# Patient Record
Sex: FEMALE | Race: WHITE | Employment: PART TIME | URBAN - METROPOLITAN AREA
[De-identification: names, ages, dates, MRNs, and addresses within clinical notes are randomized per-mention and may not be internally consistent; named-entity substitution may affect disease eponyms.]

---

## 2017-01-02 ENCOUNTER — MYC MEDICAL ADVICE (OUTPATIENT)
Dept: FAMILY MEDICINE | Facility: CLINIC | Age: 42
End: 2017-01-02

## 2017-03-03 ENCOUNTER — OFFICE VISIT (OUTPATIENT)
Dept: FAMILY MEDICINE | Facility: CLINIC | Age: 42
End: 2017-03-03
Payer: MEDICARE

## 2017-03-03 VITALS
TEMPERATURE: 98 F | DIASTOLIC BLOOD PRESSURE: 74 MMHG | HEART RATE: 94 BPM | RESPIRATION RATE: 12 BRPM | HEIGHT: 63 IN | SYSTOLIC BLOOD PRESSURE: 116 MMHG | WEIGHT: 209 LBS | BODY MASS INDEX: 37.03 KG/M2

## 2017-03-03 DIAGNOSIS — B07.0 PLANTAR WARTS: Primary | ICD-10-CM

## 2017-03-03 DIAGNOSIS — G43.109 MIGRAINE WITH AURA AND WITHOUT STATUS MIGRAINOSUS, NOT INTRACTABLE: ICD-10-CM

## 2017-03-03 DIAGNOSIS — D22.9 NEVUS: ICD-10-CM

## 2017-03-03 DIAGNOSIS — J30.89 PERENNIAL ALLERGIC RHINITIS, UNSPECIFIED ALLERGIC RHINITIS TRIGGER: ICD-10-CM

## 2017-03-03 PROCEDURE — 99212 OFFICE O/P EST SF 10 MIN: CPT | Mod: 25 | Performed by: NURSE PRACTITIONER

## 2017-03-03 PROCEDURE — 17110 DESTRUCTION B9 LES UP TO 14: CPT | Performed by: NURSE PRACTITIONER

## 2017-03-03 RX ORDER — FLUTICASONE PROPIONATE 50 MCG
2 SPRAY, SUSPENSION (ML) NASAL DAILY
Qty: 1 G | Refills: 11 | Status: SHIPPED | OUTPATIENT
Start: 2017-03-03 | End: 2018-01-28

## 2017-03-03 RX ORDER — RIZATRIPTAN BENZOATE 5 MG/1
TABLET ORAL
Qty: 30 TABLET | Refills: 6 | Status: SHIPPED | OUTPATIENT
Start: 2017-03-03 | End: 2017-09-25

## 2017-03-03 NOTE — PROGRESS NOTES
"  SUBJECTIVE:                                                    Prince Patel is a 41 year old female who presents to clinic today for the following health issues:    WART(S)      Onset: 1 month    Description (location/number): both feet    Accompanying signs and symptoms: Painful: no    History: prior warts: YES    Therapies tried and outcome: soaking feet     Prince reports warts on both feet and mole on top of R shoulder that has been \"breaking open and bleeding\" over the past month.    Problem list and histories reviewed & adjusted, as indicated.  Additional history: as documented    Patient Active Problem List   Diagnosis     Allergic rhinitis     Sprain and strain of unspecified site of knee and leg     Obesity     CARDIOVASCULAR SCREENING; LDL GOAL LESS THAN 160     S/P partial hysterectomy     Intertrigo     Migraine with aura and without status migrainosus, not intractable     Past Surgical History   Procedure Laterality Date     Surgical history of -        ingrown toenails removed     Orthopedic surgery       Toenail removal       Laparoscopic hysterectomy supracervical  11/16/2011     Procedure:LAPAROSCOPIC HYSTERECTOMY SUPRACERVICAL; LAPAROSCOPIC HYSTERECTOMY SUPRACERVICAL ; Surgeon:MASOUD WHITE; Location: OR       Social History   Substance Use Topics     Smoking status: Never Smoker     Smokeless tobacco: Never Used     Alcohol use No     Family History   Problem Relation Age of Onset     DIABETES Maternal Aunt      C.A.D. Father      MI, angioplasty, 50s     DIABETES Father      Diabetes     Neurologic Disorder Paternal Grandmother      GASTROINTESTINAL DISEASE Paternal Grandmother      Gluten Intolerance      DIABETES Maternal Grandmother      Multiple Sclerosis         Current Outpatient Prescriptions   Medication Sig Dispense Refill     hydrocortisone (CORTAID) 1 % cream Apply topically 2 times daily 30 g 0     rizatriptan (MAXALT) 5 MG tablet Take  by mouth. ONE TABLET WITH ONSET OF " "MIGRAINE, MAY REPEAT ONCE AFTER 2 HOURS. DO NOT EXCEED 6 TABLETS IN 24 HOURS. 30 tablet 6     fluticasone (FLONASE) 50 MCG/ACT nasal spray Spray 2 sprays into both nostrils daily 1 g 11     fexofenadine (ALLEGRA) 180 MG tablet Take 1 tablet (180 mg) by mouth daily 31 tablet 8     ibuprofen (ADVIL,MOTRIN) 800 MG tablet Take 1 tablet by mouth every 6 hours as needed for pain (For mild pain and temperature greater than 102F). 30 tablet 0     MULTIVITAMINS OR TABS 1 tab qd as directed  0     dicyclomine (BENTYL) 20 MG tablet Take 1 tablet (20 mg) by mouth 4 times daily as needed (Patient not taking: Reported on 3/3/2017) 40 tablet 1     ciprofloxacin (CIPRO) 500 MG tablet Take 1 tablet (500 mg) by mouth 2 times daily (Patient not taking: Reported on 3/3/2017) 14 tablet 0     Allergies   Allergen Reactions     Keflex [Cephalexin] Rash       Reviewed and updated as needed this visit by clinical staff  Tobacco  Allergies  Med Hx  Surg Hx  Fam Hx  Soc Hx      Reviewed and updated as needed this visit by Provider         ROS:  Constitutional, HEENT, cardiovascular, pulmonary, GI, , musculoskeletal, neuro, skin, endocrine and psych systems are negative, except as otherwise noted.    This document serves as a record of the services and decisions personally performed and made by Susan Haase, CNP. It was created on her behalf by Sheyla Ivory, a trained medical scribe. The creation of this document is based the provider's statements to the medical scribe.  Sheyla Ivory March 3, 2017 3:18 PM     OBJECTIVE:                                                    /74 (BP Location: Left arm, Patient Position: Chair, Cuff Size: Adult Regular)  Pulse 94  Temp 98  F (36.7  C) (Oral)  Resp 12  Ht 1.6 m (5' 3\")  Wt 94.8 kg (209 lb)  LMP 10/31/2011  BMI 37.02 kg/m2  Body mass index is 37.02 kg/(m^2).     GENERAL: healthy, alert and no distress, morbidly obese   SKIN: verruca vulgaris on lateral aspect R foot near 5th toe w/ " callus surrounding, 10 mm flesh colored mole with dark brown spot in the center, slightly raised.    NEURO: Normal strength and tone, mentation intact and speech normal  PSYCH: mentation appears normal, affect normal/bright    Diagnostic Test Results:  none      ASSESSMENT/PLAN:                                                    Prince was seen today for wart.    Diagnoses and all orders for this visit:    Plantar warts  -     DESTRUCT BENIGN LESION, UP TO 14  Procedure explained, verbal consent given, cleansed with alcohol, pared with #11 blade, liquid nitrogen applied x 3 pulses (3 seconds each). bandaide placed.  Explained that may take more than 1 treatment, keep covered for next 24 hours, if wart remains return in 3-4 weeks for an additional treatment.    Nevus:  Will monitor for the next 3-4weeks if continues to have bleeding, changes will remove.     Perennial allergic rhinitis, unspecified allergic rhinitis trigger    fluticasone (FLONASE) 50 MCG/ACT spray; Spray 2 sprays into both nostrils daily    Migraine with aura and without status migrainosus, not intractable  -     rizatriptan (MAXALT) 5 MG tablet; Take  by mouth. ONE TABLET WITH ONSET OF MIGRAINE, MAY REPEAT ONCE AFTER 2 HOURS. DO NOT EXCEED 6 TABLETS IN 24 HOURS.      Patient Instructions     Follow up in 3-4 weeks.      The information in this document, created by the medical scribe for me, accurately reflects the services I personally performed and the decisions made by me. I have reviewed and approved this document for accuracy.   Susan Haase, CNP Susan Haase, APRN CNP  [facility]

## 2017-03-03 NOTE — NURSING NOTE
"Chief Complaint   Patient presents with     Wart       Initial /74 (BP Location: Left arm, Patient Position: Chair, Cuff Size: Adult Regular)  Pulse 94  Temp 98  F (36.7  C) (Oral)  Resp 12  Ht 5' 3\" (1.6 m)  Wt 209 lb (94.8 kg)  LMP 10/31/2011  BMI 37.02 kg/m2 Estimated body mass index is 37.02 kg/(m^2) as calculated from the following:    Height as of this encounter: 5' 3\" (1.6 m).    Weight as of this encounter: 209 lb (94.8 kg).  Medication Reconciliation: complete   Lisseth Vanessa CMA      "

## 2017-03-03 NOTE — MR AVS SNAPSHOT
"              After Visit Summary   3/3/2017    Prince Patel    MRN: [id number]           Patient Information     Date Of Birth          1975        Visit Information        Provider Department      3/3/2017 3:30 PM Haase, Susan Rachele, APRN CNP [facility]        Care Instructions      Follow up in 3-4 weeks.        Follow-ups after your visit        Follow-up notes from your care team     Return in about 4 weeks (around 3/31/2017).      Who to contact     If you have questions or need follow up information about today's clinic visit or your schedule please contact [facility] directly at [phone].  Normal or non-critical lab and imaging results will be communicated to you by MyChart, letter or phone within 4 business days after the clinic has received the results. If you do not hear from us within 7 days, please contact the clinic through Open Milehart or phone. If you have a critical or abnormal lab result, we will notify you by phone as soon as possible.  Submit refill requests through travelmob or call your pharmacy and they will forward the refill request to us. Please allow 3 business days for your refill to be completed.          Additional Information About Your Visit        MyChart Information     travelmob gives you secure access to your electronic health record. If you see a primary care provider, you can also send messages to your care team and make appointments. If you have questions, please call your primary care clinic.  If you do not have a primary care provider, please call [phone] and they will assist you.        Care EveryWhere ID     This is your Care EveryWhere ID. This could be used by other organizations to access your [place] medical records  [id number]        Your Vitals Were     Pulse Temperature Respirations Height Last Period BMI (Body Mass Index)    94 98  F (36.7  C) (Oral) 12 5' 3\" (1.6 m) 10/31/2011 37.02 kg/m2       Blood Pressure from " Last 3 Encounters:   03/03/17 116/74   12/16/16 116/88   12/05/16 124/86    Weight from Last 3 Encounters:   03/03/17 209 lb (94.8 kg)   12/16/16 199 lb (90.3 kg)   12/05/16 203 lb (92.1 kg)              Today, you had the following     No orders found for display       Primary Care Provider Office Phone # Fax #    Susan Rachele Haase ELY [phone]       [facility] [address] [address] [facility] [address]        Thank you!     Thank you for choosing [facility]  for your care. Our goal is always to provide you with excellent care. Hearing back from our patients is one way we can continue to improve our services. Please take a few minutes to complete the written survey that you may receive in the mail after your visit with us. Thank you!             Your Updated Medication List - Protect others around you: Learn how to safely use, store and throw away your medicines at www.disposemymeds.org.          This list is accurate as of: 3/3/17  3:39 PM.  Always use your most recent med list.                   Brand Name Dispense Instructions for use    dicyclomine 20 MG tablet    BENTYL    40 tablet    Take 1 tablet (20 mg) by mouth 4 times daily as needed       fexofenadine 180 MG tablet    ALLEGRA    31 tablet    Take 1 tablet (180 mg) by mouth daily       fluticasone 50 MCG/ACT spray    FLONASE    1 g    Spray 2 sprays into both nostrils daily       hydrocortisone 1 % cream    CORTAID    30 g    Apply topically 2 times daily       ibuprofen 800 MG tablet    ADVIL/MOTRIN    30 tablet    Take 1 tablet by mouth every 6 hours as needed for pain (For mild pain and temperature greater than 102F).       multivitamin per tablet      1 tab qd as directed       rizatriptan 5 MG tablet    MAXALT    30 tablet    Take  by mouth. ONE TABLET WITH ONSET OF MIGRAINE, MAY REPEAT ONCE AFTER 2 HOURS. DO NOT EXCEED 6 TABLETS IN 24 HOURS.

## 2017-03-31 ENCOUNTER — OFFICE VISIT (OUTPATIENT)
Dept: FAMILY MEDICINE | Facility: CLINIC | Age: 42
End: 2017-03-31
Payer: MEDICARE

## 2017-03-31 VITALS
TEMPERATURE: 98.1 F | DIASTOLIC BLOOD PRESSURE: 76 MMHG | BODY MASS INDEX: 36.99 KG/M2 | OXYGEN SATURATION: 98 % | SYSTOLIC BLOOD PRESSURE: 112 MMHG | HEART RATE: 85 BPM | WEIGHT: 208.8 LBS

## 2017-03-31 DIAGNOSIS — R05.9 COUGH: ICD-10-CM

## 2017-03-31 DIAGNOSIS — D22.9 ATYPICAL NEVUS: Primary | ICD-10-CM

## 2017-03-31 DIAGNOSIS — J06.9 VIRAL URI WITH COUGH: ICD-10-CM

## 2017-03-31 PROCEDURE — 99213 OFFICE O/P EST LOW 20 MIN: CPT | Mod: 25 | Performed by: NURSE PRACTITIONER

## 2017-03-31 PROCEDURE — 11300 SHAVE SKIN LESION 0.5 CM/<: CPT | Performed by: NURSE PRACTITIONER

## 2017-03-31 PROCEDURE — 88305 TISSUE EXAM BY PATHOLOGIST: CPT | Performed by: NURSE PRACTITIONER

## 2017-03-31 PROCEDURE — 88305 TISSUE EXAM BY PATHOLOGIST: CPT | Mod: 26 | Performed by: NURSE PRACTITIONER

## 2017-03-31 RX ORDER — BENZONATATE 200 MG/1
200 CAPSULE ORAL 3 TIMES DAILY PRN
Qty: 21 CAPSULE | Refills: 0 | Status: SHIPPED | OUTPATIENT
Start: 2017-03-31 | End: 2017-09-25

## 2017-03-31 NOTE — PROGRESS NOTES
SUBJECTIVE:                                                    Prince Patel is a 41 year old female who presents to clinic today for the following health issues:    Pt is here for a mole removal, has had a mole on the right upper back for several months, slightly larger in size.      Cough:  Has had cold symptoms for the past week, cough remains.  Dry and frequent cough, increased at the night.  Denies shortness of breath, chest pain or fever.       Problem list and histories reviewed & adjusted, as indicated.  Additional history: as documented    Patient Active Problem List   Diagnosis     Allergic rhinitis     Sprain and strain of unspecified site of knee and leg     Obesity     CARDIOVASCULAR SCREENING; LDL GOAL LESS THAN 160     S/P partial hysterectomy     Intertrigo     Migraine with aura and without status migrainosus, not intractable     Past Surgical History:   Procedure Laterality Date     LAPAROSCOPIC HYSTERECTOMY SUPRACERVICAL  11/16/2011    Procedure:LAPAROSCOPIC HYSTERECTOMY SUPRACERVICAL; LAPAROSCOPIC HYSTERECTOMY SUPRACERVICAL ; Surgeon:MASOUD WHITE; Location:[facility] OR     ORTHOPEDIC SURGERY       SURGICAL HISTORY OF -       ingrown toenails removed     toenail removal         Social History   Substance Use Topics     Smoking status: Never Smoker     Smokeless tobacco: Never Used     Alcohol use No     Family History   Problem Relation Age of Onset     DIABETES Maternal Aunt      C.A.D. Father      MI, angioplasty, 50s     DIABETES Father      Diabetes     Neurologic Disorder Paternal Grandmother      GASTROINTESTINAL DISEASE Paternal Grandmother      Gluten Intolerance      DIABETES Maternal Grandmother      Multiple Sclerosis         Current Outpatient Prescriptions   Medication Sig Dispense Refill     benzonatate (TESSALON) 200 MG capsule Take 1 capsule (200 mg) by mouth 3 times daily as needed for cough 21 capsule 0     rizatriptan (MAXALT) 5 MG tablet Take  by mouth. ONE TABLET WITH ONSET  OF MIGRAINE, MAY REPEAT ONCE AFTER 2 HOURS. DO NOT EXCEED 6 TABLETS IN 24 HOURS. 30 tablet 6     fluticasone (FLONASE) 50 MCG/ACT spray Spray 2 sprays into both nostrils daily 1 g 11     dicyclomine (BENTYL) 20 MG tablet Take 1 tablet (20 mg) by mouth 4 times daily as needed 40 tablet 1     hydrocortisone (CORTAID) 1 % cream Apply topically 2 times daily 30 g 0     fexofenadine (ALLEGRA) 180 MG tablet Take 1 tablet (180 mg) by mouth daily 31 tablet 8     ibuprofen (ADVIL,MOTRIN) 800 MG tablet Take 1 tablet by mouth every 6 hours as needed for pain (For mild pain and temperature greater than 102F). 30 tablet 0     MULTIVITAMINS OR TABS 1 tab qd as directed  0     Allergies   Allergen Reactions     Keflex [Cephalexin] Rash       Reviewed and updated as needed this visit by clinical staff       Reviewed and updated as needed this visit by Provider         ROS:  C: NEGATIVE for fever, chills, change in weight  INTEGUMENTARY/SKIN: see HPI  E/M: NEGATIVE for ear, mouth and throat problems  R: NEGATIVE for significant cough or SOB  CV: NEGATIVE for chest pain, palpitations or peripheral edema    OBJECTIVE:                                                    /76 (BP Location: Right arm, Patient Position: Chair, Cuff Size: Adult Regular)  Pulse 85  Temp 98.1  F (36.7  C) (Oral)  Wt 208 lb 12.8 oz (94.7 kg)  LMP 10/31/2011  SpO2 98%  BMI 36.99 kg/m2  Body mass index is 36.99 kg/(m^2).   GENERAL: healthy, alert and no distress  NECK: no adenopathy, no asymmetry, masses, or scars and thyroid normal to palpation  RESP: lungs clear to auscultation - no rales, rhonchi or wheezes  CV: regular rate and rhythm, normal S1 S2, no S3 or S4, no murmur, click or rub, no peripheral edema   SKIN: right upper back with a 5 mm nevus, slightly raised.          ASSESSMENT:                                                    See below     PLAN:                                                    Prince was seen today for  mole.    Diagnoses and all orders for this visit:    Atypical nevus  -     Surgical pathology exam  -     BIOPSY SKIN/SUBQ/MUC MEM, SINGLE LESION    Skin Biopsy Procedure Note: right upper back    Consent: Affirmation of informed consent was signed and scanned into the medical record. Risks, benefits and alternatives were discussed. Patient's questions were elicited and answered.     Technique:   Skin prep Betadine  Anesthesia 0.5 cc 1% lidocaine, with epi   Biopsy size 0.5 cm  Biopsy taken via (shave, punch, incisional) shave  Suture  none  Hemostasis  monopolar cautery      EBL:    0  Complications:  No  Tolerance:   Pt tolerated procedure well and was in stable condition.   Pathology sent Yes    Instructions:    Pt should keep dressing in place for 24 hours, then may change and apply antibiotic ointment and simple bandage. May shower after 24 hours.  Pt was instructed to call if bleeding, severe pain or foul smell.   Follow up only if unimproved.    Viral URI with cough  -     benzonatate (TESSALON) 200 MG capsule; Take 1 capsule (200 mg) by mouth 3 times daily as needed for cough        FUTURE APPOINTMENTS:       - Follow-up visit as needed    Susan Haase, APRN [facility]

## 2017-03-31 NOTE — MR AVS SNAPSHOT
After Visit Summary   3/31/2017    Prince Patel    MRN: [id number]           Patient Information     Date Of Birth          1975        Visit Information        Provider Department      3/31/2017 3:30 PM Haase, Susan Rachele, APRN CNP [facility]        Today's Diagnoses     Cough    -  1       Follow-ups after your visit        Who to contact     If you have questions or need follow up information about today's clinic visit or your schedule please contact [facility] directly at [phone].  Normal or non-critical lab and imaging results will be communicated to you by Bypass Mobilehart, letter or phone within 4 business days after the clinic has received the results. If you do not hear from us within 7 days, please contact the clinic through myTomorrowst or phone. If you have a critical or abnormal lab result, we will notify you by phone as soon as possible.  Submit refill requests through 1SDK or call your pharmacy and they will forward the refill request to us. Please allow 3 business days for your refill to be completed.          Additional Information About Your Visit        MyChart Information     1SDK gives you secure access to your electronic health record. If you see a primary care provider, you can also send messages to your care team and make appointments. If you have questions, please call your primary care clinic.  If you do not have a primary care provider, please call [phone] and they will assist you.        Care EveryWhere ID     This is your Care EveryWhere ID. This could be used by other organizations to access your [place] medical records  [id number]        Your Vitals Were     Pulse Temperature Last Period Pulse Oximetry BMI (Body Mass Index)       85 98.1  F (36.7  C) (Oral) 10/31/2011 98% 36.99 kg/m2        Blood Pressure from Last 3 Encounters:   03/31/17 112/76   03/03/17 116/74   12/16/16 116/88    Weight from Last 3 Encounters:    03/31/17 208 lb 12.8 oz (94.7 kg)   03/03/17 209 lb (94.8 kg)   12/16/16 199 lb (90.3 kg)              Today, you had the following     No orders found for display         Today's Medication Changes          These changes are accurate as of: 3/31/17  3:51 PM.  If you have any questions, ask your nurse or doctor.               Start taking these medicines.        Dose/Directions    benzonatate 200 MG capsule   Commonly known as:  TESSALON   Used for:  Cough   Started by:  Haase, Susan Rachele, APRN CNP        Dose:  200 mg   Take 1 capsule (200 mg) by mouth 3 times daily as needed for cough   Quantity:  21 capsule   Refills:  0            Where to get your medicines      These medications were sent to [facility]/pharmacy #[id number] - [facility] [address]  [address] [facility] [address]     Phone:  [phone]     benzonatate 200 MG capsule                Primary Care Provider Office Phone # Fax #    Susan Rachele Haase, APRN -[phone]       [facility] [id number] [place] [address]        Thank you!     Thank you for choosing [facility]  for your care. Our goal is always to provide you with excellent care. Hearing back from our patients is one way we can continue to improve our services. Please take a few minutes to complete the written survey that you may receive in the mail after your visit with us. Thank you!             Your Updated Medication List - Protect others around you: Learn how to safely use, store and throw away your medicines at www.disposemymeds.org.          This list is accurate as of: 3/31/17  3:51 PM.  Always use your most recent med list.                   Brand Name Dispense Instructions for use    benzonatate 200 MG capsule    TESSALON    21 capsule    Take 1 capsule (200 mg) by mouth 3 times daily as needed for cough       dicyclomine 20 MG tablet    BENTYL    40 tablet    Take 1 tablet (20 mg) by mouth 4 times  daily as needed       fexofenadine 180 MG tablet    ALLEGRA    31 tablet    Take 1 tablet (180 mg) by mouth daily       fluticasone 50 MCG/ACT spray    FLONASE    1 g    Spray 2 sprays into both nostrils daily       hydrocortisone 1 % cream    CORTAID    30 g    Apply topically 2 times daily       ibuprofen 800 MG tablet    ADVIL/MOTRIN    30 tablet    Take 1 tablet by mouth every 6 hours as needed for pain (For mild pain and temperature greater than 102F).       multivitamin per tablet      1 tab qd as directed       rizatriptan 5 MG tablet    MAXALT    30 tablet    Take  by mouth. ONE TABLET WITH ONSET OF MIGRAINE, MAY REPEAT ONCE AFTER 2 HOURS. DO NOT EXCEED 6 TABLETS IN 24 HOURS.

## 2017-03-31 NOTE — NURSING NOTE
"Chief Complaint   Patient presents with     Mole       Initial /76 (BP Location: Right arm, Patient Position: Chair, Cuff Size: Adult Regular)  Pulse 85  Temp 98.1  F (36.7  C) (Oral)  Wt 208 lb 12.8 oz (94.7 kg)  LMP 10/31/2011  SpO2 98%  BMI 36.99 kg/m2 Estimated body mass index is 36.99 kg/(m^2) as calculated from the following:    Height as of 3/3/17: 5' 3\" (1.6 m).    Weight as of this encounter: 208 lb 12.8 oz (94.7 kg).  BP completed using cuff size: regular rick Gallegos MA    "

## 2017-04-04 LAB — COPATH REPORT: NORMAL

## 2017-04-05 NOTE — PROGRESS NOTES
Ryan Morrison,  The skin lesion was a normal mole, no concerns.  Please let me know if you have any questions.  Sincerely,     Susan Haase, CNP

## 2017-08-21 ENCOUNTER — TELEPHONE (OUTPATIENT)
Dept: FAMILY MEDICINE | Facility: CLINIC | Age: 42
End: 2017-08-21

## 2017-08-21 DIAGNOSIS — L30.4 INTERTRIGO: ICD-10-CM

## 2017-08-21 NOTE — TELEPHONE ENCOUNTER
Ph. [phone] after 2:30    Reason for call:  Symptom   Symptom or request: return of yeast infection    Duration (how long have symptoms been present):   Have you been treated for this before? Yes    Additional comments: Patient states Susan Haase had her using foot powder but now the infection seems to have returned.    Please advise      Phone number to reach patient:  Home number on file [phone] (home)    Best Time:  After 2:30    Can we leave a detailed message on this number?  YES     Nalini Prieto

## 2017-08-22 RX ORDER — NYSTATIN 100000 [USP'U]/G
POWDER TOPICAL
Qty: 1 BOTTLE | Refills: 11 | Status: SHIPPED | OUTPATIENT
Start: 2017-08-22 | End: 2018-05-07

## 2017-08-22 NOTE — TELEPHONE ENCOUNTER
Patient returned call to [facility].  I advised patient that a prescription was sent to [facility] pharmacy.    Umu Conley/

## 2017-09-25 ENCOUNTER — RADIANT APPOINTMENT (OUTPATIENT)
Dept: GENERAL RADIOLOGY | Facility: CLINIC | Age: 42
End: 2017-09-25
Attending: NURSE PRACTITIONER
Payer: MEDICARE

## 2017-09-25 ENCOUNTER — OFFICE VISIT (OUTPATIENT)
Dept: FAMILY MEDICINE | Facility: CLINIC | Age: 42
End: 2017-09-25
Payer: MEDICARE

## 2017-09-25 VITALS
DIASTOLIC BLOOD PRESSURE: 74 MMHG | RESPIRATION RATE: 12 BRPM | OXYGEN SATURATION: 100 % | HEART RATE: 84 BPM | TEMPERATURE: 98.3 F | WEIGHT: 210 LBS | SYSTOLIC BLOOD PRESSURE: 118 MMHG | BODY MASS INDEX: 37.2 KG/M2

## 2017-09-25 DIAGNOSIS — Z12.31 ENCOUNTER FOR SCREENING MAMMOGRAM FOR MALIGNANT NEOPLASM OF BREAST: ICD-10-CM

## 2017-09-25 DIAGNOSIS — M25.561 ACUTE PAIN OF RIGHT KNEE: Primary | ICD-10-CM

## 2017-09-25 DIAGNOSIS — G43.109 MIGRAINE WITH AURA AND WITHOUT STATUS MIGRAINOSUS, NOT INTRACTABLE: ICD-10-CM

## 2017-09-25 DIAGNOSIS — Z12.39 BREAST CANCER SCREENING: ICD-10-CM

## 2017-09-25 DIAGNOSIS — M25.561 ACUTE PAIN OF RIGHT KNEE: ICD-10-CM

## 2017-09-25 PROCEDURE — 73562 X-RAY EXAM OF KNEE 3: CPT | Mod: RT

## 2017-09-25 PROCEDURE — 99213 OFFICE O/P EST LOW 20 MIN: CPT | Performed by: NURSE PRACTITIONER

## 2017-09-25 RX ORDER — RIZATRIPTAN BENZOATE 5 MG/1
TABLET ORAL
Qty: 30 TABLET | Refills: 6 | Status: SHIPPED | OUTPATIENT
Start: 2017-09-25 | End: 2018-05-07 | Stop reason: ALTCHOICE

## 2017-09-25 NOTE — MR AVS SNAPSHOT
After Visit Summary   9/25/2017    Prince Patel    MRN: [id number]           Patient Information     Date Of Birth          1975        Visit Information        Provider Department      9/25/2017 1:30 PM Haase, Susan Rachele, APRN [facility]        Today's Diagnoses     Acute pain of right knee    -  1    Migraine with aura and without status migrainosus, not intractable          Care Instructions      Knee Pain  Knee pain is very common. It s especially common in active people who put a lot of pressure on their knees, like runners. It affects women more often than men.  Your kneecap (patella) is a thick, round bone. It covers and protects the front portion of your knee joint. It moves along a groove in your thighbone (femur) as part of the patellofemoral joint. A layer of cartilage surrounds the underside of your kneecap. This layer protects it from grinding against your femur.  When this cartilage softens and breaks down, it can cause knee pain. This is partly because of repetitive stress. The stress irritates the lining of the joint. This causes pain in the underlying bone.  What causes knee pain?  Many things can cause knee pain. You may have more than one cause. Some of these include:    Overuse of the knee joint    The kneecap doesn t line up with the tissue around it    Damage to small nerves in the area    Damage to the ligament-like structure that holds the kneecap in place (retinaculum)    Breakdown of the bone under the cartilage    Swelling in the soft tissues around the kneecap    Injury  You might be more likely to have knee pain if you:    Exercise a lot    Recently increased the intensity of your workouts    Have a body mass index (BMI) greater than 25    Have poor alignment of your kneecap    Walk with your feet turned overly outward or inward    Have weakness in surrounding muscle groups (inner quad or hip adductor muscles)    Have too much tightness in  surrounding muscle groups (hamstrings or iliotibial band)    Have a recent history of injury to the area    Are female  Symptoms of knee pain  This type of knee pain is a dull, aching pain in the front of the knee in the area under and around the kneecap. This pain may start quickly or slowly. Your pain might be worse when you squat, run, or sit for a long time. You might also sometimes feel like your knee is giving out. You may have symptoms in one or both of your knees.  Diagnosing knee pain  Your healthcare provider will ask about your medical history and your symptoms. Be sure to describe any activities that make your knee pain worse. He or she will look at your knee. This will include tests of your range of motion, strength, and areas of pain of your knee. Your knee alignment will be checked.  Your healthcare provider will need to rule out other causes of your knee pain, such as arthritis. You may need an imaging test, such as an X-ray or MRI.  Treatment for knee pain  Treatments that can help ease your symptoms may include:    Avoiding activities for a while that make your pain worse, returning to activity over time    Icing the outside of your knee when it causes you pain    Taking over-the-counter pain medicine    Wearing a knee brace or taping your knee to support it    Wearing special shoe inserts to help keep your feet in the proper alignment    Doing special exercises to stretch and strengthen the muscles around your hip and your knee  These steps help most people manage knee pain. But some cases of knee pain need to be treated with surgery. You may need surgery right away. Or you may need it later if other treatments don t work. Your healthcare provider may refer you to an orthopedic surgeon. He or she will talk with you about your choices.  Preventing knee pain  Losing weight and correcting excess muscle tightness or muscle weakness may help lower your risk.  In some cases, you can prevent knee pain.  To help prevent a flare-up of knee pain, you do these things:    Regularly do all the exercises your doctor or physical therapist advises    Support your knee as advised by your doctor or physical therapist    Increase training gradually, and ease up on training when needed    Have an expert check your gait for running or other sporting activities    Stretch properly before and after exercise    Replace your running shoes regularly    Lose excess weight     When to call your healthcare provider  Call your healthcare provider right away if:    Your symptoms don t get better after a few weeks of treatment    You have any new symptoms   Date Last Reviewed: 4/1/2017 2000-2017 [employer]. [address] [place] [address]. All rights reserved. This information is not intended as a substitute for professional medical care. Always follow your healthcare professional's instructions.                Follow-ups after your visit        Follow-up notes from your care team     Return if symptoms worsen or fail to improve.      Your next 10 appointments already scheduled     Sep 25, 2017  1:30 PM CDT   SHORT with Susan Rachele Haase, APRN CNP   [facility] ([facility])    [address] [place] [id number] [phone]              Who to contact     If you have questions or need follow up information about today's clinic visit or your schedule please contact [facility] directly at [phone].  Normal or non-critical lab and imaging results will be communicated to you by MyChart, letter or phone within 4 business days after the clinic has received the results. If you do not hear from us within 7 days, please contact the clinic through MyChart or phone. If you have a critical or abnormal lab result, we will notify you by phone as soon as possible.  Submit refill requests through [employer] or call your pharmacy and they will forward  the refill request to us. Please allow 3 business days for your refill to be completed.          Additional Information About Your Visit        [address] Information     [employer] gives you secure access to your electronic health record. If you see a primary care provider, you can also send messages to your care team and make appointments. If you have questions, please call your primary care clinic.  If you do not have a primary care provider, please call [phone] and they will assist you.        Care EveryWhere ID     This is your Care EveryWhere ID. This could be used by other organizations to access your [facility] medical records  [id number]        Your Vitals Were     Pulse Temperature Respirations Last Period Pulse Oximetry BMI (Body Mass Index)    84 98.3  F (36.8  C) (Oral) 12 10/31/2011 100% 37.2 kg/m2       Blood Pressure from Last 3 Encounters:   09/25/17 118/74   03/31/17 112/76   03/03/17 116/74    Weight from Last 3 Encounters:   09/25/17 210 lb (95.3 kg)   03/31/17 208 lb 12.8 oz (94.7 kg)   03/03/17 209 lb (94.8 kg)                 Today's Medication Changes          These changes are accurate as of: 9/25/17  1:28 PM.  If you have any questions, ask your nurse or doctor.               Start taking these medicines.        Dose/Directions    order for DME   Used for:  Acute pain of right knee   Started by:  Haase, Susan Rachele, APRN CNP        DME:  Closed knee sleeve   Quantity:  1 Device   Refills:  0            Where to get your medicines      These medications were sent to [facility]/pharm[id number] [address]  [address] [facility] [address]     Phone:  [phone]     rizatriptan 5 MG tablet         Some of these will need a paper prescription and others can be bought over the counter.  Ask your nurse if you have questions.     Bring a paper prescription for each of these medications     order for DME                Primary Care Provider Office Phone # Fax #     Susan Rachele Haase, APRN [phone]       [address] [facility] [address]        Equal Access to Services     ROBERT HACKETT : Hadii aad ku hadviri Sofrankie, walada luqadaha, qaybta kaalmada pedro, hilda bishop laDannyamina elizondo. So [facility] [phone].    ATENCIÓN: Si habla español, tiene a ty disposición servicios gratuitos de asistencia lingüística. Llame al [phone].    We comply with applicable federal civil rights laws and [place] laws. We do not discriminate on the basis of race, color, national origin, age, disability sex, sexual orientation or gender identity.            Thank you!     Thank you for choosing [facility]  for your care. Our goal is always to provide you with excellent care. Hearing back from our patients is one way we can continue to improve our services. Please take a few minutes to complete the written survey that you may receive in the mail after your visit with us. Thank you!             Your Updated Medication List - Protect others around you: Learn how to safely use, store and throw away your medicines at www.disposemymeds.org.          This list is accurate as of: 9/25/17  1:28 PM.  Always use your most recent med list.                   Brand Name Dispense Instructions for use Diagnosis    fexofenadine 180 MG tablet    ALLEGRA    31 tablet    Take 1 tablet (180 mg) by mouth daily    Allergic rhinitis, cause unspecified       fluticasone 50 MCG/ACT spray    FLONASE    1 g    Spray 2 sprays into both nostrils daily    Perennial allergic rhinitis, unspecified allergic rhinitis trigger       hydrocortisone 1 % cream    CORTAID    30 g    Apply topically 2 times daily    External hemorrhoids       ibuprofen 800 MG tablet    ADVIL/MOTRIN    30 tablet    Take 1 tablet by mouth every 6 hours as needed for pain (For mild pain and temperature greater than 102F).    Excessive or frequent menstruation       multivitamin per tablet       1 tab qd as directed        nystatin 955080 UNIT/GM Powd    MYCOSTATIN    1 Bottle    Externally apply 1 dose topically 3 times daily as needed.    Intertrigo       order for DME     1 Device    DME:  Closed knee sleeve    Acute pain of right knee       rizatriptan 5 MG tablet    MAXALT    30 tablet    Take  by mouth. ONE TABLET WITH ONSET OF MIGRAINE, MAY REPEAT ONCE AFTER 2 HOURS. DO NOT EXCEED 6 TABLETS IN 24 HOURS.    Migraine with aura and without status migrainosus, not intractable

## 2017-09-25 NOTE — NURSING NOTE
"Chief Complaint   Patient presents with     Musculoskeletal Problem       Initial /74 (BP Location: Left arm, Patient Position: Chair, Cuff Size: Adult Large)  Pulse 84  Temp 98.3  F (36.8  C) (Oral)  Resp 12  Wt 210 lb (95.3 kg)  LMP 10/31/2011  SpO2 100%  BMI 37.2 kg/m2 Estimated body mass index is 37.2 kg/(m^2) as calculated from the following:    Height as of 3/3/17: 5' 3\" (1.6 m).    Weight as of this encounter: 210 lb (95.3 kg).  Medication Reconciliation: complete   Lisseth Vanessa CMA      "

## 2017-09-25 NOTE — PROGRESS NOTES
SUBJECTIVE:   Prince Patel is a 42 year old female who presents to clinic today for the following health issues:    Musculoskeletal problem/pain      Duration: 1 week    Description  Location: R knee    Intensity:  mild    Accompanying signs and symptoms: popping sound    History  Previous similar problem: no   Previous evaluation:  none    Precipitating or alleviating factors:  Trauma or overuse: no   Aggravating factors include: climbing stairs    Therapies tried and outcome: acetaminophen    Gradual onset of popping noise of R knee. The popping noise is often occurring. She walks often for work, around 10,500 steps per [employer], which does exacerbate discomfort. At rest it hurts intermittently, when she moves her lower leg side to side she feels pain, and doing stairs are difficult. To alleviate pain she's taken Ibuprofen and applied heat. Denies swelling. Of note, patient has had previous injury to both knees when she fell about 5 years ago.     Problem list and histories reviewed & adjusted, as indicated.  Additional history: as documented    Patient Active Problem List   Diagnosis     Allergic rhinitis     Sprain and strain of unspecified site of knee and leg     Obesity     CARDIOVASCULAR SCREENING; LDL GOAL LESS THAN 160     S/P partial hysterectomy     Intertrigo     Migraine with aura and without status migrainosus, not intractable     Past Surgical History:   Procedure Laterality Date     LAPAROSCOPIC HYSTERECTOMY SUPRACERVICAL  11/16/2011    Procedure:LAPAROSCOPIC HYSTERECTOMY SUPRACERVICAL; LAPAROSCOPIC HYSTERECTOMY SUPRACERVICAL ; Surgeon:MASOUD WHITE; Location:[facility] OR     ORTHOPEDIC SURGERY       SURGICAL HISTORY OF -       ingrown toenails removed     toenail removal         Social History   Substance Use Topics     Smoking status: Never Smoker     Smokeless tobacco: Never Used     Alcohol use No     Family History   Problem Relation Age of Onset     DIABETES Maternal Aunt      C.A.D. Father       MI, angioplasty, 50s     DIABETES Father      Diabetes     Neurologic Disorder Paternal Grandmother      GASTROINTESTINAL DISEASE Paternal Grandmother      Gluten Intolerance      DIABETES Maternal Grandmother      Multiple Sclerosis         Current Outpatient Prescriptions   Medication Sig Dispense Refill     rizatriptan (MAXALT) 5 MG tablet Take  by mouth. ONE TABLET WITH ONSET OF MIGRAINE, MAY REPEAT ONCE AFTER 2 HOURS. DO NOT EXCEED 6 TABLETS IN 24 HOURS. 30 tablet 6     order for DME DME:  Closed knee sleeve 1 Device 0     nystatin (MYCOSTATIN) 504011 UNIT/GM POWD Externally apply 1 dose topically 3 times daily as needed. 1 Bottle 11     fluticasone (FLONASE) 50 MCG/ACT spray Spray 2 sprays into both nostrils daily 1 g 11     hydrocortisone (CORTAID) 1 % cream Apply topically 2 times daily 30 g 0     fexofenadine (ALLEGRA) 180 MG tablet Take 1 tablet (180 mg) by mouth daily 31 tablet 8     ibuprofen (ADVIL,MOTRIN) 800 MG tablet Take 1 tablet by mouth every 6 hours as needed for pain (For mild pain and temperature greater than 102F). 30 tablet 0     MULTIVITAMINS OR TABS 1 tab qd as directed  0     [DISCONTINUED] rizatriptan (MAXALT) 5 MG tablet Take  by mouth. ONE TABLET WITH ONSET OF MIGRAINE, MAY REPEAT ONCE AFTER 2 HOURS. DO NOT EXCEED 6 TABLETS IN 24 HOURS. 30 tablet 6     Allergies   Allergen Reactions     Keflex [Cephalexin] Rash         Reviewed and updated as needed this visit by clinical staff     Reviewed and updated as needed this visit by Provider       ROS:  Constitutional, HEENT, cardiovascular, pulmonary, GI, , musculoskeletal, neuro, skin, endocrine and psych systems are negative, except as otherwise noted.    This document serves as a record of the services and decisions personally performed and made by Susan Haase, CNP. It was created on her behalf by Romelia Salazar, a trained medical scribe. The creation of this document is based on the provider's statements to the medical scribe.  Romelia  Martin 1:20 PM September 25, 2017    OBJECTIVE:   /74 (BP Location: Left arm, Patient Position: Chair, Cuff Size: Adult Large)  Pulse 84  Temp 98.3  F (36.8  C) (Oral)  Resp 12  Wt 95.3 kg (210 lb)  LMP 10/31/2011  SpO2 100%  BMI 37.2 kg/m2  Body mass index is 37.2 kg/(m^2).  GENERAL: healthy, alert and no distress  RESP: lungs clear to auscultation - no rales, rhonchi or wheezes  CV: regular rate and rhythm, normal S1 S2, no S3 or S4, no murmur, click or rub, no peripheral edema  MS: R knee: lateral and medial tenderness to palpation, full  Flexion and extension, walking with normal gait.  NEURO: Normal strength and tone, mentation intact and speech normal  PSYCH: mentation appears normal, affect normal/bright    ASSESSMENT/PLAN:   Prince was seen today for musculoskeletal problem.    Diagnoses and all orders for this visit:    Acute pain of right knee;  Xray appears normal, patient informed.  Discussed taking tylenol for acute pain, apply ice for 15-20 min tid, wear knee sleeve when at work.   -     XR Knee Right 3 Views; Future    Migraine with aura and without status migrainosus, not intractable: well controlled, refill below  -     rizatriptan (MAXALT) 5 MG tablet; Take  by mouth. ONE TABLET WITH ONSET OF MIGRAINE, MAY REPEAT ONCE AFTER 2 HOURS. DO NOT EXCEED 6 TABLETS IN 24 HOURS.  Follow p in 2 weeks if symptoms get worse or do not improve    The information in this document, created by the medical scribe for me, accurately reflects the services I personally performed and the decisions made by me. I have reviewed and approved this document for accuracy prior to leaving the patient care area.  September 25, 2017 1:23 PM  Susan Haase, APRN [facility]

## 2017-09-25 NOTE — PATIENT INSTRUCTIONS
Knee Pain  Knee pain is very common. It s especially common in active people who put a lot of pressure on their knees, like runners. It affects women more often than men.  Your kneecap (patella) is a thick, round bone. It covers and protects the front portion of your knee joint. It moves along a groove in your thighbone (femur) as part of the patellofemoral joint. A layer of cartilage surrounds the underside of your kneecap. This layer protects it from grinding against your femur.  When this cartilage softens and breaks down, it can cause knee pain. This is partly because of repetitive stress. The stress irritates the lining of the joint. This causes pain in the underlying bone.  What causes knee pain?  Many things can cause knee pain. You may have more than one cause. Some of these include:    Overuse of the knee joint    The kneecap doesn t line up with the tissue around it    Damage to small nerves in the area    Damage to the ligament-like structure that holds the kneecap in place (retinaculum)    Breakdown of the bone under the cartilage    Swelling in the soft tissues around the kneecap    Injury  You might be more likely to have knee pain if you:    Exercise a lot    Recently increased the intensity of your workouts    Have a body mass index (BMI) greater than 25    Have poor alignment of your kneecap    Walk with your feet turned overly outward or inward    Have weakness in surrounding muscle groups (inner quad or hip adductor muscles)    Have too much tightness in surrounding muscle groups (hamstrings or iliotibial band)    Have a recent history of injury to the area    Are female  Symptoms of knee pain  This type of knee pain is a dull, aching pain in the front of the knee in the area under and around the kneecap. This pain may start quickly or slowly. Your pain might be worse when you squat, run, or sit for a long time. You might also sometimes feel like your knee is giving out. You may have symptoms in  one or both of your knees.  Diagnosing knee pain  Your healthcare provider will ask about your medical history and your symptoms. Be sure to describe any activities that make your knee pain worse. He or she will look at your knee. This will include tests of your range of motion, strength, and areas of pain of your knee. Your knee alignment will be checked.  Your healthcare provider will need to rule out other causes of your knee pain, such as arthritis. You may need an imaging test, such as an X-ray or MRI.  Treatment for knee pain  Treatments that can help ease your symptoms may include:    Avoiding activities for a while that make your pain worse, returning to activity over time    Icing the outside of your knee when it causes you pain    Taking over-the-counter pain medicine    Wearing a knee brace or taping your knee to support it    Wearing special shoe inserts to help keep your feet in the proper alignment    Doing special exercises to stretch and strengthen the muscles around your hip and your knee  These steps help most people manage knee pain. But some cases of knee pain need to be treated with surgery. You may need surgery right away. Or you may need it later if other treatments don t work. Your healthcare provider may refer you to an orthopedic surgeon. He or she will talk with you about your choices.  Preventing knee pain  Losing weight and correcting excess muscle tightness or muscle weakness may help lower your risk.  In some cases, you can prevent knee pain. To help prevent a flare-up of knee pain, you do these things:    Regularly do all the exercises your doctor or physical therapist advises    Support your knee as advised by your doctor or physical therapist    Increase training gradually, and ease up on training when needed    Have an expert check your gait for running or other sporting activities    Stretch properly before and after exercise    Replace your running shoes regularly    Lose excess  weight     When to call your healthcare provider  Call your healthcare provider right away if:    Your symptoms don t get better after a few weeks of treatment    You have any new symptoms   Date Last Reviewed: 4/1/2017 2000-2017 [employer]. [address] [place] [address], [place] [address]. All rights reserved. This information is not intended as a substitute for professional medical care. Always follow your healthcare professional's instructions.

## 2017-09-27 ENCOUNTER — RADIANT APPOINTMENT (OUTPATIENT)
Dept: MAMMOGRAPHY | Facility: CLINIC | Age: 42
End: 2017-09-27
Payer: MEDICARE

## 2017-09-27 DIAGNOSIS — Z12.31 ENCOUNTER FOR SCREENING MAMMOGRAM FOR MALIGNANT NEOPLASM OF BREAST: ICD-10-CM

## 2017-09-27 DIAGNOSIS — Z12.39 BREAST CANCER SCREENING: ICD-10-CM

## 2017-09-27 PROCEDURE — G0202 SCR MAMMO BI INCL CAD: HCPCS | Mod: TC

## 2017-09-27 NOTE — PROGRESS NOTES
Ryan Morrison,  The xray of your knee was normal.  Please let me know if you have any questions.  Sincerely,     Susan Haase, CNP

## 2017-10-26 ENCOUNTER — OFFICE VISIT (OUTPATIENT)
Dept: FAMILY MEDICINE | Facility: CLINIC | Age: 42
End: 2017-10-26
Payer: MEDICARE

## 2017-10-26 VITALS
RESPIRATION RATE: 16 BRPM | HEART RATE: 113 BPM | OXYGEN SATURATION: 97 % | SYSTOLIC BLOOD PRESSURE: 130 MMHG | HEIGHT: 63 IN | WEIGHT: 208 LBS | DIASTOLIC BLOOD PRESSURE: 86 MMHG | TEMPERATURE: 98.6 F | BODY MASS INDEX: 36.86 KG/M2

## 2017-10-26 DIAGNOSIS — J02.8 VIRAL SORE THROAT: Primary | ICD-10-CM

## 2017-10-26 DIAGNOSIS — B97.89 VIRAL SORE THROAT: Primary | ICD-10-CM

## 2017-10-26 DIAGNOSIS — G43.111 INTRACTABLE MIGRAINE WITH AURA WITH STATUS MIGRAINOSUS: ICD-10-CM

## 2017-10-26 LAB
DEPRECATED S PYO AG THROAT QL EIA: NORMAL
SPECIMEN SOURCE: NORMAL

## 2017-10-26 PROCEDURE — 87081 CULTURE SCREEN ONLY: CPT | Performed by: FAMILY MEDICINE

## 2017-10-26 PROCEDURE — 99214 OFFICE O/P EST MOD 30 MIN: CPT | Performed by: FAMILY MEDICINE

## 2017-10-26 PROCEDURE — 87880 STREP A ASSAY W/OPTIC: CPT | Performed by: FAMILY MEDICINE

## 2017-10-26 RX ORDER — ONDANSETRON 4 MG/1
4 TABLET, FILM COATED ORAL EVERY 8 HOURS PRN
Qty: 18 TABLET | Refills: 0 | Status: SHIPPED | OUTPATIENT
Start: 2017-10-26 | End: 2018-05-07

## 2017-10-26 RX ORDER — BUTALBITAL, ACETAMINOPHEN AND CAFFEINE 50; 325; 40 MG/1; MG/1; MG/1
1 TABLET ORAL EVERY 4 HOURS PRN
Qty: 10 TABLET | Refills: 0 | Status: SHIPPED | OUTPATIENT
Start: 2017-10-26 | End: 2018-05-11

## 2017-10-26 RX ORDER — DIPHENHYDRAMINE HCL 25 MG
50 TABLET ORAL EVERY 6 HOURS PRN
Qty: 30 TABLET | Refills: 0 | Status: SHIPPED | OUTPATIENT
Start: 2017-10-26 | End: 2018-12-27

## 2017-10-26 NOTE — NURSING NOTE
"Chief Complaint   Patient presents with     Pharyngitis     Headache       Initial BP (!) 155/98 (BP Location: Right arm, Patient Position: Chair, Cuff Size: Adult Large)  Pulse 113  Temp 98.6  F (37  C) (Oral)  Resp 16  Ht 5' 3\" (1.6 m)  Wt 208 lb (94.3 kg)  LMP 10/31/2011  SpO2 97%  BMI 36.85 kg/m2 Estimated body mass index is 36.85 kg/(m^2) as calculated from the following:    Height as of this encounter: 5' 3\" (1.6 m).    Weight as of this encounter: 208 lb (94.3 kg).  Medication Reconciliation: complete   Daja Flores, LYNNETTE      "

## 2017-10-26 NOTE — MR AVS SNAPSHOT
"              After Visit Summary   10/26/2017    Prince Patel    MRN: [id number]           Patient Information     Date Of Birth          1975        Visit Information        Provider Department      10/26/2017 11:20 AM Trina Brown,  [facility]        Today's Diagnoses     Viral sore throat    -  1    Intractable migraine with aura with status migrainosus           Follow-ups after your visit        Who to contact     If you have questions or need follow up information about today's clinic visit or your schedule please contact [facility] directly at [phone].  Normal or non-critical lab and imaging results will be communicated to you by [employer], letter or phone within 4 business days after the clinic has received the results. If you do not hear from us within 7 days, please contact the clinic through [employer] or phone. If you have a critical or abnormal lab result, we will notify you by phone as soon as possible.  Submit refill requests through [employer] or call your pharmacy and they will forward the refill request to us. Please allow 3 business days for your refill to be completed.          Additional Information About Your Visit        MyChart Information     [employer] gives you secure access to your electronic health record. If you see a primary care provider, you can also send messages to your care team and make appointments. If you have questions, please call your primary care clinic.  If you do not have a primary care provider, please call [phone] and they will assist you.        Care EveryWhere ID     This is your Care EveryWhere ID. This could be used by other organizations to access your [place] medical records  [id number]        Your Vitals Were     Pulse Temperature Respirations Height Last Period Pulse Oximetry    113 98.6  F (37  C) (Oral) 16 5' 3\" (1.6 m) 10/31/2011 97%    BMI (Body Mass Index)                   36.85 kg/m2            " Blood Pressure from Last 3 Encounters:   10/26/17 130/86   09/25/17 118/74   03/31/17 112/76    Weight from Last 3 Encounters:   10/26/17 208 lb (94.3 kg)   09/25/17 210 lb (95.3 kg)   03/31/17 208 lb 12.8 oz (94.7 kg)              We Performed the Following     Beta strep group A culture     Rapid strep screen          Today's Medication Changes          These changes are accurate as of: 10/26/17 11:59 PM.  If you have any questions, ask your nurse or doctor.               Start taking these medicines.        Dose/Directions    butalbital-acetaminophen-caffeine -40 MG per tablet   Commonly known as:  FIORICET/ESGIC   Used for:  Intractable migraine with aura with status migrainosus   Started by:  Trina Brown DO        Dose:  1 tablet   Take 1 tablet by mouth every 4 hours as needed Take together with diphenhydramine and ondansetron   Quantity:  10 tablet   Refills:  0       diphenhydrAMINE 25 MG tablet   Commonly known as:  BENADRYL   Used for:  Intractable migraine with aura with status migrainosus   Started by:  Trina Brown DO        Dose:  50 mg   Take 2 tablets (50 mg) by mouth every 6 hours as needed for other (Headache)   Quantity:  30 tablet   Refills:  0       ondansetron 4 MG tablet   Commonly known as:  ZOFRAN   Used for:  Intractable migraine with aura with status migrainosus   Started by:  Trina Brown DO        Dose:  4 mg   Take 1 tablet (4 mg) by mouth every 8 hours as needed for nausea   Quantity:  18 tablet   Refills:  0            Where to get your medicines      These medications were sent to [facility]/pharmacy #[id number] - [place], [place] [facility] [address]  [address] [facility] [address]     Phone:  [phone]     diphenhydrAMINE 25 MG tablet    ondansetron 4 MG tablet         Some of these will need a paper prescription and others can be bought over the counter.  Ask your nurse if you have questions.     Bring a paper prescription for each of these medications      butalbital-acetaminophen-caffeine -40 MG per tablet                Primary Care Provider Office Phone # Fax #    Susan Rachele Haase, EL[place] [phone] [address] [address]        Equal Access to Services     ROBERT HACKETT : Hadii aad ku hadasho Soomaali, waaxda luqadaha, qaybta kaalmada adeegyada, waxay idiin hayaan adeeg eileenkelly lamatilde elizondo. So [facility] [phone].    ATENCIÓN: Si habla español, tiene a ty disposición servicios gratuitos de asistencia lingüística. Llame al [phone].    We comply with applicable federal civil rights laws and [place] laws. We do not discriminate on the basis of race, color, national origin, age, disability, sex, sexual orientation, or gender identity.            Thank you!     Thank you for choosing [facility]  for your care. Our goal is always to provide you with excellent care. Hearing back from our patients is one way we can continue to improve our services. Please take a few minutes to complete the written survey that you may receive in the mail after your visit with us. Thank you!             Your Updated Medication List - Protect others around you: Learn how to safely use, store and throw away your medicines at www.disposemymeds.org.          This list is accurate as of: 10/26/17 11:59 PM.  Always use your most recent med list.                   Brand Name Dispense Instructions for use Diagnosis    butalbital-acetaminophen-caffeine -40 MG per tablet    FIORICET/ESGIC    10 tablet    Take 1 tablet by mouth every 4 hours as needed Take together with diphenhydramine and ondansetron    Intractable migraine with aura with status migrainosus       diphenhydrAMINE 25 MG tablet    BENADRYL    30 tablet    Take 2 tablets (50 mg) by mouth every 6 hours as needed for other (Headache)    Intractable migraine with aura with status migrainosus       fexofenadine 180 MG tablet    ALLEGRA    31 tablet    Take 1 tablet  (180 mg) by mouth daily    Allergic rhinitis, cause unspecified       fluticasone 50 MCG/ACT spray    FLONASE    1 g    Spray 2 sprays into both nostrils daily    Perennial allergic rhinitis, unspecified allergic rhinitis trigger       hydrocortisone 1 % cream    CORTAID    30 g    Apply topically 2 times daily    External hemorrhoids       ibuprofen 800 MG tablet    ADVIL/MOTRIN    30 tablet    Take 1 tablet by mouth every 6 hours as needed for pain (For mild pain and temperature greater than 102F).    Excessive or frequent menstruation       multivitamin per tablet      1 tab qd as directed        nystatin 441371 UNIT/GM Powd    MYCOSTATIN    1 Bottle    Externally apply 1 dose topically 3 times daily as needed.    Intertrigo       ondansetron 4 MG tablet    ZOFRAN    18 tablet    Take 1 tablet (4 mg) by mouth every 8 hours as needed for nausea    Intractable migraine with aura with status migrainosus       order for DME     1 Device    DME:  Closed knee sleeve    Acute pain of right knee       rizatriptan 5 MG tablet    MAXALT    30 tablet    Take  by mouth. ONE TABLET WITH ONSET OF MIGRAINE, MAY REPEAT ONCE AFTER 2 HOURS. DO NOT EXCEED 6 TABLETS IN 24 HOURS.    Migraine with aura and without status migrainosus, not intractable

## 2017-10-26 NOTE — LETTER
[facility]  [address] [place] [address]  Phone: [phone]    October 26, 2017        Prince Patel  [address] [place] [address]          To whom it may concern:    RE: Prince Patel    Patient was seen and treated today at our clinic and missed work.  Please excuse Prince from work.    Please contact me for questions or concerns.      Sincerely,        Trina Brown, DO

## 2017-10-26 NOTE — PROGRESS NOTES
SUBJECTIVE:   Prince Patel is a 42 year old female who presents to clinic today for the following health issues:      RESPIRATORY SYMPTOMS      Duration: 4 days for migraine, ST started 1 day ago    Description  sore throat, cough, headache, fatigue/malaise and nausea    Severity: mild    Accompanying signs and symptoms: None    History (predisposing factors):  none    Precipitating or alleviating factors: coworker had ST    Therapies tried and outcome:  acetaminophen maxalt for migraines        Problem list and histories reviewed & adjusted, as indicated.  Additional history: as documented    Patient Active Problem List   Diagnosis     Allergic rhinitis     Sprain and strain of unspecified site of knee and leg     Obesity     CARDIOVASCULAR SCREENING; LDL GOAL LESS THAN 160     S/P partial hysterectomy     Intertrigo     Migraine with aura and without status migrainosus, not intractable     Past Surgical History:   Procedure Laterality Date     LAPAROSCOPIC HYSTERECTOMY SUPRACERVICAL  11/16/2011    Procedure:LAPAROSCOPIC HYSTERECTOMY SUPRACERVICAL; LAPAROSCOPIC HYSTERECTOMY SUPRACERVICAL ; Surgeon:MASOUD WHITE; Location:[facility] OR     ORTHOPEDIC SURGERY       SURGICAL HISTORY OF -       ingrown toenails removed     toenail removal         Social History   Substance Use Topics     Smoking status: Never Smoker     Smokeless tobacco: Never Used     Alcohol use No     Family History   Problem Relation Age of Onset     DIABETES Maternal Aunt      C.A.D. Father      MI, angioplasty, 50s     DIABETES Father      Diabetes     Neurologic Disorder Paternal Grandmother      GASTROINTESTINAL DISEASE Paternal Grandmother      Gluten Intolerance      DIABETES Maternal Grandmother      Multiple Sclerosis             Reviewed and updated as needed this visit by clinical staff     Reviewed and updated as needed this visit by Provider         ROS:  Constitutional, HEENT, cardiovascular, pulmonary, gi and gu systems are  "negative, except as otherwise noted.      OBJECTIVE:   /86 (BP Location: Right arm, Patient Position: Chair, Cuff Size: Adult Large)  Pulse 113  Temp 98.6  F (37  C) (Oral)  Resp 16  Ht 5' 3\" (1.6 m)  Wt 208 lb (94.3 kg)  LMP 10/31/2011  SpO2 97%  BMI 36.85 kg/m2  Body mass index is 36.85 kg/(m^2).  GENERAL: healthy, alert and no distress  EYES: Eyes grossly normal to inspection, PERRL and conjunctivae and sclerae normal  HENT: ear canals and TM's normal, nose and mouth without ulcers or lesions  NECK: no adenopathy, no asymmetry, masses, or scars and thyroid normal to palpation  RESP: lungs clear to auscultation - no rales, rhonchi or wheezes  CV: regular rate and rhythm, normal S1 S2, no S3 or S4, no murmur, click or rub, no peripheral edema and peripheral pulses strong  NEURO: Normal strength and tone, mentation intact, speech normal, cranial nerves 2-12 intact and DTR's normal and symmetric bilateral UE and LE    Diagnostic Test Results:  Results for orders placed or performed in visit on 10/26/17   Rapid strep screen   Result Value Ref Range    Specimen Description Throat     Rapid Strep A Screen       NEGATIVE: No Group A streptococcal antigen detected by immunoassay, await culture report.       ASSESSMENT/PLAN:     1. Viral sore throat  - Advised OTC cold medications, Analgesics   - Rapid strep screen  - Beta strep group A culture    2. Intractable migraine with aura with status migrainosus  - Take Fioricet, benadryl and zofran together once you get home and then go to bed  - If pain persists can repeat in 6 hours  - butalbital-acetaminophen-caffeine (FIORICET/ESGIC) -40 MG per tablet; Take 1 tablet by mouth every 4 hours as needed Take together with diphenhydramine and ondansetron  Dispense: 10 tablet; Refill: 0  - ondansetron (ZOFRAN) 4 MG tablet; Take 1 tablet (4 mg) by mouth every 8 hours as needed for nausea  Dispense: 18 tablet; Refill: 0  - diphenhydrAMINE (BENADRYL) 25 MG tablet; " Take 2 tablets (50 mg) by mouth every 6 hours as needed for other (Headache)  Dispense: 30 tablet; Refill: 0    Follow up if symptoms are worsening or not improving    Trina Brown,   [facility]

## 2017-10-27 LAB
BACTERIA SPEC CULT: NORMAL
SPECIMEN SOURCE: NORMAL

## 2018-01-28 ENCOUNTER — MYC REFILL (OUTPATIENT)
Dept: OTHER | Age: 43
End: 2018-01-28

## 2018-01-28 ENCOUNTER — MYC REFILL (OUTPATIENT)
Dept: FAMILY MEDICINE | Facility: CLINIC | Age: 43
End: 2018-01-28

## 2018-01-28 DIAGNOSIS — J30.9 ALLERGIC RHINITIS, CAUSE UNSPECIFIED: ICD-10-CM

## 2018-01-28 DIAGNOSIS — J30.9 ALLERGIC RHINITIS: Primary | ICD-10-CM

## 2018-01-28 RX ORDER — FEXOFENADINE HCL 180 MG/1
180 TABLET ORAL DAILY
Qty: 31 TABLET | Refills: 8 | Status: CANCELLED | OUTPATIENT
Start: 2018-01-28

## 2018-01-29 ENCOUNTER — MYC MEDICAL ADVICE (OUTPATIENT)
Dept: FAMILY MEDICINE | Facility: CLINIC | Age: 43
End: 2018-01-29

## 2018-01-29 RX ORDER — FLUTICASONE PROPIONATE 50 MCG
2 SPRAY, SUSPENSION (ML) NASAL DAILY
Qty: 1 G | Refills: 1 | Status: SHIPPED | OUTPATIENT
Start: 2018-01-29 | End: 2018-09-06

## 2018-01-29 RX ORDER — FEXOFENADINE HCL 180 MG/1
180 TABLET ORAL DAILY
Qty: 31 TABLET | Refills: 1 | Status: SHIPPED | OUTPATIENT
Start: 2018-01-29 | End: 2020-03-05

## 2018-01-29 NOTE — TELEPHONE ENCOUNTER
Message from peerTransfer:  Original authorizing provider: ASHA Montano Jorge would like a refill of the following medications:  fexofenadine (ALLEGRA) 180 MG tablet [Lisseth Garibay PA-C]    Preferred pharmacy: [facility]/PHARMACY #[id number] [facility] [address]    Comment:      Medication renewals requested in this message routed to other providers:  fluticasone (FLONASE) 50 MCG/ACT spray [Susan Haase, APRN CNP]

## 2018-01-29 NOTE — TELEPHONE ENCOUNTER
Message from TxVia:  Original authorizing provider: Susan Haase, APRN CNP [facility] would like a refill of the following medications:  fluticasone (FLONASE) 50 MCG/ACT spray [Susan Haase, APRN CNP]    Preferred pharmacy: [facility]/PHARMACY #[id number] [facility] [address]    Comment:      Medication renewals requested in this message routed to other providers:  fexofenadine (ALLEGRA) 180 MG tablet [Lisseth Garibay PA-C]

## 2018-01-29 NOTE — TELEPHONE ENCOUNTER
See refill request sent    Last OV: 10/26/17  Reason for visit: migraine  Date last filled: should have refills until March, allegra giovanna Sawant RN, BSN  Message handled by Nurse Triage.

## 2018-04-05 ENCOUNTER — VIRTUAL VISIT (OUTPATIENT)
Dept: FAMILY MEDICINE | Facility: CLINIC | Age: 43
End: 2018-04-05
Payer: MEDICARE

## 2018-04-05 DIAGNOSIS — J30.89 CHRONIC NON-SEASONAL ALLERGIC RHINITIS, UNSPECIFIED TRIGGER: Primary | ICD-10-CM

## 2018-04-05 PROCEDURE — 98966 PH1 ASSMT&MGMT NQHP 5-10: CPT | Performed by: NURSE PRACTITIONER

## 2018-04-05 NOTE — PROGRESS NOTES
"Prince Patel is a 42 year old female who is being evaluated via a telephone visit.      The patient has been notified of following:     \"This telephone visit will be conducted via a call between you and your physician/provider. We have found that certain health care needs can be provided without the need for a physical exam.  This service lets us provide the care you need with a short phone conversation.  If a prescription is necessary we can send it directly to your pharmacy.  If lab work is needed we can place an order for that and you can then stop by our lab to have the test done at a later time.    We will bill your insurance company for this service.  Please check with your medical insurance if this type of visit is covered. You may be responsible for the cost of this type of visit if insurance coverage is denied.  The typical cost is $30 (10min), $59 (11-20min) and $85 (21-30min).  Most often these visits are shorter than 10 minutes.    If during the course of the call the physician/provider feels a telephone visit is not appropriate, you will not be charged for this service.\"       Consent has been obtained for this service by care team member: yes.   See the scanned image in the medical record.    Prince Patel complains of  No chief complaint on file.    RESPIRATORY SYMPTOMS      Duration: 1 day    Description  nasal congestion, sore throat, cough and headache    Severity: moderate    Accompanying signs and symptoms: light headed    History (predisposing factors):  none    Precipitating or alleviating factors: None    Therapies tried and outcome:  acetaminophen  Since yesterday has had nasal congestion, when has drainage it is green.  Infrequent cough without sputum production.  Denies fever, shortness of breath, chest pain.  Using nasonex on daily basis.  Is not taking allegra at this time.        I have reviewed and updated the patient's Past Medical History, Social History, Family History and " Medication List.    ALLERGIES  Keflex [cephalexin]    Lisseth Vanessa CMA   (MA signature)  Assessment/Plan:  Prince was seen today for uri.    Diagnoses and all orders for this visit:    Chronic non-seasonal allergic rhinitis, unspecified trigger  Suggested OTC mucinex D on a daily basis, continue nasonex, increase fluid intake.     Follow up in 1 week with a clinic visit if symptoms get worse or do not improve.     I have reviewed the note as documented above.  This accurately captures the substance of my conversation with the patient,  Prince Patel    Total time of call between patient and provider was 10 minutes

## 2018-04-05 NOTE — MR AVS SNAPSHOT
After Visit Summary   4/5/2018    Prince Patel    MRN: [id number]           Patient Information     Date Of Birth          1975        Visit Information        Provider Department      4/5/2018 2:15 PM Haase, Susan Rachele, APRN CNP [facility]        Today's Diagnoses     Chronic non-seasonal allergic rhinitis, unspecified trigger    -  1       Follow-ups after your visit        Follow-up notes from your care team     Return if symptoms worsen or fail to improve.      Who to contact     If you have questions or need follow up information about today's clinic visit or your schedule please contact [facility] directly at [phone].  Normal or non-critical lab and imaging results will be communicated to you by MyChart, letter or phone within 4 business days after the clinic has received the results. If you do not hear from us within 7 days, please contact the clinic through "[employer]hart or phone. If you have a critical or abnormal lab result, we will notify you by phone as soon as possible.  Submit refill requests through RE2 or call your pharmacy and they will forward the refill request to us. Please allow 3 business days for your refill to be completed.          Additional Information About Your Visit        MyChart Information     [employer] gives you secure access to your electronic health record. If you see a primary care provider, you can also send messages to your care team and make appointments. If you have questions, please call your primary care clinic.  If you do not have a primary care provider, please call [phone] and they will assist you.        Care EveryWhere ID     This is your Care EveryWhere ID. This could be used by other organizations to access your [place] medical records  [id number]        Your Vitals Were     Last Period                   10/31/2011            Blood Pressure from Last 3 Encounters:   10/26/17 130/86   09/25/17  118/74   03/31/17 112/76    Weight from Last 3 Encounters:   10/26/17 208 lb (94.3 kg)   09/25/17 210 lb (95.3 kg)   03/31/17 208 lb 12.8 oz (94.7 kg)              Today, you had the following     No orders found for display       Primary Care Provider Office Phone # Fax #    Susan Rachele Haase, APRN -[phone]       [address] [facility] [address]        Equal Access to Services     ROBERT HACKETT : Hadii aad ku hadasho Soomaali, waaxda luqadaha, qaybta kaalmada adeegyada, waxay idiin hayaan adeeg dario fitzgerald . So [facility] [phone].    ATENCIÓN: Si habla español, tiene a ty disposición servicios gratuitos de asistencia lingüística. Briseydaame al [phone].    We comply with applicable federal civil rights laws and [place] laws. We do not discriminate on the basis of race, color, national origin, age, disability, sex, sexual orientation, or gender identity.            Thank you!     Thank you for choosing [facility]  for your care. Our goal is always to provide you with excellent care. Hearing back from our patients is one way we can continue to improve our services. Please take a few minutes to complete the written survey that you may receive in the mail after your visit with us. Thank you!             Your Updated Medication List - Protect others around you: Learn how to safely use, store and throw away your medicines at www.disposemymeds.org.          This list is accurate as of 4/5/18  2:36 PM.  Always use your most recent med list.                   Brand Name Dispense Instructions for use Diagnosis    butalbital-acetaminophen-caffeine -40 MG per tablet    FIORICET/ESGIC    10 tablet    Take 1 tablet by mouth every 4 hours as needed Take together with diphenhydramine and ondansetron    Intractable migraine with aura with status migrainosus       diphenhydrAMINE 25 MG tablet    BENADRYL    30 tablet    Take 2 tablets (50 mg) by mouth every 6 hours as needed  for other (Headache)    Intractable migraine with aura with status migrainosus       fexofenadine 180 MG tablet    ALLEGRA    31 tablet    Take 1 tablet (180 mg) by mouth daily    Allergic rhinitis       fluticasone 50 MCG/ACT spray    FLONASE    1 g    Spray 2 sprays into both nostrils daily    Allergic rhinitis       hydrocortisone 1 % cream    CORTAID    30 g    Apply topically 2 times daily    External hemorrhoids       ibuprofen 800 MG tablet    ADVIL/MOTRIN    30 tablet    Take 1 tablet by mouth every 6 hours as needed for pain (For mild pain and temperature greater than 102F).    Excessive or frequent menstruation       multivitamin per tablet      1 tab qd as directed        nystatin [id number] UNIT/GM Powd    MYCOSTATIN    1 Bottle    Externally apply 1 dose topically 3 times daily as needed.    Intertrigo       ondansetron 4 MG tablet    ZOFRAN    18 tablet    Take 1 tablet (4 mg) by mouth every 8 hours as needed for nausea    Intractable migraine with aura with status migrainosus       order for DME     1 Device    DME:  Closed knee sleeve    Acute pain of right knee       rizatriptan 5 MG tablet    MAXALT    30 tablet    Take  by mouth. ONE TABLET WITH ONSET OF MIGRAINE, MAY REPEAT ONCE AFTER 2 HOURS. DO NOT EXCEED 6 TABLETS IN 24 HOURS.    Migraine with aura and without status migrainosus, not intractable

## 2018-05-07 ENCOUNTER — OFFICE VISIT (OUTPATIENT)
Dept: FAMILY MEDICINE | Facility: CLINIC | Age: 43
End: 2018-05-07
Payer: MEDICARE

## 2018-05-07 VITALS
DIASTOLIC BLOOD PRESSURE: 78 MMHG | RESPIRATION RATE: 12 BRPM | WEIGHT: 219 LBS | OXYGEN SATURATION: 99 % | BODY MASS INDEX: 38.79 KG/M2 | HEART RATE: 98 BPM | TEMPERATURE: 98.5 F | SYSTOLIC BLOOD PRESSURE: 120 MMHG

## 2018-05-07 DIAGNOSIS — J30.89 CHRONIC NON-SEASONAL ALLERGIC RHINITIS, UNSPECIFIED TRIGGER: ICD-10-CM

## 2018-05-07 DIAGNOSIS — G43.109 MIGRAINE WITH AURA AND WITHOUT STATUS MIGRAINOSUS, NOT INTRACTABLE: Primary | ICD-10-CM

## 2018-05-07 PROCEDURE — 99214 OFFICE O/P EST MOD 30 MIN: CPT | Performed by: NURSE PRACTITIONER

## 2018-05-07 RX ORDER — SUMATRIPTAN 50 MG/1
TABLET, FILM COATED ORAL
COMMUNITY
End: 2018-05-11

## 2018-05-07 NOTE — MR AVS SNAPSHOT
After Visit Summary   5/7/2018    Prince Patel    MRN: [id number]           Patient Information     Date Of Birth          1975        Visit Information        Provider Department      5/7/2018 2:30 PM Haase, Susan Rachele, APRN CNP [facility]        Today's Diagnoses     Migraine with aura and without status migrainosus, not intractable    -  1    Chronic non-seasonal allergic rhinitis, unspecified trigger           Follow-ups after your visit        Follow-up notes from your care team     Return in about 4 days (around 5/11/2018) for Routine Visit.      Who to contact     If you have questions or need follow up information about today's clinic visit or your schedule please contact [facility] directly at [phone].  Normal or non-critical lab and imaging results will be communicated to you by MyChart, letter or phone within 4 business days after the clinic has received the results. If you do not hear from us within 7 days, please contact the clinic through MyChart or phone. If you have a critical or abnormal lab result, we will notify you by phone as soon as possible.  Submit refill requests through [place] [employer] or call your pharmacy and they will forward the refill request to us. Please allow 3 business days for your refill to be completed.          Additional Information About Your Visit        MyChart Information     [place] [employer] gives you secure access to your electronic health record. If you see a primary care provider, you can also send messages to your care team and make appointments. If you have questions, please call your primary care clinic.  If you do not have a primary care provider, please call [phone] and they will assist you.        Care EveryWhere ID     This is your Care EveryWhere ID. This could be used by other organizations to access your [place] medical records  [id number]        Your Vitals Were     Pulse Temperature Respirations  Last Period Pulse Oximetry BMI (Body Mass Index)    98 98.5  F (36.9  C) (Oral) 12 10/31/2011 99% 38.79 kg/m2       Blood Pressure from Last 3 Encounters:   05/07/18 120/78   10/26/17 130/86   09/25/17 118/74    Weight from Last 3 Encounters:   05/07/18 219 lb (99.3 kg)   10/26/17 208 lb (94.3 kg)   09/25/17 210 lb (95.3 kg)              Today, you had the following     No orders found for display         Today's Medication Changes          These changes are accurate as of 5/7/18  3:01 PM.  If you have any questions, ask your nurse or doctor.               Start taking these medicines.        Dose/Directions    amitriptyline 25 MG tablet   Commonly known as:  ELAVIL   Used for:  Migraine with aura and without status migrainosus, not intractable   Started by:  Haase, Susan Rachele, APRN CNP        Dose:  25 mg   Take 1 tablet (25 mg) by mouth At Bedtime   Quantity:  30 tablet   Refills:  1         Stop taking these medicines if you haven't already. Please contact your care team if you have questions.     hydrocortisone 1 % cream   Commonly known as:  CORTAID   Stopped by:  Haase, Susan Rachele, APRN CNP           nystatin [id number] UNIT/GM Powd   Commonly known as:  MYCOSTATIN   Stopped by:  Haase, Susan Rachele, APRN CNP           ondansetron 4 MG tablet   Commonly known as:  ZOFRAN   Stopped by:  Haase, Susan Rachele, APRN CNP           rizatriptan 5 MG tablet   Commonly known as:  MAXALT   Stopped by:  Haase, Susan Rachele, APRN CNP                Where to get your medicines      These medications were sent to [facility]/pharmacy #[id number] - [place], [place] - [address]  [address] [facility] [address]     Phone:  [phone]     amitriptyline 25 MG tablet                Primary Care Provider Office Phone # Fax #    Susan Rachele Haase, APRN -[phone] [address]  [facility] [address]        Equal Access to Services     ROBERT HACKETT AH: hima Rose  stanley royalmariella gómezhilda armstrong ah. So [facility] [phone].    ATENCIÓN: Si christ ramos, tiene a ty disposición servicios gratuitos de asistencia lingüística. Doris al [phone].    We comply with applicable federal civil rights laws and [place] laws. We do not discriminate on the basis of race, color, national origin, age, disability, sex, sexual orientation, or gender identity.            Thank you!     Thank you for choosing [facility]  for your care. Our goal is always to provide you with excellent care. Hearing back from our patients is one way we can continue to improve our services. Please take a few minutes to complete the written survey that you may receive in the mail after your visit with us. Thank you!             Your Updated Medication List - Protect others around you: Learn how to safely use, store and throw away your medicines at www.disposemymeds.org.          This list is accurate as of 5/7/18  3:01 PM.  Always use your most recent med list.                   Brand Name Dispense Instructions for use Diagnosis    amitriptyline 25 MG tablet    ELAVIL    30 tablet    Take 1 tablet (25 mg) by mouth At Bedtime    Migraine with aura and without status migrainosus, not intractable       butalbital-acetaminophen-caffeine -40 MG per tablet    FIORICET/ESGIC    10 tablet    Take 1 tablet by mouth every 4 hours as needed Take together with diphenhydramine and ondansetron    Intractable migraine with aura with status migrainosus       diphenhydrAMINE 25 MG tablet    BENADRYL    30 tablet    Take 2 tablets (50 mg) by mouth every 6 hours as needed for other (Headache)    Intractable migraine with aura with status migrainosus       fexofenadine 180 MG tablet    ALLEGRA    31 tablet    Take 1 tablet (180 mg) by mouth daily    Allergic rhinitis       fluticasone 50 MCG/ACT spray    FLONASE    1 g    Spray 2 sprays into both nostrils daily     Allergic rhinitis       ibuprofen 800 MG tablet    ADVIL/MOTRIN    30 tablet    Take 1 tablet by mouth every 6 hours as needed for pain (For mild pain and temperature greater than 102F).    Excessive or frequent menstruation       multivitamin per tablet      1 tab qd as directed        order for DME     1 Device    DME:  Closed knee sleeve    Acute pain of right knee       SUMAtriptan 50 MG tablet    IMITREX     Take by mouth at onset of headache for migraine

## 2018-05-07 NOTE — PROGRESS NOTES
SUBJECTIVE:   Prince Patel is a 42 year old female who presents to clinic with her mother today for the following health issues:    History of Present Illness     Migraines:     Headache Symptoms are:  Stable    Migraine frequency::  5 per week    Migraine Duration::  >3 days    Ability to perform ADL's::  Yes    Migraine Rescue/Relief Medications::  Tylenol, Excedrin and Maxalt    Effectiveness of rescue/relief medications::  No relief    Migraine Preventative Medications::  Other    Neurological symptoms::  None    ER or UC Visits::  1    ED/UC Followup:    Facility:  [facility]  Date of visit: 5/4/2018  Reason for visit: headache  Current Status: no improvement     Patient was seen at [facility] on 05/04 for migraine. She received an injection of Imitrex and Toradol which improved headache for a short period of time.  She has had a constant headache residing in the temples since 4/28,  pain is present when awakens in the morning and gradually increases throughout the day.  Has been able to work with the headache and go about normal tasks.   Associated symptoms include vomiting once daily, ear-ringing, nausea especially at night.   Drinking adequate water, normal eating habits. Denies URI symptoms, visual changes, dizziness, recent trauma, light sensitivity, or numbness/tingling in fingertips. Of note, patient has seasonal allergies. Well controlled with daily medication.   Prior to onset of current headaches, migraines occurred monthly.She knows aspartame is a trigger, denies drinking soda.   Problem list and histories reviewed & adjusted, as indicated.  Additional history: as documented  Patient Active Problem List   Diagnosis     Sprain and strain of unspecified site of knee and leg     Obesity     CARDIOVASCULAR SCREENING; LDL GOAL LESS THAN 160     S/P partial hysterectomy     Intertrigo     Migraine with aura and without status migrainosus, not intractable     Chronic  non-seasonal allergic rhinitis, unspecified trigger     Past Surgical History:   Procedure Laterality Date     LAPAROSCOPIC HYSTERECTOMY SUPRACERVICAL  11/16/2011    Procedure:LAPAROSCOPIC HYSTERECTOMY SUPRACERVICAL; LAPAROSCOPIC HYSTERECTOMY SUPRACERVICAL ; Surgeon:MASOUD WHITE; Location:[facility] OR     ORTHOPEDIC SURGERY       SURGICAL HISTORY OF -       ingrown toenails removed     toenail removal         Social History   Substance Use Topics     Smoking status: Never Smoker     Smokeless tobacco: Never Used     Alcohol use No     Family History   Problem Relation Age of Onset     DIABETES Maternal Aunt      C.A.D. Father      MI, angioplasty, 50s     DIABETES Father      Diabetes     Neurologic Disorder Paternal Grandmother      GASTROINTESTINAL DISEASE Paternal Grandmother      Gluten Intolerance      DIABETES Maternal Grandmother      Multiple Sclerosis         Current Outpatient Prescriptions   Medication Sig Dispense Refill     butalbital-acetaminophen-caffeine (FIORICET/ESGIC) -40 MG per tablet Take 1 tablet by mouth every 4 hours as needed Take together with diphenhydramine and ondansetron 10 tablet 0     diphenhydrAMINE (BENADRYL) 25 MG tablet Take 2 tablets (50 mg) by mouth every 6 hours as needed for other (Headache) 30 tablet 0     fexofenadine (ALLEGRA) 180 MG tablet Take 1 tablet (180 mg) by mouth daily 31 tablet 1     fluticasone (FLONASE) 50 MCG/ACT spray Spray 2 sprays into both nostrils daily 1 g 1     ibuprofen (ADVIL,MOTRIN) 800 MG tablet Take 1 tablet by mouth every 6 hours as needed for pain (For mild pain and temperature greater than 102F). 30 tablet 0     MULTIVITAMINS OR TABS 1 tab qd as directed  0     order for DME DME:  Closed knee sleeve 1 Device 0     rizatriptan (MAXALT) 5 MG tablet Take  by mouth. ONE TABLET WITH ONSET OF MIGRAINE, MAY REPEAT ONCE AFTER 2 HOURS. DO NOT EXCEED 6 TABLETS IN 24 HOURS. 30 tablet 6     SUMAtriptan (IMITREX) 50 MG tablet Take by mouth at onset of  headache for migraine       Allergies   Allergen Reactions     Keflex [Cephalexin] Rash       ROS:  Constitutional, HEENT, cardiovascular, pulmonary, GI, neuro, and psych systems are negative, except as otherwise noted.    This document serves as a record of the services and decisions personally performed and made by Susan Haase, CNP. It was created on her behalf by Romelia Salazar, a trained medical scribe. The creation of this document is based on the provider's statements to the medical scribe.  Romelia Salazar 2:52 PM May 7, 2018  OBJECTIVE:   /78 (BP Location: Right arm, Patient Position: Chair, Cuff Size: Adult Large)  Pulse 98  Temp 98.5  F (36.9  C) (Oral)  Resp 12  Wt 99.3 kg (219 lb)  LMP 10/31/2011  SpO2 99%  BMI 38.79 kg/m2  Body mass index is 38.79 kg/(m^2).  GENERAL: healthy, alert and no distress  HENT: ear canals and TM's normal, nose and mouth without ulcers or lesions  NECK: no adenopathy, no asymmetry, masses, or scars and thyroid normal to palpation  RESP: lungs clear to auscultation - no rales, rhonchi or wheezes  CV: regular rate and rhythm, normal S1 S2, no S3 or S4, no murmur, click or rub  MENTAL STATUS:  Alert, oriented x3.  Speech fluent with normal naming. CRANIAL NERVES:  Pupils are equal, round, reactive to light.  Extraocular movements full.  Visual fields full.  Facial sensation, movement normal.  Palate moves symmetrically.  Tongue midline.  Sternocleidomastoid and trapezius strength intact.  Neck strength was normal.    NEUROLOGIC:  Motor 5/5.  Reflexes 2/4.  Good  romberg negative.  Normal heel to toe gait without ataxia.    PSYCH: mentation appears normal, affect normal/bright    ASSESSMENT/PLAN:   Prince was seen today for recheck medication.    Diagnoses and all orders for this visit:    Migraine with aura and without status migrainosus, not intractable: continue to take sumatriptan on prn basis, will also start on amitriptyline to take on a daily basis at bedtime.  Is aware  of need to follow up if symptoms change (dizziness, vision disturbance, etc).    -     amitriptyline (ELAVIL) 25 MG tablet; Take 1 tablet (25 mg) by mouth At Bedtime    Chronic non-seasonal allergic rhinitis, unspecified trigger: well controlled.      Follow up visit on 5/11/2018 at 1430, sooner as needed.     The information in this document, created by the medical scribe for me, accurately reflects the services I personally performed and the decisions made by me. I have reviewed and approved this document for accuracy prior to leaving the patient care area.  May 7, 2018 2:53 PM  Susan Haase, APRN [facility]

## 2018-05-11 ENCOUNTER — OFFICE VISIT (OUTPATIENT)
Dept: FAMILY MEDICINE | Facility: CLINIC | Age: 43
End: 2018-05-11
Payer: MEDICARE

## 2018-05-11 VITALS
WEIGHT: 219 LBS | HEART RATE: 108 BPM | HEIGHT: 63 IN | RESPIRATION RATE: 16 BRPM | BODY MASS INDEX: 38.8 KG/M2 | SYSTOLIC BLOOD PRESSURE: 120 MMHG | DIASTOLIC BLOOD PRESSURE: 84 MMHG | TEMPERATURE: 98.1 F | OXYGEN SATURATION: 97 %

## 2018-05-11 DIAGNOSIS — G43.109 MIGRAINE WITH AURA AND WITHOUT STATUS MIGRAINOSUS, NOT INTRACTABLE: ICD-10-CM

## 2018-05-11 DIAGNOSIS — R51.9 DAILY HEADACHE: Primary | ICD-10-CM

## 2018-05-11 DIAGNOSIS — L70.0 COMEDONE: ICD-10-CM

## 2018-05-11 PROCEDURE — 99213 OFFICE O/P EST LOW 20 MIN: CPT | Performed by: NURSE PRACTITIONER

## 2018-05-11 RX ORDER — SUMATRIPTAN 50 MG/1
50 TABLET, FILM COATED ORAL
Qty: 30 TABLET | Refills: 3 | Status: SHIPPED | OUTPATIENT
Start: 2018-05-11 | End: 2018-10-12

## 2018-05-11 NOTE — PROGRESS NOTES
"  SUBJECTIVE:   Prince Patel is a 42 year old female who presents to clinic today for the following health issues:      History of Present Illness     Migraines:     Headache Symptoms are:  Stable    Migraine frequency::  5 per week    Migraine Duration::  >3 days    Ability to perform ADL's::  Yes    Migraine Rescue/Relief Medications::  Tylenol, Excedrin and Maxalt    Effectiveness of rescue/relief medications::  No relief    Migraine Preventative Medications::  Other    Neurological symptoms::  None    ER or UC Visits::  1    Headaches: At last visit on 5/7/2018 Prince was started on amitriptyline 25 mg at bedtime daily.  Since starting this medication headaches have not improved,still has headaches almost every day. She has not needed her migraine medications, but takes Amitriptyline and tylenol daily. She currently has a headache around her temples that she rates as 3/10. She has been able to attend work with the headaches.  She denies having dizziness, nausea, vomiting or  vision changes.     Skin: Prince is concerned about a blackhead on her back      Problem list and histories reviewed & adjusted, as indicated.  Additional history: as documented    ROS:  Constitutional, HEENT, cardiovascular, pulmonary, GI,  neuro, skin and psych systems are negative, except as otherwise noted.    This document serves as a record of the services and decisions personally performed and made by Susan Haase, CNP. It was created on her behalf by Olman Sparrow, a trained medical scribe. The creation of this document is based the provider's statements to the medical scribe.  Olman Sparrow May 11, 2018 2:35 PM      OBJECTIVE:     /84 (BP Location: Right arm, Patient Position: Chair, Cuff Size: Adult Regular)  Pulse 108  Temp 98.1  F (36.7  C) (Oral)  Resp 16  Ht 1.6 m (5' 3\")  Wt 99.3 kg (219 lb)  LMP 10/31/2011  SpO2 97%  BMI 38.79 kg/m2  Body mass index is 38.79 kg/(m^2).  GENERAL: healthy, alert and no distress  RESP: " lungs clear to auscultation - no rales, rhonchi or wheezes  CV: regular rate and rhythm, normal S1 S2, no S3 or S4, no murmur, click or rub, no peripheral edema  SKIN: comedone on left scapula  PSYCH: mentation appears normal, affect normal/bright    ASSESSMENT/PLAN:   Prince was seen today for recheck.    Diagnoses and all orders for this visit:    Daily headache:  Has had daily headaches since 4/28/2018, placed on daily amitriptyline 25 mg without relief. Also not relieved by ibuprofen, tylenol or Imitrex.  -     NEUROLOGY ADULT REFERRAL    Migraine with aura and without status migrainosus, not intractable  -     SUMAtriptan (IMITREX) 50 MG tablet; Take 1 tablet (50 mg) by mouth at onset of headache for migraine  -     NEUROLOGY ADULT REFERRAL    Comedone: left scapula, extracted.       Follow up in 4 weeks, sooner as needed.    The information in this document, created by the medical scribe for me, accurately reflects the services I personally performed and the decisions made by me. I have reviewed and approved this document for accuracy.   Susan Haase, CNP Susan Haase, APRN CNP  [facility]

## 2018-05-11 NOTE — MR AVS SNAPSHOT
After Visit Summary   5/11/2018    Prince Patel    MRN: [id number]           Patient Information     Date Of Birth          1975        Visit Information        Provider Department      5/11/2018 2:15 PM Haase, Susan Rachele, APRN CNP [facility]        Today's Diagnoses     Daily headache    -  1    Migraine with aura and without status migrainosus, not intractable           Follow-ups after your visit        Additional Services     NEUROLOGY ADULT REFERRAL       Your provider has referred you for the following:   Consult at [facility]: [facility] [phone]   [email or web address]    Please be aware that coverage of these services is subject to the terms and limitations of your health insurance plan.  Call member services at your health plan with any benefit or coverage questions.      Please bring the following with you to your appointment:    (1) Any X-Rays, CTs or MRIs which have been performed.  Contact the facility where they were done to arrange for  prior to your scheduled appointment.    (2) List of current medications  (3) This referral request   (4) Any documents/labs given to you for this referral                  Follow-up notes from your care team     Return in about 4 weeks (around 6/8/2018).      Who to contact     If you have questions or need follow up information about today's clinic visit or your schedule please contact [facility] directly at [phone].  Normal or non-critical lab and imaging results will be communicated to you by MyChart, letter or phone within 4 business days after the clinic has received the results. If you do not hear from us within 7 days, please contact the clinic through MyChart or phone. If you have a critical or abnormal lab result, we will notify you by phone as soon as possible.  Submit refill requests through [employer] or call your pharmacy and  "they will forward the refill request to us. Please allow 3 business days for your refill to be completed.          Additional Information About Your Visit        [email or web address] Information     [employer] gives you secure access to your electronic health record. If you see a primary care provider, you can also send messages to your care team and make appointments. If you have questions, please call your primary care clinic.  If you do not have a primary care provider, please call [phone] and they will assist you.        Care EveryWhere ID     This is your Care EveryWhere ID. This could be used by other organizations to access your [place] medical records  [id number]        Your Vitals Were     Pulse Temperature Respirations Height Last Period Pulse Oximetry    108 98.1  F (36.7  C) (Oral) 16 5' 3\" (1.6 m) 10/31/2011 97%    BMI (Body Mass Index)                   38.79 kg/m2            Blood Pressure from Last 3 Encounters:   05/11/18 120/84   05/07/18 120/78   10/26/17 130/86    Weight from Last 3 Encounters:   05/11/18 219 lb (99.3 kg)   05/07/18 219 lb (99.3 kg)   10/26/17 208 lb (94.3 kg)              We Performed the Following     NEUROLOGY ADULT REFERRAL          Today's Medication Changes          These changes are accurate as of 5/11/18  2:46 PM.  If you have any questions, ask your nurse or doctor.               These medicines have changed or have updated prescriptions.        Dose/Directions    SUMAtriptan 50 MG tablet   Commonly known as:  IMITREX   This may have changed:  how much to take   Used for:  Migraine with aura and without status migrainosus, not intractable   Changed by:  Haase, Susan Rachele, APRN CNP        Dose:  50 mg   Take 1 tablet (50 mg) by mouth at onset of headache for migraine   Quantity:  30 tablet   Refills:  3         Stop taking these medicines if you haven't already. Please contact your care team if you have questions.     butalbital-acetaminophen-caffeine -40 MG per tablet "   Commonly known as:  FIORICET/ESGIC   Stopped by:  Haase, Susan Rachele, APRN CNP                Where to get your medicines      These medications were sent to [facility]/pharmacy #[id number] - [place], [place] - [address]  [address] [address]     Phone:  [phone]     SUMAtriptan 50 MG tablet                Primary Care Provider Office Phone # Fax #    Susan Rachele Haase, APRN -[phone] [address]  [facility] [address]        Equal Access to Services     [facility]: Hadii aad ku hadasho Soomaali, waaxda luqadaha, qaybta kaalmada adeegyada, waxorion rae hayamina fitzgerald . So [facility] [phone].    ATENCIÓN: Si habla español, tiene a ty disposición servicios gratuitos de asistencia lingüística. [facility] [phone].    We comply with applicable federal civil rights laws and [place] laws. We do not discriminate on the basis of race, color, national origin, age, disability, sex, sexual orientation, or gender identity.            Thank you!     Thank you for choosing [facility]  for your care. Our goal is always to provide you with excellent care. Hearing back from our patients is one way we can continue to improve our services. Please take a few minutes to complete the written survey that you may receive in the mail after your visit with us. Thank you!             Your Updated Medication List - Protect others around you: Learn how to safely use, store and throw away your medicines at www.disposemymeds.org.          This list is accurate as of 5/11/18  2:46 PM.  Always use your most recent med list.                   Brand Name Dispense Instructions for use Diagnosis    amitriptyline 25 MG tablet    ELAVIL    30 tablet    Take 1 tablet (25 mg) by mouth At Bedtime    Migraine with aura and without status migrainosus, not intractable       diphenhydrAMINE 25 MG tablet    BENADRYL    30 tablet    Take 2 tablets (50 mg) by mouth every 6  hours as needed for other (Headache)    Intractable migraine with aura with status migrainosus       fexofenadine 180 MG tablet    ALLEGRA    31 tablet    Take 1 tablet (180 mg) by mouth daily    Allergic rhinitis       fluticasone 50 MCG/ACT spray    FLONASE    1 g    Spray 2 sprays into both nostrils daily    Allergic rhinitis       ibuprofen 800 MG tablet    ADVIL/MOTRIN    30 tablet    Take 1 tablet by mouth every 6 hours as needed for pain (For mild pain and temperature greater than 102F).    Excessive or frequent menstruation       multivitamin per tablet      1 tab qd as directed        order for DME     1 Device    DME:  Closed knee sleeve    Acute pain of right knee       SUMAtriptan 50 MG tablet    IMITREX    30 tablet    Take 1 tablet (50 mg) by mouth at onset of headache for migraine    Migraine with aura and without status migrainosus, not intractable

## 2018-05-11 NOTE — PROGRESS NOTES
Answers for HPI/ROS submitted by the patient on 5/7/2018   Chronic problems general questions HPI Form  Headache Symptoms are: Stable  Migraine frequency:: 5 per week  Migraine Duration:: >3 days  Ability to perform ADL's:: Yes  Migraine Rescue/Relief Medications:: Tylenol, Excedrin, Maxalt  Effectiveness of rescue/relief medications:: No relief  Migraine Preventative Medications:: other  Neurological symptoms:: None  ER or UC Visits:: 1

## 2018-06-01 ENCOUNTER — TELEPHONE (OUTPATIENT)
Dept: FAMILY MEDICINE | Facility: CLINIC | Age: 43
End: 2018-06-01

## 2018-06-01 NOTE — TELEPHONE ENCOUNTER
Danuta-see below.  Prince sent below message in her mother's [employer] message.  Not sure why since Prince has [employer] also.  Please advise.  Sujata Raphael RN    May 31, 2018   Tina Patel   to Susan Rachele Haase, ELY CNP           6:05 PM   Hi Dr Tipton this is Prince Patel. I'm using my mom's my chart because my phone is dead right now!   Just want o let you know that I still haven't gotten in to see anyone for the headaches I have been having and still have them!! Please please call and see if you can help me get an appointment scheduled! Appointments can be scheduled after 2:30 on Mondays,Tuesday's and Friday's.     Thank you!   Prince Patel

## 2018-06-01 NOTE — TELEPHONE ENCOUNTER
Naa,  Can you call Prince and help her get a neurology appointment? A referral was placed on 5/11/2018.  Thanks,    Susan Haase, CNP

## 2018-06-01 NOTE — TELEPHONE ENCOUNTER
Spoke to [facility] and the said they will be calling the pt to make an appointment.  Pt has been informed.    Naa-Referrals

## 2018-06-13 ENCOUNTER — OFFICE VISIT (OUTPATIENT)
Dept: FAMILY MEDICINE | Facility: CLINIC | Age: 43
End: 2018-06-13
Payer: MEDICARE

## 2018-06-13 VITALS
TEMPERATURE: 98.2 F | HEART RATE: 116 BPM | RESPIRATION RATE: 14 BRPM | SYSTOLIC BLOOD PRESSURE: 138 MMHG | DIASTOLIC BLOOD PRESSURE: 88 MMHG | BODY MASS INDEX: 38.79 KG/M2 | WEIGHT: 219 LBS

## 2018-06-13 DIAGNOSIS — T14.8XXA HEMATOMA OF SKIN: Primary | ICD-10-CM

## 2018-06-13 PROCEDURE — 99213 OFFICE O/P EST LOW 20 MIN: CPT | Performed by: PHYSICIAN ASSISTANT

## 2018-06-13 NOTE — MR AVS SNAPSHOT
After Visit Summary   6/13/2018    Prince Patel    MRN: [id number]           Patient Information     Date Of Birth          1975        Visit Information        Provider Department      6/13/2018 9:00 AM Radu Gordon PA-C [facility]        Today's Diagnoses     Hematoma of skin    -  1      Care Instructions      Hematoma  A hematoma is a collection of blood trapped outside of a blood vessel. It is what we think of as a bruise or a contusion. It is usually seen under the skin as a black and blue spot on your arm or leg, or a bump on your head after an injury. It can be almost anywhere on or in your body. It can also occur in an internal organ where it can be more serious.  A hematoma is caused by an injury with damage to small blood vessels. This causes blood to leak into the tissues. Blood forms a pocket under the skin that swells and looks like a purplish patch.  Gradually the blood in the hematoma is absorbed back into the body. The swelling and pain of the hematoma will go away. This takes from 1 to 4 weeks, depending on the size of the hematoma. The skin over the hematoma may turn bluish then brown and yellow as the blood is dissolved and absorbed. Usually, this only takes a couple of weeks but can last months.  Home care    Limit motion of the joints near the hematoma. If the hematoma is large and painful, avoid sports and other vigorous physical activity until the swelling and pain goes away.    Apply an ice pack (ice cubes in a plastic bag, wrapped in a thin towel or a frozen bag of peas) over the injured area for 20 minutes every 1 to 2 hours the first day. Continue with ice packs 3 to 4 times a day for the next 2 days. Continue the use of ice packs for relief of pain and swelling as needed.    If you need anything for pain, you can take acetaminophen, unless you were given a different pain medicine to use. Talk with your doctor before using this medicine  if you have chronic liver or kidney disease. Also talk with your doctor if you have had a stomach ulcer or digestive tract bleeding, or are taking blood-thinner medicines.  Follow-up care  Follow up with your healthcare provider, or as advised. If X-rays or a CT scan were done, you will be notified if there is a change in the reading, especially if it affects treatment.  When to seek medical advice  Call your healthcare provider right away f any of the following occur:    Redness around the hematoma    Increase in pain or warmth in the hematoma    Increase in size of the hematoma    Fever of 100.4 F (38 C) or higher, or as directed by your healthcare provider    If the hematoma is on the arm or leg, watch for:  ? Increased swelling or pain in the extremity  ? Numbness or tingling or blue color of the hand or foot  Date Last Reviewed: 11/5/2015 2000-2017 [employer]. [address] [place] [address]. All rights reserved. This information is not intended as a substitute for professional medical care. Always follow your healthcare professional's instructions.                Follow-ups after your visit        Who to contact     If you have questions or need follow up information about today's clinic visit or your schedule please contact [facility] directly at [phone].  Normal or non-critical lab and imaging results will be communicated to you by MyChart, letter or phone within 4 business days after the clinic has received the results. If you do not hear from us within 7 days, please contact the clinic through MyChart or phone. If you have a critical or abnormal lab result, we will notify you by phone as soon as possible.  Submit refill requests through [employer] or call your pharmacy and they will forward the refill request to us. Please allow 3 business days for your refill to be completed.          Additional Information About Your Visit        MyChart Information      [employer] gives you secure access to your electronic health record. If you see a primary care provider, you can also send messages to your care team and make appointments. If you have questions, please call your primary care clinic.  If you do not have a primary care provider, please call [phone] and they will assist you.        Care EveryWhere ID     This is your Care EveryWhere ID. This could be used by other organizations to access your [place] medical records  [id number]        Your Vitals Were     Pulse Temperature Respirations Last Period BMI (Body Mass Index)       116 98.2  F (36.8  C) (Oral) 14 10/31/2011 38.79 kg/m2        Blood Pressure from Last 3 Encounters:   06/13/18 138/88   05/11/18 120/84   05/07/18 120/78    Weight from Last 3 Encounters:   06/13/18 219 lb (99.3 kg)   05/11/18 219 lb (99.3 kg)   05/07/18 219 lb (99.3 kg)              Today, you had the following     No orders found for display         Today's Medication Changes          These changes are accurate as of 6/13/18  9:26 AM.  If you have any questions, ask your nurse or doctor.               Start taking these medicines.        Dose/Directions    order for DME   Used for:  Hematoma of skin   Started by:  Radu Gordon PA-C        Equipment being ordered: crutches   Quantity:  1 Device   Refills:  0            Where to get your medicines      Some of these will need a paper prescription and others can be bought over the counter.  Ask your nurse if you have questions.     Bring a paper prescription for each of these medications     order for DME                Primary Care Provider Office Phone # Fax #    Susan Rachele Haase, APRN -[phone] [address] [facility] [address]        Equal Access to Services     [facility] ROXANN AH: Clarence Lobato, wanoreen angel, qaybta kaalmaarun vasquez, hilda elizondo. So [facility] [phone].    ATENCIÓN: Alie ramos,  tiene a ty disposición servicios gratuitos de asistencia lingüística. Doris joseph [phone].    We comply with applicable federal civil rights laws and [place] laws. We do not discriminate on the basis of race, color, national origin, age, disability, sex, sexual orientation, or gender identity.            Thank you!     Thank you for choosing [facility]  for your care. Our goal is always to provide you with excellent care. Hearing back from our patients is one way we can continue to improve our services. Please take a few minutes to complete the written survey that you may receive in the mail after your visit with us. Thank you!             Your Updated Medication List - Protect others around you: Learn how to safely use, store and throw away your medicines at www.disposemymeds.org.          This list is accurate as of 6/13/18  9:26 AM.  Always use your most recent med list.                   Brand Name Dispense Instructions for use Diagnosis    amitriptyline 25 MG tablet    ELAVIL    30 tablet    Take 1 tablet (25 mg) by mouth At Bedtime    Migraine with aura and without status migrainosus, not intractable       diphenhydrAMINE 25 MG tablet    BENADRYL    30 tablet    Take 2 tablets (50 mg) by mouth every 6 hours as needed for other (Headache)    Intractable migraine with aura with status migrainosus       fexofenadine 180 MG tablet    ALLEGRA    31 tablet    Take 1 tablet (180 mg) by mouth daily    Allergic rhinitis       fluticasone 50 MCG/ACT spray    FLONASE    1 g    Spray 2 sprays into both nostrils daily    Allergic rhinitis       multivitamin per tablet      1 tab qd as directed        order for DME     1 Device    DME:  Closed knee sleeve    Acute pain of right knee       order for DME     1 Device    Equipment being ordered: crutches    Hematoma of skin       SUMAtriptan 50 MG tablet    IMITREX    30 tablet    Take 1 tablet (50 mg) by mouth at onset of headache for migraine     Migraine with aura and without status migrainosus, not intractable

## 2018-06-13 NOTE — LETTER
[facility]  [id number] [place] [id number]  Phone: [phone]    June 13, 2018        Prince Patel  [address] [place] [address]          To whom it may concern:    RE: Prince Patel    Patient was seen and treated today at our clinic and missed work due to non-work related injury. I recommend no work from 6/13/18-6/15/18 for rest and recovery.     Please contact me for questions or concerns.      Sincerely,        Radu Gordon PA-C

## 2018-06-13 NOTE — PATIENT INSTRUCTIONS
Hematoma  A hematoma is a collection of blood trapped outside of a blood vessel. It is what we think of as a bruise or a contusion. It is usually seen under the skin as a black and blue spot on your arm or leg, or a bump on your head after an injury. It can be almost anywhere on or in your body. It can also occur in an internal organ where it can be more serious.  A hematoma is caused by an injury with damage to small blood vessels. This causes blood to leak into the tissues. Blood forms a pocket under the skin that swells and looks like a purplish patch.  Gradually the blood in the hematoma is absorbed back into the body. The swelling and pain of the hematoma will go away. This takes from 1 to 4 weeks, depending on the size of the hematoma. The skin over the hematoma may turn bluish then brown and yellow as the blood is dissolved and absorbed. Usually, this only takes a couple of weeks but can last months.  Home care    Limit motion of the joints near the hematoma. If the hematoma is large and painful, avoid sports and other vigorous physical activity until the swelling and pain goes away.    Apply an ice pack (ice cubes in a plastic bag, wrapped in a thin towel or a frozen bag of peas) over the injured area for 20 minutes every 1 to 2 hours the first day. Continue with ice packs 3 to 4 times a day for the next 2 days. Continue the use of ice packs for relief of pain and swelling as needed.    If you need anything for pain, you can take acetaminophen, unless you were given a different pain medicine to use. Talk with your doctor before using this medicine if you have chronic liver or kidney disease. Also talk with your doctor if you have had a stomach ulcer or digestive tract bleeding, or are taking blood-thinner medicines.  Follow-up care  Follow up with your healthcare provider, or as advised. If X-rays or a CT scan were done, you will be notified if there is a change in the reading, especially if it affects  treatment.  When to seek medical advice  Call your healthcare provider right away f any of the following occur:    Redness around the hematoma    Increase in pain or warmth in the hematoma    Increase in size of the hematoma    Fever of 100.4 F (38 C) or higher, or as directed by your healthcare provider    If the hematoma is on the arm or leg, watch for:  ? Increased swelling or pain in the extremity  ? Numbness or tingling or blue color of the hand or foot  Date Last Reviewed: 11/5/2015 2000-2017 [employer]. [address] [place], [place] [address]. All rights reserved. This information is not intended as a substitute for professional medical care. Always follow your healthcare professional's instructions.

## 2018-06-13 NOTE — PROGRESS NOTES
SUBJECTIVE:   Prince Patel is a 43 year old female who presents to clinic today for the following health issues:      Trauma Left Leg    Onset: 1 day    Description:   Location: Left leg  Character: Sharp    Intensity: 9/10    Progression of Symptoms: constant    Accompanying Signs & Symptoms:  Other symptoms: warmth, swelling, redness and discoloration of thigh    History:   Previous similar pain: no       Precipitating factors:   Trauma or overuse: YES- softball injury    Alleviating factors:  Improved by: ice    Therapies Tried and outcome: ice - helped a little bit      Problem list and histories reviewed & adjusted, as indicated.  Additional history: as documented    Patient Active Problem List   Diagnosis     Sprain and strain of unspecified site of knee and leg     Obesity     CARDIOVASCULAR SCREENING; LDL GOAL LESS THAN 160     S/P partial hysterectomy     Intertrigo     Migraine with aura and without status migrainosus, not intractable     Chronic non-seasonal allergic rhinitis, unspecified trigger     Past Surgical History:   Procedure Laterality Date     LAPAROSCOPIC HYSTERECTOMY SUPRACERVICAL  11/16/2011    Procedure:LAPAROSCOPIC HYSTERECTOMY SUPRACERVICAL; LAPAROSCOPIC HYSTERECTOMY SUPRACERVICAL ; Surgeon:MASOUD WHITE; Location:[facility] OR     ORTHOPEDIC SURGERY       SURGICAL HISTORY OF -       ingrown toenails removed     toenail removal         Social History   Substance Use Topics     Smoking status: Never Smoker     Smokeless tobacco: Never Used     Alcohol use No     Family History   Problem Relation Age of Onset     DIABETES Maternal Aunt      C.A.D. Father      MI, angioplasty, 50s     DIABETES Father      Diabetes     Neurologic Disorder Paternal Grandmother      GASTROINTESTINAL DISEASE Paternal Grandmother      Gluten Intolerance      DIABETES Maternal Grandmother      Multiple Sclerosis         Current Outpatient Prescriptions   Medication Sig Dispense Refill     amitriptyline (ELAVIL)  25 MG tablet Take 1 tablet (25 mg) by mouth At Bedtime 30 tablet 1     diphenhydrAMINE (BENADRYL) 25 MG tablet Take 2 tablets (50 mg) by mouth every 6 hours as needed for other (Headache) 30 tablet 0     fexofenadine (ALLEGRA) 180 MG tablet Take 1 tablet (180 mg) by mouth daily 31 tablet 1     fluticasone (FLONASE) 50 MCG/ACT spray Spray 2 sprays into both nostrils daily 1 g 1     MULTIVITAMINS OR TABS 1 tab qd as directed  0     order for DME Equipment being ordered: crutches 1 Device 0     order for DME DME:  Closed knee sleeve 1 Device 0     SUMAtriptan (IMITREX) 50 MG tablet Take 1 tablet (50 mg) by mouth at onset of headache for migraine 30 tablet 3     Allergies   Allergen Reactions     Keflex [Cephalexin] Rash     BP Readings from Last 3 Encounters:   06/13/18 138/88   05/11/18 120/84   05/07/18 120/78    Wt Readings from Last 3 Encounters:   06/13/18 219 lb (99.3 kg)   05/11/18 219 lb (99.3 kg)   05/07/18 219 lb (99.3 kg)                    Reviewed and updated as needed this visit by clinical staff  Tobacco  Allergies  Meds  Problems  Med Hx  Surg Hx  Fam Hx  Soc Hx        Reviewed and updated as needed this visit by Provider  Allergies  Meds  Problems         ROS:  Constitutional, msk, skin, cardiovascular, pulmonary, gi and gu systems are negative, except as otherwise noted.    OBJECTIVE:     /88 (BP Location: Left arm, Patient Position: Chair, Cuff Size: Adult Large)  Pulse 116  Temp 98.2  F (36.8  C) (Oral)  Resp 14  Wt 219 lb (99.3 kg)  LMP 10/31/2011  BMI 38.79 kg/m2  Body mass index is 38.79 kg/(m^2).  GENERAL APPEARANCE: healthy, alert and no distress  ORTHO: L Hip Exam: Palpation: Tender:   None   Non-tender:  left greater trochanter, left gluteus medius, left ASIS, left iliac crest, left proximal hamstring attachment  Range of Motion:  Left Hip  flexion   decreased, painful, extension  full, external rotation  full, internal rotation  decreased, painful, adduction  full,  abduction  decreased, painful  Strength:  flexion: 4-/5, painful, extension 5/5, abduction: 4+/5, painful, adduction: 5/5    L Knee Exam: Inspection: AP/lateral alignment normal  Tender: none   Non-tender: lateral patellar facet, medial patellar facet, inferior pole patella, patella tendon, quadriceps insertion, MCL, LCL, lateral joint line, medial joint line, medial tibial plateau, lateral tibial plateau, medial femoral condyle, lateral femoral condyle, distal IT band, prepatellar bursa, popliteal region, pes anserine bursa  Active Range of Motion: full flexion, no pain with flexion, full extension, pain with extension  Strength: quad  4-/5, painful and Hamstrings  5/5  Special tests: normal Valgus stress test, positive Valgus stress test    SKIN: ecchymosis with minimal swelling and no erythema noted on lateral mid left thigh. No warmth. Tenderness to palpation present over site.   PSYCH: mentation appears normal and affect normal/bright    Diagnostic Test Results:  none     ASSESSMENT/PLAN:     (T14.8XXA) Hematoma of skin  (primary encounter diagnosis)  Comment: noted on exam. No significant findings suggesting DVT. No findings suggesting hip or knee etiology. No obvious findings suggesting muscular tear. Educated on hematomas and course length. Scheduled nsaids, ice, rest, and compression recommended. Offered crutches. Patient accepts these. If symptoms fail to improve in 3 weeks, patient should RTC. Sooner if worsening.   Plan: order for DME              Follow up: as above. Otherwise, within the next 3 months for fasting annual exam with pcp.     Radu Gordon PA-C  [facility]

## 2018-07-06 DIAGNOSIS — G43.109 MIGRAINE WITH AURA AND WITHOUT STATUS MIGRAINOSUS, NOT INTRACTABLE: ICD-10-CM

## 2018-07-06 NOTE — TELEPHONE ENCOUNTER
"Requested Prescriptions   Pending Prescriptions Disp Refills     amitriptyline (ELAVIL) 25 MG tablet [Pharmacy Med Name: AMITRIPTYLINE HCL 25 MG TAB]  Last Written Prescription Date:  5/7/2018  Last Fill Quantity: 30 tablet,  # refills: 1   Last office visit: 6/13/2018 with prescribing provider:  Evan      30 tablet 1     Sig: TAKE 1 TABLET (25 MG) BY MOUTH AT BEDTIME    Tricyclic Agents ( Annual appt and no PHQ9) Passed    7/6/2018  1:55 AM       Passed - Blood Pressure under 140/90 in past 12 mos    BP Readings from Last 3 Encounters:   06/13/18 138/88   05/11/18 120/84   05/07/18 120/78                Passed - Recent (12 mo) or future (30 days) visit within authorizing provider's specialty    Patient had office visit in the last 12 months or has a visit in the next 30 days with authorizing provider or within the authorizing provider's specialty.  See \"Patient Info\" tab in inbasket, or \"Choose Columns\" in Meds & Orders section of the refill encounter.           Passed - Patient is age 18 or older       Passed - Patient is not pregnant       Passed - No positive pregnancy test on record in past 12 mos          "

## 2018-07-07 ENCOUNTER — MYC MEDICAL ADVICE (OUTPATIENT)
Dept: FAMILY MEDICINE | Facility: CLINIC | Age: 43
End: 2018-07-07

## 2018-07-10 NOTE — TELEPHONE ENCOUNTER
Can you call Prince and have her set up an appt, please double book if needed.  Thanks,  Susan Haase, CNP

## 2018-07-10 NOTE — TELEPHONE ENCOUNTER
Prescription approved per [facility] Refill Protocol.    Judy Cantu, RN  Patient Care Supervisor  [facility]  [phone]

## 2018-07-10 NOTE — TELEPHONE ENCOUNTER
SH, see mychart request and advise if appointment or E visit appropriate, told pt to make appointment, please confirm, route to inform pt of plan    Dayana Sawant RN, BSN  Message handled by Nurse Triage.

## 2018-07-16 ENCOUNTER — OFFICE VISIT (OUTPATIENT)
Dept: FAMILY MEDICINE | Facility: CLINIC | Age: 43
End: 2018-07-16
Payer: MEDICARE

## 2018-07-16 VITALS
WEIGHT: 225.5 LBS | SYSTOLIC BLOOD PRESSURE: 132 MMHG | BODY MASS INDEX: 39.95 KG/M2 | TEMPERATURE: 98 F | OXYGEN SATURATION: 100 % | RESPIRATION RATE: 16 BRPM | HEART RATE: 108 BPM | DIASTOLIC BLOOD PRESSURE: 84 MMHG

## 2018-07-16 DIAGNOSIS — K92.1 HEMATOCHEZIA: Primary | ICD-10-CM

## 2018-07-16 DIAGNOSIS — G43.109 MIGRAINE WITH AURA AND WITHOUT STATUS MIGRAINOSUS, NOT INTRACTABLE: ICD-10-CM

## 2018-07-16 DIAGNOSIS — K64.4 EXTERNAL HEMORRHOIDS: ICD-10-CM

## 2018-07-16 LAB
BASOPHILS # BLD AUTO: 0 10E9/L (ref 0–0.2)
BASOPHILS NFR BLD AUTO: 0.2 %
DIFFERENTIAL METHOD BLD: NORMAL
EOSINOPHIL # BLD AUTO: 0.3 10E9/L (ref 0–0.7)
EOSINOPHIL NFR BLD AUTO: 2.7 %
ERYTHROCYTE [DISTWIDTH] IN BLOOD BY AUTOMATED COUNT: 13.7 % (ref 10–15)
HCT VFR BLD AUTO: 37.3 % (ref 35–47)
HGB BLD-MCNC: 12.4 G/DL (ref 11.7–15.7)
LYMPHOCYTES # BLD AUTO: 2.1 10E9/L (ref 0.8–5.3)
LYMPHOCYTES NFR BLD AUTO: 20.8 %
MCH RBC QN AUTO: 28.7 PG (ref 26.5–33)
MCHC RBC AUTO-ENTMCNC: 33.2 G/DL (ref 31.5–36.5)
MCV RBC AUTO: 86 FL (ref 78–100)
MONOCYTES # BLD AUTO: 0.6 10E9/L (ref 0–1.3)
MONOCYTES NFR BLD AUTO: 6.2 %
NEUTROPHILS # BLD AUTO: 7.1 10E9/L (ref 1.6–8.3)
NEUTROPHILS NFR BLD AUTO: 70.1 %
PLATELET # BLD AUTO: 289 10E9/L (ref 150–450)
RBC # BLD AUTO: 4.32 10E12/L (ref 3.8–5.2)
WBC # BLD AUTO: 10.2 10E9/L (ref 4–11)

## 2018-07-16 PROCEDURE — 85025 COMPLETE CBC W/AUTO DIFF WBC: CPT | Performed by: NURSE PRACTITIONER

## 2018-07-16 PROCEDURE — 99214 OFFICE O/P EST MOD 30 MIN: CPT | Performed by: NURSE PRACTITIONER

## 2018-07-16 PROCEDURE — 36415 COLL VENOUS BLD VENIPUNCTURE: CPT | Performed by: NURSE PRACTITIONER

## 2018-07-16 NOTE — MR AVS SNAPSHOT
After Visit Summary   7/16/2018    Prince Patel    MRN: [id number]           Patient Information     Date Of Birth          1975        Visit Information        Provider Department      7/16/2018 3:00 PM Haase, Susan Rachele, APRN CNP [facility]        Today's Diagnoses     Hematochezia    -  1       Follow-ups after your visit        Additional Services     GASTROENTEROLOGY ADULT REF PROCEDURE ONLY [facility] [phone]       Last Lab Result: Creatinine (mg/dL)       Date                     Value                 11/17/2014               0.88             ----------  Body mass index is 39.95 kg/(m^2).     Needed:  No  Language:  English    Patient will be contacted to schedule procedure.     Please be aware that coverage of these services is subject to the terms and limitations of your health insurance plan.  Call member services at your health plan with any benefit or coverage questions.  Any procedures must be performed at a [facility] facility OR coordinated by your clinic's referral office.    Please bring the following with you to your appointment:    (1) Any X-Rays, CTs or MRIs which have been performed.  Contact the facility where they were done to arrange for  prior to your scheduled appointment.    (2) List of current medications   (3) This referral request   (4) Any documents/labs given to you for this referral                  Follow-up notes from your care team     Return in about 4 weeks (around 8/13/2018) for Physical Exam, Lab Work.      Who to contact     If you have questions or need follow up information about today's clinic visit or your schedule please contact [facility] directly at [phone].  Normal or non-critical lab and imaging results will be communicated to you by MyChart, letter or phone within 4 business days after the clinic has received the results. If you do not hear from us within 7 days, please  contact the clinic through [employer] or phone. If you have a critical or abnormal lab result, we will notify you by phone as soon as possible.  Submit refill requests through [employer] or call your pharmacy and they will forward the refill request to us. Please allow 3 business days for your refill to be completed.          Additional Information About Your Visit        [employer] Information     [employer] gives you secure access to your electronic health record. If you see a primary care provider, you can also send messages to your care team and make appointments. If you have questions, please call your primary care clinic.  If you do not have a primary care provider, please call [phone] and they will assist you.        Care EveryWhere ID     This is your Care EveryWhere ID. This could be used by other organizations to access your [facility] medical records  [id number]        Your Vitals Were     Pulse Temperature Respirations Last Period Pulse Oximetry BMI (Body Mass Index)    108 98  F (36.7  C) (Oral) 16 10/31/2011 100% 39.95 kg/m2       Blood Pressure from Last 3 Encounters:   07/16/18 140/90   06/13/18 138/88   05/11/18 120/84    Weight from Last 3 Encounters:   07/16/18 225 lb 8 oz (102.3 kg)   06/13/18 219 lb (99.3 kg)   05/11/18 219 lb (99.3 kg)              We Performed the Following     CBC with platelets differential     GASTROENTEROLOGY ADULT REF PROCEDURE ONLY [facility] [phone]        Primary Care Provider Office Phone # Fax #    Danuta Price Haase, APRN [phone]       [address] [facility] [address]        Equal Access to Services     [facility]OXANN : Hadii carlos Lobato, waaxda luqadaha, qaybta kaalmada pedro, hilda elizondo. So [facility] [phone].    ATENCIÓN: Si habla español, tiene a ty disposición servicios gratuitos de asistencia lingüística. Llame al [phone].    We comply with applicable federal civil rights laws  and [place] laws. We do not discriminate on the basis of race, color, national origin, age, disability, sex, sexual orientation, or gender identity.            Thank you!     Thank you for choosing [facility]  for your care. Our goal is always to provide you with excellent care. Hearing back from our patients is one way we can continue to improve our services. Please take a few minutes to complete the written survey that you may receive in the mail after your visit with us. Thank you!             Your Updated Medication List - Protect others around you: Learn how to safely use, store and throw away your medicines at www.disposemymeds.org.          This list is accurate as of 7/16/18  3:58 PM.  Always use your most recent med list.                   Brand Name Dispense Instructions for use Diagnosis    amitriptyline 25 MG tablet    ELAVIL    30 tablet    TAKE 1 TABLET (25 MG) BY MOUTH AT BEDTIME    Migraine with aura and without status migrainosus, not intractable       diphenhydrAMINE 25 MG tablet    BENADRYL    30 tablet    Take 2 tablets (50 mg) by mouth every 6 hours as needed for other (Headache)    Intractable migraine with aura with status migrainosus       fexofenadine 180 MG tablet    ALLEGRA    31 tablet    Take 1 tablet (180 mg) by mouth daily    Allergic rhinitis       fluticasone 50 MCG/ACT spray    FLONASE    1 g    Spray 2 sprays into both nostrils daily    Allergic rhinitis       multivitamin per tablet      1 tab qd as directed        order for DME     1 Device    DME:  Closed knee sleeve    Acute pain of right knee       order for DME     1 Device    Equipment being ordered: crutches    Hematoma of skin       SUMAtriptan 50 MG tablet    IMITREX    30 tablet    Take 1 tablet (50 mg) by mouth at onset of headache for migraine    Migraine with aura and without status migrainosus, not intractable

## 2018-07-16 NOTE — PROGRESS NOTES
SUBJECTIVE:   Prince Patel is a 43 year old female who presents to clinic today for the following health issues:    Hemorrhoids       Duration: 1 week    Description:   Pain: no   Itching: no     Accompanying signs and symptoms:   Blood in stool: YES- with wiping   Changes in stool pattern: no / hard stools    History (similar episodes/previous evaluation): once    Precipitating or alleviating factors: None    Therapies tried and outcome: none    Current symptoms began 1 week ago. After each BM, blood in the toilet and when she wipes. Blood is bright red. Stools have been hard. Doesn't take stool softener. Drinks water throughout the day, fiber filled diet. Denies rectal itching or pain, abdominal pain. Personal history of hemorrhoids and rectal bleeding first episode in 12/2016. Family history of colon cancer - mom's sister     Migraines: Have greatly improved with PT. Getting roughly 4-5 headaches per month.     Problem list and histories reviewed & adjusted, as indicated.  Additional history: as documented    Patient Active Problem List   Diagnosis     Sprain and strain of unspecified site of knee and leg     Obesity     CARDIOVASCULAR SCREENING; LDL GOAL LESS THAN 160     S/P partial hysterectomy     Intertrigo     Migraine with aura and without status migrainosus, not intractable     Chronic non-seasonal allergic rhinitis, unspecified trigger     Past Surgical History:   Procedure Laterality Date     LAPAROSCOPIC HYSTERECTOMY SUPRACERVICAL  11/16/2011    Procedure:LAPAROSCOPIC HYSTERECTOMY SUPRACERVICAL; LAPAROSCOPIC HYSTERECTOMY SUPRACERVICAL ; Surgeon:MASOUD WHITE; Location:[facility] OR     ORTHOPEDIC SURGERY       SURGICAL HISTORY OF -       ingrown toenails removed     toenail removal         Social History   Substance Use Topics     Smoking status: Never Smoker     Smokeless tobacco: Never Used     Alcohol use No     Family History   Problem Relation Age of Onset     Diabetes Maternal Aunt      SILVINA  Father      MI, angioplasty, 50s     Diabetes Father      Diabetes     Neurologic Disorder Paternal Grandmother      GASTROINTESTINAL DISEASE Paternal Grandmother      Gluten Intolerance      Diabetes Maternal Grandmother      Multiple Sclerosis         Current Outpatient Prescriptions   Medication Sig Dispense Refill     amitriptyline (ELAVIL) 25 MG tablet TAKE 1 TABLET (25 MG) BY MOUTH AT BEDTIME 30 tablet 1     diphenhydrAMINE (BENADRYL) 25 MG tablet Take 2 tablets (50 mg) by mouth every 6 hours as needed for other (Headache) 30 tablet 0     fexofenadine (ALLEGRA) 180 MG tablet Take 1 tablet (180 mg) by mouth daily 31 tablet 1     fluticasone (FLONASE) 50 MCG/ACT spray Spray 2 sprays into both nostrils daily 1 g 1     MULTIVITAMINS OR TABS 1 tab qd as directed  0     order for DME Equipment being ordered: crutches 1 Device 0     order for DME DME:  Closed knee sleeve 1 Device 0     SUMAtriptan (IMITREX) 50 MG tablet Take 1 tablet (50 mg) by mouth at onset of headache for migraine 30 tablet 3     Allergies   Allergen Reactions     Keflex [Cephalexin] Rash       Reviewed and updated as needed this visit by clinical staff  Tobacco  Allergies  Med Hx  Surg Hx  Fam Hx  Soc Hx      Reviewed and updated as needed this visit by Provider         ROS:  Constitutional, cardiovascular, pulmonary, GI,  neuro, and psych systems are negative, except as otherwise noted.    OBJECTIVE:   /84  Pulse 108  Temp 98  F (36.7  C) (Oral)  Resp 16  Wt 225 lb 8 oz (102.3 kg)  LMP 10/31/2011  SpO2 100%  BMI 39.95 kg/m2  Body mass index is 39.95 kg/(m^2).  GENERAL: healthy, alert and no distress  RESP: lungs clear to auscultation - no rales, rhonchi or wheezes  CV: regular rate and rhythm, normal S1 S2, no S3 or S4, no murmur, click or rub, no peripheral edema  ABDOMEN: soft, nontender, no hepatosplenomegaly, no masses and bowel sounds normal  RECTAL (female): normal sphincter tone, no rectal masses. External hemorrhoid at  3 o'clock,, non thrombosed, no bleeding.  PSYCH: mentation appears normal, affect normal/bright    ASSESSMENT/PLAN:   Prince was seen today for derm problem.    Diagnoses and all orders for this visit:    Hematochezia: 3rd episode, FIT test positive in the past, recommend colonoscopy.  HGB stable at 12.4  -     GASTROENTEROLOGY ADULT REF PROCEDURE ONLY [facility] [phone]  -     CBC with platelets differential    External hemorrhoids: non thrombosed,asymptomatic, increase fiber and fluids in diet    Migraine with aura and without status migrainosus, not intractable: greatly improved with PT.     Follow up in 2-3 months for physical exam, sooner as needed.     The information in this document, created by the medical scribe for me, accurately reflects the services I personally performed and the decisions made by me. I have reviewed and approved this document for accuracy prior to leaving the patient care area.  July 16, 2018 3:50 PM  Susan Haase, APRN [facility]

## 2018-07-17 NOTE — PROGRESS NOTES
Ryan Morrison,  Your CBC (checks for anemia and infection) is normal.  Sincerely,     Susan Haase, CNP

## 2018-08-03 ENCOUNTER — HOSPITAL ENCOUNTER (OUTPATIENT)
Facility: CLINIC | Age: 43
Discharge: HOME | End: 2018-08-03
Attending: INTERNAL MEDICINE | Admitting: INTERNAL MEDICINE
Payer: MEDICARE

## 2018-08-03 VITALS
DIASTOLIC BLOOD PRESSURE: 97 MMHG | HEIGHT: 64 IN | SYSTOLIC BLOOD PRESSURE: 133 MMHG | OXYGEN SATURATION: 97 % | BODY MASS INDEX: 37.05 KG/M2 | WEIGHT: 217 LBS | RESPIRATION RATE: 15 BRPM

## 2018-08-03 LAB — COLONOSCOPY: NORMAL

## 2018-08-03 PROCEDURE — G0500 MOD SEDAT ENDO SERVICE >5YRS: HCPCS | Performed by: INTERNAL MEDICINE

## 2018-08-03 PROCEDURE — 25000128 H RX IP 250 OP 636: Performed by: INTERNAL MEDICINE

## 2018-08-03 PROCEDURE — 45378 DIAGNOSTIC COLONOSCOPY: CPT | Performed by: INTERNAL MEDICINE

## 2018-08-03 RX ORDER — ONDANSETRON 4 MG/1
4 TABLET, ORALLY DISINTEGRATING ORAL EVERY 6 HOURS PRN
Status: DISCONTINUED | OUTPATIENT
Start: 2018-08-03 | End: 2018-08-03 | Stop reason: HOSPADM

## 2018-08-03 RX ORDER — ONDANSETRON 2 MG/ML
4 INJECTION INTRAMUSCULAR; INTRAVENOUS EVERY 6 HOURS PRN
Status: DISCONTINUED | OUTPATIENT
Start: 2018-08-03 | End: 2018-08-03 | Stop reason: HOSPADM

## 2018-08-03 RX ORDER — FENTANYL CITRATE 50 UG/ML
INJECTION, SOLUTION INTRAMUSCULAR; INTRAVENOUS PRN
Status: DISCONTINUED | OUTPATIENT
Start: 2018-08-03 | End: 2018-08-03 | Stop reason: HOSPADM

## 2018-08-03 RX ORDER — FLUMAZENIL 0.1 MG/ML
0.2 INJECTION, SOLUTION INTRAVENOUS
Status: DISCONTINUED | OUTPATIENT
Start: 2018-08-03 | End: 2018-08-03 | Stop reason: HOSPADM

## 2018-08-03 RX ORDER — NALOXONE HYDROCHLORIDE 0.4 MG/ML
.1-.4 INJECTION, SOLUTION INTRAMUSCULAR; INTRAVENOUS; SUBCUTANEOUS
Status: DISCONTINUED | OUTPATIENT
Start: 2018-08-03 | End: 2018-08-03 | Stop reason: HOSPADM

## 2018-08-03 RX ORDER — ONDANSETRON 2 MG/ML
4 INJECTION INTRAMUSCULAR; INTRAVENOUS
Status: DISCONTINUED | OUTPATIENT
Start: 2018-08-03 | End: 2018-08-03 | Stop reason: HOSPADM

## 2018-08-03 RX ORDER — LIDOCAINE 40 MG/G
CREAM TOPICAL
Status: DISCONTINUED | OUTPATIENT
Start: 2018-08-03 | End: 2018-08-03 | Stop reason: HOSPADM

## 2018-08-03 NOTE — LETTER
July 18, 2018      Prince Patel  [address] [place] [address]        Thank you for choosing [facility]. You are scheduled for the following service.     Date:  8/03/2018 Friday             Procedure:  COLONOSCOPY  Doctor:        Dr. Isaias Medina   Arrival Time:  12:30 PM  *Check in at Emergency/Endoscopy desk*  Procedure Time:  1:00 PM    Location:   [facility]        Endoscopy Department, First Floor (Enter through ER Doors) *        [address] [place], [place] [address]      [phone] or [phone] () to reschedule      MIRALAX -GATORADE  PREP  Colonoscopy is the most accurate test to detect colon polyps and colon cancer; and the only test where polyps can be removed. During this procedure, a doctor examines the lining of your large intestine and rectum through a flexible tube.     Transportation  Arrange for a ride for the day of your procedure with a responsible adult.  A taxi ride is not an option unless you are accompanied by a responsible adult. If you fail to arrange transportation with a responsible adult, your procedure will be cancelled and rescheduled.    Purchase the  following supplies at your local pharmacy:  - 2 (two) bisacodyl tablets: each tablet contains 5 mg.  (Dulcolax  laxative NOT Dulcolax  stool softener)   - 1 (one) 8.3 oz bottle of Polyethylene Glycol (PEG) 3350 Powder   (MiraLAX , Smooth LAX , ClearLAX  or equivalent)  - 64 oz Gatorade    Regular Gatorade, Gatorade G2 , Powerade , Powerade Zero  or Pedialyte  is acceptable. Red colored flavors are not allowed; all other colors (yellow, green, orange, purple and blue) are okay. It is also okay to buy two 2.12 oz packets of powdered Gatorade that can be mixed with water to a total volume of 64 oz of liquid.  - 1 (one) 10 oz bottle of Magnesium Citrate (Red colored flavors are not allowed)  It is also okay for you to use a 0.5 oz package of  powdered magnesium citrate (17 g) mixed with 10 oz of water.    PREPARATION FOR COLONOSCOPY    7 days before:    Discontinue fiber supplements and medications containing iron. This includes Metamucil  and Fibercon ; and multivitamins with iron.  3 days before:    Begin a low-fiber diet. A low-fiber diet helps making the cleanout more effective.     Examples of a low-fiber diet include (but are not limited to): white bread, white rice, pasta, crackers, fish, chicken, eggs, ground beef, creamy peanut butter, cooked/steamed/boiled vegetables, canned fruit, bananas, melons, milk, plain yogurt cheese, salad dressing and other condiments.     The following are not allowed on a low-fiber diet: seeds, nuts, popcorn, bran, whole wheat, corn, quinoa, raw fruits and vegetables, berries and dried fruit, beans and lentils.    For additional details on low-fiber diet, please refer to the table on the last page.  2 days before:    Continue the low-fiber diet.     Drink at least 8 glasses of water throughout the day.     Stop eating solid foods at 11:45 pm.  1 day before:    In the morning: begin a clear liquid diet (liquids you can see through).     Examples of a clear liquid diet include: water, clear broth or bouillon, Gatorade, Pedialyte or Powerade, carbonated and non-carbonated soft drinks (Sprite , 7-Up , ginger ale), strained fruit juices without pulp (apple, white grape, white cranberry), Jell-O  and popsicles.     The following are not allowed on a clear liquid diet: red liquids, alcoholic beverages, dairy products (milk, creamer, and yogurt), protein shakes, creamy broths, juice with pulp and chewing tobacco.    At noon: take 2 (two) bisacodyl tablets     At 4 (and no later than 6pm): start drinking the Miralax-Gatorade preparation (8.3 oz of Miralax mixed with 64 oz of Gatorade in a large pitcher). Drink 1(one) 8 oz glass every 15 minutes thereafter, until the mixture is gone.    COLON CLEANSING TIPS: drink adequate  amounts of fluids before and after your colon cleansing to prevent dehydration. Stay near a toilet because you will have diarrhea. Even if you are sitting on the toilet, continue to drink the cleansing solution every 15 minutes. If you feel nauseous or vomit, rinse your mouth with water, take a 15 to 30-minute-break and then continue drinking the solution. You will be uncomfortable until the stool has flushed from your colon (in about 2 to 4 hours). You may feel chilled.      Day of your procedure  You may take all of your morning medications including blood pressure medications, blood thinners (if you have not been instructed to stop these by our office), methadone, anti-seizure medications with sips of water 3 hours prior to your procedure or earlier. Do not take insulin or vitamins prior to your procedure. Continue the clear liquid diet.   4 hours prior: drink 10 oz of magnesium citrate. It may be easier to drink it with a straw.    STOP consuming all liquids after that.     Do not take anything by mouth during this time.     Allow extra time to travel to your procedure as you may need to stop and use a restroom along the way.  You are ready for the procedure, if you followed all instructions and your stool is no longer formed, but clear or yellow liquid. If you are unsure whether your colon is clean, please call our office at [phone] before you leave for your appointment.  Bring the following to your procedure:  - Insurance Card/Photo ID.   - List of current medications including over-the-counter medications and supplements.   - Your rescue inhaler if you currently use one to control asthma.      Canceling or rescheduling your appointment:   If you must cancel or reschedule your appointment, please call [phone] as soon as possible.      COLONOSCOPY PRE-PROCEDURE CHECKLIST  If you have diabetes, ask your regular doctor for diet and medication restrictions.  If you take an anticoagulant or anti-platelet  medication (such as Coumadin , Lovenox , Pradaxa , Xarelto , Eliquis , etc.), please call your primary doctor for advice on holding this medication.  If you take aspirin you may continue to do so.  If you are or may be pregnant, please discuss the risks and benefits of this procedure with your doctor.          What happens during a colonoscopy?    Plan to spend up to two hours, starting at registration time, at the endoscopy center the day of your procedure. The colonoscopy takes an average of 15 to 30 minutes. Recovery time is about 30 minutes.    Before the exam:    You will change into a gown.    Your medical history and medication list will be reviewed with you, unless that has been done over the phone prior to the procedure.     A nurse will insert an intravenous (IV) line into your hand or arm.    The doctor will meet with you and will give you a consent form to sign.    During the exam:     Medicine will be given through the IV line to help you relax.     Your heart rate and oxygen levels will be monitored. If your blood pressure is low, you may be given fluids through the IV line.     The doctor will insert a flexible hollow tube, called a colonoscope, into your rectum. The scope will be advanced slowly through the large intestine (colon).    You may have a feeling of fullness or pressure.     If an abnormal tissue or a polyp is found, the doctor may remove it through the endoscope for closer examination, or biopsy. Tissue removal is painless    After the exam:           Any tissue samples removed during the exam will be sent to a lab for evaluation. It may take 5-7 working days for you to be notified of the results.     A nurse will provide you with complete discharge instructions before you leave the endoscopy center. Be sure to ask the nurse for specific instructions if you take blood thinners such as Aspirin, Coumadin or Plavix.     The doctor will prepare a full report for you and for the physician who  referred you for the procedure.     Your doctor will talk with you about the initial results of your exam.      Medication given during the exam will prohibit you from driving for the rest of the day.     Following the exam, you may resume your normal diet. Your first meal should be light, no greasy foods. Avoid alcohol until the next day.     You may resume your regular activities the day after the procedure.     LOW-FIBER DIET    Foods RECOMMENDED Foods to AVOID   Breads, Cereal, Rice and Pasta:   White bread, rolls, biscuits, croissant and praveena toast.   Waffles, [place] toast and pancakes.   White rice, noodles, pasta, macaroni and peeled cooked potatoes.   Plain crackers and saltines.   Cooked cereals: farina, cream of rice.   Cold cereals: Puffed Rice , Rice Krispies , Corn Flakes  and Special K    Breads, Cereal, Rice and Pasta:   Breads or rolls with nuts, seeds or fruit.   Whole wheat, pumpernickel, rye breads and cornbread.   Potatoes with skin, brown or wild rice, and kasha (buckwheat).     Vegetables:   Tender cooked and canned vegetables without seeds: carrots, asparagus tips, green or wax beans, pumpkin, spinach, lima beans. Vegetables:   Raw or steamed vegetables.   Vegetables with seeds.   Sauerkraut.   Winter squash, peas, broccoli, Brussel sprouts, cabbage, onions, cauliflower, baked beans, peas and corn.   Fruits:   Strained fruit juice.   Canned fruit, except pineapple.   Ripe bananas and melon. Fruits:   Prunes and prune juice.   Raw fruits.   Dried fruits: figs, dates and raisins.   Milk/Dairy:   Milk: plain or flavored.   Yogurt, custard and ice cream.   Cheese and cottage cheese Milk/Dairy:     Meat and other proteins:   ground, well-cooked tender beef, lamb, ham, veal, pork, fish, poultry and organ meats.   Eggs.   Peanut butter without nuts. Meat and other proteins:   Tough, fibrous meats with gristle.   Dry beans, peas and lentils.   Peanut butter with nuts.   Tofu.   Fats, Snack, Sweets,  Condiments and Beverages:   Margarine, butter, oils, mayonnaise, sour cream and salad dressing, plain gravy.   Sugar, hard candy, clear jelly, honey and syrup.   Spices, cooked herbs, bouillon, broth and soups made with allowed vegetable, ketchup and mustard.   Coffee, tea and carbonated drinks.   Plain cakes, cookies and pretzels.   Gelatin, plain puddings, custard, ice cream, sherbet and popsicles. Fats, Snack, Sweets, Condiments and Beverages:   Nuts, seeds and coconut.   Jam, marmalade and preserves.   Pickles, olives, relish and horseradish.   All desserts containing nuts, seeds, dried fruit and coconut; or made from whole grains or bran.   Candy made with nuts or seeds.   Popcorn.                     DIRECTIONS TO THE ENDOSCOPY DEPARTMENT     From the north ([facility])  Take [address] South, exit on [address] [address]. Get into the left hand [address], turn left (east), go one-half mile to [address] and turn left. Go north to the first stoplight, take a right on [address] and follow it to the Emergency entrance.    From the south ([facility])  Take 35N to the 35E split and exit on [address] [address]. On [address] [address], turn left (west) to [address]. Turn right (north) on [address]. Go north to the first stoplight, take a right on [address] and follow it to the Emergency entrance.    From the east via 35E ([facility])  Take [address] south to [address] [address] exit. Turn right on [address] [address]. Go west to [address]. Turn right (north) on [address]. Go to the first stoplight, take a right and follow on [address] to the Emergency entrance.    From the east via [address] ([facility])  Take [address] to [address]. Turn left (south) on [address] to [address]. Turn left (east) on [address] and follow it to the Emergency entrance.    From the west via [address] ([place])  Take [address] east to [address].  Turn right (south) on [address] to [address]. Turn left (east) on [address] and follow it to the Emergency entrance.

## 2018-08-03 NOTE — H&P
Pre-Endoscopy History and Physical     Prince Patel MRN# [id number]   YOB: 1975 Age: 43 year old     Date of Procedure: 8/3/2018  Primary care provider: Haase, Susan Rachele  Type of Endoscopy: Colonoscopy with possible biopsy, possible polypectomy  Reason for Procedure: bleed  Type of Anesthesia Anticipated: Conscious Sedation    HPI:    Prince is a 43 year old female who will be undergoing the above procedure.      A history and physical has been performed. The patient's medications and allergies have been reviewed. The risks and benefits of the procedure and the sedation options and risks were discussed with the patient.  All questions were answered and informed consent was obtained.      She denies a personal or family history of anesthesia complications or bleeding disorders.     Patient Active Problem List   Diagnosis     Sprain and strain of unspecified site of knee and leg     Obesity     CARDIOVASCULAR SCREENING; LDL GOAL LESS THAN 160     S/P partial hysterectomy     Intertrigo     Migraine with aura and without status migrainosus, not intractable     Chronic non-seasonal allergic rhinitis, unspecified trigger        Past Medical History:   Diagnosis Date     Allergic rhinitis, cause unspecified     Allergic rhinitis and blueberries     Intertrigo 11/17/2014     Migraine with aura and without status migrainosus, not intractable 7/18/2016     Migraine, unspecified, without mention of intractable migraine without mention of status migrainosus     Migraine     NONSPECIFIC MEDICAL HISTORY     learning disability, mild MR, ADD?     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 Father      MI, angioplasty, 50s     Diabetes Father      Diabetes     Neurologic Disorder Paternal Grandmother      GASTROINTESTINAL DISEASE Paternal Grandmother      Gluten Intolerance      Diabetes Maternal Grandmother      Multiple Sclerosis       Prior to Admission medications    Medication Sig Start Date End Date Taking? Authorizing Provider   amitriptyline (ELAVIL) 25 MG tablet TAKE 1 TABLET (25 MG) BY MOUTH AT BEDTIME 7/10/18  Yes Haase, Susan Rachele, APRN CNP   diphenhydrAMINE (BENADRYL) 25 MG tablet Take 2 tablets (50 mg) by mouth every 6 hours as needed for other (Headache) 10/26/17  Yes Trina Brown,    fexofenadine (ALLEGRA) 180 MG tablet Take 1 tablet (180 mg) by mouth daily 1/29/18  Yes Haase, Susan Rachele, APRN CNP   fluticasone (FLONASE) 50 MCG/ACT spray Spray 2 sprays into both nostrils daily 1/29/18  Yes Haase, Susan Rachele, APRN CNP   MULTIVITAMINS OR TABS 1 tab qd as directed 9/7/04  Yes Merly Cardona PA-C   SUMAtriptan (IMITREX) 50 MG tablet Take 1 tablet (50 mg) by mouth at onset of headache for migraine 5/11/18  Yes Haase, Susan Rachele, APRN CNP       Allergies   Allergen Reactions     Keflex [Cephalexin] Rash        REVIEW OF SYSTEMS:   5 point ROS negative except as noted above in HPI, including Gen., Resp., CV, GI &  system review.    PHYSICAL EXAM:   LMP 10/31/2011 Estimated body mass index is 39.95 kg/(m^2) as calculated from the following:    Height as of 5/11/18: 1.6 m (5' 3\").    Weight as of 7/16/18: 102.3 kg (225 lb 8 oz).   GENERAL APPEARANCE: alert, and oriented  MENTAL STATUS: alert  AIRWAY EXAM: Mallampatti Class I (visualization of the soft palate, fauces, uvula, anterior and " posterior pillars)  RESP: lungs clear to auscultation - no rales, rhonchi or wheezes  CV: regular rates and rhythm  DIAGNOSTICS:    Not indicated    IMPRESSION   ASA Class 1 - Healthy patient, no medical problems    PLAN:   Plan for Colonoscopy with possible biopsy, possible polypectomy. We discussed the risks, benefits and alternatives and the patient wished to proceed.    The above has been forwarded to the consulting provider.      Signed Electronically by: Isaias Medina  August 3, 2018

## 2018-09-06 DIAGNOSIS — J30.9 ALLERGIC RHINITIS: ICD-10-CM

## 2018-09-06 NOTE — TELEPHONE ENCOUNTER
"Requested Prescriptions   Pending Prescriptions Disp Refills     fluticasone (FLONASE) 50 MCG/ACT spray [Pharmacy Med Name: FLUTICASONE PROP 50 MCG SPRAY]    Last Written Prescription Date:  1/29/18  Last Fill Quantity: 1g,  # refills: 1   Last office visit: 7/16/2018 with prescribing provider:  Haase   Future Office Visit:     16 mL 1     Sig: SPRAY 2 SPRAYS INTO BOTH NOSTRILS DAILY    Inhaled Steroids Protocol Passed    9/6/2018  1:51 AM       Passed - Patient is age 12 or older       Passed - Recent (12 mo) or future (30 days) visit within the authorizing provider's specialty    Patient had office visit in the last 12 months or has a visit in the next 30 days with authorizing provider or within the authorizing provider's specialty.  See \"Patient Info\" tab in inbasket, or \"Choose Columns\" in Meds & Orders section of the refill encounter.              "

## 2018-09-07 RX ORDER — FLUTICASONE PROPIONATE 50 MCG
SPRAY, SUSPENSION (ML) NASAL
Qty: 16 ML | Refills: 1 | Status: SHIPPED | OUTPATIENT
Start: 2018-09-07 | End: 2018-10-15

## 2018-09-07 NOTE — TELEPHONE ENCOUNTER
Prescription [place] per [facility] [place]  Sherry Ontiveros RN BS

## 2018-09-26 ENCOUNTER — ALLIED HEALTH/NURSE VISIT (OUTPATIENT)
Dept: NURSING | Facility: CLINIC | Age: 43
End: 2018-09-26
Payer: MEDICARE

## 2018-09-26 DIAGNOSIS — Z23 NEED FOR PROPHYLACTIC VACCINATION AND INOCULATION AGAINST INFLUENZA: Primary | ICD-10-CM

## 2018-09-26 PROCEDURE — 90686 IIV4 VACC NO PRSV 0.5 ML IM: CPT

## 2018-09-26 PROCEDURE — G0008 ADMIN INFLUENZA VIRUS VAC: HCPCS

## 2018-09-26 PROCEDURE — 99207 ZZC NO CHARGE NURSE ONLY: CPT

## 2018-09-26 NOTE — PROGRESS NOTES

## 2018-09-26 NOTE — MR AVS SNAPSHOT
After Visit Summary   9/26/2018    Prince Patel    MRN: [id number]           Patient Information     Date Of Birth          1975        Visit Information        Provider Department      9/26/2018 2:30 PM ARTURO THEODORE/LPN [facility]        Today's Diagnoses     Need for prophylactic vaccination and inoculation against influenza    -  1       Follow-ups after your visit        Your next 10 appointments already scheduled     Sep 26, 2018  2:30 PM CDT   Nurse Only with CR MA/LPN   [facility] ([facility])    [address] [facility] [address] [phone]            Sep 26, 2018  3:15 PM CDT   Screening Mammogram with CRMA1   [facility] ([facility])    [address] [address] [phone]           Do NOT use body powder, lotions, perfume or deodorant the day of the exam.  If your last mammogram was not done at [place], please bring your mammogram films. We will need the name of your provider to send a copy of your report.  A mammogram may be covered on an annual or biannual basis, please check with your insurance company.              Who to contact     If you have questions or need follow up information about today's clinic visit or your schedule please contact [facility] directly at [phone].  Normal or non-critical lab and imaging results will be communicated to you by MyChart, letter or phone within 4 business days after the clinic has received the results. If you do not hear from us within 7 days, please contact the clinic through MyChart or phone. If you have a critical or abnormal lab result, we will notify you by phone as soon as possible.  Submit refill requests through [employer] or call your pharmacy and they will forward the refill request to us. Please allow 3 business days for your refill to be completed.          Additional  Information About Your Visit        [employer] Information     [employer] gives you secure access to your electronic health record. If you see a primary care provider, you can also send messages to your care team and make appointments. If you have questions, please call your primary care clinic.  If you do not have a primary care provider, please call [phone] and they will assist you.        Care EveryWhere ID     This is your Care EveryWhere ID. This could be used by other organizations to access your [place] medical records  [id number]        Your Vitals Were     Last Period                   10/31/2011            Blood Pressure from Last 3 Encounters:   08/03/18 (!) 133/97   07/16/18 132/84   06/13/18 138/88    Weight from Last 3 Encounters:   08/03/18 217 lb (98.4 kg)   07/16/18 225 lb 8 oz (102.3 kg)   06/13/18 219 lb (99.3 kg)              We Performed the Following     ADMIN INFLUENZA (For MEDICARE Patients ONLY) []     FLU VACCINE, SPLIT VIRUS, IM (QUADRIVALENT) [22906]- >3 YRS        Primary Care Provider Office Phone # Fax #    Susan Rachele Haase, APRN [phone]       [address] [facility] [address]        Equal Access to Services     ROBERT HACKETT : Hadii aad ku hadasho Soomaali, waaxda luqadaha, qaybta kaalmada adeegyada, waxay lashawnin haydiandran natalie fitzgerald . So [facility] [phone].    ATENCIÓN: Si habla español, tiene a ty disposición servicios gratuitos de asistencia lingüística. Llame al [phone].    We comply with applicable federal civil rights laws and [place] laws. We do not discriminate on the basis of race, color, national origin, age, disability, sex, sexual orientation, or gender identity.            Thank you!     Thank you for choosing [facility]  for your care. Our goal is always to provide you with excellent care. Hearing back from our patients is one way we can continue to improve our services. Please take a few minutes to  complete the written survey that you may receive in the mail after your visit with us. Thank you!             Your Updated Medication List - Protect others around you: Learn how to safely use, store and throw away your medicines at www.disposemymeds.org.          This list is accurate as of 9/26/18  2:11 PM.  Always use your most recent med list.                   Brand Name Dispense Instructions for use Diagnosis    amitriptyline 25 MG tablet    ELAVIL    30 tablet    TAKE 1 TABLET (25 MG) BY MOUTH AT BEDTIME    Migraine with aura and without status migrainosus, not intractable       diphenhydrAMINE 25 MG tablet    BENADRYL    30 tablet    Take 2 tablets (50 mg) by mouth every 6 hours as needed for other (Headache)    Intractable migraine with aura with status migrainosus       fexofenadine 180 MG tablet    ALLEGRA    31 tablet    Take 1 tablet (180 mg) by mouth daily    Allergic rhinitis       fluticasone 50 MCG/ACT spray    FLONASE    16 mL    SPRAY 2 SPRAYS INTO BOTH NOSTRILS DAILY    Allergic rhinitis       multivitamin per tablet      1 tab qd as directed        SUMAtriptan 50 MG tablet    IMITREX    30 tablet    Take 1 tablet (50 mg) by mouth at onset of headache for migraine    Migraine with aura and without status migrainosus, not intractable

## 2018-10-01 PROCEDURE — 77067 SCR MAMMO BI INCL CAD: CPT | Mod: TC

## 2018-10-08 ENCOUNTER — TRANSFERRED RECORDS (OUTPATIENT)
Dept: HEALTH INFORMATION MANAGEMENT | Facility: CLINIC | Age: 43
End: 2018-10-08

## 2018-10-10 ENCOUNTER — TELEPHONE (OUTPATIENT)
Dept: FAMILY MEDICINE | Facility: CLINIC | Age: 43
End: 2018-10-10

## 2018-10-10 DIAGNOSIS — G43.109 MIGRAINE WITH AURA AND WITHOUT STATUS MIGRAINOSUS, NOT INTRACTABLE: ICD-10-CM

## 2018-10-10 RX ORDER — SUMATRIPTAN 50 MG/1
50 TABLET, FILM COATED ORAL
Qty: 30 TABLET | Refills: 0 | Status: CANCELLED | OUTPATIENT
Start: 2018-10-10

## 2018-10-10 NOTE — TELEPHONE ENCOUNTER
I had placed a referral for Prince to see neurology on 5/11/2018, can you see if she was seen by them?  If not please have her set up an appointment with them. She will also need to be seen by a provider here for her current issue. I could squeeze her in at 1045 on Friday if that will work for her, otherwise see if another provider has an opening.  Thanks,  Susan Haase, CNP

## 2018-10-10 NOTE — TELEPHONE ENCOUNTER
Called pt, saw neurologist, saw them in June, ordered exercises and PT, willing to see SH 10/12, works until 11:30 Friday morning, wonders if any chance to work in after noon? Aware SH out now, ok for tomorrow, prefers to see SH, limited appts, SH please advise, route to inform pt   Dayana Sawant RN, BSN  Message handled by Nurse Triage.

## 2018-10-12 ENCOUNTER — OFFICE VISIT (OUTPATIENT)
Dept: FAMILY MEDICINE | Facility: CLINIC | Age: 43
End: 2018-10-12
Payer: MEDICARE

## 2018-10-12 VITALS
SYSTOLIC BLOOD PRESSURE: 132 MMHG | DIASTOLIC BLOOD PRESSURE: 82 MMHG | RESPIRATION RATE: 16 BRPM | BODY MASS INDEX: 37.97 KG/M2 | OXYGEN SATURATION: 100 % | HEART RATE: 86 BPM | TEMPERATURE: 97.8 F | WEIGHT: 219.5 LBS

## 2018-10-12 DIAGNOSIS — L30.4 INTERTRIGO: ICD-10-CM

## 2018-10-12 DIAGNOSIS — G43.109 MIGRAINE WITH AURA AND WITHOUT STATUS MIGRAINOSUS, NOT INTRACTABLE: Primary | ICD-10-CM

## 2018-10-12 PROCEDURE — 99214 OFFICE O/P EST MOD 30 MIN: CPT | Mod: 25 | Performed by: NURSE PRACTITIONER

## 2018-10-12 PROCEDURE — 96372 THER/PROPH/DIAG INJ SC/IM: CPT | Performed by: NURSE PRACTITIONER

## 2018-10-12 RX ORDER — NYSTATIN 100000 [USP'U]/G
POWDER TOPICAL 3 TIMES DAILY PRN
Qty: 60 G | Refills: 1 | Status: SHIPPED | OUTPATIENT
Start: 2018-10-12 | End: 2020-03-05

## 2018-10-12 RX ORDER — KETOROLAC TROMETHAMINE 30 MG/ML
30 INJECTION, SOLUTION INTRAMUSCULAR; INTRAVENOUS ONCE
Qty: 1 ML | Refills: 0 | OUTPATIENT
Start: 2018-10-12 | End: 2019-02-15

## 2018-10-12 RX ORDER — SUMATRIPTAN 50 MG/1
50 TABLET, FILM COATED ORAL
Qty: 18 TABLET | Refills: 3 | Status: SHIPPED | OUTPATIENT
Start: 2018-10-12 | End: 2019-02-19

## 2018-10-12 RX ORDER — TOPIRAMATE 25 MG/1
25 TABLET, FILM COATED ORAL 2 TIMES DAILY
Qty: 60 TABLET | Refills: 1 | Status: SHIPPED | OUTPATIENT
Start: 2018-10-12 | End: 2018-12-10

## 2018-10-12 NOTE — MR AVS SNAPSHOT
After Visit Summary   10/12/2018    Prince Patel    MRN: [id number]           Patient Information     Date Of Birth          1975        Visit Information        Provider Department      10/12/2018 12:45 PM Haase, Susan Rachele, APRN [facility]        Today's Diagnoses     Migraine with aura and without status migrainosus, not intractable    -  1    Intertrigo          Care Instructions    Apply powder to your yeast infection for a couple more days.   Preventing Migraine Headaches: Triggers  The first step in preventing migraines is to learn what triggers them. You may then be able to control your triggers to avoid or reduce the severity of your migraines.  Know your triggers  Be aware that you may have more than one trigger, and that some triggers may work together. Common migraine triggers include:    Food and nutrition. Skipping meals or not drinking enough water can trigger headaches. So can certain foods, such as caffeine, monosodium glutamate (MSG), aged cheese, or sausage.    Alcohol. Red wine and other alcoholic beverages are common migraine triggers.    Chemicals. Scents, cleaning products, gasoline, glue, perfume, and paint can be triggers. So can tobacco smoke, including secondhand smoke.    Emotions. Stress can trigger headaches or make them worse once they begin.    Sleep disruption. Staying up late, sleeping late, and traveling across time zones can disrupt your sleep cycle, triggering headaches.    Hormones. Many women notice that migraines tend to happen at a certain point in their menstrual cycle. Birth control pills or hormone replacement therapy may also trigger migraines.    Environment and weather. Air travel, changes in altitude, air pressure changes, hot sun, or bright or flashing lights can be triggers.  Control your triggers  These are some of the things you can do to try to control triggers:    Avoid triggers if you can. For example, stay clear  of alcohol and foods that trigger your headaches. Use unscented household products. Keep regular sleep habits. Manage stress to help control emotional triggers.    Change your behavior at times when triggers can't be avoided. For example, make sure to get enough rest and drink plenty of water while you're traveling. Make sure to carry a hat, sunglasses, and your medicines. Be alert for migraine symptoms, so you can treat a migraine early if it happens.  Date Last Reviewed: 10/9/2015    2770-6965 [employer]. [address] [place] [address]. All rights reserved. This information is not intended as a substitute for professional medical care. Always follow your healthcare professional's instructions.                Follow-ups after your visit        Follow-up notes from your care team     Return in about 4 weeks (around 11/9/2018).      Who to contact     If you have questions or need follow up information about today's clinic visit or your schedule please contact [facility] directly at [phone].  Normal or non-critical lab and imaging results will be communicated to you by [employer], letter or phone within 4 business days after the clinic has received the results. If you do not hear from us within 7 days, please contact the clinic through [employer] or phone. If you have a critical or abnormal lab result, we will notify you by phone as soon as possible.  Submit refill requests through [employer] or call your pharmacy and they will forward the refill request to us. Please allow 3 business days for your refill to be completed.          Additional Information About Your Visit        [employer] Information     [employer] gives you secure access to your electronic health record. If you see a primary care provider, you can also send messages to your care team and make appointments. If you have questions, please call your primary care clinic.  If you do not have a primary care provider,  please call [phone] and they will assist you.        Care EveryWhere ID     This is your Care EveryWhere ID. This could be used by other organizations to access your [place] medical records  [id number]        Your Vitals Were     Pulse Temperature Respirations Last Period Pulse Oximetry Breastfeeding?    86 97.8  F (36.6  C) (Oral) 16 10/31/2011 100% No    BMI (Body Mass Index)                   37.97 kg/m2            Blood Pressure from Last 3 Encounters:   10/12/18 (!) 136/92   08/03/18 (!) 133/97   07/16/18 132/84    Weight from Last 3 Encounters:   10/12/18 219 lb 8 oz (99.6 kg)   08/03/18 217 lb (98.4 kg)   07/16/18 225 lb 8 oz (102.3 kg)              Today, you had the following     No orders found for display         Today's Medication Changes          These changes are accurate as of 10/12/18  1:21 PM.  If you have any questions, ask your nurse or doctor.               Start taking these medicines.        Dose/Directions    ketorolac 30 MG/ML injection   Commonly known as:  TORADOL   Used for:  Migraine with aura and without status migrainosus, not intractable   Started by:  Haase, Susan Rachele, APRN CNP        Dose:  30 mg   Inject 1 mL (30 mg) into the muscle once for 1 dose   Quantity:  1 mL   Refills:  0       nystatin 395597 UNIT/GM Powd   Commonly known as:  MYCOSTATIN   Used for:  Intertrigo   Started by:  Haase, Susan Rachele, APRN CNP        Apply topically 3 times daily as needed   Quantity:  60 g   Refills:  1       topiramate 25 MG tablet   Commonly known as:  TOPAMAX   Used for:  Migraine with aura and without status migrainosus, not intractable   Started by:  Haase, Susan Rachele, APRN CNP        Dose:  25 mg   Take 1 tablet (25 mg) by mouth 2 times daily   Quantity:  60 tablet   Refills:  1         Stop taking these medicines if you haven't already. Please contact your care team if you have questions.     amitriptyline 25 MG tablet   Commonly known as:  ELAVIL   Stopped by:  Haase,  ELY Loaiza CNP                Where to get your medicines      These medications were sent to [facility] [place] [place] - [address]  [address] [facility] [address]     Phone:  [phone]     nystatin 811313 UNIT/GM Powd    SUMAtriptan 50 MG tablet    topiramate 25 MG tablet         Some of these will need a paper prescription and others can be bought over the counter.  Ask your nurse if you have questions.     You don't need a prescription for these medications     ketorolac 30 MG/ML injection                Primary Care Provider Office Phone # Fax #    Susan Rachele Haase, APRN -[phone] [address] [facility] [address]        Equal Access to Services     ROBERT HACKETT : Clarence vidaleso Soraphaelali, waaxda luqadaha, qaybta kaalmada adeegyada, hilda elizondo. So [facility] [phone].    ATENCIÓN: Si habla español, tiene a ty disposición servicios gratuitos de asistencia lingüística. [facility] [phone].    We comply with applicable federal civil rights laws and [place] laws. We do not discriminate on the basis of race, color, national origin, age, disability, sex, sexual orientation, or gender identity.            Thank you!     Thank you for choosing [facility]  for your care. Our goal is always to provide you with excellent care. Hearing back from our patients is one way we can continue to improve our services. Please take a few minutes to complete the written survey that you may receive in the mail after your visit with us. Thank you!             Your Updated Medication List - Protect others around you: Learn how to safely use, store and throw away your medicines at www.disposemymeds.org.          This list is accurate as of 10/12/18  1:21 PM.  Always use your most recent med list.                   Brand Name Dispense Instructions for use Diagnosis    diphenhydrAMINE 25 MG tablet     BENADRYL    30 tablet    Take 2 tablets (50 mg) by mouth every 6 hours as needed for other (Headache)    Intractable migraine with aura with status migrainosus       fexofenadine 180 MG tablet    ALLEGRA    31 tablet    Take 1 tablet (180 mg) by mouth daily    Allergic rhinitis       fluticasone 50 MCG/ACT spray    FLONASE    16 mL    SPRAY 2 SPRAYS INTO BOTH NOSTRILS DAILY    Allergic rhinitis       ketorolac 30 MG/ML injection    TORADOL    1 mL    Inject 1 mL (30 mg) into the muscle once for 1 dose    Migraine with aura and without status migrainosus, not intractable       multivitamin per tablet      1 tab qd as directed        nystatin 137908 UNIT/GM Powd    MYCOSTATIN    60 g    Apply topically 3 times daily as needed    Intertrigo       SUMAtriptan 50 MG tablet    IMITREX    18 tablet    Take 1 tablet (50 mg) by mouth at onset of headache for migraine    Migraine with aura and without status migrainosus, not intractable       topiramate 25 MG tablet    TOPAMAX    60 tablet    Take 1 tablet (25 mg) by mouth 2 times daily    Migraine with aura and without status migrainosus, not intractable

## 2018-10-12 NOTE — PATIENT INSTRUCTIONS
Apply powder to your yeast infection for a couple more days.   Preventing Migraine Headaches: Triggers  The first step in preventing migraines is to learn what triggers them. You may then be able to control your triggers to avoid or reduce the severity of your migraines.  Know your triggers  Be aware that you may have more than one trigger, and that some triggers may work together. Common migraine triggers include:    Food and nutrition. Skipping meals or not drinking enough water can trigger headaches. So can certain foods, such as caffeine, monosodium glutamate (MSG), aged cheese, or sausage.    Alcohol. Red wine and other alcoholic beverages are common migraine triggers.    Chemicals. Scents, cleaning products, gasoline, glue, perfume, and paint can be triggers. So can tobacco smoke, including secondhand smoke.    Emotions. Stress can trigger headaches or make them worse once they begin.    Sleep disruption. Staying up late, sleeping late, and traveling across time zones can disrupt your sleep cycle, triggering headaches.    Hormones. Many women notice that migraines tend to happen at a certain point in their menstrual cycle. Birth control pills or hormone replacement therapy may also trigger migraines.    Environment and weather. Air travel, changes in altitude, air pressure changes, hot sun, or bright or flashing lights can be triggers.  Control your triggers  These are some of the things you can do to try to control triggers:    Avoid triggers if you can. For example, stay clear of alcohol and foods that trigger your headaches. Use unscented household products. Keep regular sleep habits. Manage stress to help control emotional triggers.    Change your behavior at times when triggers can't be avoided. For example, make sure to get enough rest and drink plenty of water while you're traveling. Make sure to carry a hat, sunglasses, and your medicines. Be alert for migraine symptoms, so you can treat a migraine  early if it happens.  Date Last Reviewed: 10/9/2015    2501-7910 [employer]. [address] [place] [address], [place] [address]. All rights reserved. This information is not intended as a substitute for professional medical care. Always follow your healthcare professional's instructions.

## 2018-10-12 NOTE — PROGRESS NOTES
SUBJECTIVE:   Prince Patel is a 43 year old female who presents to clinic today for the following health issues:      Headache  Onset: 10/4/18    Description:   Location: all over   Character: throbbing pain  Frequency:  Constant   Duration:  9 days    Intensity: moderate    Progression of Symptoms:  same and constant    Accompanying Signs & Symptoms:  Stiff neck: no  Neck or upper back pain: no  Fever: no  Sinus pressure: no  Nausea or vomiting: YES- first night only vomiting  Dizziness: no  Numbness: no  Weakness: no  Visual changes: YES- seeing spots    History:   Head trauma: no  Family history of migraines: YES- grandmother paternal   Previous tests for headaches: YES- with neurologist  Neurologist evaluations: YES  Able to do daily activities: YES  Wake with a headaches: YES  Do headaches wake you up: YES  Daily pain medication use: YES- tylenol ran out of migraine medication    Work/school stressors/changes: no    Precipitating factors:   Does light make it worse: YES- sometimes  Does sound make it worse: no    Alleviating factors:  Does sleep help: YES    Therapies Tried and outcome: Ibuprofen (Advil, Motrin) and Tylenol, not helping symptoms    Patient denies that she has been doing anything different that could have triggered her headache. She rates her current pain as 9/10 and describes feeling like something is pushing on her head. The pain gets worse throughout the day. She has been taking Ibuprofen and Tylenol because she is out of Imitrex but reports no relief. Patient last took ibuprofen before she left for work this morning. She thinks she has been taking one Imitrex about three times monthly. Patient describes seeing stars and complains of tinnitus. Denies numbness or tingling. She denies recent allergies or sinus symptoms. Patient was last seen by neurology in June who recommended exercises. They recommended she take two elavil rather than one. Patient feels she is properly hydrated and has  not been more stressed than usual. She has been sleeping normally.     Additional:  Patient complains of recent yeast infection below panus. She has been applying powder and thinks it is improving.     Answers for HPI/ROS submitted by the patient on 10/12/2018   Chronic problems general questions HPI Form  Headache Symptoms are: Stable  Migraine frequency:: 3 per week  Migraine Duration:: >3 days  Ability to perform ADL's:: Yes  Migraine Rescue/Relief Medications:: Tylenol, Sumatriptan (Imitrex)  Effectiveness of rescue/relief medications:: Minor relief  Migraine Preventative Medications:: Amitriptyline  Neurological symptoms:: None  ER or UC Visits:: None      Problem list and histories reviewed & adjusted, as indicated.  Additional history: as documented    Patient Active Problem List   Diagnosis     Sprain and strain of unspecified site of knee and leg     Obesity     CARDIOVASCULAR SCREENING; LDL GOAL LESS THAN 160     S/P partial hysterectomy     Intertrigo     Migraine with aura and without status migrainosus, not intractable     Chronic non-seasonal allergic rhinitis, unspecified trigger     Past Surgical History:   Procedure Laterality Date     COLONOSCOPY N/A 8/3/2018    Procedure: COLONOSCOPY;  colonoscopy;  Surgeon: Isaias Medina MD;  Location:  GI     LAPAROSCOPIC HYSTERECTOMY SUPRACERVICAL  11/16/2011    Procedure:LAPAROSCOPIC HYSTERECTOMY SUPRACERVICAL; LAPAROSCOPIC HYSTERECTOMY SUPRACERVICAL ; Surgeon:MASOUD WHITE; Location: OR     SURGICAL HISTORY OF -       ingrown toenails removed     toenail removal         Social History   Substance Use Topics     Smoking status: Never Smoker     Smokeless tobacco: Never Used     Alcohol use No     Family History   Problem Relation Age of Onset     Diabetes Maternal Aunt      C.A.D. Father      MI, angioplasty, 50s     Diabetes Father      Diabetes     Neurologic Disorder Paternal Grandmother      GASTROINTESTINAL DISEASE Paternal Grandmother       Gluten Intolerance      Diabetes Maternal Grandmother      Multiple Sclerosis         Current Outpatient Prescriptions   Medication Sig Dispense Refill     amitriptyline (ELAVIL) 25 MG tablet TAKE 1 TABLET (25 MG) BY MOUTH AT BEDTIME 30 tablet 1     diphenhydrAMINE (BENADRYL) 25 MG tablet Take 2 tablets (50 mg) by mouth every 6 hours as needed for other (Headache) 30 tablet 0     fexofenadine (ALLEGRA) 180 MG tablet Take 1 tablet (180 mg) by mouth daily 31 tablet 1     fluticasone (FLONASE) 50 MCG/ACT spray SPRAY 2 SPRAYS INTO BOTH NOSTRILS DAILY 16 mL 1     MULTIVITAMINS OR TABS 1 tab qd as directed  0     SUMAtriptan (IMITREX) 50 MG tablet Take 1 tablet (50 mg) by mouth at onset of headache for migraine 30 tablet 3     Allergies   Allergen Reactions     Keflex [Cephalexin] Rash       Reviewed and updated as needed this visit by clinical staff  Tobacco  Allergies  Meds  Med Hx  Surg Hx  Fam Hx  Soc Hx      Reviewed and updated as needed this visit by Provider         ROS:  Constitutional, HEENT, cardiovascular, pulmonary, GI,  neuro, skin, endocrine and psych systems are negative, except as otherwise noted.    This document serves as a record of the services and decisions personally performed by HAASE, SUSAN RACHELE. It was created on his/her behalf by Raven Edwards, a trained medical scribe. The creation of this document is based on the provider's statements to the medical scribe. Raven Edwards, October 12, 2018 1:01 PM  OBJECTIVE:     BP (!) 136/92 (BP Location: Left arm, Patient Position: Sitting, Cuff Size: Adult Large)  Pulse 86  Temp 97.8  F (36.6  C) (Oral)  Resp 16  Wt 99.6 kg (219 lb 8 oz)  LMP 10/31/2011  SpO2 100%  Breastfeeding? No  BMI 37.97 kg/m2  Body mass index is 37.97 kg/(m^2).  GENERAL: healthy, alert and no distress  EYES: Eyes grossly normal to inspection, PERRL and conjunctivae and sclerae normal.   RESP: lungs clear to auscultation - no rales, rhonchi or wheezes  CV: regular  rate and rhythm, normal S1 S2, no S3 or S4, no murmur, click or rub, no peripheral edema and peripheral pulses strong  SKIN: area of erythema (2 cm) below right lower stomach fold    PSYCH: mentation appears normal, affect normal/bright  NEURO: MENTAL STATUS:  Alert, oriented x3.  Speech fluent with normal naming. CRANIAL NERVES:  Pupils are equal, round, reactive to light.  Extraocular movements full.  Visual fields full.  Facial sensation, movement normal.  Palate moves symmetrically.  Tongue midline.  Sternocleidomastoid and trapezius strength intact.  Neck strength was normal.    NEUROLOGIC:  Motor 5/5.  Normal gait.      ASSESSMENT/PLAN:     Prince was seen today for headache.    Diagnoses and all orders for this visit:    Migraine with aura and without status migrainosus, not intractable:  Normal neuro exam.  Gave toradol injection in clinic, rates pain of 5/10 after injection.  Will discontinue amitriptyline and change to Topamax bid for headache prevention.  Will  prescription of Imitrex today.   -     ketorolac (TORADOL) 30 MG/ML injection; Inject 1 mL (30 mg) into the muscle once for 1 dose  -     topiramate (TOPAMAX) 25 MG tablet; Take 1 tablet (25 mg) by mouth 2 times daily  -     SUMAtriptan (IMITREX) 50 MG tablet; Take 1 tablet (50 mg) by mouth at onset of headache for migraine  -     KETOROLAC TROMETHAMINE 15MG  -     INJECTION INTRAMUSCULAR OR SUB-Q    Intertrigo: below left abdominal fold.   -     nystatin (MYCOSTATIN) 070950 UNIT/GM POWD; Apply topically 3 times daily as needed      Follow up in 4 weeks for migraine check, sooner as needed.     Patient Instructions   Apply powder to your yeast infection for a couple more days.       The information in this document, created by the medical scribe for me, accurately reflects the services I personally performed and the decisions made by me. I have reviewed and approved this document for accuracy.   Susan Haase, APRN [facility]  [place]

## 2018-10-15 ENCOUNTER — TELEPHONE (OUTPATIENT)
Dept: FAMILY MEDICINE | Facility: CLINIC | Age: 43
End: 2018-10-15

## 2018-10-15 DIAGNOSIS — J30.9 ALLERGIC RHINITIS: ICD-10-CM

## 2018-10-15 NOTE — TELEPHONE ENCOUNTER
I don't think the form needs a signature, just have the lab results scanned into her chart.  Thanks,  Susan Haase, CNP

## 2018-10-15 NOTE — TELEPHONE ENCOUNTER
Reason for Call:  Form, our goal is to have forms completed with 72 hours, however, some forms may require a visit or additional information.    Type of letter, form or note:  Results    Who is the form from?: [employer] (if other please explain)    Where did the form come from: form was faxed in    What clinic location was the form placed at?: [facility]     Where the form was placed: 's Box    What number is listed as a contact on the form?: [phone]       Additional comments: please review and sign [phone]    Call taken on 10/15/2018 at 10:56 AM by Mireille Reeder

## 2018-10-16 RX ORDER — FLUTICASONE PROPIONATE 50 MCG
SPRAY, SUSPENSION (ML) NASAL
Qty: 3 BOTTLE | Refills: 1
Start: 2018-10-16 | End: 2018-12-27

## 2018-10-16 NOTE — TELEPHONE ENCOUNTER
Pt not taking amitriptyline, discontinued, other med needs short term f/u, not appropriate for 90 day  Dayana Sawant RN, BSN  Message handled by Nurse Triage.

## 2018-10-16 NOTE — TELEPHONE ENCOUNTER
Another fax from [facility], verbal order provided for 3 month supply of flonase if pt desires, will cancel amitriptyline rx, discontinued  Dayana Sawant RN, BSN  Message handled by Nurse Triage.

## 2018-10-17 NOTE — TELEPHONE ENCOUNTER
[facility] calling again to check on refills.  Advised of below.  Flonase was given x 3 months and Amitriptyline to be cancelled-was CARLOZ'kyrie.  Sujata Raphael RN

## 2018-11-30 ENCOUNTER — OFFICE VISIT (OUTPATIENT)
Dept: FAMILY MEDICINE | Facility: CLINIC | Age: 43
End: 2018-11-30
Payer: MEDICARE

## 2018-11-30 VITALS
DIASTOLIC BLOOD PRESSURE: 82 MMHG | SYSTOLIC BLOOD PRESSURE: 130 MMHG | BODY MASS INDEX: 36.88 KG/M2 | OXYGEN SATURATION: 99 % | WEIGHT: 216 LBS | TEMPERATURE: 97.3 F | HEIGHT: 64 IN | RESPIRATION RATE: 14 BRPM | HEART RATE: 91 BPM

## 2018-11-30 DIAGNOSIS — R07.0 THROAT PAIN: Primary | ICD-10-CM

## 2018-11-30 DIAGNOSIS — J01.01 ACUTE RECURRENT MAXILLARY SINUSITIS: ICD-10-CM

## 2018-11-30 LAB
DEPRECATED S PYO AG THROAT QL EIA: NORMAL
SPECIMEN SOURCE: NORMAL

## 2018-11-30 PROCEDURE — 87880 STREP A ASSAY W/OPTIC: CPT | Performed by: NURSE PRACTITIONER

## 2018-11-30 PROCEDURE — 87081 CULTURE SCREEN ONLY: CPT | Performed by: NURSE PRACTITIONER

## 2018-11-30 PROCEDURE — 99213 OFFICE O/P EST LOW 20 MIN: CPT | Performed by: NURSE PRACTITIONER

## 2018-11-30 RX ORDER — AMOXICILLIN 875 MG
875 TABLET ORAL 2 TIMES DAILY
Qty: 20 TABLET | Refills: 0 | Status: SHIPPED | OUTPATIENT
Start: 2018-11-30 | End: 2018-12-27

## 2018-11-30 NOTE — PROGRESS NOTES
SUBJECTIVE:   Prince Patel is a 43 year old female who presents to clinic today for the following health issues:      RESPIRATORY SYMPTOMS      Duration: 3 weeks    Description  nasal congestion, rhinorrhea, sore throat, cough, fatigue/malaise and lightheaded, ringing in the ears, insomnia, green mucous coming out of nose, vomiting after coughing so hard, noticed some blood when vomiting    Severity: moderate    Accompanying signs and symptoms: None    History (predisposing factors):  none    Precipitating or alleviating factors: None    Therapies tried and outcome:  Dayquil, nyquil, mucinex    Patient reports for respiratory symptoms that started 3 weeks ago. She is experiencing nasal congestion, rhinorrhea, a sore throat with loss of voice, a cough, fatigue/malaise, lightheadedness that waxes and weans, constant tinnitus, insomnia, green mucous discharge, vomiting from coughing so hard, and blood in vomit. She feels hot during the day. The severity of her symptoms is moderate. Her friend has bronchitis. Patient has tried Dayquil, Nyquil, and Mucinex. She has not taken medication Today. She denies shortness of breath or chest pain.     Headaches  Patient's headaches have been better since medication change.     Problem list and histories reviewed & adjusted, as indicated.  Additional history: as documented    Patient Active Problem List   Diagnosis     Sprain and strain of unspecified site of knee and leg     Obesity     CARDIOVASCULAR SCREENING; LDL GOAL LESS THAN 160     S/P partial hysterectomy     Intertrigo     Migraine with aura and without status migrainosus, not intractable     Chronic non-seasonal allergic rhinitis, unspecified trigger     Past Surgical History:   Procedure Laterality Date     COLONOSCOPY N/A 8/3/2018    Procedure: COLONOSCOPY;  colonoscopy;  Surgeon: Isaias Medina MD;  Location:  GI     LAPAROSCOPIC HYSTERECTOMY SUPRACERVICAL  11/16/2011    Procedure:LAPAROSCOPIC HYSTERECTOMY  SUPRACERVICAL; LAPAROSCOPIC HYSTERECTOMY SUPRACERVICAL ; Surgeon:MASOUD WHITE; Location:[facility] OR     SURGICAL HISTORY OF -       ingrown toenails removed     toenail removal         Social History   Substance Use Topics     Smoking status: Never Smoker     Smokeless tobacco: Never Used     Alcohol use No     Family History   Problem Relation Age of Onset     Diabetes Maternal Aunt      C.A.D. Father      MI, angioplasty, 50s     Diabetes Father      Diabetes     Neurologic Disorder Paternal Grandmother      GASTROINTESTINAL DISEASE Paternal Grandmother      Gluten Intolerance      Diabetes Maternal Grandmother      Multiple Sclerosis         Current Outpatient Prescriptions   Medication Sig Dispense Refill     diphenhydrAMINE (BENADRYL) 25 MG tablet Take 2 tablets (50 mg) by mouth every 6 hours as needed for other (Headache) 30 tablet 0     fexofenadine (ALLEGRA) 180 MG tablet Take 1 tablet (180 mg) by mouth daily 31 tablet 1     fluticasone (FLONASE) 50 MCG/ACT spray SPRAY 2 SPRAYS INTO BOTH NOSTRILS DAILY 3 Bottle 1     MULTIVITAMINS OR TABS 1 tab qd as directed  0     nystatin (MYCOSTATIN) 242822 UNIT/GM POWD Apply topically 3 times daily as needed 60 g 1     SUMAtriptan (IMITREX) 50 MG tablet Take 1 tablet (50 mg) by mouth at onset of headache for migraine 18 tablet 3     topiramate (TOPAMAX) 25 MG tablet Take 1 tablet (25 mg) by mouth 2 times daily 60 tablet 1     Allergies   Allergen Reactions     Keflex [Cephalexin] Rash       Reviewed and updated as needed this visit by clinical staff  Tobacco  Allergies  Meds  Med Hx  Surg Hx  Fam Hx  Soc Hx      Reviewed and updated as needed this visit by Provider         ROS:  Constitutional, HEENT, cardiovascular, pulmonary, GI, , musculoskeletal, neuro, skin, endocrine and psych systems are negative, except as otherwise noted.    This document serves as a record of the services and decisions personally performed and made by Susan Haase, CNP. It was created on  "her behalf by Opal Rollins, a trained medical scribe. The creation of this document is based on the provider's statements to the medical scribe.  Opal Rollins 1:06 PM November 30, 2018    OBJECTIVE:     /82  Pulse 91  Temp 97.3  F (36.3  C) (Oral)  Resp 14  Ht 1.619 m (5' 3.75\")  Wt 98 kg (216 lb)  LMP 10/31/2011  SpO2 99%  BMI 37.37 kg/m2  Body mass index is 37.37 kg/(m^2).  GENERAL APPEARANCE: healthy, alert and no distress  HENT: ear canals with cerumen in left ear and TM's normal and nose with sinus tenderness, and mouth without ulcers or lesions, unable to visualize back of throat  NECK: no adenopathy, no asymmetry, masses, or scars and thyroid normal to palpation  RESP: lungs clear to auscultation - no rales, rhonchi or wheezes  CV: regular rates and rhythm, normal S1 S2, no S3 or S4 and no murmur, click or rub  PSYCH: mentation appears normal and affect normal/bright    Diagnostic Test Results:  Results for orders placed or performed in visit on 11/30/18 (from the past 24 hour(s))   Rapid strep screen   Result Value Ref Range    Specimen Description Throat     Rapid Strep A Screen       NEGATIVE: No Group A streptococcal antigen detected by immunoassay, await culture report.       ASSESSMENT/PLAN:   Diagnoses and all orders for this visit:    Throat pain:rapid strep negative, patient informed.  -     Rapid strep screen  -     Beta strep group A culture    Acute recurrent maxillary sinusitis: continue daily allegra and flonase.  Will start on amoxicillin.    -     amoxicillin (AMOXIL) 875 MG tablet; Take 1 tablet (875 mg) by mouth 2 times daily    Follow up in 2/2019 for physical exam, arrive fasting.      Patient Instructions   Return if symptoms worsen or fail to improve.       The information in this document, created by the medical scribe for me, accurately reflects the services I personally performed and the decisions made by me. I have reviewed and approved this document for accuracy " prior to leaving the patient care area.  November 30, 2018 1:19 PM    Susan Haase, APRN [facility]

## 2018-11-30 NOTE — MR AVS SNAPSHOT
After Visit Summary   11/30/2018    Prince Patel    MRN: [id number]           Patient Information     Date Of Birth          1975        Visit Information        Provider Department      11/30/2018 1:00 PM Haase, Susan Rachele, APRN CNP [facility]        Today's Diagnoses     Throat pain    -  1    Acute recurrent maxillary sinusitis          Care Instructions    Return if symptoms worsen or fail to improve.           Follow-ups after your visit        Follow-up notes from your care team     Return in about 3 months (around 2/28/2019) for Physical Exam, Lab Work.      Who to contact     If you have questions or need follow up information about today's clinic visit or your schedule please contact [facility] directly at [phone].  Normal or non-critical lab and imaging results will be communicated to you by [employer], letter or phone within 4 business days after the clinic has received the results. If you do not hear from us within 7 days, please contact the clinic through [employer] or phone. If you have a critical or abnormal lab result, we will notify you by phone as soon as possible.  Submit refill requests through [employer] or call your pharmacy and they will forward the refill request to us. Please allow 3 business days for your refill to be completed.          Additional Information About Your Visit        MyChart Information     [employer] gives you secure access to your electronic health record. If you see a primary care provider, you can also send messages to your care team and make appointments. If you have questions, please call your primary care clinic.  If you do not have a primary care provider, please call [phone] and they will assist you.        Care EveryWhere ID     This is your Care EveryWhere ID. This could be used by other organizations to access your [place] medical records  [id number]        Your Vitals Were     Pulse Temperature  "Respirations Height Last Period Pulse Oximetry    91 97.3  F (36.3  C) (Oral) 14 5' 3.75\" (1.619 m) 10/31/2011 99%    BMI (Body Mass Index)                   37.37 kg/m2            Blood Pressure from Last 3 Encounters:   11/30/18 130/82   10/12/18 132/82   08/03/18 (!) 133/97    Weight from Last 3 Encounters:   11/30/18 216 lb (98 kg)   10/12/18 219 lb 8 oz (99.6 kg)   08/03/18 217 lb (98.4 kg)              We Performed the Following     Beta strep group A culture     Rapid strep screen          Today's Medication Changes          These changes are accurate as of 11/30/18  1:30 PM.  If you have any questions, ask your nurse or doctor.               Start taking these medicines.        Dose/Directions    amoxicillin 875 MG tablet   Commonly known as:  AMOXIL   Used for:  Acute recurrent maxillary sinusitis   Started by:  Haase, Susan Rachele, APRN CNP        Dose:  875 mg   Take 1 tablet (875 mg) by mouth 2 times daily   Quantity:  20 tablet   Refills:  0            Where to get your medicines      These medications were sent to [facility] [address]  [address] [facility] [address]     Phone:  [phone]     amoxicillin 875 MG tablet                Primary Care Provider Office Phone # Fax #    Susan Rachele Haase, APRN [phone] [address] [facility] [address]        Equal Access to Services     ROBERT HACKETT AH: Hadii carlos salazar hadasho Soomaali, waaxda luqadaha, qaybta kaalmada adeegyada, waxay kyra elizondo. So [facility] [phone].    ATENCIÓN: Si christ ramos, tiene a ty disposición servicios gratuitos de asistencia lingüística. Llame al [phone].    We comply with applicable federal civil rights laws and [place] laws. We do not discriminate on the basis of race, color, national origin, age, disability, sex, sexual orientation, or gender identity.            Thank you!     Thank you for choosing [facility]  for your care. Our goal is always to provide you with excellent care. Hearing back from our patients is one way we can continue to improve our services. Please take a few minutes to complete the written survey that you may receive in the mail after your visit with us. Thank you!             Your Updated Medication List - Protect others around you: Learn how to safely use, store and throw away your medicines at www.disposemymeds.org.          This list is accurate as of 11/30/18  1:30 PM.  Always use your most recent med list.                   Brand Name Dispense Instructions for use Diagnosis    amoxicillin 875 MG tablet    AMOXIL    20 tablet    Take 1 tablet (875 mg) by mouth 2 times daily    Acute recurrent maxillary sinusitis       diphenhydrAMINE 25 MG tablet    BENADRYL    30 tablet    Take 2 tablets (50 mg) by mouth every 6 hours as needed for other (Headache)    Intractable migraine with aura with status migrainosus       fexofenadine 180 MG tablet    ALLEGRA    31 tablet    Take 1 tablet (180 mg) by mouth daily    Allergic rhinitis       fluticasone 50 MCG/ACT nasal spray    FLONASE    3 Bottle    SPRAY 2 SPRAYS INTO BOTH NOSTRILS DAILY    Allergic rhinitis       multivitamin per tablet      1 tab qd as directed        nystatin 593369 UNIT/GM external powder    MYCOSTATIN    60 g    Apply topically 3 times daily as needed    Intertrigo       SUMAtriptan 50 MG tablet    IMITREX    18 tablet    Take 1 tablet (50 mg) by mouth at onset of headache for migraine    Migraine with aura and without status migrainosus, not intractable       topiramate 25 MG tablet    TOPAMAX    60 tablet    Take 1 tablet (25 mg) by mouth 2 times daily    Migraine with aura and without status migrainosus, not intractable

## 2018-12-01 LAB
BACTERIA SPEC CULT: NORMAL
SPECIMEN SOURCE: NORMAL

## 2018-12-10 DIAGNOSIS — G43.109 MIGRAINE WITH AURA AND WITHOUT STATUS MIGRAINOSUS, NOT INTRACTABLE: ICD-10-CM

## 2018-12-11 DIAGNOSIS — J30.9 ALLERGIC RHINITIS: ICD-10-CM

## 2018-12-11 NOTE — TELEPHONE ENCOUNTER
"Requested Prescriptions   Pending Prescriptions Disp Refills     fluticasone (FLONASE) 50 MCG/ACT nasal spray [Pharmacy Med Name: FLUTICASONE PROP 50 MCG SPRAY]  Last Written Prescription Date:  10/16/2018  Last Fill Quantity: 3 bottle,  # refills: 1   Last office visit: 11/30/2018 with prescribing provider:  Haase     Future Office Visit:   Next 5 appointments (look out 90 days)    Feb 15, 2019  9:30 AM CST  PHYSICAL with Susan Rachele Haase, APRN CNP  [address] [address]  [phone]          16 mL 1     Sig: SPRAY 2 SPRAYS INTO BOTH NOSTRILS DAILY    Inhaled Steroids Protocol Passed - 12/11/2018 12:31 PM       Passed - Patient is age 12 or older       Passed - Recent (12 mo) or future (30 days) visit within the authorizing provider's specialty    Patient had office visit in the last 12 months or has a visit in the next 30 days with authorizing provider or within the authorizing provider's specialty.  See \"Patient Info\" tab in inbasket, or \"Choose Columns\" in Meds & Orders section of the refill encounter.                "

## 2018-12-13 ENCOUNTER — MYC REFILL (OUTPATIENT)
Dept: FAMILY MEDICINE | Facility: CLINIC | Age: 43
End: 2018-12-13

## 2018-12-13 DIAGNOSIS — G43.109 MIGRAINE WITH AURA AND WITHOUT STATUS MIGRAINOSUS, NOT INTRACTABLE: ICD-10-CM

## 2018-12-13 RX ORDER — TOPIRAMATE 25 MG/1
TABLET, FILM COATED ORAL
Qty: 60 TABLET | Refills: 0 | Status: SHIPPED | OUTPATIENT
Start: 2018-12-13 | End: 2019-01-02

## 2018-12-13 RX ORDER — TOPIRAMATE 25 MG/1
25 TABLET, FILM COATED ORAL 2 TIMES DAILY
Qty: 60 TABLET | Refills: 1 | Status: CANCELLED | OUTPATIENT
Start: 2018-12-13

## 2018-12-13 RX ORDER — FLUTICASONE PROPIONATE 50 MCG
SPRAY, SUSPENSION (ML) NASAL
Qty: 16 ML | Refills: 1 | Status: SHIPPED | OUTPATIENT
Start: 2018-12-13 | End: 2020-03-05

## 2018-12-13 NOTE — TELEPHONE ENCOUNTER
10/12/18 visit note: Follow up in 4 weeks for migraine check, sooner as needed.  No show 11/9.  Acute appointment with PCP 11/30/18.   Danuta, do you need to see Prince for migraine management sooner than her physical Feb 15?   Bryce Fowler, RN

## 2018-12-17 ENCOUNTER — MYC MEDICAL ADVICE (OUTPATIENT)
Dept: FAMILY MEDICINE | Facility: CLINIC | Age: 43
End: 2018-12-17

## 2018-12-17 NOTE — TELEPHONE ENCOUNTER
Duplicate, SH sent, see [employer] message  Dayana Sawant RN, BSN  Message handled by Nurse Triage.

## 2018-12-17 NOTE — TELEPHONE ENCOUNTER
Sent mychart response, recommend call nurse line to get more information  Dayana Sawant RN, BSN  Message handled by Nurse Triage.
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
## 2018-12-21 ENCOUNTER — TELEPHONE (OUTPATIENT)
Dept: FAMILY MEDICINE | Facility: CLINIC | Age: 43
End: 2018-12-21
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
## 2018-12-27 ENCOUNTER — ANCILLARY PROCEDURE (OUTPATIENT)
Dept: GENERAL RADIOLOGY | Facility: CLINIC | Age: 43
End: 2018-12-27
Attending: NURSE PRACTITIONER
Payer: MEDICARE

## 2018-12-27 ENCOUNTER — OFFICE VISIT (OUTPATIENT)
Dept: FAMILY MEDICINE | Facility: CLINIC | Age: 43
End: 2018-12-27
Payer: MEDICARE

## 2018-12-27 VITALS
OXYGEN SATURATION: 98 % | WEIGHT: 214 LBS | RESPIRATION RATE: 16 BRPM | SYSTOLIC BLOOD PRESSURE: 114 MMHG | DIASTOLIC BLOOD PRESSURE: 82 MMHG | BODY MASS INDEX: 37.02 KG/M2 | TEMPERATURE: 98 F | HEART RATE: 94 BPM

## 2018-12-27 DIAGNOSIS — R05.9 COUGH: ICD-10-CM

## 2018-12-27 DIAGNOSIS — J20.9 ACUTE BRONCHITIS WITH SYMPTOMS > 10 DAYS: ICD-10-CM

## 2018-12-27 DIAGNOSIS — S06.0X0D CONCUSSION WITHOUT LOSS OF CONSCIOUSNESS, SUBSEQUENT ENCOUNTER: ICD-10-CM

## 2018-12-27 DIAGNOSIS — G43.109 MIGRAINE WITH AURA AND WITHOUT STATUS MIGRAINOSUS, NOT INTRACTABLE: Primary | ICD-10-CM

## 2018-12-27 PROCEDURE — 71046 X-RAY EXAM CHEST 2 VIEWS: CPT

## 2018-12-27 PROCEDURE — 99214 OFFICE O/P EST MOD 30 MIN: CPT | Performed by: NURSE PRACTITIONER

## 2018-12-27 RX ORDER — PREDNISONE 20 MG/1
40 TABLET ORAL DAILY
Qty: 10 TABLET | Refills: 0 | Status: SHIPPED | OUTPATIENT
Start: 2018-12-27 | End: 2019-02-15

## 2018-12-27 RX ORDER — AZITHROMYCIN 250 MG/1
TABLET, FILM COATED ORAL
Qty: 6 TABLET | Refills: 0 | Status: SHIPPED | OUTPATIENT
Start: 2018-12-27 | End: 2019-01-24

## 2018-12-27 RX ORDER — BENZONATATE 200 MG/1
200 CAPSULE ORAL 3 TIMES DAILY PRN
Qty: 30 CAPSULE | Refills: 0 | Status: SHIPPED | OUTPATIENT
Start: 2018-12-27 | End: 2019-02-15
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
## 2018-12-29 ENCOUNTER — MYC REFILL (OUTPATIENT)
Dept: FAMILY MEDICINE | Facility: CLINIC | Age: 43
End: 2018-12-29

## 2018-12-29 DIAGNOSIS — J30.9 ALLERGIC RHINITIS: ICD-10-CM

## 2018-12-29 DIAGNOSIS — G43.109 MIGRAINE WITH AURA AND WITHOUT STATUS MIGRAINOSUS, NOT INTRACTABLE: Primary | ICD-10-CM

## 2018-12-31 NOTE — TELEPHONE ENCOUNTER
"Requested Prescriptions   Pending Prescriptions Disp Refills     topiramate (TOPAMAX) 25 MG tablet  Last Written Prescription Date:  12/13/2018  Last Fill Quantity: 60 tablet,  # refills: 0   Last office visit: 12/27/2018 with prescribing provider:  Haase   Future Office Visit:   Next 5 appointments (look out 90 days)    Feb 15, 2019  9:30 AM CST  PHYSICAL with Susan Rachele Haase, ELY CNP  [facility] [address] [place] [id number] [phone]          60 tablet 0    Anti-Seizure Meds Protocol  Failed - 12/31/2018  8:23 AM       Failed - Review Authorizing provider's last note.     Refer to last progress notes: confirm request is for original authorizing provider (cannot be through other providers).         Failed - Normal ALT or AST on file in past 26 months    Recent Labs   Lab Test 06/21/12  1121   ALT 22     Recent Labs   Lab Test 06/21/12  1121   AST 27            Passed - Recent (12 mo) or future (30 days) visit within the authorizing provider's specialty    Patient had office visit in the last 12 months or has a visit in the next 30 days with authorizing provider or within the authorizing provider's specialty.  See \"Patient Info\" tab in inbasket, or \"Choose Columns\" in Meds & Orders section of the refill encounter.             Passed - Normal CBC on file in past 26 months    Recent Labs   Lab Test 07/16/18  1608   WBC 10.2   RBC 4.32   HGB 12.4   HCT 37.3                   Passed - Normal platelet count on file in past 26 months    Recent Labs   Lab Test 07/16/18  1608                 Passed - No active pregnancy on record       Passed - No positive pregnancy test in last 12 months        fluticasone (FLONASE) 50 MCG/ACT nasal spray  Last Written Prescription Date:  12/13/2018  Last Fill Quantity: 16 mL,  # refills: 1   Last office visit: 12/27/2018 with prescribing provider:  Haase   Future Office Visit:   Next 5 " "appointments (look out 90 days)    Feb 15, 2019  9:30 AM CST  PHYSICAL with Susan Rachele Haase, APRN CNP  [facility] [address] [facility] [address] [phone]          16 mL 1    Inhaled Steroids Protocol Passed - 12/31/2018  8:23 AM       Passed - Patient is age 12 or older       Passed - Recent (12 mo) or future (30 days) visit within the authorizing provider's specialty    Patient had office visit in the last 12 months or has a visit in the next 30 days with authorizing provider or within the authorizing provider's specialty.  See \"Patient Info\" tab in inbasket, or \"Choose Columns\" in Meds & Orders section of the refill encounter.                "

## 2019-01-02 RX ORDER — FLUTICASONE PROPIONATE 50 MCG
SPRAY, SUSPENSION (ML) NASAL
Qty: 16 ML | Refills: 1 | OUTPATIENT
Start: 2019-01-02

## 2019-01-02 RX ORDER — TOPIRAMATE 25 MG/1
25 TABLET, FILM COATED ORAL 2 TIMES DAILY
Qty: 60 TABLET | Refills: 0 | Status: SHIPPED | OUTPATIENT
Start: 2019-01-02 | End: 2019-01-24

## 2019-01-02 RX ORDER — TOPIRAMATE 25 MG/1
TABLET, FILM COATED ORAL
Qty: 60 TABLET | Refills: 0 | Status: CANCELLED | OUTPATIENT
Start: 2019-01-02

## 2019-01-02 NOTE — TELEPHONE ENCOUNTER
Routing refill request to provider for review/approval because:  Labs not current:  ALT/AST  Jerald Mccallum RN, BSN

## 2019-01-18 ENCOUNTER — TRANSFERRED RECORDS (OUTPATIENT)
Dept: HEALTH INFORMATION MANAGEMENT | Facility: CLINIC | Age: 44
End: 2019-01-18

## 2019-01-23 ENCOUNTER — MYC REFILL (OUTPATIENT)
Dept: FAMILY MEDICINE | Facility: CLINIC | Age: 44
End: 2019-01-23

## 2019-01-23 ENCOUNTER — MYC MEDICAL ADVICE (OUTPATIENT)
Dept: FAMILY MEDICINE | Facility: CLINIC | Age: 44
End: 2019-01-23

## 2019-01-23 DIAGNOSIS — G43.109 MIGRAINE WITH AURA AND WITHOUT STATUS MIGRAINOSUS, NOT INTRACTABLE: ICD-10-CM

## 2019-01-23 RX ORDER — TOPIRAMATE 25 MG/1
25 TABLET, FILM COATED ORAL 2 TIMES DAILY
Qty: 60 TABLET | Refills: 0 | Status: CANCELLED | OUTPATIENT
Start: 2019-01-23

## 2019-01-23 NOTE — TELEPHONE ENCOUNTER
Called pt, appointment scheduled, will call and reschedule with ZB if work conflict  Dayana Sawant RN, BSN  Message handled by Nurse Triage.

## 2019-01-23 NOTE — TELEPHONE ENCOUNTER
Danuta- see mychart message and picture and also 2nd [employer] message and picture.  Please advise.  Sujata Raphael RN

## 2019-01-23 NOTE — TELEPHONE ENCOUNTER
Please call Petr Tolbert and ask her to set up an appt, I can work her in tomorrow at 1245?  Thanks,  Susan Haase, CNP

## 2019-01-23 NOTE — TELEPHONE ENCOUNTER
See 3 [employer] messages for same issue, see other mychart message  Dayana Sawant RN, BSN  Message handled by Nurse Triage.

## 2019-01-24 ENCOUNTER — ANCILLARY PROCEDURE (OUTPATIENT)
Dept: GENERAL RADIOLOGY | Facility: CLINIC | Age: 44
End: 2019-01-24
Payer: MEDICARE

## 2019-01-24 ENCOUNTER — OFFICE VISIT (OUTPATIENT)
Dept: FAMILY MEDICINE | Facility: CLINIC | Age: 44
End: 2019-01-24
Payer: MEDICARE

## 2019-01-24 VITALS
HEART RATE: 90 BPM | TEMPERATURE: 97.9 F | SYSTOLIC BLOOD PRESSURE: 120 MMHG | HEIGHT: 64 IN | DIASTOLIC BLOOD PRESSURE: 82 MMHG | RESPIRATION RATE: 14 BRPM | WEIGHT: 226 LBS | BODY MASS INDEX: 38.58 KG/M2 | OXYGEN SATURATION: 100 %

## 2019-01-24 DIAGNOSIS — M54.50 ACUTE RIGHT-SIDED LOW BACK PAIN WITHOUT SCIATICA: ICD-10-CM

## 2019-01-24 DIAGNOSIS — M25.521 RIGHT ELBOW PAIN: Primary | ICD-10-CM

## 2019-01-24 DIAGNOSIS — M25.521 RIGHT ELBOW PAIN: ICD-10-CM

## 2019-01-24 DIAGNOSIS — G43.109 MIGRAINE WITH AURA AND WITHOUT STATUS MIGRAINOSUS, NOT INTRACTABLE: ICD-10-CM

## 2019-01-24 PROCEDURE — 99214 OFFICE O/P EST MOD 30 MIN: CPT | Performed by: NURSE PRACTITIONER

## 2019-01-24 PROCEDURE — 73080 X-RAY EXAM OF ELBOW: CPT | Mod: RT

## 2019-01-24 PROCEDURE — 72100 X-RAY EXAM L-S SPINE 2/3 VWS: CPT

## 2019-01-24 RX ORDER — CYCLOBENZAPRINE HCL 10 MG
5-10 TABLET ORAL 2 TIMES DAILY PRN
Qty: 15 TABLET | Refills: 0 | Status: SHIPPED | OUTPATIENT
Start: 2019-01-24 | End: 2019-02-15

## 2019-01-24 RX ORDER — TOPIRAMATE 25 MG/1
25 TABLET, FILM COATED ORAL 2 TIMES DAILY
Qty: 180 TABLET | Refills: 1 | Status: SHIPPED | OUTPATIENT
Start: 2019-01-24 | End: 2019-07-26

## 2019-01-24 ASSESSMENT — MIFFLIN-ST. JEOR: SCORE: 1671.16

## 2019-01-24 NOTE — PROGRESS NOTES
SUBJECTIVE:   Prince Patel is a 43 year old female who presents to clinic today for the following health issues:    ED/UC Followup:    Facility:  [facility]  Date of visit: 1/18/2018  Reason for visit: Fall: R elbow pain  Current Status: still having pain       Fell on ice on 1/18/2018 landing on her buttocks and right elbow.  Was seen at [facility], had an xray of the elbow taken that she reports was normal.  Has been taking ibuprofen 600 mg for pain, taking on a regular basis without relief.     Has full ROM of elbow, continues to have pain below the elbow that is continuous.  Rates pain as 10/10, constant, increases with movement.  Is right hand dominant.  Has been able to complete functions of her job.  Lower back pain;  Increased pain with sitting and bending, has been using a pad to sit on which helps.  Lower back pain does not radiate down the legs.  Denies extremity weakness,numbness or tingling.  Denies bowel or bladder incontinence/changes.  Migraine: well controlled, requests refill of topamax.    Problem list and histories reviewed & adjusted, as indicated.  Additional history: as documented    Patient Active Problem List   Diagnosis     Sprain and strain of unspecified site of knee and leg     Obesity     CARDIOVASCULAR SCREENING; LDL GOAL LESS THAN 160     S/P partial hysterectomy     Intertrigo     Migraine with aura and without status migrainosus, not intractable     Chronic non-seasonal allergic rhinitis, unspecified trigger     Past Surgical History:   Procedure Laterality Date     COLONOSCOPY N/A 8/3/2018    Procedure: COLONOSCOPY;  colonoscopy;  Surgeon: Isaias Medina MD;  Location:  GI     LAPAROSCOPIC HYSTERECTOMY SUPRACERVICAL  11/16/2011    Procedure:LAPAROSCOPIC HYSTERECTOMY SUPRACERVICAL; LAPAROSCOPIC HYSTERECTOMY SUPRACERVICAL ; Surgeon:MASOUD WHITE; Location: OR     SURGICAL HISTORY OF -       ingrown toenails removed     toenail removal         Social  "History     Tobacco Use     Smoking status: Never Smoker     Smokeless tobacco: Never Used   Substance Use Topics     Alcohol use: No     Family History   Problem Relation Age of Onset     Diabetes Maternal Aunt      C.A.D. Father         MI, angioplasty, 50s     Diabetes Father         Diabetes     Neurologic Disorder Paternal Grandmother      Gastrointestinal Disease Paternal Grandmother         Gluten Intolerance      Diabetes Maternal Grandmother         Multiple Sclerosis         Current Outpatient Medications   Medication Sig Dispense Refill     fexofenadine (ALLEGRA) 180 MG tablet Take 1 tablet (180 mg) by mouth daily 31 tablet 1     fluticasone (FLONASE) 50 MCG/ACT nasal spray SPRAY 2 SPRAYS INTO BOTH NOSTRILS DAILY 16 mL 1     MULTIVITAMINS OR TABS 1 tab qd as directed  0     nystatin (MYCOSTATIN) 106611 UNIT/GM POWD Apply topically 3 times daily as needed 60 g 1     SUMAtriptan (IMITREX) 50 MG tablet Take 1 tablet (50 mg) by mouth at onset of headache for migraine 18 tablet 3     topiramate (TOPAMAX) 25 MG tablet Take 1 tablet (25 mg) by mouth 2 times daily 60 tablet 0     Allergies   Allergen Reactions     Keflex [Cephalexin] Rash       Reviewed and updated as needed this visit by clinical staff       Reviewed and updated as needed this visit by Provider         ROS:  CONSTITUTIONAL: NEGATIVE for fever, chills, change in weight  RESP: NEGATIVE for significant cough or SOB  CV: NEGATIVE for chest pain, palpitations or peripheral edema  MUSCULOSKELETAL: see HPI  NEURO:without paresthesia   PSYCHIATRIC: NEGATIVE for changes in mood or affect    OBJECTIVE:     /82 (BP Location: Left arm, Patient Position: Chair, Cuff Size: Adult Large)   Pulse 90   Temp 97.9  F (36.6  C) (Oral)   Resp 14   Ht 1.619 m (5' 3.75\")   Wt 102.5 kg (226 lb)   LMP 10/31/2011   SpO2 100%   BMI 39.10 kg/m    Body mass index is 39.1 kg/m .   GENERAL: healthy, alert and no distress  NECK: no adenopathy, no asymmetry, " masses, or scars and thyroid normal to palpation  RESP: lungs clear to auscultation - no rales, rhonchi or wheezes  CV: regular rate and rhythm, normal S1 S2, no S3 or S4, no murmur, click or rub, no peripheral edema   MS: right olecranon with tenderness, area of ecchymosis and edema below the elbow about 5 cm in size.  Has full flexion and extension of the elbow, full ROM of wrist.    Right buttocks with a 2 cm area of ecchymosis,  Increased pain upon palpation of coccyx.   NEURO: BUE/BLE with normal strength, tone and reflexes. mentation intact and speech normal  PSYCH: mentation appears normal, affect normal/bright    ASSESSMENT:   Se below  PLAN:     Prince was seen today for recheck.    Diagnoses and all orders for this visit:    Right elbow pain:  Xray appears without fracture. Applied ace to wear during the day for comfort. Pain likely due to severe contusion, will take time to heal.  Continue to apply ice for 15-20 min tid, take ibuprofen 400 mg tid prn  -     XR Elbow Right 2 Views; Future    Acute right-sided low back pain without sciatica:  Suggested ice for 15-20 min tid prn, use pillow to sit on, flexeril 5-10 mg bid prn as well as ibuprofen as above.   -     XR Lumbar Spine 2/3 Views; Future  Migraine with aura and without status migrainosus, not intractable: refill sent.  -     topiramate (TOPAMAX) 25 MG tablet; Take 1 tablet (25 mg) by mouth 2 times daily      Follow up on 2/15 for physical exam, sooner as needed.       Susan Haase, APRN [facility]

## 2019-02-15 ENCOUNTER — OFFICE VISIT (OUTPATIENT)
Dept: FAMILY MEDICINE | Facility: CLINIC | Age: 44
End: 2019-02-15
Payer: MEDICARE

## 2019-02-15 VITALS
SYSTOLIC BLOOD PRESSURE: 130 MMHG | OXYGEN SATURATION: 100 % | DIASTOLIC BLOOD PRESSURE: 86 MMHG | BODY MASS INDEX: 38.07 KG/M2 | HEART RATE: 106 BPM | WEIGHT: 223 LBS | RESPIRATION RATE: 14 BRPM | TEMPERATURE: 98.1 F | HEIGHT: 64 IN

## 2019-02-15 DIAGNOSIS — B07.0 PLANTAR WARTS: ICD-10-CM

## 2019-02-15 DIAGNOSIS — Z00.00 ROUTINE GENERAL MEDICAL EXAMINATION AT A HEALTH CARE FACILITY: Primary | ICD-10-CM

## 2019-02-15 LAB
ALBUMIN SERPL-MCNC: 3.9 G/DL (ref 3.4–5)
ALP SERPL-CCNC: 41 U/L (ref 40–150)
ALT SERPL W P-5'-P-CCNC: 32 U/L (ref 0–50)
ANION GAP SERPL CALCULATED.3IONS-SCNC: 8 MMOL/L (ref 3–14)
AST SERPL W P-5'-P-CCNC: 30 U/L (ref 0–45)
BASOPHILS # BLD AUTO: 0 10E9/L (ref 0–0.2)
BASOPHILS NFR BLD AUTO: 0.3 %
BILIRUB SERPL-MCNC: 0.7 MG/DL (ref 0.2–1.3)
BUN SERPL-MCNC: 9 MG/DL (ref 7–30)
CALCIUM SERPL-MCNC: 8.7 MG/DL (ref 8.5–10.1)
CHLORIDE SERPL-SCNC: 108 MMOL/L (ref 94–109)
CHOLEST SERPL-MCNC: 151 MG/DL
CO2 SERPL-SCNC: 20 MMOL/L (ref 20–32)
CREAT SERPL-MCNC: 0.8 MG/DL (ref 0.52–1.04)
DIFFERENTIAL METHOD BLD: NORMAL
EOSINOPHIL # BLD AUTO: 0.2 10E9/L (ref 0–0.7)
EOSINOPHIL NFR BLD AUTO: 2.9 %
ERYTHROCYTE [DISTWIDTH] IN BLOOD BY AUTOMATED COUNT: 13.3 % (ref 10–15)
GFR SERPL CREATININE-BSD FRML MDRD: >90 ML/MIN/{1.73_M2}
GLUCOSE SERPL-MCNC: 96 MG/DL (ref 70–99)
HCT VFR BLD AUTO: 40.2 % (ref 35–47)
HDLC SERPL-MCNC: 35 MG/DL
HGB BLD-MCNC: 13.8 G/DL (ref 11.7–15.7)
LDLC SERPL CALC-MCNC: 92 MG/DL
LYMPHOCYTES # BLD AUTO: 1.5 10E9/L (ref 0.8–5.3)
LYMPHOCYTES NFR BLD AUTO: 20.3 %
MCH RBC QN AUTO: 29.6 PG (ref 26.5–33)
MCHC RBC AUTO-ENTMCNC: 34.3 G/DL (ref 31.5–36.5)
MCV RBC AUTO: 86 FL (ref 78–100)
MONOCYTES # BLD AUTO: 0.4 10E9/L (ref 0–1.3)
MONOCYTES NFR BLD AUTO: 5.6 %
NEUTROPHILS # BLD AUTO: 5.2 10E9/L (ref 1.6–8.3)
NEUTROPHILS NFR BLD AUTO: 70.9 %
NONHDLC SERPL-MCNC: 116 MG/DL
PLATELET # BLD AUTO: 239 10E9/L (ref 150–450)
POTASSIUM SERPL-SCNC: 3.9 MMOL/L (ref 3.4–5.3)
PROT SERPL-MCNC: 7.6 G/DL (ref 6.8–8.8)
RBC # BLD AUTO: 4.67 10E12/L (ref 3.8–5.2)
SODIUM SERPL-SCNC: 136 MMOL/L (ref 133–144)
TRIGL SERPL-MCNC: 122 MG/DL
TSH SERPL DL<=0.005 MIU/L-ACNC: 2.87 MU/L (ref 0.4–4)
WBC # BLD AUTO: 7.3 10E9/L (ref 4–11)

## 2019-02-15 PROCEDURE — 80053 COMPREHEN METABOLIC PANEL: CPT | Performed by: NURSE PRACTITIONER

## 2019-02-15 PROCEDURE — 36415 COLL VENOUS BLD VENIPUNCTURE: CPT | Performed by: NURSE PRACTITIONER

## 2019-02-15 PROCEDURE — G0145 SCR C/V CYTO,THINLAYER,RESCR: HCPCS | Performed by: NURSE PRACTITIONER

## 2019-02-15 PROCEDURE — 17110 DESTRUCTION B9 LES UP TO 14: CPT | Performed by: NURSE PRACTITIONER

## 2019-02-15 PROCEDURE — 85025 COMPLETE CBC W/AUTO DIFF WBC: CPT | Performed by: NURSE PRACTITIONER

## 2019-02-15 PROCEDURE — 87624 HPV HI-RISK TYP POOLED RSLT: CPT | Performed by: NURSE PRACTITIONER

## 2019-02-15 PROCEDURE — 80061 LIPID PANEL: CPT | Performed by: NURSE PRACTITIONER

## 2019-02-15 PROCEDURE — 84443 ASSAY THYROID STIM HORMONE: CPT | Performed by: NURSE PRACTITIONER

## 2019-02-15 PROCEDURE — G0476 HPV COMBO ASSAY CA SCREEN: HCPCS | Performed by: NURSE PRACTITIONER

## 2019-02-15 PROCEDURE — 99396 PREV VISIT EST AGE 40-64: CPT | Mod: 25 | Performed by: NURSE PRACTITIONER

## 2019-02-15 ASSESSMENT — ENCOUNTER SYMPTOMS
JOINT SWELLING: 0
DIARRHEA: 0
CHILLS: 0
PALPITATIONS: 0
FREQUENCY: 0
SORE THROAT: 0
SHORTNESS OF BREATH: 0
BREAST MASS: 0
WEAKNESS: 0
CONSTIPATION: 0
DIZZINESS: 0
EYE PAIN: 0
PARESTHESIAS: 0
HEADACHES: 1
ABDOMINAL PAIN: 0
DYSURIA: 0
COUGH: 0
HEMATOCHEZIA: 0
HEARTBURN: 0
MYALGIAS: 0
NAUSEA: 0
HEMATURIA: 0
FEVER: 0
NERVOUS/ANXIOUS: 0
ARTHRALGIAS: 0

## 2019-02-15 ASSESSMENT — MIFFLIN-ST. JEOR: SCORE: 1657.55

## 2019-02-15 NOTE — PROGRESS NOTES
p   SUBJECTIVE:   CC: Prince Patel is an 43 year old woman who presents for preventive health visit.     Physical   Annual:     Getting at least 3 servings of Calcium per day:  Yes    Bi-annual eye exam:  NO    Dental care twice a year:  NO    Sleep apnea or symptoms of sleep apnea:  Daytime drowsiness    Diet:  Regular (no restrictions)    Frequency of exercise:  2-3 days/week    Duration of exercise:  15-30 minutes    Taking medications regularly:  Yes    Medication side effects:  None    Additional concerns today:  No    PHQ-2 Total Score: 0  1.  Menses:  Absent due to hysterectomy, cervix intact, last Pap in 2016, NIL, is not sexually active.   2.  Breast cancer screening:  Last mammogram 9/2018  3. Plantar wart:  Noticed several weeks ago on the bottom of the right foot.     Today's PHQ-2 Score:   PHQ-2 ( 1999 [employer]) 2/15/2019   Q1: Little interest or pleasure in doing things 0   Q2: Feeling down, depressed or hopeless 0   PHQ-2 Score 0   Q1: Little interest or pleasure in doing things Not at all   Q2: Feeling down, depressed or hopeless Not at all   PHQ-2 Score 0       Abuse: Current or Past(Physical, Sexual or Emotional)- No  Do you feel safe in your environment? Yes    Social History     Tobacco Use     Smoking status: Never Smoker     Smokeless tobacco: Never Used   Substance Use Topics     Alcohol use: No     Alcohol Use 2/15/2019   If you drink alcohol do you typically have greater than 3 drinks per day OR greater than 7 drinks per week? No   No flowsheet data found.    Reviewed orders with patient.  Reviewed health maintenance and updated orders accordingly - Yes    Mammogram Screening: Patient under age 50, mutual decision reflected in health maintenance.      Pertinent mammograms are reviewed under the imaging tab.  History of abnormal Pap smear: NO - age 30- 65 PAP every 3 years recommended  PAP / HPV 2/4/2016 2/7/2013 9/20/2010   PAP NIL NIL NIL     Reviewed and updated as needed this visit by  "clinical staff         Reviewed and updated as needed this visit by Provider        Review of Systems   Constitutional: Negative for chills and fever.   HENT: Negative for congestion, ear pain, hearing loss and sore throat.    Eyes: Negative for pain and visual disturbance.   Respiratory: Negative for cough and shortness of breath.    Cardiovascular: Negative for chest pain, palpitations and peripheral edema.   Gastrointestinal: Negative for abdominal pain, constipation, diarrhea, heartburn, hematochezia and nausea.   Breasts:  Negative for tenderness, breast mass and discharge.   Genitourinary: Negative for dysuria, frequency, genital sores, hematuria, pelvic pain, urgency, vaginal bleeding and vaginal discharge.   Musculoskeletal: Negative for arthralgias, joint swelling and myalgias.   Skin: Negative for rash.   Neurological: Positive for headaches. Negative for dizziness, weakness and paresthesias.   Psychiatric/Behavioral: Negative for mood changes. The patient is not nervous/anxious.       OBJECTIVE:   /86 (BP Location: Right arm, Patient Position: Chair, Cuff Size: Adult Large)   Pulse 106   Temp 98.1  F (36.7  C) (Oral)   Resp 14   Ht 1.619 m (5' 3.75\")   Wt 101.2 kg (223 lb)   LMP 10/31/2011   SpO2 100%   BMI 38.58 kg/m    Physical Exam  GENERAL: healthy, alert and no distress  EYES: Eyes grossly normal to inspection, PERRL and conjunctivae and sclerae normal  HENT: ear canals and TM's normal, nose and mouth without ulcers or lesions  NECK: no adenopathy, no asymmetry, masses, or scars and thyroid normal to palpation  RESP: lungs clear to auscultation - no rales, rhonchi or wheezes  BREAST: normal without masses, tenderness or nipple discharge and no palpable axillary masses or adenopathy  CV: regular rate and rhythm, normal S1 S2, no S3 or S4, no murmur, click or rub, no peripheral edema and peripheral pulses strong  ABDOMEN: soft, nontender, no hepatosplenomegaly, no masses and bowel sounds " "normal  MS: no gross musculoskeletal defects noted, no edema  :  normal female external genitalia, vaginal mucosa pink, moist, well rugated and normal cervix, adnexae, and uterus surgically absent.  Normal vaginal discharge  SKIN: plantar aspect of right foot with 10 mm wart.   NEURO: Normal strength and tone, mentation intact and speech normal  PSYCH: mentation appears normal, affect normal/bright    ASSESSMENT/PLAN:   Prince was seen today for physical.    Diagnoses and all orders for this visit:    Routine general medical examination at a health care facility  -     CBC with platelets differential  -     Comprehensive metabolic panel  -     Lipid panel reflex to direct LDL Fasting  -     TSH with free T4 reflex  -     Pap imaged thin layer screen with HPV - recommended age 30 - 65 years (select HPV order below)  -     HPV High Risk DNA Cervical    Plantar warts  -     DESTRUCT BENIGN LESION, UP TO 14    Procedure explained, verbal consent given, cleansed with alcohol, liquid nitrogen applied with cryogun x 3 pulses of 3 seconds each, x 4 q-tips, bandaide placed.  Explained that may take more than 1 treatment, keep covered for next 24 hours, if wart remains return in 3-4 weeks for an additional treatment.    COUNSELING:  Reviewed preventive health counseling, as reflected in patient instructions       Regular exercise       Healthy diet/nutrition       Vision screening    BP Readings from Last 1 Encounters:   01/24/19 120/82     Estimated body mass index is 39.1 kg/m  as calculated from the following:    Height as of 1/24/19: 1.619 m (5' 3.75\").    Weight as of 1/24/19: 102.5 kg (226 lb).      Weight management plan: Discussed healthy diet and exercise guidelines     reports that  has never smoked. she has never used smokeless tobacco.      Counseling Resources:  ATP IV Guidelines  Pooled Cohorts Equation Calculator  Breast Cancer Risk Calculator  FRAX Risk Assessment  ICSI Preventive Guidelines  Dietary " Guidelines for Americans, 2010  USDA's MyPlate  ASA Prophylaxis  Lung CA Screening  Follow up in 6 months, sooner as needed.   Susan Haase, APRN [facility]

## 2019-02-15 NOTE — RESULT ENCOUNTER NOTE
Ryan Tolbert,  Your CBC (checks for anemia and infection) is normal.  Sincerely,  Susan Haase, CNP

## 2019-02-17 NOTE — RESULT ENCOUNTER NOTE
Ryan Tolbert,  Your lab results are as below:  1)  TSH (thyroid level) 2.87 which is normal (range 0.4-4)  2)  Cholesterol is normal at 151,  your LDL (bad cholesterol) is normal, your  HDL (good cholesterol) is low at 35.  Continue to follow a low cholesterol diet and we will recheck this in 1 year.  3)  Glucose is normal at 96 (normal fasting is <100).      If you have any questions do not hesitate to call the clinic to discuss the results with me further.     Sincerely,    Susan Haase, CNP

## 2019-02-19 ENCOUNTER — MYC REFILL (OUTPATIENT)
Dept: FAMILY MEDICINE | Facility: CLINIC | Age: 44
End: 2019-02-19

## 2019-02-19 DIAGNOSIS — G43.109 MIGRAINE WITH AURA AND WITHOUT STATUS MIGRAINOSUS, NOT INTRACTABLE: ICD-10-CM

## 2019-02-20 LAB
COPATH REPORT: NORMAL
PAP: NORMAL

## 2019-02-20 RX ORDER — SUMATRIPTAN 50 MG/1
50 TABLET, FILM COATED ORAL
Qty: 18 TABLET | Refills: 2 | Status: SHIPPED | OUTPATIENT
Start: 2019-02-20 | End: 2020-06-24

## 2019-02-20 NOTE — TELEPHONE ENCOUNTER
Has refills, resent  Prescription approved per [facility].  Dayana Sawant, RN, BSN

## 2019-02-21 LAB
FINAL DIAGNOSIS: NORMAL
HPV HR 12 DNA CVX QL NAA+PROBE: NEGATIVE
HPV16 DNA SPEC QL NAA+PROBE: NEGATIVE
HPV18 DNA SPEC QL NAA+PROBE: NEGATIVE
SPECIMEN DESCRIPTION: NORMAL
SPECIMEN SOURCE CVX/VAG CYTO: NORMAL

## 2019-04-23 ENCOUNTER — TELEPHONE (OUTPATIENT)
Dept: FAMILY MEDICINE | Facility: CLINIC | Age: 44
End: 2019-04-23

## 2019-04-23 NOTE — TELEPHONE ENCOUNTER
Reason for call:  Form   Our goal is to have forms completed within 72 hours, however some forms may require a visit or additional information.     Who is the form from? [employer]   Where did the form come from? Patient or family brought in     What clinic location was the form placed at? [facility]  Where was the form placed? [facility]  What number is listed as a contact on the form? [phone]    Phone call message - patient request for a letter, form or note:     Date needed: as soon as possible  Please mail to Patient at address on file  Has the patient signed a consent form for release of information? Not Applicable    Additional comments: Please have Susan Haase complete the [employer] application and mail back to the patient.      Type of letter, form or note: [employer] Application    Phone number to reach patient:  Home number on file [phone] (home)    Best Time:  Anytime    Can we leave a detailed message on this number?  YES

## 2019-04-24 NOTE — TELEPHONE ENCOUNTER
Please let Petrsalvatore Perezu know that the physical exam section of the form has been completed and she can  the form.    Thanks,  Susan Haase, CNP

## 2019-04-28 ENCOUNTER — TRANSFERRED RECORDS (OUTPATIENT)
Dept: HEALTH INFORMATION MANAGEMENT | Facility: CLINIC | Age: 44
End: 2019-04-28

## 2019-08-12 ENCOUNTER — MYC MEDICAL ADVICE (OUTPATIENT)
Dept: FAMILY MEDICINE | Facility: CLINIC | Age: 44
End: 2019-08-12

## 2019-08-14 ENCOUNTER — MYC MEDICAL ADVICE (OUTPATIENT)
Dept: FAMILY MEDICINE | Facility: CLINIC | Age: 44
End: 2019-08-14

## 2019-08-14 ENCOUNTER — E-VISIT (OUTPATIENT)
Dept: FAMILY MEDICINE | Facility: CLINIC | Age: 44
End: 2019-08-14
Payer: MEDICARE

## 2019-08-14 ENCOUNTER — TELEPHONE (OUTPATIENT)
Dept: FAMILY MEDICINE | Facility: CLINIC | Age: 44
End: 2019-08-14

## 2019-08-14 DIAGNOSIS — G43.109 MIGRAINE WITH AURA AND WITHOUT STATUS MIGRAINOSUS, NOT INTRACTABLE: Primary | ICD-10-CM

## 2019-08-14 PROCEDURE — 99444 ZZC PHYSICIAN ONLINE EVALUATION & MANAGEMENT SERVICE: CPT | Performed by: NURSE PRACTITIONER

## 2019-08-14 NOTE — TELEPHONE ENCOUNTER
Phone call to patient     She has had a headache for a few days   She has used imitrex and topamax and not helping     Discussed Evisit vs urgent care visit as unable to get into clinic today     Patient does not have way to get to urgent care as she does not drive     Will send Evisit directions via my chart as well as urgent care locations     Vicky Acosta, Registered Nurse   [facility]

## 2019-08-14 NOTE — TELEPHONE ENCOUNTER
Patient sent in a [facility] request for an appointment for migraine medication.    Migraines medication not helping anymore    She wanted an appointment today.    Please contact patient.

## 2019-09-19 ENCOUNTER — ANCILLARY PROCEDURE (OUTPATIENT)
Dept: GENERAL RADIOLOGY | Facility: CLINIC | Age: 44
End: 2019-09-19
Attending: PHYSICIAN ASSISTANT
Payer: MEDICARE

## 2019-09-19 ENCOUNTER — OFFICE VISIT (OUTPATIENT)
Dept: FAMILY MEDICINE | Facility: CLINIC | Age: 44
End: 2019-09-19
Payer: MEDICARE

## 2019-09-19 VITALS
BODY MASS INDEX: 37.54 KG/M2 | OXYGEN SATURATION: 96 % | SYSTOLIC BLOOD PRESSURE: 150 MMHG | DIASTOLIC BLOOD PRESSURE: 91 MMHG | RESPIRATION RATE: 16 BRPM | TEMPERATURE: 99.4 F | WEIGHT: 217 LBS | HEART RATE: 122 BPM

## 2019-09-19 DIAGNOSIS — R05.9 COUGH: Primary | ICD-10-CM

## 2019-09-19 DIAGNOSIS — R11.10 POST-TUSSIVE VOMITING: ICD-10-CM

## 2019-09-19 DIAGNOSIS — R05.9 COUGH: ICD-10-CM

## 2019-09-19 PROCEDURE — 87798 DETECT AGENT NOS DNA AMP: CPT | Performed by: PHYSICIAN ASSISTANT

## 2019-09-19 PROCEDURE — 99214 OFFICE O/P EST MOD 30 MIN: CPT | Performed by: PHYSICIAN ASSISTANT

## 2019-09-19 PROCEDURE — 71046 X-RAY EXAM CHEST 2 VIEWS: CPT

## 2019-09-19 RX ORDER — BENZONATATE 200 MG/1
200 CAPSULE ORAL 3 TIMES DAILY PRN
Qty: 30 CAPSULE | Refills: 0 | Status: SHIPPED | OUTPATIENT
Start: 2019-09-19 | End: 2020-03-05

## 2019-09-19 RX ORDER — CODEINE PHOSPHATE/GUAIFENESIN 10-100MG/5
5 LIQUID (ML) ORAL EVERY 6 HOURS PRN
Qty: 118 ML | Refills: 0 | Status: SHIPPED | OUTPATIENT
Start: 2019-09-19 | End: 2020-03-05

## 2019-09-19 RX ORDER — AZITHROMYCIN 250 MG/1
TABLET, FILM COATED ORAL
Qty: 6 TABLET | Refills: 0 | Status: SHIPPED | OUTPATIENT
Start: 2019-09-19 | End: 2020-03-03

## 2019-09-19 NOTE — PROGRESS NOTES
Subjective     Petr Patel is a 44 year old female who presents to clinic today for the following health issues:    HPI   Acute Illness   Acute illness concerns: Cough  Onset: 2 days    Fever: Feverish    Chills/Sweats: YES- sweats    Headache (location?): YES    Sinus Pressure:no    Conjunctivitis:  no    Ear Pain: ringing in both ears    Rhinorrhea: no    Congestion: no    Sore Throat: no     Cough: YES green mucous and blood, chest discomfort from coughing    Wheeze: YES    Decreased Appetite: YES    Nausea: YES    Vomiting: YES- from coughing so hard    Diarrhea:  no    Dysuria/Freq.: no    Fatigue/Achiness: YES    Sick/Strep Exposure: YES- friend      Therapies Tried and outcome: none    Patient has been immunized against pertussis. Her last immunization was in 2012.  Of note she did have the influenza immunization about a week ago.    Patient is a non-smoker and does not vape.    Patient Active Problem List   Diagnosis     Sprain and strain of unspecified site of knee and leg     Obesity     CARDIOVASCULAR SCREENING; LDL GOAL LESS THAN 160     S/P partial hysterectomy     Intertrigo     Migraine with aura and without status migrainosus, not intractable     Chronic non-seasonal allergic rhinitis, unspecified trigger       Current Outpatient Medications   Medication     fexofenadine (ALLEGRA) 180 MG tablet     fluticasone (FLONASE) 50 MCG/ACT nasal spray     MULTIVITAMINS OR TABS     nystatin (MYCOSTATIN) 759420 UNIT/GM POWD     SUMAtriptan (IMITREX) 50 MG tablet     topiramate (TOPAMAX) 25 MG tablet     No current facility-administered medications for this visit.        Allergies   Allergen Reactions     Keflex [Cephalexin] Rash       Reviewed and updated as needed this visit by Provider  Tobacco  Allergies  Meds  Problems         Review of Systems   GENERAL:  As noted in HPI  RESP:  As noted in HPI      Objective    BP (!) 150/91 (BP Location: Right arm, Patient Position: Chair, Cuff Size: Adult Large)    Pulse 122   Temp 99.4  F (37.4  C) (Oral)   Resp 16   Wt 98.4 kg (217 lb)   LMP 10/31/2011   SpO2 96%   BMI 37.54 kg/m    Body mass index is 37.54 kg/m .  Physical Exam   GENERAL: No acute distress  HEENT: Normocephalic, PERRL, Canals patent, bilateral TM's non-erythematous and non-bulging. Turbinates normal in appearance bilaterally. Posterior oropharynx non-erythematous and without exudate.  CARDIAC: Regular rate and rhythm. No murmurs.  PULMONARY: Lungs are clear to auscultation bilaterally. No wheezes, rhonchi or crackles.  NEURO: Alert and non-focal      Diagnostic Test Results:  XR CHEST 2 VW 9/19/2019 3:16 PM      HISTORY: Cough; Post-tussive vomiting      COMPARISON: 12/27/2018      FINDINGS: The heart size. Clear lungs. No pleural effusion or  pneumothorax appreciated.                                                                       IMPRESSION: No acute cardiopulmonary abnormalities.     Pertussis: Pending        Assessment & Plan     1. Cough  Due to patient's posttussive vomiting and episodic coughs I did feel further evaluation for possible pertussis was indicated.  Patient treated empirically with a azithromycin as noted below.  She was also provided a prescription for Tessalon Perles as well as guaifenesin with codeine to help with cough.  She was given a note for work.  Chest x-ray obtained and is as noted above showing no pneumonia or cardiopulmonary abnormalities.  We will call patient once results of the pertussis testing has returned.  - XR Chest 2 Views; Future  - B. pertussis/parapertussis PCR-NP  - azithromycin (ZITHROMAX) 250 MG tablet; Take 2 tablets (500 mg) by mouth daily for 1 day, THEN 1 tablet (250 mg) daily for 4 days.  Dispense: 6 tablet; Refill: 0  - benzonatate (TESSALON) 200 MG capsule; Take 1 capsule (200 mg) by mouth 3 times daily as needed for cough  Dispense: 30 capsule; Refill: 0  - guaiFENesin-codeine (GUAIFENESIN AC) 100-10 MG/5ML syrup; Take 5 mLs by mouth every  6 hours as needed for cough  Dispense: 118 mL; Refill: 0    2. Post-tussive vomiting  As noted above.  - XR Chest 2 Views; Future  - B. pertussis/parapertussis PCR-NP  - azithromycin (ZITHROMAX) 250 MG tablet; Take 2 tablets (500 mg) by mouth daily for 1 day, THEN 1 tablet (250 mg) daily for 4 days.  Dispense: 6 tablet; Refill: 0     Malika Bowen PA-C  [facility]

## 2019-09-19 NOTE — LETTER
September 19, 2019      Prince Patel  [address] [place] [id number]        To Whom It May Concern:    Prince Patel  was seen on 9/19/2019.  Please excuse her  until 9/23/2019 due to illness.        Sincerely,        Malika Bowen PA-C

## 2019-09-20 LAB
B PARAPERT DNA SPEC QL NAA+PROBE: NOT DETECTED
B PERT DNA SPEC QL NAA+PROBE: NOT DETECTED
BORDETELLA COMMENT: NORMAL

## 2019-10-10 ENCOUNTER — ANCILLARY PROCEDURE (OUTPATIENT)
Dept: MAMMOGRAPHY | Facility: CLINIC | Age: 44
End: 2019-10-10
Payer: MEDICARE

## 2019-10-10 DIAGNOSIS — Z12.31 VISIT FOR SCREENING MAMMOGRAM: ICD-10-CM

## 2019-10-10 PROCEDURE — 77067 SCR MAMMO BI INCL CAD: CPT | Mod: TC

## 2019-11-02 ENCOUNTER — HEALTH MAINTENANCE LETTER (OUTPATIENT)
Age: 44
End: 2019-11-02

## 2019-11-14 ENCOUNTER — MYC MEDICAL ADVICE (OUTPATIENT)
Dept: FAMILY MEDICINE | Facility: CLINIC | Age: 44
End: 2019-11-14

## 2019-11-22 ENCOUNTER — MYC MEDICAL ADVICE (OUTPATIENT)
Dept: FAMILY MEDICINE | Facility: CLINIC | Age: 44
End: 2019-11-22

## 2019-11-27 ENCOUNTER — OFFICE VISIT (OUTPATIENT)
Dept: ORTHOPEDICS | Facility: CLINIC | Age: 44
End: 2019-11-27
Payer: MEDICARE

## 2019-11-27 ENCOUNTER — ANCILLARY PROCEDURE (OUTPATIENT)
Dept: GENERAL RADIOLOGY | Facility: CLINIC | Age: 44
End: 2019-11-27
Attending: FAMILY MEDICINE
Payer: MEDICARE

## 2019-11-27 VITALS
BODY MASS INDEX: 37.05 KG/M2 | WEIGHT: 217 LBS | SYSTOLIC BLOOD PRESSURE: 130 MMHG | HEIGHT: 64 IN | DIASTOLIC BLOOD PRESSURE: 80 MMHG

## 2019-11-27 DIAGNOSIS — M25.519 PAIN IN JOINT, SHOULDER REGION: ICD-10-CM

## 2019-11-27 DIAGNOSIS — M25.511 PAIN IN JOINT OF RIGHT SHOULDER: Primary | ICD-10-CM

## 2019-11-27 DIAGNOSIS — M75.21 BICEPS TENDINITIS OF RIGHT UPPER EXTREMITY: ICD-10-CM

## 2019-11-27 DIAGNOSIS — G56.01 CARPAL TUNNEL SYNDROME OF RIGHT WRIST: ICD-10-CM

## 2019-11-27 DIAGNOSIS — M75.101 ROTATOR CUFF SYNDROME OF RIGHT SHOULDER: ICD-10-CM

## 2019-11-27 PROCEDURE — 73030 X-RAY EXAM OF SHOULDER: CPT | Mod: RT

## 2019-11-27 PROCEDURE — 99204 OFFICE O/P NEW MOD 45 MIN: CPT | Performed by: FAMILY MEDICINE

## 2019-11-27 ASSESSMENT — MIFFLIN-ST. JEOR: SCORE: 1625.18

## 2019-11-27 NOTE — LETTER
"    11/27/2019         RE: Prince Patel  [address]  [place] [address]        Dear Colleague,    Thank you for referring your patient, Prince Patel, to the [facility]. Please see a copy of my visit note below.    ASSESSMENT & PLAN  Patient Instructions     1. Pain in joint of right shoulder    2. Rotator cuff syndrome of right shoulder    3. Biceps tendinitis of right upper extremity    4. Carpal tunnel syndrome of right wrist      -Patient has right shoulder pain due to rotator cuff and biceps tendinitis.  Patient also has numbness and tingling into her right hand due to carpal tunnel syndrome.  -Patient will start formal physical therapy and home exercise program.  -Patient will start nabumetone 750 mg twice a day as needed.  -Patient will start wearing a cock-up wrist splint at night to offload the wrist joint  -Patient will follow-up in 4 weeks for reevaluation and progression of activity.  -Call direct clinic number [[phone]] at any time with questions or concerns.    Albert Yeo MD [facility] Orthopedics and [facility] Medicine  [facility]          -----    SUBJECTIVE  Prince Patel is a/an 44 year old Right handed female who is seen as a self referral for evaluation of right shoulder pain. The patient is seen by themselves.    Onset: 3 week(s) ago. Patient describes injury as was bowling and something \"popped\" in her shoulder.   Location of Pain: Right anterior GH, sharp pain that shoots down arm, that can cause numbness  Rating of Pain at worst: 10/10  Rating of Pain Currently: 5/10  Worsened by: bowling, lifting, reaching behind, opening things.   Better with: nothing  Treatments tried: rest/activity avoidance, ice, heat, Tylenol, ibuprofen and Aleve  Associated symptoms: numbness and tingling, feels a popping when playing video games.  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)     Other acne      Social History     Socioeconomic History     Marital status: Single     Spouse name: Not on file     Number of children: 0     Years of education: Not on file     Highest education level: Not on file   Occupational History     Occupation: cleaning     Comment: archiver's   Social Needs     Financial resource strain: Not on file     Food insecurity:     Worry: Not on file     Inability: Not on file     Transportation needs:     Medical: Not on file     Non-medical: Not on file   Tobacco Use     Smoking status: Never Smoker     Smokeless tobacco: Never Used   Substance and Sexual Activity     Alcohol use: No     Drug use: No     Sexual activity: Never     Partners: Male   Lifestyle     Physical activity:     Days per week: Not on file     Minutes per session: Not on file     Stress: Not on file   Relationships     Social connections:     Talks on phone: Not on file     Gets together: Not on file     Attends Rastafari service: Not on file     Active member of club or organization: Not on file     Attends meetings of clubs or organizations: Not on file     Relationship status: Not on file     Intimate partner violence:     Fear of current or ex partner: Not on file     Emotionally abused: Not on file     Physically abused: Not on file     Forced sexual activity: Not on file   Other Topics Concern  "     Service Not Asked     Blood Transfusions Not Asked     Caffeine Concern Yes     Comment: 1 pop daily     Occupational Exposure Not Asked     Hobby Hazards Not Asked     Sleep Concern Not Asked     Stress Concern Not Asked     Weight Concern Not Asked     Special Diet No     Comment: calcium daily     Back Care Not Asked     Exercise Yes     Comment: rollerblade, bike     Bike Helmet Not Asked     Seat Belt Yes     Self-Exams Yes     Parent/sibling w/ CABG, MI or angioplasty before 65F 55M? 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    REVIEW OF SYSTEMS:  10 point ROS is negative other than symptoms noted above in HPI, Past Medical History or as stated below  Constitutional: NEGATIVE for fever, chills, change in weight  Skin: NEGATIVE for worrisome rashes, moles or lesions  GI/: NEGATIVE for bowel or bladder changes  Neuro: NEGATIVE for weakness, dizziness or paresthesias    OBJECTIVE:  /80   Ht 1.619 m (5' 3.74\")   Wt 98.4 kg (217 lb)   LMP 10/31/2011   BMI 37.55 kg/m      General: healthy, alert and in no distress  HEENT: no scleral icterus or conjunctival erythema  Skin: no suspicious lesions or rash. No jaundice.  CV: regular rhythm by palpation  Resp: normal respiratory effort without conversational dyspnea   Psych: normal mood and affect  Gait: normal steady gait with appropriate coordination and balance  Neuro: normal light touch sensory exam of the bilateral upper extremities.    MSK:  RIGHT SHOULDER  Inspection:    no swelling, bruising, discoloration, or obvious deformity or asymmetry  Palpation:    Tender about the anterior capsule and greater tuberosity. Remainder of bony and tendinous landmarks are nontender.  Active Range of Motion:     Abduction 1350, FF 1650, , IR L4.      Scapular dyskinesis absent  Strength:    " Scapular plane abduction 5-/5,  ER 5-/5, IR 5/5, biceps 5/5, triceps 5/5  Special Tests:    Positive: Neer's, Ferrera', supraspinatus (empty can), crossed arm adduction and [place]'s    Negative: drop arm/painful arc, crossed arm adduction, Speed's and Yergason's    RIGHT WRIST  Inspection:    No swelling, bruising, discoloration, or obvious deformity or asymmetry  Palpation:   bony and ligamentous line marks are nontender.    Crepitus is Absent    Metacarpals: normal    Thumb: normal    Fingers: normal  Range of Motion:    Full (active and passive) flexion, extension, pronation/supination, and ulnar/radial deviation.  Strength:    No deficits in flexion, extension, ulnar/radial deviation, or  strength.  Special Tests:    Positive: Phalen's    Negative: Tinel's (carpal tunnel), Finkelstein's, thenar eminence wasting, hypothenar eminence wasting      Independent visualization of the below image:  Recent Results (from the past 24 hour(s))   XR Shoulder Right G/E 3 Views    Narrative    SHOULDER RIGHT THREE OR MORE VIEWS  11/27/2019 3:34 PM     HISTORY: Pain in joint, shoulder region.      Impression    IMPRESSION: There is a small focus of calcification adjacent to the  lesser tuberosity. This may well represent a subscapularis region  focus of calcific tendinitis/bursitis. Otherwise unremarkable.    ALAN LAORR, MD Albert Yeo MD [facility]        Again, thank you for allowing me to participate in the care of your patient.        Sincerely,        Albert Yeo, MD

## 2019-11-27 NOTE — PATIENT INSTRUCTIONS
1. Pain in joint of right shoulder    2. Rotator cuff syndrome of right shoulder    3. Biceps tendinitis of right upper extremity    4. Carpal tunnel syndrome of right wrist      -Patient has right shoulder pain due to rotator cuff and biceps tendinitis.  Patient also has numbness and tingling into her right hand due to carpal tunnel syndrome.  -Patient will start formal physical therapy and home exercise program.  -Patient will start nabumetone 750 mg twice a day as needed.  -Patient will start wearing a cock-up wrist splint at night to offload the wrist joint  -Patient will follow-up in 4 weeks for reevaluation and progression of activity.  -Call direct clinic number [[phone]] at any time with questions or concerns.    Albert Yeo MD [facility]  [facility]

## 2019-12-06 ENCOUNTER — THERAPY VISIT (OUTPATIENT)
Dept: PHYSICAL THERAPY | Facility: CLINIC | Age: 44
End: 2019-12-06
Attending: FAMILY MEDICINE
Payer: MEDICARE

## 2019-12-06 ENCOUNTER — TELEPHONE (OUTPATIENT)
Dept: ORTHOPEDICS | Facility: CLINIC | Age: 44
End: 2019-12-06

## 2019-12-06 DIAGNOSIS — G56.01 CARPAL TUNNEL SYNDROME OF RIGHT WRIST: ICD-10-CM

## 2019-12-06 DIAGNOSIS — M75.21 BICEPS TENDINITIS OF RIGHT UPPER EXTREMITY: ICD-10-CM

## 2019-12-06 DIAGNOSIS — M75.101 ROTATOR CUFF SYNDROME OF RIGHT SHOULDER: Primary | ICD-10-CM

## 2019-12-06 DIAGNOSIS — M75.101 ROTATOR CUFF SYNDROME OF RIGHT SHOULDER: ICD-10-CM

## 2019-12-06 DIAGNOSIS — M25.511 PAIN IN JOINT OF RIGHT SHOULDER: ICD-10-CM

## 2019-12-06 PROCEDURE — 97161 PT EVAL LOW COMPLEX 20 MIN: CPT | Mod: GP | Performed by: PHYSICAL THERAPIST

## 2019-12-06 PROCEDURE — 97112 NEUROMUSCULAR REEDUCATION: CPT | Mod: GP | Performed by: PHYSICAL THERAPIST

## 2019-12-06 PROCEDURE — 97110 THERAPEUTIC EXERCISES: CPT | Mod: GP | Performed by: PHYSICAL THERAPIST

## 2019-12-06 NOTE — LETTER
"DEPARTMENT OF HEALTH AND HUMAN SERVICES  CENTERS FOR MEDICARE & MEDICAID SERVICES    PLAN/UPDATED PLAN OF PROGRESS FOR OUTPATIENT REHABILITATION    PATIENTS NAME:  Prince Patel   : 1975  PROVIDER NUMBER:    [id number]  HICN: [id number]   PROVIDER NAME: [facility] [place] [facility]  MEDICAL RECORD NUMBER: [id number]   START OF CARE DATE:  19   TYPE:  PT  PRIMARY/TREATMENT DIAGNOSIS: Rotator cuff syndrome of right shoulder,   Biceps tendinitis of right upper extremity, Carpal tunnel syndrome of right wrist  Pain in joint of right shoulder  VISITS FROM START OF CARE:  Rxs Used: 1     [facility] Initial Evaluation  Subjective:    Prince Patel being seen for  R shoulder pain.   Problem began 2019. Where condition occurred: during recreation / sport.Problem occurred: while bowling  and reported as 5/10 on pain scale. General health as reported by patient is good. Pertinent medical history includes:  Migraines/headaches.  Medical allergies: other. Other medical allergies details: see EPIC.  Surgeries include:  Other. Other surgery history details: Partial hysterectomy.  Current medications:  Anti-inflammatory.   Primary job tasks include:  Prolonged standing, repetitive tasks and prolonged sitting.  Pain is described as aching, shooting and stabbing and is constant. Pain is the same all the time. Since onset symptoms are unchanged. Special tests:  X-ray.  Patient is . Restrictions include:  Working in normal job without restrictions. Barriers include:  None as reported by patient.  Red flags:  Pain at rest/night.  Type of problem:  Right shoulder  This is a new condition   Problem details: Pt c/o R shoulder pain since hearing a \"pop\" in shoulder while bowling 19.  R handed.   MD order 19.  Also c/o R>L wrist pain and was started on splint R hand with mod improvement noted..   Patient reports pain:  Anterior.  Associated " symptoms:  Numbness and tingling (related to CTS in R>L hand). Symptoms are exacerbated by using arm behind back, using arm at shoulder level, lifting, carrying, lying on extremity and using arm overhead and relieved by ice, NSAID's and heat (minimal relief).                Objective:  Shoulder Evaluation:  ROM:  AROM:  : pain all testintg.  Flexion:  Right:  150  Abduction:  Right:  120  External Rotation:  Right:  75  Extension/Internal Rotation:  Right:  L3    PROM:  : ERP all testing guarded.  Strength:    Flexion: Left:/5 strong/pain free   Pain:    Right: 4+/5      Pain:  +  Extension:  Left:/5 Strong/pain free    Pain:    Right: /5  Strong/pain free  Pain:  Abduction:  Left:/5 Strong/pain free  Pain:    Right: 4+/5      Pain:+  Adduction:  Left:/5  Strong/pain free   Pain:    Right:/5  Strong/pain free    Pain:  Internal Rotation:  Left:/5  Strong/pain free    Pain:    Right: 5-/5      Pain:+  External Rotation:   Left:/5  Strong/pain free    Pain:   Right:4/5      Pain:+    Elbow Flexion:  Left:/5  Strong/pain free    Pain:    Right:5-/5      Pain:+  Elbow Extension:  Left:/5  Strong/pain free    Pain:    Right:/5  Strong/pain free    Pain:  PATIENTS NAME:  Prince Patel   : 1975            Special Tests:    Right shoulder positive for the following special tests:Impingement  Palpation:    Right shoulder tenderness present at: Biceps  Mobility Tests:    Glenohumeral posterior right:  Hypomobile  Scapulohumeral rhythm right:  Hypermobile       Assessment/Plan:    Patient is a 44 year old female with right side shoulder complaints.    Patient has the following significant findings with corresponding treatment plan.                Diagnosis 1:  R shoulder pain  Pain -  hot/cold therapy, US, manual therapy, directional preference exercise and home program  Decreased ROM/flexibility - manual therapy and therapeutic exercise  Decreased strength - therapeutic exercise and therapeutic activities  Impaired  "muscle performance - neuro re-education  Decreased function - therapeutic activities  Impaired posture - neuro re-education    Therapy Evaluation Codes:   Cumulative Therapy Evaluation is: Low complexity.    Previous and current functional limitations:  (See Goal Flow Sheet for this information)    Short term and Long term goals: (See Goal Flow Sheet for this information)     Communication ability:  Patient appears to be able to clearly communicate and understand verbal and written communication and follow directions correctly.  Treatment Explanation - The following has been discussed with the patient:   RX ordered/plan of care  Anticipated outcomes  Possible risks and side effects  This patient would benefit from PT intervention to resume normal activities.   Rehab potential is good.    Frequency:  1 X week, once daily  Duration:  for 8 weeks  Discharge Plan:  Achieve all LTG.  Independent in home treatment program.  Reach maximal therapeutic benefit.    Caregiver Signature/Credentials _____________________________ Date ________       Treating Provider:  HUMAIRA Guido                      PATIENTS NAME:  Prince Patel   : 1975             I have reviewed and certified the need for these services and plan of treatment while under my care.        PHYSICIAN'S SIGNATURE:   _________________________________________  Date___________   Albert Yeo, MD    Certification period:  Beginning of Cert date period: 19 to  End of Cert period date: 20     Functional Level Progress Report: Please see attached \"Goal Flow sheet for Functional level.\"    ____X____ Continue Services or       ________ DC Services                Service dates: From  SOC Date: 19 date to present                         "

## 2019-12-06 NOTE — PROGRESS NOTES
"[facility] Initial Evaluation  Subjective:    Prince Patel being seen for  R shoulder pain.   Problem began 11/7/2019. Where condition occurred: during recreation / sport.Problem occurred: while bowling  and reported as 5/10 on pain scale. General health as reported by patient is good. Pertinent medical history includes:  Migraines/headaches.  Medical allergies: other. Other medical allergies details: see EPIC.  Surgeries include:  Other. Other surgery history details: Partial hysterectomy.  Current medications:  Anti-inflammatory.   Primary job tasks include:  Prolonged standing, repetitive tasks and prolonged sitting.  Pain is described as aching, shooting and stabbing and is constant. Pain is the same all the time. Since onset symptoms are unchanged. Special tests:  X-ray.     Patient is . Restrictions include:  Working in normal job without restrictions.    Barriers include:  None as reported by patient.  Red flags:  Pain at rest/night.  Type of problem:  Right shoulder    This is a new condition   Problem details: Pt c/o R shoulder pain since hearing a \"pop\" in shoulder while bowling 11/7/19.  R handed.   MD order 11/27/19.  Also c/o R>L wrist pain and was started on splint R hand with mod improvement noted..   Patient reports pain:  Anterior.  Associated symptoms:  Numbness and tingling (related to CTS in R>L hand). Symptoms are exacerbated by using arm behind back, using arm at shoulder level, lifting, carrying, lying on extremity and using arm overhead and relieved by ice, NSAID's and heat (minimal relief).                      Objective:  System                   Shoulder Evaluation:  ROM:  AROM:  : pain all testintg.  Flexion:  Right:  150    Abduction:  Right:  120      External Rotation:  Right:  75            Extension/Internal Rotation:  Right:  L3    PROM:  : ERP all testing guarded.                                Strength:    Flexion: Left:/5 strong/pain free   " Pain:    Right: 4+/5      Pain:  +  Extension:  Left:/5 Strong/pain free    Pain:    Right: /5  Strong/pain free  Pain:  Abduction:  Left:/5 Strong/pain free  Pain:    Right: 4+/5      Pain:+  Adduction:  Left:/5  Strong/pain free   Pain:    Right:/5  Strong/pain free    Pain:  Internal Rotation:  Left:/5  Strong/pain free    Pain:    Right: 5-/5      Pain:+  External Rotation:   Left:/5  Strong/pain free    Pain:   Right:4/5      Pain:+        Elbow Flexion:  Left:/5  Strong/pain free    Pain:    Right:5-/5      Pain:+  Elbow Extension:  Left:/5  Strong/pain free    Pain:    Right:/5  Strong/pain free    Pain:    Special Tests:      Right shoulder positive for the following special tests:Impingement  Palpation:      Right shoulder tenderness present at: Biceps  Mobility Tests:      Glenohumeral posterior right:  Hypomobile          Scapulohumeral rhythm right:  Hypermobile                                   General     ROS    Assessment/Plan:    Patient is a 44 year old female with right side shoulder complaints.    Patient has the following significant findings with corresponding treatment plan.                Diagnosis 1:  R shoulder pain  Pain -  hot/cold therapy, US, manual therapy, directional preference exercise and home program  Decreased ROM/flexibility - manual therapy and therapeutic exercise  Decreased strength - therapeutic exercise and therapeutic activities  Impaired muscle performance - neuro re-education  Decreased function - therapeutic activities  Impaired posture - neuro re-education    Therapy Evaluation Codes:   Cumulative Therapy Evaluation is: Low complexity.    Previous and current functional limitations:  (See Goal Flow Sheet for this information)    Short term and Long term goals: (See Goal Flow Sheet for this information)     Communication ability:  Patient appears to be able to clearly communicate and understand verbal and written communication and follow directions correctly.  Treatment  Explanation - The following has been discussed with the patient:   RX ordered/plan of care  Anticipated outcomes  Possible risks and side effects  This patient would benefit from PT intervention to resume normal activities.   Rehab potential is good.    Frequency:  1 X week, once daily  Duration:  for 8 weeks  Discharge Plan:  Achieve all LTG.  Independent in home treatment program.  Reach maximal therapeutic benefit.    Please refer to the daily flowsheet for treatment today, total treatment time and time spent performing 1:1 timed codes.

## 2019-12-06 NOTE — TELEPHONE ENCOUNTER
Prince Patel is a 44 year old female who left voicemail stating she saw Dr. Yeo last week and he prescribed Nabumetone 750mg twice a day. It is upsetting her stomach and she is asking for an alternative.     She can be reached at: [phone]  Ok to leave message: No consent on file.     Had to leave voicemail for patient. Asked that she call back on Monday to discuss further. Informed to stop current medication.     AMADEO Isidro RN

## 2019-12-09 NOTE — TELEPHONE ENCOUNTER
Phone call to patient . She state she took the Nabumetone twice daily for 4 days with food. She had nausea and vomited. She has not had this issue with any medication before. She had tried Tylenol , ibuprofen and Aleve in the past with Aleve being most helpful. Will discuss with Dr. Yeo of an alternative and get back with her.     Please advise.     AMADEO Isidro RN

## 2019-12-11 ENCOUNTER — THERAPY VISIT (OUTPATIENT)
Dept: PHYSICAL THERAPY | Facility: CLINIC | Age: 44
End: 2019-12-11
Payer: MEDICARE

## 2019-12-11 DIAGNOSIS — M25.511 PAIN IN JOINT OF RIGHT SHOULDER: ICD-10-CM

## 2019-12-11 PROCEDURE — 97112 NEUROMUSCULAR REEDUCATION: CPT | Mod: GP | Performed by: PHYSICAL THERAPIST

## 2019-12-11 PROCEDURE — 97110 THERAPEUTIC EXERCISES: CPT | Mod: GP | Performed by: PHYSICAL THERAPIST

## 2019-12-11 RX ORDER — DICLOFENAC SODIUM 75 MG/1
75 TABLET, DELAYED RELEASE ORAL 2 TIMES DAILY PRN
Qty: 60 TABLET | Refills: 1 | Status: SHIPPED | OUTPATIENT
Start: 2019-12-11 | End: 2020-03-05

## 2019-12-12 NOTE — TELEPHONE ENCOUNTER
Char called and left a message on my behalf that a new anti-inflammatory, diclofenac 75 mg twice a day was sent to her pharmacy for her to try.  Patient may come back with any further questions or concerns.

## 2019-12-18 ENCOUNTER — THERAPY VISIT (OUTPATIENT)
Dept: PHYSICAL THERAPY | Facility: CLINIC | Age: 44
End: 2019-12-18
Attending: FAMILY MEDICINE
Payer: MEDICARE

## 2019-12-18 DIAGNOSIS — M25.511 PAIN IN JOINT OF RIGHT SHOULDER: ICD-10-CM

## 2019-12-18 PROCEDURE — 97110 THERAPEUTIC EXERCISES: CPT | Mod: GP | Performed by: PHYSICAL THERAPIST

## 2019-12-18 PROCEDURE — 97112 NEUROMUSCULAR REEDUCATION: CPT | Mod: GP | Performed by: PHYSICAL THERAPIST

## 2019-12-27 ENCOUNTER — THERAPY VISIT (OUTPATIENT)
Dept: PHYSICAL THERAPY | Facility: CLINIC | Age: 44
End: 2019-12-27
Payer: MEDICARE

## 2019-12-27 DIAGNOSIS — M25.511 PAIN IN JOINT OF RIGHT SHOULDER: ICD-10-CM

## 2019-12-27 PROCEDURE — 97110 THERAPEUTIC EXERCISES: CPT | Mod: GP | Performed by: PHYSICAL THERAPIST

## 2019-12-27 PROCEDURE — 97112 NEUROMUSCULAR REEDUCATION: CPT | Mod: GP | Performed by: PHYSICAL THERAPIST

## 2020-01-08 ENCOUNTER — THERAPY VISIT (OUTPATIENT)
Dept: PHYSICAL THERAPY | Facility: CLINIC | Age: 45
End: 2020-01-08
Payer: MEDICARE

## 2020-01-08 DIAGNOSIS — M25.511 PAIN IN JOINT OF RIGHT SHOULDER: ICD-10-CM

## 2020-01-08 PROCEDURE — 97110 THERAPEUTIC EXERCISES: CPT | Mod: GP | Performed by: PHYSICAL THERAPIST

## 2020-01-08 PROCEDURE — 97112 NEUROMUSCULAR REEDUCATION: CPT | Mod: GP | Performed by: PHYSICAL THERAPIST

## 2020-01-13 ENCOUNTER — E-VISIT (OUTPATIENT)
Dept: FAMILY MEDICINE | Facility: CLINIC | Age: 45
End: 2020-01-13
Payer: MEDICARE

## 2020-01-13 DIAGNOSIS — M25.511 PAIN IN JOINT OF RIGHT SHOULDER: Primary | ICD-10-CM

## 2020-01-13 PROCEDURE — 99421 OL DIG E/M SVC 5-10 MIN: CPT | Performed by: NURSE PRACTITIONER

## 2020-01-13 NOTE — TELEPHONE ENCOUNTER
Patient requests copy of bill for this visit    Provider E-Visit time total (minutes): 6  Marc Gómez MD

## 2020-01-20 ENCOUNTER — ANCILLARY PROCEDURE (OUTPATIENT)
Dept: GENERAL RADIOLOGY | Facility: CLINIC | Age: 45
End: 2020-01-20
Attending: FAMILY MEDICINE
Payer: MEDICARE

## 2020-01-20 ENCOUNTER — OFFICE VISIT (OUTPATIENT)
Dept: ORTHOPEDICS | Facility: CLINIC | Age: 45
End: 2020-01-20
Payer: MEDICARE

## 2020-01-20 VITALS
HEIGHT: 64 IN | WEIGHT: 217 LBS | BODY MASS INDEX: 37.05 KG/M2 | DIASTOLIC BLOOD PRESSURE: 80 MMHG | SYSTOLIC BLOOD PRESSURE: 128 MMHG

## 2020-01-20 DIAGNOSIS — M75.101 ROTATOR CUFF SYNDROME OF RIGHT SHOULDER: ICD-10-CM

## 2020-01-20 DIAGNOSIS — M25.511 PAIN IN JOINT OF RIGHT SHOULDER: Primary | ICD-10-CM

## 2020-01-20 DIAGNOSIS — M75.21 BICEPS TENDINITIS OF RIGHT UPPER EXTREMITY: ICD-10-CM

## 2020-01-20 DIAGNOSIS — M25.511 PAIN IN JOINT OF RIGHT SHOULDER: ICD-10-CM

## 2020-01-20 PROCEDURE — 73030 X-RAY EXAM OF SHOULDER: CPT | Mod: RT

## 2020-01-20 PROCEDURE — 99213 OFFICE O/P EST LOW 20 MIN: CPT | Performed by: FAMILY MEDICINE

## 2020-01-20 ASSESSMENT — MIFFLIN-ST. JEOR: SCORE: 1625.18

## 2020-01-20 NOTE — LETTER
1/20/2020         RE: Prince Patel  [address]  [address]  [facility] [address]        Dear Colleague,    Thank you for referring your patient, Prince Patel, to the [facility]. Please see a copy of my visit note below.    ASSESSMENT & PLAN  Patient Instructions     1. Pain in joint of right shoulder    2. Rotator cuff syndrome of right shoulder    3. Biceps tendinitis of right upper extremity      -Patient is here for worsening right shoulder pain after slipping on ice and landing on her right shoulder causing a strain of her rotator cuff muscles in the right shoulder and aggravation of her biceps tendinitis  -Patient will restart formal physical therapy and home exercise program.  -Patient will continue with diclofenac 75 mg twice a day as needed for pain.  -Patient possibly partially tore her rotator cuff muscles and or ligaments in the right shoulder after falling on ice.  If patient does not improve, she will need an MRI of the right shoulder for further evaluation and treatment.  -Call direct clinic number [[phone]] at any time with questions or concerns.    Albert Yeo MD [facility] Orthopedics and Sports Medicine  [facility]          -----    SUBJECTIVE:  Prince Patel is a 44 year old female who is seen in follow-up for right shoulder pain due to rotator cuff and biceps tendinitis.  Patient also has numbness and tingling into her right hand due to carpal tunnel syndrome.They were last seen 12/6/2019. Feel on the Right shoulder on ice at her apartment, on 12/29/19    Since their last visit reports 50% improvement until she fell on it, now feels like it's the same pain as when she intially came in. They indicate that their current pain level is 5/10. They have tried other medications: Diclofenac, home exercises and physical therapy (4 visits).      The patient is seen by themselves.    Patient's past medical, surgical, social, and family  "histories were reviewed today and no changes are noted.    REVIEW OF SYSTEMS:  Constitutional: NEGATIVE for fever, chills, change in weight  Skin: NEGATIVE for worrisome rashes, moles or lesions  GI/: NEGATIVE for bowel or bladder changes  Neuro: NEGATIVE for weakness, dizziness or paresthesias    OBJECTIVE:  /80   Ht 1.619 m (5' 3.74\")   Wt 98.4 kg (217 lb)   LMP 10/31/2011   BMI 37.55 kg/m      General: healthy, alert and in no distress  HEENT: no scleral icterus or conjunctival erythema  Skin: no suspicious lesions or rash. No jaundice.  CV: regular rhythm by palpation, no pedal edema  Resp: normal respiratory effort without conversational dyspnea   Psych: normal mood and affect  Gait: normal steady gait with appropriate coordination and balance  Neuro: normal light touch sensory exam of the extremities.    MSK:  RIGHT SHOULDER  Inspection:    no swelling, bruising, discoloration, or obvious deformity or asymmetry  Palpation:    Tender about the anterior capsule and greater tuberosity. Remainder of bony and tendinous landmarks are nontender.  Active Range of Motion:     Abduction 1350, FF 1650, , IR L4.      Scapular dyskinesis absent  Strength:    Scapular plane abduction 5-/5,  ER 5-/5, IR 5/5, biceps 5/5, triceps 5/5  Special Tests:    Positive: Neer's, Ferrera', supraspinatus (empty can), crossed arm adduction and Cecil's    Negative: drop arm/painful arc, crossed arm adduction, Speed's and Yergason's    Independent visualization of the below image:    X-rays taken office today of the right shoulder show no acute fractures or dislocations.  There is a small calcification at the insertion of the supraspinatus tendon.        Albert Yeo MD, [facility] Sports and Orthopedic Care          Again, thank you for allowing me to participate in the care of your patient.        Sincerely,        Albert Yeo, MD    "

## 2020-01-20 NOTE — PROGRESS NOTES
ASSESSMENT & PLAN  Patient Instructions     1. Pain in joint of right shoulder    2. Rotator cuff syndrome of right shoulder    3. Biceps tendinitis of right upper extremity      -Patient is here for worsening right shoulder pain after slipping on ice and landing on her right shoulder causing a strain of her rotator cuff muscles in the right shoulder and aggravation of her biceps tendinitis  -Patient will restart formal physical therapy and home exercise program.  -Patient will continue with diclofenac 75 mg twice a day as needed for pain.  -Patient possibly partially tore her rotator cuff muscles and or ligaments in the right shoulder after falling on ice.  If patient does not improve, she will need an MRI of the right shoulder for further evaluation and treatment.  -Call direct clinic number [[phone]] at any time with questions or concerns.    Albert Yeo MD [facility] Orthopedics and Sports Medicine  [facility]          -----    SUBJECTIVE:  Prince Patel is a 44 year old female who is seen in follow-up for right shoulder pain due to rotator cuff and biceps tendinitis.  Patient also has numbness and tingling into her right hand due to carpal tunnel syndrome.They were last seen 12/6/2019. Feel on the Right shoulder on ice at her apartment, on 12/29/19    Since their last visit reports 50% improvement until she fell on it, now feels like it's the same pain as when she intially came in. They indicate that their current pain level is 5/10. They have tried other medications: Diclofenac, home exercises and physical therapy (4 visits).      The patient is seen by themselves.    Patient's past medical, surgical, social, and family histories were reviewed today and no changes are noted.    REVIEW OF SYSTEMS:  Constitutional: NEGATIVE for fever, chills, change in weight  Skin: NEGATIVE for worrisome rashes, moles or lesions  GI/: NEGATIVE for bowel or bladder changes  Neuro: NEGATIVE for  "weakness, dizziness or paresthesias    OBJECTIVE:  /80   Ht 1.619 m (5' 3.74\")   Wt 98.4 kg (217 lb)   LMP 10/31/2011   BMI 37.55 kg/m     General: healthy, alert and in no distress  HEENT: no scleral icterus or conjunctival erythema  Skin: no suspicious lesions or rash. No jaundice.  CV: regular rhythm by palpation, no pedal edema  Resp: normal respiratory effort without conversational dyspnea   Psych: normal mood and affect  Gait: normal steady gait with appropriate coordination and balance  Neuro: normal light touch sensory exam of the extremities.    MSK:  RIGHT SHOULDER  Inspection:    no swelling, bruising, discoloration, or obvious deformity or asymmetry  Palpation:    Tender about the anterior capsule and greater tuberosity. Remainder of bony and tendinous landmarks are nontender.  Active Range of Motion:     Abduction 1350, FF 1650, , IR L4.      Scapular dyskinesis absent  Strength:    Scapular plane abduction 5-/5,  ER 5-/5, IR 5/5, biceps 5/5, triceps 5/5  Special Tests:    Positive: Neer's, Ferrera', supraspinatus (empty can), crossed arm adduction and [place]'s    Negative: drop arm/painful arc, crossed arm adduction, Speed's and Yergason's    Independent visualization of the below image:    X-rays taken office today of the right shoulder show no acute fractures or dislocations.  There is a small calcification at the insertion of the supraspinatus tendon.        Albert Yeo MD, [facility] Sports and Orthopedic Care        "

## 2020-01-20 NOTE — PATIENT INSTRUCTIONS
1. Pain in joint of right shoulder    2. Rotator cuff syndrome of right shoulder    3. Biceps tendinitis of right upper extremity      -Patient is here for worsening right shoulder pain after slipping on ice and landing on her right shoulder causing a strain of her rotator cuff muscles in the right shoulder and aggravation of her biceps tendinitis  -Patient will restart formal physical therapy and home exercise program.  -Patient will continue with diclofenac 75 mg twice a day as needed for pain.  -Patient possibly partially tore her rotator cuff muscles and or ligaments in the right shoulder after falling on ice.  If patient does not improve, she will need an MRI of the right shoulder for further evaluation and treatment.  -Call direct clinic number [[phone]] at any time with questions or concerns.    Albert Yeo MD [facility] Orthopedics and Sports Medicine  [facility] [place]

## 2020-01-22 ENCOUNTER — THERAPY VISIT (OUTPATIENT)
Dept: PHYSICAL THERAPY | Facility: CLINIC | Age: 45
End: 2020-01-22
Payer: MEDICARE

## 2020-01-22 DIAGNOSIS — M25.511 PAIN IN JOINT OF RIGHT SHOULDER: ICD-10-CM

## 2020-01-22 PROCEDURE — 97110 THERAPEUTIC EXERCISES: CPT | Mod: GP | Performed by: PHYSICAL THERAPIST

## 2020-01-22 PROCEDURE — 97112 NEUROMUSCULAR REEDUCATION: CPT | Mod: GP | Performed by: PHYSICAL THERAPIST

## 2020-01-27 ENCOUNTER — TELEPHONE (OUTPATIENT)
Dept: FAMILY MEDICINE | Facility: CLINIC | Age: 45
End: 2020-01-27

## 2020-01-27 NOTE — TELEPHONE ENCOUNTER
Pt calls, fell last night at [employer] party, tripped over bean bad toss game,, bruised both shins, also injured thumb, bruising noted, already taking naproxen twice daily and diclofenac, wonders that else to do, discussed RICE, may add tylenol, discussed urgent care if concerned about thumb injury, pt agrees, will f/u prn  Dayana Sawant RN, BSN  Message handled by Nurse Triage.

## 2020-02-05 ENCOUNTER — THERAPY VISIT (OUTPATIENT)
Dept: PHYSICAL THERAPY | Facility: CLINIC | Age: 45
End: 2020-02-05
Payer: MEDICARE

## 2020-02-05 DIAGNOSIS — M25.511 PAIN IN JOINT OF RIGHT SHOULDER: ICD-10-CM

## 2020-02-05 PROCEDURE — 97112 NEUROMUSCULAR REEDUCATION: CPT | Mod: GP | Performed by: PHYSICAL THERAPIST

## 2020-02-05 PROCEDURE — 97110 THERAPEUTIC EXERCISES: CPT | Mod: GP | Performed by: PHYSICAL THERAPIST

## 2020-02-08 ENCOUNTER — MYC MEDICAL ADVICE (OUTPATIENT)
Dept: ORTHOPEDICS | Facility: CLINIC | Age: 45
End: 2020-02-08

## 2020-02-08 DIAGNOSIS — M75.101 ROTATOR CUFF SYNDROME OF RIGHT SHOULDER: ICD-10-CM

## 2020-02-08 NOTE — TELEPHONE ENCOUNTER
Sent patient a [email or web address] message asking if she needed a medication refill.     Will wait for a response.     Char Mullen MS, ATC

## 2020-02-10 RX ORDER — DICLOFENAC SODIUM 75 MG/1
75 TABLET, DELAYED RELEASE ORAL 2 TIMES DAILY PRN
Qty: 60 TABLET | Refills: 1 | OUTPATIENT
Start: 2020-02-10

## 2020-02-10 NOTE — TELEPHONE ENCOUNTER
Please deny. Patient responded to my chart message stating she did not request the refill.    Char Mullen MS, ATC

## 2020-02-17 ENCOUNTER — OFFICE VISIT (OUTPATIENT)
Dept: ORTHOPEDICS | Facility: CLINIC | Age: 45
End: 2020-02-17
Payer: MEDICARE

## 2020-02-17 VITALS
SYSTOLIC BLOOD PRESSURE: 120 MMHG | WEIGHT: 217 LBS | DIASTOLIC BLOOD PRESSURE: 88 MMHG | HEIGHT: 64 IN | BODY MASS INDEX: 37.05 KG/M2

## 2020-02-17 DIAGNOSIS — M75.41 IMPINGEMENT SYNDROME OF RIGHT SHOULDER: ICD-10-CM

## 2020-02-17 DIAGNOSIS — M75.101 ROTATOR CUFF SYNDROME OF RIGHT SHOULDER: Primary | ICD-10-CM

## 2020-02-17 PROCEDURE — 99214 OFFICE O/P EST MOD 30 MIN: CPT | Performed by: FAMILY MEDICINE

## 2020-02-17 ASSESSMENT — MIFFLIN-ST. JEOR: SCORE: 1625.18

## 2020-02-17 NOTE — PATIENT INSTRUCTIONS
1. Rotator cuff syndrome of right shoulder    2. Impingement syndrome of right shoulder      -Patient is here for follow-up of right shoulder pain due to inflammation of the rotator cuff muscles and bursitis  -Patient has had no improvement in pain with physical therapy and is concerned with possible tearing after fall  -Patient will get an MRI of the right shoulder for further evaluation.  Your MRI has been ordered. Will check for same day otherwise, schedule with [place] ([phone]).  I will send a message over [employer] with her results and next step treatment options  -Call direct clinic number [[phone]] at any time with questions or concerns.    [facility]o MD [facility]  [facility] [place]

## 2020-02-17 NOTE — PROGRESS NOTES
"ASSESSMENT & PLAN  Patient Instructions     1. Rotator cuff syndrome of right shoulder    2. Impingement syndrome of right shoulder      -Patient is here for follow-up of right shoulder pain due to inflammation of the rotator cuff muscles and bursitis  -Patient has had no improvement in pain with physical therapy and is concerned with possible tearing after fall  -Patient will get an MRI of the right shoulder for further evaluation.  Your MRI has been ordered. Will check for same day otherwise, schedule with [place] ([phone]).  I will send a message over [employer] with her results and next step treatment options  -Call direct clinic number [[phone]] at any time with questions or concerns.    Albert Yeo MD [facility] Orthopedics and Sports Medicine  [facility] [place]          -----    SUBJECTIVE:  Prince Patel is a 44 year old female who is seen in follow-up for right shoulder pain after slipping on ice and landing on her right shoulder causing a strain of her rotator cuff muscles in the right shoulder and aggravation of her biceps tendinitis.They were last seen 1/20/2020.     Since their last visit reports 0% - (About the same as last time).  still hurts at night when she tries to lay on that right side. They indicate that their current pain level is 5/10. They have tried other medications: Diclofenac 75mg, and tylenol home exercises and physical therapy (2 visits).   is able to reach higher above her head now due to PT    The patient is seen by themselves.    Patient's past medical, surgical, social, and family histories were reviewed today and no changes are noted.    REVIEW OF SYSTEMS:  Constitutional: NEGATIVE for fever, chills, change in weight  Skin: NEGATIVE for worrisome rashes, moles or lesions  GI/: NEGATIVE for bowel or bladder changes  Neuro: NEGATIVE for weakness, dizziness or paresthesias    OBJECTIVE:  /88   Ht 1.619 m (5' 3.74\")   Wt 98.4 kg (217 lb)  "  LMP 10/31/2011   BMI 37.55 kg/m     General: healthy, alert and in no distress  HEENT: no scleral icterus or conjunctival erythema  Skin: no suspicious lesions or rash. No jaundice.  CV: regular rhythm by palpation, no pedal edema  Resp: normal respiratory effort without conversational dyspnea   Psych: normal mood and affect  Gait: normal steady gait with appropriate coordination and balance  Neuro: normal light touch sensory exam of the extremities.    MSK:  RIGHT SHOULDER  Inspection:    no swelling, bruising, discoloration, or obvious deformity or asymmetry  Palpation:    Tender about the anterior capsule and greater tuberosity. Remainder of bony and tendinous landmarks are nontender.  Active Range of Motion:     Abduction 1350, FF 1650, , IR L4.      Scapular dyskinesis absent  Strength:    Scapular plane abduction 5-/5,  ER 5-/5, IR 5/5, biceps 5/5, triceps 5/5  Special Tests:    Positive: Neer's, Ferrera', supraspinatus (empty can), crossed arm adduction and Blair's    Negative: drop arm/painful arc, crossed arm adduction, Speed's and Yeairam's        Albert Yeo MD, [facility] Sports and Orthopedic Care

## 2020-02-17 NOTE — LETTER
2/17/2020         RE: Prince Patel  [address]  [address]  [place] [address]        Dear Colleague,    Thank you for referring your patient, Prince Patel, to the [facility] MEDIC[facility]. Please see a copy of my visit note below.    ASSESSMENT & PLAN  Patient Instructions     1. Rotator cuff syndrome of right shoulder    2. Impingement syndrome of right shoulder      -Patient is here for follow-up of right shoulder pain due to inflammation of the rotator cuff muscles and bursitis  -Patient has had no improvement in pain with physical therapy and is concerned with possible tearing after fall  -Patient will get an MRI of the right shoulder for further evaluation.  Your MRI has been ordered. Will check for same day otherwise, schedule with [place] ([phone]).  I will send a message over [employer] with her results and next step treatment options  -Call direct clinic number [[phone]] at any time with questions or concerns.    Albert Yeo MD [facility] Orthopedics and Sports Medicine  [facility] [place]          -----    SUBJECTIVE:  Prince Patel is a 44 year old female who is seen in follow-up for right shoulder pain after slipping on ice and landing on her right shoulder causing a strain of her rotator cuff muscles in the right shoulder and aggravation of her biceps tendinitis.They were last seen 1/20/2020.     Since their last visit reports 0% - (About the same as last time).  still hurts at night when she tries to lay on that right side. They indicate that their current pain level is 5/10. They have tried other medications: Diclofenac 75mg, and tylenol home exercises and physical therapy (2 visits).   is able to reach higher above her head now due to PT    The patient is seen by themselves.    Patient's past medical, surgical, social, and family histories were reviewed today and no changes are noted.    REVIEW OF SYSTEMS:  Constitutional: NEGATIVE for  "fever, chills, change in weight  Skin: NEGATIVE for worrisome rashes, moles or lesions  GI/: NEGATIVE for bowel or bladder changes  Neuro: NEGATIVE for weakness, dizziness or paresthesias    OBJECTIVE:  /88   Ht 1.619 m (5' 3.74\")   Wt 98.4 kg (217 lb)   LMP 10/31/2011   BMI 37.55 kg/m      General: healthy, alert and in no distress  HEENT: no scleral icterus or conjunctival erythema  Skin: no suspicious lesions or rash. No jaundice.  CV: regular rhythm by palpation, no pedal edema  Resp: normal respiratory effort without conversational dyspnea   Psych: normal mood and affect  Gait: normal steady gait with appropriate coordination and balance  Neuro: normal light touch sensory exam of the extremities.    MSK:  RIGHT SHOULDER  Inspection:    no swelling, bruising, discoloration, or obvious deformity or asymmetry  Palpation:    Tender about the anterior capsule and greater tuberosity. Remainder of bony and tendinous landmarks are nontender.  Active Range of Motion:     Abduction 1350, FF 1650, , IR L4.      Scapular dyskinesis absent  Strength:    Scapular plane abduction 5-/5,  ER 5-/5, IR 5/5, biceps 5/5, triceps 5/5  Special Tests:    Positive: Neer's, Ferrera', supraspinatus (empty can), crossed arm adduction and [place]'s    Negative: drop arm/painful arc, crossed arm adduction, Speed's and Yergason's        Albert Yeo MD, [facility] Sports and Orthopedic Care          Again, thank you for allowing me to participate in the care of your patient.        Sincerely,        Albert Yeo, MD    "

## 2020-02-19 ENCOUNTER — THERAPY VISIT (OUTPATIENT)
Dept: PHYSICAL THERAPY | Facility: CLINIC | Age: 45
End: 2020-02-19
Payer: MEDICARE

## 2020-02-19 DIAGNOSIS — M25.511 PAIN IN JOINT OF RIGHT SHOULDER: ICD-10-CM

## 2020-02-19 PROCEDURE — 97112 NEUROMUSCULAR REEDUCATION: CPT | Mod: GP | Performed by: PHYSICAL THERAPIST

## 2020-02-19 PROCEDURE — 97110 THERAPEUTIC EXERCISES: CPT | Mod: GP | Performed by: PHYSICAL THERAPIST

## 2020-02-24 ENCOUNTER — HOSPITAL ENCOUNTER (OUTPATIENT)
Dept: MRI IMAGING | Facility: CLINIC | Age: 45
Discharge: HOME | End: 2020-02-24
Attending: FAMILY MEDICINE | Admitting: FAMILY MEDICINE
Payer: MEDICARE

## 2020-02-24 DIAGNOSIS — M75.101 ROTATOR CUFF SYNDROME OF RIGHT SHOULDER: ICD-10-CM

## 2020-02-24 DIAGNOSIS — M75.41 IMPINGEMENT SYNDROME OF RIGHT SHOULDER: ICD-10-CM

## 2020-02-24 PROCEDURE — 73221 MRI JOINT UPR EXTREM W/O DYE: CPT | Mod: RT

## 2020-02-25 ENCOUNTER — TELEPHONE (OUTPATIENT)
Dept: ORTHOPEDICS | Facility: CLINIC | Age: 45
End: 2020-02-25

## 2020-02-25 NOTE — TELEPHONE ENCOUNTER
Covering for Dr. Yeo.    MRI reviewed.  Distal clavicle fracture, non-displaced, bursitis, no rotator cuff tearing, labral tearing.    Plan was to forward results over [employer] - results send with recommendations.    Celio Bran DO, [facility] Orthopedics [facility]

## 2020-02-26 ENCOUNTER — MYC MEDICAL ADVICE (OUTPATIENT)
Dept: ORTHOPEDICS | Facility: CLINIC | Age: 45
End: 2020-02-26

## 2020-02-26 NOTE — LETTER
February 28, 2020      Prince Patel  [address] [address] [place] [facility] [address]        To Whom It May Concern:    Prince Patel was seen in our clinic. She may return to work with the following: limited to light duty - no use of arms over shoulders on until 3/6/2020 .      Sincerely,        Albert Yeo, MD

## 2020-02-28 NOTE — TELEPHONE ENCOUNTER
Ok to write / issue letter as is.    Celio Bran DO, GURJITM  [facility]

## 2020-02-28 NOTE — TELEPHONE ENCOUNTER
Patient indicates that is it hard to reach over shoulder height for her job. Is requesting a work letter to limit over head reaching for things.     Letter attached, giving her work restrictions of no reaching over head until her appointment with Dr. Yeo on Friday, 3/6/2020.    Please advise.     Char Mullen MS, ATC

## 2020-02-28 NOTE — TELEPHONE ENCOUNTER
LVM with patient, told her to call us back or check her mychart messages.     Char Mullen MS, ATC

## 2020-03-03 NOTE — PROGRESS NOTES
Pre-Visit Planning     Future Appointments   Date Time Provider Department Center   3/5/2020  3:20 PM Haase, Susan Rachele, APRN CNP [facility]   3/6/2020  3:00 PM Yeo, Albert, MD [facility]     Arrival Time for this Appointment:  3:00 PM     Appointment Notes for this encounter:   [facility]    Questionnaires Reviewed/Assigned  Additional questionnaires assigned         HM due pended   Health Maintenance Due   Topic Date Due     ANNUAL REVIEW OF HM ORDERS  1975     HIV SCREENING  05/31/1992     PHQ-2  01/01/2020     MEDICARE ANNUAL WELLNESS VISIT  02/15/2020       Patient preferred phone number: [phone]    Unable to reach. Left voicemail. Advised patient to call clinic back at [phone] to reschedule if unable to attend visit     Vicky Acosta Registered Nurse   [facility]

## 2020-03-05 ENCOUNTER — OFFICE VISIT (OUTPATIENT)
Dept: FAMILY MEDICINE | Facility: CLINIC | Age: 45
End: 2020-03-05
Payer: MEDICARE

## 2020-03-05 VITALS
RESPIRATION RATE: 18 BRPM | TEMPERATURE: 98.4 F | DIASTOLIC BLOOD PRESSURE: 82 MMHG | SYSTOLIC BLOOD PRESSURE: 127 MMHG | WEIGHT: 226.2 LBS | BODY MASS INDEX: 38.62 KG/M2 | HEIGHT: 64 IN | OXYGEN SATURATION: 97 % | HEART RATE: 109 BPM

## 2020-03-05 DIAGNOSIS — J30.2 SEASONAL ALLERGIC RHINITIS, UNSPECIFIED TRIGGER: ICD-10-CM

## 2020-03-05 DIAGNOSIS — Z02.9 ADMINISTRATIVE ENCOUNTER: ICD-10-CM

## 2020-03-05 DIAGNOSIS — M75.101 ROTATOR CUFF SYNDROME OF RIGHT SHOULDER: ICD-10-CM

## 2020-03-05 DIAGNOSIS — B07.0 PLANTAR WARTS: Primary | ICD-10-CM

## 2020-03-05 PROCEDURE — 99213 OFFICE O/P EST LOW 20 MIN: CPT | Mod: 25 | Performed by: NURSE PRACTITIONER

## 2020-03-05 PROCEDURE — 17110 DESTRUCTION B9 LES UP TO 14: CPT | Performed by: NURSE PRACTITIONER

## 2020-03-05 RX ORDER — FLUTICASONE PROPIONATE 50 MCG
SPRAY, SUSPENSION (ML) NASAL
Qty: 16 ML | Refills: 1 | Status: SHIPPED | OUTPATIENT
Start: 2020-03-05 | End: 2020-03-30

## 2020-03-05 RX ORDER — FEXOFENADINE HCL 180 MG/1
180 TABLET ORAL DAILY
Qty: 31 TABLET | Refills: 1 | Status: SHIPPED | OUTPATIENT
Start: 2020-03-05 | End: 2020-03-30

## 2020-03-05 RX ORDER — DICLOFENAC SODIUM 75 MG/1
75 TABLET, DELAYED RELEASE ORAL 2 TIMES DAILY PRN
Qty: 60 TABLET | Refills: 1 | Status: SHIPPED | OUTPATIENT
Start: 2020-03-05 | End: 2020-08-05

## 2020-03-05 ASSESSMENT — MIFFLIN-ST. JEOR: SCORE: 1667.07

## 2020-03-05 NOTE — PROGRESS NOTES
Subjective     Prince Patel is a 44 year old female who presents to clinic today for the following health issues:    History of Present Illness        She eats 2-3 servings of fruits and vegetables daily.She consumes 1 sweetened beverage(s) daily.She exercises with enough effort to increase her heart rate 10 to 19 minutes per day.    She is taking medications regularly.     WART(S)  Onset: unsure    Description:   Location: right foot  Number of warts: a few  Painful: no     Accompanying Signs & Symptoms:  Signs of infection: no     History:   History of trauma: no   Prior warts: YES  Right foot, lateral asepct, 3   Therapies Tried and outcome: liquid nitrogen    Right shoulder pain;  Seeing orthopedics, is having increased pain, requesting a sling to wear until she is seen tomorrow.    Form:  Has a form for [employer] transit to be completed.        Patient Active Problem List   Diagnosis     Sprain and strain of unspecified site of knee and leg     Obesity     CARDIOVASCULAR SCREENING; LDL GOAL LESS THAN 160     S/P partial hysterectomy     Intertrigo     Migraine with aura and without status migrainosus, not intractable     Chronic non-seasonal allergic rhinitis, unspecified trigger     Pain in joint of right shoulder     Past Surgical History:   Procedure Laterality Date     APPENDECTOMY       CARDIAC SURGERY       COLONOSCOPY N/A 8/3/2018    Procedure: COLONOSCOPY;  colonoscopy;  Surgeon: Isaias Medina MD;  Location: [facility]     LAPAROSCOPIC HYSTERECTOMY SUPRACERVICAL  11/16/2011    Procedure:LAPAROSCOPIC HYSTERECTOMY SUPRACERVICAL; LAPAROSCOPIC HYSTERECTOMY SUPRACERVICAL ; Surgeon:MASOUD WHITE; Location:[facility]     SURGICAL HISTORY OF -       ingrown toenails removed     toenail removal         Social History     Tobacco Use     Smoking status: Never Smoker     Smokeless tobacco: Never Used   Substance Use Topics     Alcohol use: No     Family History   Problem Relation Age of Onset     Diabetes Maternal  "Aunt      KATINA. Father         MI, angioplasty, 50s     Diabetes Father         Diabetes     Hypertension Father      Neurologic Disorder Paternal Grandmother      Gastrointestinal Disease Paternal Grandmother         Gluten Intolerance      Diabetes Maternal Grandmother         Multiple Sclerosis         Current Outpatient Medications   Medication Sig Dispense Refill     diclofenac (VOLTAREN) 75 MG EC tablet Take 1 tablet (75 mg) by mouth 2 times daily as needed for moderate pain 60 tablet 1     fexofenadine (ALLEGRA) 180 MG tablet Take 1 tablet (180 mg) by mouth daily 31 tablet 1     fluticasone (FLONASE) 50 MCG/ACT nasal spray SPRAY 2 SPRAYS INTO BOTH NOSTRILS DAILY 16 mL 1     order for DME Equipment being ordered: arm sling 1 each 0     MULTIVITAMINS OR TABS 1 tab qd as directed  0     order for DME Right Wrist Quick Fit,  1 Device 0     SUMAtriptan (IMITREX) 50 MG tablet Take 1 tablet (50 mg) by mouth at onset of headache for migraine 18 tablet 2     BP Readings from Last 3 Encounters:   03/05/20 127/82   02/17/20 120/88   01/20/20 128/80    Wt Readings from Last 3 Encounters:   03/05/20 102.6 kg (226 lb 3.2 oz)   02/17/20 98.4 kg (217 lb)   01/20/20 98.4 kg (217 lb)                    Reviewed and updated as needed this visit by Provider         Review of Systems   ROS COMP: CONSTITUTIONAL: NEGATIVE for fever, chills, change in weight  INTEGUMENTARY/SKIN: see HPI  MUSCULOSKELETAL: see HPI  PSYCHIATRIC: NEGATIVE for changes in mood or affect      Objective    LMP 10/31/2011   Body mass index is 39.13 kg/m .   /82 (BP Location: Right arm, Patient Position: Chair, Cuff Size: Adult Large)   Pulse 109   Temp 98.4  F (36.9  C) (Oral)   Resp 18   Ht 1.619 m (5' 3.75\")   Wt 102.6 kg (226 lb 3.2 oz)   LMP 10/31/2011   SpO2 97%   BMI 39.13 kg/m    Physical Exam   GENERAL: healthy, alert and no distress  RESP: lungs clear to auscultation - no rales, rhonchi or wheezes  CV: regular rate and rhythm, " "normal S1 S2,   MS: right shoulder with limited ROM,holding near side of body.   SKIN: lateral aspect of forefoot with 3 flesh colored 1 mm warts  PSYCH: mentation appears normal, affect normal/bright            Assessment & Plan   Assessment  Prince was seen today for wart and forms.    Diagnoses and all orders for this visit:    Plantar warts  -     DESTRUCT BENIGN LESION, UP TO 14  Procedure explained to patient, verbal consent given, cleansed with alcohol, pared with #15 blade, liquid nitrogen applied x 3 pulses (5 sec each), bandaide placed.  Explained that may take more than 1 treatment, keep covered for next 24 hours, if wart remains return in 3-4 weeks for an additional treatment.    Rotator cuff syndrome of right shoulder:refill below, sling given,will follow up with ortho tomorrow.  -     diclofenac (VOLTAREN) 75 MG EC tablet; Take 1 tablet (75 mg) by mouth 2 times daily as needed for moderate pain    Seasonal allergic rhinitis, unspecified trigger  -     fexofenadine (ALLEGRA) 180 MG tablet; Take 1 tablet (180 mg) by mouth daily  -     fluticasone (FLONASE) 50 MCG/ACT nasal spray; SPRAY 2 SPRAYS INTO BOTH NOSTRILS DAILY    Administrative encounter:  [employer] form completed, asked for form to be copied and scanned into chart.     Other orders  -     REVIEW OF HEALTH MAINTENANCE PROTOCOL ORDERS  -     order for DME; Equipment being ordered: arm sling  BMI:   Estimated body mass index is 39.13 kg/m  as calculated from the following:    Height as of this encounter: 1.619 m (5' 3.75\").    Weight as of this encounter: 102.6 kg (226 lb 3.2 oz).     Follow up in 4 weeks for physical, sooner as needed.     Susan Haase, APRN [facility]        "

## 2020-03-06 ENCOUNTER — OFFICE VISIT (OUTPATIENT)
Dept: ORTHOPEDICS | Facility: CLINIC | Age: 45
End: 2020-03-06
Payer: MEDICARE

## 2020-03-06 VITALS
BODY MASS INDEX: 38.58 KG/M2 | HEIGHT: 64 IN | SYSTOLIC BLOOD PRESSURE: 120 MMHG | DIASTOLIC BLOOD PRESSURE: 88 MMHG | WEIGHT: 226 LBS

## 2020-03-06 DIAGNOSIS — S42.034D CLOSED NONDISPLACED FRACTURE OF ACROMIAL END OF RIGHT CLAVICLE WITH ROUTINE HEALING, SUBSEQUENT ENCOUNTER: ICD-10-CM

## 2020-03-06 DIAGNOSIS — S43.431D TEAR OF RIGHT GLENOID LABRUM, SUBSEQUENT ENCOUNTER: ICD-10-CM

## 2020-03-06 DIAGNOSIS — M75.81 TENDINITIS OF RIGHT ROTATOR CUFF: Primary | ICD-10-CM

## 2020-03-06 PROCEDURE — 99213 OFFICE O/P EST LOW 20 MIN: CPT | Performed by: FAMILY MEDICINE

## 2020-03-06 ASSESSMENT — MIFFLIN-ST. JEOR: SCORE: 1666.16

## 2020-03-06 NOTE — LETTER
March 6, 2020      Prince Patel  [address] [address] [address]        To Whom It May Concern:    Prince Patel was seen in our clinic. She may return to work with the following: no lifting over head until 4/6/2020.      Sincerely,        Albert Yeo, MD

## 2020-03-06 NOTE — LETTER
3/6/2020         RE: Prince Patel  [address]  [address]  [place] [address]        Dear Colleague,    Thank you for referring your patient, Prince Patel, to the [facility]. Please see a copy of my visit note below.    ASSESSMENT & PLAN  Patient Instructions     1. Tendinitis of right rotator cuff    2. Tear of right glenoid labrum, subsequent encounter    3. Closed nondisplaced fracture of acromial end of right clavicle with routine healing, subsequent encounter      -Patient is here for follow-up of right shoulder pain due to a nondisplaced distal clavicle fracture, rotator cuff tendinitis, and labral tear  -MRI of the right shoulder was reviewed today.  All questions were answered.  -Patient will continue with her lifting restrictions of no activity at shoulder height or above.  -Patient will start diclofenac 75 mg twice a day as needed for the next 2 weeks and then at nighttime on an as-needed basis.  Patient was recently prescribed this medication but has not yet started it  -Patient will follow-up in 4 weeks for reevaluation and progression of activity.  If patient has continued pain at the next visit, to consider possible intra-articular cortisone injection  -Call direct clinic number [[phone]] at any time with questions or concerns.    Albert Yeo MD [facility]  [facility] [place]          -----    SUBJECTIVE:  Prince Patel is a 44 year old female who is seen in follow-up for right shoulder pain due to inflammation of the rotator cuff muscles and bursitis.They were last seen 2/17/2020.     Since their last visit reports 0% - (About the same as last time). They indicate that their current pain level is 5/10. Pain wakes her up at night due to waking up on the shoulder. Reaching up over her head and reaching behind her back bothers her. They have tried heat, Tylenol and previous imaging (MRI 2/24/2020).      The  "patient is seen with their mother.    Patient's past medical, surgical, social, and family histories were reviewed today and no changes are noted.    REVIEW OF SYSTEMS:  Constitutional: NEGATIVE for fever, chills, change in weight  Skin: NEGATIVE for worrisome rashes, moles or lesions  GI/: NEGATIVE for bowel or bladder changes  Neuro: NEGATIVE for weakness, dizziness or paresthesias    OBJECTIVE:  /88   Ht 1.619 m (5' 3.75\")   Wt 102.5 kg (226 lb)   LMP 10/31/2011   BMI 39.10 kg/m      General: healthy, alert and in no distress  HEENT: no scleral icterus or conjunctival erythema  Skin: no suspicious lesions or rash. No jaundice.  CV: regular rhythm by palpation, no pedal edema  Resp: normal respiratory effort without conversational dyspnea   Psych: normal mood and affect  Gait: normal steady gait with appropriate coordination and balance  Neuro: normal light touch sensory exam of the extremities.    MSK:  RIGHT SHOULDER  Inspection:    no swelling, bruising, discoloration, or obvious deformity or asymmetry  Palpation:    Tender about the anterior capsule and greater tuberosity. Remainder of bony and tendinous landmarks are nontender.  Active Range of Motion:     Abduction 1350, FF 1650, , IR L4.      Scapular dyskinesis absent  Strength:    Scapular plane abduction 5-/5,  ER 5-/5, IR 5/5, biceps 5/5, triceps 5/5  Special Tests:    Positive: Neer's, Ferrera', supraspinatus (empty can), crossed arm adduction and [place]'s    Negative: drop arm/painful arc, crossed arm adduction, Speed's and Yergason's    Independent visualization of the below image:  EXAM: MR Right  Shoulder without  contrast     TECHNIQUE: Multiplanar, multisequence imaging of the right  shoulder  were obtained without administration of intravenous or intra-articular  gadolinium contrast using routine protocol.     History: Shoulder pain, rotator cuff tear/impingement suspected; Right  shoulder pain s/p fall. r/o rotator cuff " tearing vs tendinitis;  Rotator cuff syndrome of right shoulder; Impingement syndrome of right  shoulder      Additional Clinical information from EMR: Fall on ice.     Comparison: Radiograph 1/20/2020     Findings:     ROTATOR CUFF and ASSOCIATED STRUCTURES  Rotator cuff: Tendinosis of the supraspinatus and subscapularis. No  full-thickness rotator cuff tears or tendon retraction.     Bursa: Small amount of fluid in the subacromial/subdeltoid bursa     Musculature: Muscle bulk of rotator cuff is preserved.  Deltoid muscle  bulk is also preserved.  No muscle edema.     Acromioclavicular joint  There are mild degenerative changes of the acromioclavicular joint.   Acromion is type 2 in sagittal morphology.  Coracoacromial ligament is  not thickened.     OSSEOUS STRUCTURES  Bone marrow edema involving the distal clavicle with an irregular line  involving the far distal tip of the clavicle, which appears to  represent a nondisplaced fracture. The coracoclavicular ligaments are  intact.     LONG BICIPITAL TENDON  The long head of the biceps tendon is normally situated within the  bicipital groove. No complete or partial biceps tendon tear is  present.     GLENOHUMERAL JOINT  Joint fluid: Physiologic amount of joint fluid is  present.     Cartilage and subarticular bone:  No focal hyaline cartilage defects  are noted. No Hill-Sachs, reverse Hill-Sachs, or bony Bankart lesions  are seen.     Labrum: Limited assessment on this study with relative lack of joint  distention shows tearing of the posterior superior labrum.     ANCILLARY FINDINGS:  None                                                                      Impression:  1.  Nondisplaced fracture distal tip clavicle with surrounding bone  marrow edema.  2.  Tendinosis of the supraspinatus and subscapularis without full  thickness tear or tendon retraction.  3.  Tearing of the posterior superior labrum.     I have personally reviewed the examination and initial  interpretation  and I agree with the findings.     JUTTA ELLERMANN, MD    Patient's conditions were thoroughly discussed during today's visit with greater than 50% of the visit spent counseling the patient with total time spent face-to-face with the patient being 20 minutes.    Albert Yeo MD, [facility]          Again, thank you for allowing me to participate in the care of your patient.        Sincerely,        Albert Yeo, MD

## 2020-03-06 NOTE — PROGRESS NOTES
ASSESSMENT & PLAN  Patient Instructions     1. Tendinitis of right rotator cuff    2. Tear of right glenoid labrum, subsequent encounter    3. Closed nondisplaced fracture of acromial end of right clavicle with routine healing, subsequent encounter      -Patient is here for follow-up of right shoulder pain due to a nondisplaced distal clavicle fracture, rotator cuff tendinitis, and labral tear  -MRI of the right shoulder was reviewed today.  All questions were answered.  -Patient will continue with her lifting restrictions of no activity at shoulder height or above.  -Patient will start diclofenac 75 mg twice a day as needed for the next 2 weeks and then at nighttime on an as-needed basis.  Patient was recently prescribed this medication but has not yet started it  -Patient will follow-up in 4 weeks for reevaluation and progression of activity.  If patient has continued pain at the next visit, to consider possible intra-articular cortisone injection  -Call direct clinic number [[phone]] at any time with questions or concerns.    Albert Yeo MD [facility] Orthopedics and Sports Medicine  [facility]          -----    SUBJECTIVE:  Prince Patel is a 44 year old female who is seen in follow-up for right shoulder pain due to inflammation of the rotator cuff muscles and bursitis.They were last seen 2/17/2020.     Since their last visit reports 0% - (About the same as last time). They indicate that their current pain level is 5/10. Pain wakes her up at night due to waking up on the shoulder. Reaching up over her head and reaching behind her back bothers her. They have tried heat, Tylenol and previous imaging (MRI 2/24/2020).      The patient is seen with their mother.    Patient's past medical, surgical, social, and family histories were reviewed today and no changes are noted.    REVIEW OF SYSTEMS:  Constitutional: NEGATIVE for fever, chills, change in weight  Skin: NEGATIVE for worrisome  "rashes, moles or lesions  GI/: NEGATIVE for bowel or bladder changes  Neuro: NEGATIVE for weakness, dizziness or paresthesias    OBJECTIVE:  /88   Ht 1.619 m (5' 3.75\")   Wt 102.5 kg (226 lb)   LMP 10/31/2011   BMI 39.10 kg/m     General: healthy, alert and in no distress  HEENT: no scleral icterus or conjunctival erythema  Skin: no suspicious lesions or rash. No jaundice.  CV: regular rhythm by palpation, no pedal edema  Resp: normal respiratory effort without conversational dyspnea   Psych: normal mood and affect  Gait: normal steady gait with appropriate coordination and balance  Neuro: normal light touch sensory exam of the extremities.    MSK:  RIGHT SHOULDER  Inspection:    no swelling, bruising, discoloration, or obvious deformity or asymmetry  Palpation:    Tender about the anterior capsule and greater tuberosity. Remainder of bony and tendinous landmarks are nontender.  Active Range of Motion:     Abduction 1350, FF 1650, , IR L4.      Scapular dyskinesis absent  Strength:    Scapular plane abduction 5-/5,  ER 5-/5, IR 5/5, biceps 5/5, triceps 5/5  Special Tests:    Positive: Neer's, Ferrera', supraspinatus (empty can), crossed arm adduction and [place]'s    Negative: drop arm/painful arc, crossed arm adduction, Speed's and Yergason's    Independent visualization of the below image:  EXAM: MR Right  Shoulder without  contrast     TECHNIQUE: Multiplanar, multisequence imaging of the right  shoulder  were obtained without administration of intravenous or intra-articular  gadolinium contrast using routine protocol.     History: Shoulder pain, rotator cuff tear/impingement suspected; Right  shoulder pain s/p fall. r/o rotator cuff tearing vs tendinitis;  Rotator cuff syndrome of right shoulder; Impingement syndrome of right  shoulder      Additional Clinical information from EMR: Fall on ice.     Comparison: Radiograph 1/20/2020     Findings:     ROTATOR CUFF and ASSOCIATED STRUCTURES  Rotator " cuff: Tendinosis of the supraspinatus and subscapularis. No  full-thickness rotator cuff tears or tendon retraction.     Bursa: Small amount of fluid in the subacromial/subdeltoid bursa     Musculature: Muscle bulk of rotator cuff is preserved.  Deltoid muscle  bulk is also preserved.  No muscle edema.     Acromioclavicular joint  There are mild degenerative changes of the acromioclavicular joint.   Acromion is type 2 in sagittal morphology.  Coracoacromial ligament is  not thickened.     OSSEOUS STRUCTURES  Bone marrow edema involving the distal clavicle with an irregular line  involving the far distal tip of the clavicle, which appears to  represent a nondisplaced fracture. The coracoclavicular ligaments are  intact.     LONG BICIPITAL TENDON  The long head of the biceps tendon is normally situated within the  bicipital groove. No complete or partial biceps tendon tear is  present.     GLENOHUMERAL JOINT  Joint fluid: Physiologic amount of joint fluid is  present.     Cartilage and subarticular bone:  No focal hyaline cartilage defects  are noted. No Hill-Sachs, reverse Hill-Sachs, or bony Bankart lesions  are seen.     Labrum: Limited assessment on this study with relative lack of joint  distention shows tearing of the posterior superior labrum.     ANCILLARY FINDINGS:  None                                                                      Impression:  1.  Nondisplaced fracture distal tip clavicle with surrounding bone  marrow edema.  2.  Tendinosis of the supraspinatus and subscapularis without full  thickness tear or tendon retraction.  3.  Tearing of the posterior superior labrum.     I have personally reviewed the examination and initial interpretation  and I agree with the findings.     JUTTA ELLERMANN, MD    Patient's conditions were thoroughly discussed during today's visit with greater than 50% of the visit spent counseling the patient with total time spent face-to-face with the patient being 20  minutes.    Albert Yeo MD, [facility]

## 2020-03-06 NOTE — PATIENT INSTRUCTIONS
1. Tendinitis of right rotator cuff    2. Tear of right glenoid labrum, subsequent encounter    3. Closed nondisplaced fracture of acromial end of right clavicle with routine healing, subsequent encounter      -Patient is here for follow-up of right shoulder pain due to a nondisplaced distal clavicle fracture, rotator cuff tendinitis, and labral tear  -MRI of the right shoulder was reviewed today.  All questions were answered.  -Patient will continue with her lifting restrictions of no activity at shoulder height or above.  -Patient will start diclofenac 75 mg twice a day as needed for the next 2 weeks and then at nighttime on an as-needed basis.  Patient was recently prescribed this medication but has not yet started it  -Patient will follow-up in 4 weeks for reevaluation and progression of activity.  If patient has continued pain at the next visit, to consider possible intra-articular cortisone injection  -Call direct clinic number [[phone]] at any time with questions or concerns.    Albert Yeo MD [facility]  [facility] [place]

## 2020-03-29 DIAGNOSIS — J30.2 SEASONAL ALLERGIC RHINITIS, UNSPECIFIED TRIGGER: ICD-10-CM

## 2020-03-30 RX ORDER — FLUTICASONE PROPIONATE 50 MCG
SPRAY, SUSPENSION (ML) NASAL
Qty: 16 ML | Refills: 3 | Status: SHIPPED | OUTPATIENT
Start: 2020-03-30 | End: 2020-06-22

## 2020-03-30 RX ORDER — FEXOFENADINE HCL 180 MG/1
TABLET ORAL
Qty: 31 TABLET | Refills: 3 | Status: SHIPPED | OUTPATIENT
Start: 2020-03-30 | End: 2020-08-05 | Stop reason: ALTCHOICE

## 2020-03-30 NOTE — TELEPHONE ENCOUNTER
Prescription approved per [facility] Refill Protocol.    Pending Prescriptions:                       Disp   Refills    fluticasone (FLONASE) 50 MCG/ACT nasal sp*16 mL  3            Sig: INSTILL 2 SPRAYS INTO BOTH NOSTRILS DAILY    fexofenadine (ALLEGRA) 180 MG tablet [Pha*31 tab*3            Sig: TAKE 1 TABLET BY MOUTH EVERY DAY. (NOT COVERED)    Vciky Acosta Registered Nurse   [facility]

## 2020-03-31 ENCOUNTER — MYC MEDICAL ADVICE (OUTPATIENT)
Dept: FAMILY MEDICINE | Facility: CLINIC | Age: 45
End: 2020-03-31

## 2020-03-31 DIAGNOSIS — J30.89 CHRONIC NON-SEASONAL ALLERGIC RHINITIS: Primary | ICD-10-CM

## 2020-03-31 RX ORDER — CETIRIZINE HYDROCHLORIDE 10 MG/1
10 TABLET ORAL DAILY
Qty: 30 TABLET | Refills: 6 | Status: SHIPPED | OUTPATIENT
Start: 2020-03-31 | End: 2022-02-18

## 2020-03-31 NOTE — TELEPHONE ENCOUNTER
"Patient sends [employer] message saying, \"Is there a way I could get something else for my allergies other than the Fexofenadine as I am temporarily out of work due to the virus and the Fexofenadine is not covered by my insurance anymore ?\"  Please advise.   Zulay Donahue, BSN, RN, PHN    "

## 2020-04-01 ENCOUNTER — TELEPHONE (OUTPATIENT)
Dept: PHYSICAL THERAPY | Facility: CLINIC | Age: 45
End: 2020-04-01

## 2020-04-01 DIAGNOSIS — M25.511 PAIN IN JOINT OF RIGHT SHOULDER: ICD-10-CM

## 2020-04-03 ENCOUNTER — TELEPHONE (OUTPATIENT)
Dept: PHYSICAL THERAPY | Facility: CLINIC | Age: 45
End: 2020-04-03

## 2020-04-03 ENCOUNTER — VIRTUAL VISIT (OUTPATIENT)
Dept: ORTHOPEDICS | Facility: CLINIC | Age: 45
End: 2020-04-03

## 2020-04-03 VITALS — HEIGHT: 64 IN | BODY MASS INDEX: 38.58 KG/M2 | WEIGHT: 226 LBS

## 2020-04-03 DIAGNOSIS — S42.034D CLOSED NONDISPLACED FRACTURE OF ACROMIAL END OF RIGHT CLAVICLE WITH ROUTINE HEALING, SUBSEQUENT ENCOUNTER: Primary | ICD-10-CM

## 2020-04-03 DIAGNOSIS — M25.511 PAIN IN JOINT OF RIGHT SHOULDER: ICD-10-CM

## 2020-04-03 DIAGNOSIS — S43.431D TEAR OF RIGHT GLENOID LABRUM, SUBSEQUENT ENCOUNTER: ICD-10-CM

## 2020-04-03 DIAGNOSIS — M75.81 TENDINITIS OF RIGHT ROTATOR CUFF: ICD-10-CM

## 2020-04-03 PROCEDURE — 99213 OFFICE O/P EST LOW 20 MIN: CPT | Mod: TEL | Performed by: FAMILY MEDICINE

## 2020-04-03 ASSESSMENT — MIFFLIN-ST. JEOR: SCORE: 1666.16

## 2020-04-03 NOTE — TELEPHONE ENCOUNTER
Informed that [facility] is currently closed. Given HUB number is wanting a telephone or video appointment or to be called when clinic reopens. Will call again in a couple of days.

## 2020-04-03 NOTE — TELEPHONE ENCOUNTER
2nd call regarding clinic closure. HUB phone number given again if would like to schedule telephone or video visit or be called when clinic reopens. Will call again next week.

## 2020-04-03 NOTE — PROGRESS NOTES
"Prince Patel is a 44 year old female who is being evaluated via a billable telephone visit.      The patient has been notified of following:     \"This telephone visit will be conducted via a call between you and your physician/provider. We have found that certain health care needs can be provided without the need for a physical exam.  This service lets us provide the care you need with a short phone conversation.  If a prescription is necessary we can send it directly to your pharmacy.  If lab work is needed we can place an order for that and you can then stop by our lab to have the test done at a later time.    If during the course of the call the physician/provider feels a telephone visit is not appropriate, you will not be charged for this service.\"     Physician has received verbal consent for a Telephone Visit from the patient? Yes    Prince Patel complains of    Chief Complaint   Patient presents with     Pain     right shoulder         ALLERGIES  Keflex [cephalexin]      ASSESSMENT & PLAN  Patient Instructions     1. Closed nondisplaced fracture of acromial end of right clavicle with routine healing, subsequent encounter    2. Tear of right glenoid labrum, subsequent encounter    3. Tendinitis of right rotator cuff      -Patient is following up for right shoulder pain due to a fracture, tendinitis and tear  -Patient reports improvement in pain over the past 4 weeks  -Patient can now restart physical therapy; they can start virtual visit next week  -Patient will schedule a repeat phone visit in 4 weeks   -Call direct clinic number [[phone]] at any time with questions or concerns.    Albert Yeo MD [facility] Orthopedics and Sports Medicine  [facility] [place]            -----    SUBJECTIVE:  Prince Patel is a 44 year old female who is seen in follow-up for right shoulder pain due to a nondisplaced distal clavicle fracture, rotator cuff tendinitis, and labral tear.They were last seen " 3/6/2020.     Since their last visit reports 60% improvement. States will have pain if wakes up then will be sore off and on for the whole day. If she does not wake up laying on that side she is pain free for the whole day.  They indicate that their current pain level is 3/10. They have tried ice, Tylenol and other medications: diclofenac at night. Not currently working because of the pandemic        Patient's past medical, surgical, social, family histories and medication list were reviewed today and no changes are noted.    REVIEW OF SYSTEMS:  Constitutional: NEGATIVE for fever, chills, change in weight  Skin: NEGATIVE for worrisome rashes, moles or lesions  GI/: NEGATIVE for bowel or bladder changes  Neuro: NEGATIVE for weakness, dizziness or paresthesias      Independent visualization of the below image:      Phone call start: 3:04pm   Phone call end: 3:20    Albert Yeo MD, CAM  [facility]

## 2020-04-03 NOTE — PATIENT INSTRUCTIONS
1. Closed nondisplaced fracture of acromial end of right clavicle with routine healing, subsequent encounter    2. Tear of right glenoid labrum, subsequent encounter    3. Tendinitis of right rotator cuff      -Patient is following up for right shoulder pain due to a fracture, tendinitis and tear  -Patient reports improvement in pain over the past 4 weeks  -Patient can now restart physical therapy; they can start virtual visit next week  -Patient will continue with Tylenol and Diclofenac as needed  -Patient will schedule a repeat phone visit in 4 weeks   -Call direct clinic number [[phone]] at any time with questions or concerns.    Albert Yeo MD CA[facility]SP[facility]  [facility] [place]

## 2020-04-08 ENCOUNTER — TELEPHONE (OUTPATIENT)
Dept: PHYSICAL THERAPY | Facility: CLINIC | Age: 45
End: 2020-04-08

## 2020-04-08 DIAGNOSIS — M25.511 PAIN IN JOINT OF RIGHT SHOULDER: ICD-10-CM

## 2020-04-08 NOTE — TELEPHONE ENCOUNTER
3rd call regarding clinic closure.  Dr. Yeo's visit note from 4/3/20 instructed patient to resume PT with virtual care this week. Hub phone number left once again to schedule virtual care.

## 2020-06-22 DIAGNOSIS — J30.2 SEASONAL ALLERGIC RHINITIS, UNSPECIFIED TRIGGER: ICD-10-CM

## 2020-06-22 RX ORDER — FLUTICASONE PROPIONATE 50 MCG
SPRAY, SUSPENSION (ML) NASAL
Qty: 48 ML | Refills: 1 | Status: SHIPPED | OUTPATIENT
Start: 2020-06-22 | End: 2022-02-18

## 2020-06-22 NOTE — TELEPHONE ENCOUNTER
Acute Inpt Rehab Note     Still waiting on verification of pre-cert and being in network with patient insurance.  Reviewed Dr. Aldana's note which states he will continue to monitor blood pressure over next 24 hours.  CCP, Sarah, notified still have not heard back from insurance.  Will keep CCP updated as we hear.     Thank you,  Huong Welsh RN   Rehab Admission RN    Prescription approved per [facility].  Jerald Mccallum RN, BSN

## 2020-06-26 DIAGNOSIS — J30.2 SEASONAL ALLERGIC RHINITIS, UNSPECIFIED TRIGGER: ICD-10-CM

## 2020-06-26 RX ORDER — FEXOFENADINE HCL 180 MG/1
TABLET ORAL
Qty: 93 TABLET | Refills: 1 | OUTPATIENT
Start: 2020-06-26

## 2020-08-03 NOTE — PROGRESS NOTES
Pre-Visit Planning     Future Appointments   Date Time Provider Department Center   8/5/2020  2:15 PM Haase, Susan Rachele, APRN CNP [facility]     Arrival Time for this Appointment:  1:50 PM   Appointment Notes for this encounter:   sh review; px  Might have another wart.     Questionnaires Reviewed/Assigned  No additional questionnaires are needed  Last OV with provider  03/05/2020 OV SH plantar warts    [facility] ER Visits  None    Specialty Visits  sptmed tendinitis R shoulder pain r/t clavical fx  PT visits    Imaging and Lab Review  none    Recent Procedures  none    Health Maintenance Due   Topic Date Due     HIV SCREENING  05/31/1992     MEDICARE ANNUAL WELLNESS VISIT  02/15/2020     Current Outpatient Medications   Medication     cetirizine (ZYRTEC) 10 MG tablet     diclofenac (VOLTAREN) 75 MG EC tablet     fexofenadine (ALLEGRA) 180 MG tablet     fluticasone (FLONASE) 50 MCG/ACT nasal spray     MULTIVITAMINS OR TABS     order for DME     order for DME     SUMAtriptan (IMITREX) 50 MG tablet     No current facility-administered medications for this visit.    spoke to pt  No changes to medications and she is good on refills.  Cassidy Langley RN      Patient preferred phone number: [phone]

## 2020-08-05 ENCOUNTER — OFFICE VISIT (OUTPATIENT)
Dept: FAMILY MEDICINE | Facility: CLINIC | Age: 45
End: 2020-08-05
Payer: MEDICARE

## 2020-08-05 VITALS
HEART RATE: 92 BPM | OXYGEN SATURATION: 96 % | HEIGHT: 63 IN | BODY MASS INDEX: 40.08 KG/M2 | DIASTOLIC BLOOD PRESSURE: 88 MMHG | TEMPERATURE: 98.7 F | SYSTOLIC BLOOD PRESSURE: 137 MMHG | WEIGHT: 226.2 LBS | RESPIRATION RATE: 16 BRPM

## 2020-08-05 DIAGNOSIS — E66.01 MORBID OBESITY (H): ICD-10-CM

## 2020-08-05 DIAGNOSIS — Z00.00 ENCOUNTER FOR MEDICARE ANNUAL WELLNESS EXAM: Primary | ICD-10-CM

## 2020-08-05 DIAGNOSIS — B07.0 PLANTAR WARTS: ICD-10-CM

## 2020-08-05 DIAGNOSIS — M75.101 ROTATOR CUFF SYNDROME OF RIGHT SHOULDER: ICD-10-CM

## 2020-08-05 DIAGNOSIS — F43.21 GRIEF: ICD-10-CM

## 2020-08-05 DIAGNOSIS — K21.00 GASTROESOPHAGEAL REFLUX DISEASE WITH ESOPHAGITIS: ICD-10-CM

## 2020-08-05 DIAGNOSIS — L30.4 INTERTRIGO: ICD-10-CM

## 2020-08-05 LAB
BASOPHILS # BLD AUTO: 0 10E9/L (ref 0–0.2)
BASOPHILS NFR BLD AUTO: 0.4 %
DIFFERENTIAL METHOD BLD: NORMAL
EOSINOPHIL # BLD AUTO: 0.3 10E9/L (ref 0–0.7)
EOSINOPHIL NFR BLD AUTO: 3.2 %
ERYTHROCYTE [DISTWIDTH] IN BLOOD BY AUTOMATED COUNT: 14 % (ref 10–15)
HCT VFR BLD AUTO: 40.4 % (ref 35–47)
HGB BLD-MCNC: 13.7 G/DL (ref 11.7–15.7)
LYMPHOCYTES # BLD AUTO: 2 10E9/L (ref 0.8–5.3)
LYMPHOCYTES NFR BLD AUTO: 24.6 %
MCH RBC QN AUTO: 29.9 PG (ref 26.5–33)
MCHC RBC AUTO-ENTMCNC: 33.9 G/DL (ref 31.5–36.5)
MCV RBC AUTO: 88 FL (ref 78–100)
MONOCYTES # BLD AUTO: 0.6 10E9/L (ref 0–1.3)
MONOCYTES NFR BLD AUTO: 7.7 %
NEUTROPHILS # BLD AUTO: 5.2 10E9/L (ref 1.6–8.3)
NEUTROPHILS NFR BLD AUTO: 64.1 %
PLATELET # BLD AUTO: 264 10E9/L (ref 150–450)
RBC # BLD AUTO: 4.58 10E12/L (ref 3.8–5.2)
WBC # BLD AUTO: 8.1 10E9/L (ref 4–11)

## 2020-08-05 PROCEDURE — G0439 PPPS, SUBSEQ VISIT: HCPCS | Performed by: NURSE PRACTITIONER

## 2020-08-05 PROCEDURE — 36415 COLL VENOUS BLD VENIPUNCTURE: CPT | Performed by: NURSE PRACTITIONER

## 2020-08-05 PROCEDURE — 85025 COMPLETE CBC W/AUTO DIFF WBC: CPT | Performed by: NURSE PRACTITIONER

## 2020-08-05 PROCEDURE — 17110 DESTRUCTION B9 LES UP TO 14: CPT | Performed by: NURSE PRACTITIONER

## 2020-08-05 PROCEDURE — 80053 COMPREHEN METABOLIC PANEL: CPT | Performed by: NURSE PRACTITIONER

## 2020-08-05 RX ORDER — FAMOTIDINE 20 MG/1
20 TABLET, FILM COATED ORAL 2 TIMES DAILY
Qty: 60 TABLET | Refills: 1 | Status: SHIPPED | OUTPATIENT
Start: 2020-08-05 | End: 2020-08-05

## 2020-08-05 RX ORDER — FAMOTIDINE 20 MG/1
20 TABLET, FILM COATED ORAL 2 TIMES DAILY
Qty: 60 TABLET | Refills: 1 | Status: SHIPPED | OUTPATIENT
Start: 2020-08-05 | End: 2020-10-12

## 2020-08-05 RX ORDER — CLOTRIMAZOLE 1 %
CREAM (GRAM) TOPICAL 2 TIMES DAILY
Qty: 60 G | Refills: 0 | Status: SHIPPED | OUTPATIENT
Start: 2020-08-05 | End: 2020-12-03

## 2020-08-05 RX ORDER — FLUCONAZOLE 150 MG/1
TABLET ORAL
Qty: 3 TABLET | Refills: 0 | Status: SHIPPED | OUTPATIENT
Start: 2020-08-05 | End: 2020-09-29

## 2020-08-05 RX ORDER — DICLOFENAC SODIUM 75 MG/1
75 TABLET, DELAYED RELEASE ORAL 2 TIMES DAILY PRN
Qty: 60 TABLET | Refills: 1 | Status: SHIPPED | OUTPATIENT
Start: 2020-08-05 | End: 2020-12-03

## 2020-08-05 ASSESSMENT — ENCOUNTER SYMPTOMS
DIARRHEA: 0
HEMATURIA: 0
CHILLS: 0
ABDOMINAL PAIN: 0
DIZZINESS: 0
CONSTIPATION: 0
COUGH: 0
HEMATOCHEZIA: 0

## 2020-08-05 ASSESSMENT — MIFFLIN-ST. JEOR: SCORE: 1646.99

## 2020-08-05 ASSESSMENT — ACTIVITIES OF DAILY LIVING (ADL): CURRENT_FUNCTION: NO ASSISTANCE NEEDED

## 2020-08-05 NOTE — PATIENT INSTRUCTIONS
Patient Education   Personalized Prevention Plan  You are due for the preventive services outlined below.  Your care team is available to assist you in scheduling these services.  If you have already completed any of these items, please share that information with your care team to update in your medical record.  Health Maintenance Due   Topic Date Due     HIV Screening  05/31/1992     Annual Wellness Visit  02/15/2020        At your visit today, we discussed your risk for falls and preventive options.    Fall Prevention  Falls often occur due to slipping, tripping or losing your balance. Millions of people fall every year and injure themselves. Here are ways to reduce your risk of falling again.    Think about your fall, was there anything that caused your fall that can be fixed, removed, or replaced?    Make your home safe by keeping walkways clear of objects you may trip over, such as electric cords.    Use non-slip pads under rugs. Don't use area rugs or small throw rugs.    Use non-slip mats in bathtubs and showers.    Install handrails and lights on staircases. The handrails should be on both sides of the stairs.    Don't walk in poorly lit areas.    Don't stand on chairs or wobbly ladders.    Use caution when reaching overhead or looking upward. This position can cause a loss of balance.    Be sure your shoes fit properly, have non-slip bottoms and are in good condition.     Wear shoes both inside and out. Don't go barefoot or wear slippers.    Be cautious when going up and down stairs, curbs, and when walking on uneven sidewalks.    If your balance is poor, consider using a cane or walker.    If your fall was related to alcohol use, stop or limit alcohol intake.     If your fall was related to use of sleeping medicines, talk to your healthcare provider about this. You may need to reduce your dosage at bedtime if you awaken during the night to go to the bathroom.      To reduce the need for nighttime  bathroom trips:  ? Don't drink fluids for several hours before going to bed  ? Empty your bladder before going to bed  ? Men can keep a urinal at the bedside    Stay as active as you can. Balance, flexibility, strength, and endurance all come from exercise. They all play a role in preventing falls. Ask your healthcare provider which types of activity are right for you.    Get your vision checked on a regular basis.    If you have pets, know where they are before you stand up or walk so you don't trip over them.    Use night lights.    Go over all your medicines with a pharmacist or other healthcare provider to see if any of them could make you more likely to fall.  Date Last Reviewed: 4/1/2018 2000-2019 [employer]. [address] [place] [address], [place] [address]. All rights reserved. This information is not intended as a substitute for professional medical care. Always follow your healthcare professional's instructions.

## 2020-08-05 NOTE — PROGRESS NOTES
"SUBJECTIVE:   Prince Patel is a 45 year old female who presents for Preventive Visit.  Are you in the first 12 months of your Medicare coverage?  No    Healthy Habits:     In general, how would you rate your overall health?  Excellent    Frequency of exercise:  2-3 days/week    Duration of exercise:  15-30 minutes    Do you usually eat at least 4 servings of fruit and vegetables a day, include whole grains    & fiber and avoid regularly eating high fat or \"junk\" foods?  Yes    Taking medications regularly:  Yes    Medication side effects:  None    Ability to successfully perform activities of daily living:  No assistance needed    Home Safety:  No safety concerns identified    Hearing Impairment:  No hearing concerns    In the past 6 months, have you been bothered by leaking of urine?  No    In general, how would you rate your overall mental or emotional health?  Excellent      PHQ-2 Total Score: 0    Additional concerns today:  No    Do you feel safe in your environment? Yes    Have you ever done Advance Care Planning? (For example, a Health Directive, POLST, or a discussion with a medical provider or your loved ones about your wishes): No, advance care planning information given to patient to review.  Patient declined advance care planning discussion at this time.      Fall risk  Fallen 2 or more times in the past year?: Yes  Any fall with injury in the past year?: Yes  Do you have sleep apnea, excessive snoring or daytime drowsiness?: no    Breast cancer screening:  Last 10/2019  Cervical cancer screening:  Last 2/2019, NIL  Right shoulder pain: due to fall, was to have continued PT in 4/2020 was not able to set up appt .  Continues to have right shoulder pain, mainly with movement.    Grief:  Loss of best friend, dog and grandfather in a 2 month period of time. Has been feeling sad.  Right foot:  Wart on right heel, previously treated. .      Reviewed and updated as needed this visit by clinical " staff  Tobacco  Allergies  Med Hx  Surg Hx  Fam Hx  Soc Hx        Reviewed and updated as needed this visit by Provider        Social History     Tobacco Use     Smoking status: Never Smoker     Smokeless tobacco: Never Used   Substance Use Topics     Alcohol use: No         Alcohol Use 8/5/2020   Prescreen: >3 drinks/day or >7 drinks/week? No   Prescreen: >3 drinks/day or >7 drinks/week? -     Current providers sharing in care for this patient include:   Patient Care Team:  Haase, Susan Rachele, APRN CNP as PCP - General (Nurse Practitioner)  Haase, Susan Rachele, APRN CNP as Assigned PCP  Vicky Acosta RN as Personal Advocate & Liaison (PAL) (Nurse)    The following health maintenance items are reviewed in Epic and correct as of today:  Health Maintenance   Topic Date Due     HIV SCREENING  05/31/1992     MEDICARE ANNUAL WELLNESS VISIT  02/15/2020     INFLUENZA VACCINE (1) 09/01/2020     ANNUAL REVIEW OF HM ORDERS  03/05/2021     HPV TEST  02/15/2022     PAP  02/15/2022     DTAP/TDAP/TD IMMUNIZATION (4 - Td) 09/27/2022     MIGRAINE ACTION PLAN  Completed     PHQ-2  Completed     IPV IMMUNIZATION  Aged Out     MENINGITIS IMMUNIZATION  Aged Out     Lab work is in process  BP Readings from Last 3 Encounters:   08/05/20 137/88   03/06/20 120/88   03/05/20 127/82    Wt Readings from Last 3 Encounters:   08/05/20 102.6 kg (226 lb 3.2 oz)   04/03/20 102.5 kg (226 lb)   03/06/20 102.5 kg (226 lb)           Patient Active Problem List   Diagnosis     Sprain and strain of unspecified site of knee and leg     Obesity     CARDIOVASCULAR SCREENING; LDL GOAL LESS THAN 160     S/P partial hysterectomy     Intertrigo     Migraine with aura and without status migrainosus, not intractable     Chronic non-seasonal allergic rhinitis, unspecified trigger     Pain in joint of right shoulder     Past Surgical History:   Procedure Laterality Date     APPENDECTOMY       CARDIAC SURGERY       COLONOSCOPY N/A 8/3/2018     Procedure: COLONOSCOPY;  colonoscopy;  Surgeon: Isaias Medina MD;  Location: [facility]     LAPAROSCOPIC HYSTERECTOMY SUPRACERVICAL  11/16/2011    Procedure:LAPAROSCOPIC HYSTERECTOMY SUPRACERVICAL; LAPAROSCOPIC HYSTERECTOMY SUPRACERVICAL ; Surgeon:MASOUD WHITE; Location:[facility] OR     SURGICAL HISTORY OF -       ingrown toenails removed     toenail removal         Social History     Tobacco Use     Smoking status: Never Smoker     Smokeless tobacco: Never Used   Substance Use Topics     Alcohol use: No     Family History   Problem Relation Age of Onset     Diabetes Maternal Aunt      C.A.D. Father         MI, angioplasty, 50s     Diabetes Father         Diabetes     Hypertension Father      Neurologic Disorder Paternal Grandmother      Gastrointestinal Disease Paternal Grandmother         Gluten Intolerance      Diabetes Maternal Grandmother         Multiple Sclerosis         Current Outpatient Medications   Medication Sig Dispense Refill     cetirizine (ZYRTEC) 10 MG tablet Take 1 tablet (10 mg) by mouth daily 30 tablet 6     fexofenadine (ALLEGRA) 180 MG tablet TAKE 1 TABLET BY MOUTH EVERY DAY. (NOT COVERED) 31 tablet 3     fluticasone (FLONASE) 50 MCG/ACT nasal spray INSTILL 2 SPRAYS INTO BOTH NOSTRILS DAILY 48 mL 1     MULTIVITAMINS OR TABS 1 tab qd as directed  0     SUMAtriptan (IMITREX) 50 MG tablet Take 1 tablet (50 mg) by mouth at onset of headache for migraine 18 tablet 2     diclofenac (VOLTAREN) 75 MG EC tablet Take 1 tablet (75 mg) by mouth 2 times daily as needed for moderate pain 60 tablet 1     order for DME Equipment being ordered: arm sling 1 each 0     order for DME Right Wrist Quick Fit,  1 Device 0     Mammogram Screening:   Last 3 Pap and HPV Results:   PAP / HPV Latest Ref Rng & Units 2/15/2019 2/4/2016 2/7/2013   PAP - NIL NIL NIL   HPV 16 DNA NEG:Negative Negative - -   HPV 18 DNA NEG:Negative Negative - -   OTHER HR HPV NEG:Negative Negative - -       Review of Systems  "  Constitutional: Negative for chills.   HENT: Negative for congestion.    Respiratory: Negative for cough.    Cardiovascular: Negative for chest pain.   Gastrointestinal: Negative for abdominal pain, constipation, diarrhea and hematochezia.   Genitourinary: Negative for hematuria.   Neurological: Negative for dizziness.       OBJECTIVE:   /88 (BP Location: Right arm, Patient Position: Sitting, Cuff Size: Adult Large)   Pulse 92   Temp 98.7  F (37.1  C) (Oral)   Resp 16   Ht 1.595 m (5' 2.8\")   Wt 102.6 kg (226 lb 3.2 oz)   LMP 10/31/2011   SpO2 96%   Breastfeeding No   BMI 40.33 kg/m   Estimated body mass index is 40.33 kg/m  as calculated from the following:    Height as of this encounter: 1.595 m (5' 2.8\").    Weight as of this encounter: 102.6 kg (226 lb 3.2 oz).  Physical Exam  GENERAL: healthy, alert and no distress  EYES: Eyes grossly normal to inspection, PERRL and conjunctivae and sclerae normal  HENT: ear canals and TM's normal, nose and mouth without ulcers or lesions  NECK: no adenopathy, no asymmetry, masses, or scars and thyroid normal to palpation  RESP: lungs clear to auscultation - no rales, rhonchi or wheezes  BREAST: normal without masses, tenderness or nipple discharge and no palpable axillary masses or adenopathy  CV: regular rate and rhythm, normal S1 S2, no S3 or S4, no murmur, click or rub, no peripheral edema and peripheral pulses strong  ABDOMEN: soft, nontender, no hepatosplenomegaly, no masses and bowel sounds normal  MS: flesh colored 2 mm on heel of right foot.  Right shoulder with limited extension.   SKIN: no suspicious lesions or rashes  NEURO: Normal strength and tone, mentation intact and speech normal  PSYCH: mentation appears normal, affect normal/bright    ASSESSMENT / PLAN:   Prince was seen today for physical and wart.    Diagnoses and all orders for this visit:    Encounter for Medicare annual wellness exam  -     C ANNUAL WELLNESS VISIT, PPS, SUBSEQUENT  -     " "CBC with platelets differential  -     Comprehensive metabolic panel  -     Cancel: Lipid panel reflex to direct LDL Fasting    Rotator cuff syndrome of right shoulder: MA assisted with making appt for PT, instructed to follow up with sports med 4 weeks after PT if pain has not improved.   -     diclofenac (VOLTAREN) 75 MG EC tablet; Take 1 tablet (75 mg) by mouth 2 times daily as needed for moderate pain    Grief:  Will refer to BHT    Plantar warts:  cryotherapy  Procedure explained to patient, verbal consent given, cleansed with alcohol, pared with #15 blade, liquid nitrogen applied x 3 pulses (5 sec each), bandaide placed.  Explained that may take more than 1 treatment, keep covered for next 24 hours, if wart remains return in 3-4 weeks for an additional treatment.    Gastroesophageal reflux disease with esophagitis  -    -     famotidine (PEPCID) 20 MG tablet; Take 1 tablet (20 mg) by mouth 2 times daily    Intertrigo  -     fluconazole (DIFLUCAN) 150 MG tablet; Take 1 tablet every week for 3 weeks.  -     clotrimazole (LOTRIMIN) 1 % external cream; Apply topically 2 times daily    COUNSELING:  Reviewed preventive health counseling, as reflected in patient instructions       Regular exercise       Healthy diet/nutrition       Dental care    Estimated body mass index is 40.33 kg/m  as calculated from the following:    Height as of this encounter: 1.595 m (5' 2.8\").    Weight as of this encounter: 102.6 kg (226 lb 3.2 oz).    Weight management plan: Discussed healthy diet and exercise guidelines     reports that she has never smoked. She has never used smokeless tobacco.      Appropriate preventive services were discussed with this patient, including applicable screening as appropriate for cardiovascular disease, diabetes, osteopenia/osteoporosis, and glaucoma.  As appropriate for age/gender, discussed screening for colorectal cancer, prostate cancer, breast cancer, and cervical cancer. Checklist reviewing " preventive services available has been given to the patient.    Reviewed patients plan of care and provided an AVS. The Basic Care Plan (routine screening as documented in Health Maintenance) for Prince meets the Care Plan requirement. This Care Plan has been established and reviewed with the Patient.    Counseling Resources:  ATP IV Guidelines  Pooled Cohorts Equation Calculator  Breast Cancer Risk Calculator  FRAX Risk Assessment  ICSI Preventive Guidelines  Dietary Guidelines for Americans, 2010  USDA's MyPlate  ASA Prophylaxis  Lung CA Screening  Follow up in 6 months, sooner as needed.   Susan Haase, APRN [facility]    Identified Health Risks:

## 2020-08-06 LAB
ALBUMIN SERPL-MCNC: 3.5 G/DL (ref 3.4–5)
ALP SERPL-CCNC: 45 U/L (ref 40–150)
ALT SERPL W P-5'-P-CCNC: 27 U/L (ref 0–50)
ANION GAP SERPL CALCULATED.3IONS-SCNC: 2 MMOL/L (ref 3–14)
AST SERPL W P-5'-P-CCNC: 15 U/L (ref 0–45)
BILIRUB SERPL-MCNC: 0.5 MG/DL (ref 0.2–1.3)
BUN SERPL-MCNC: 11 MG/DL (ref 7–30)
CALCIUM SERPL-MCNC: 8.6 MG/DL (ref 8.5–10.1)
CHLORIDE SERPL-SCNC: 111 MMOL/L (ref 94–109)
CO2 SERPL-SCNC: 27 MMOL/L (ref 20–32)
CREAT SERPL-MCNC: 0.92 MG/DL (ref 0.52–1.04)
GFR SERPL CREATININE-BSD FRML MDRD: 76 ML/MIN/{1.73_M2}
GLUCOSE SERPL-MCNC: 102 MG/DL (ref 70–99)
POTASSIUM SERPL-SCNC: 4.4 MMOL/L (ref 3.4–5.3)
PROT SERPL-MCNC: 7.6 G/DL (ref 6.8–8.8)
SODIUM SERPL-SCNC: 140 MMOL/L (ref 133–144)

## 2020-08-06 NOTE — RESULT ENCOUNTER NOTE
Ryan Tolbert,  Your CBC (checks for anemia and infection) is normal.  Sincerely,     Susan Haase, CNP

## 2020-08-07 ENCOUNTER — TELEPHONE (OUTPATIENT)
Dept: FAMILY MEDICINE | Facility: CLINIC | Age: 45
End: 2020-08-07

## 2020-08-07 NOTE — RESULT ENCOUNTER NOTE
Ryan Tolbert,  Your electrolytes (sodium, potassium, glucose) and kidney/liver function are normal.  Sincerely,     Susan Haase, CNP

## 2020-08-07 NOTE — TELEPHONE ENCOUNTER
Can you please reach out to Petr Perezu she is going through some grief, 3 losses in a short period of time, see my note.    Thank you,  Susan Haase, CNP

## 2020-08-11 NOTE — PROGRESS NOTES
Discharge Note    Progress reporting period is from initial eval to Feb 19, 2020.     Prince failed to return for next follow up visit and current status is unknown.  Please see information below for last relevant information on current status.  Patient seen for Rxs Used: 8 visits.  SUBJECTIVE  Subjective changes noted by patient:  Subjective: Rechecked with MD and to have MRI next week.  Overall cont to note slow improvement  .  Current pain level is Current Pain level: (1-2/10).     Previous pain level was  Initial Pain level: 5/10(upto 10/10).   Changes in function:  Yes (See Goal flowsheet attached for changes in current functional level)  Adverse reaction to treatment or activity: None    OBJECTIVE  Changes noted in objective findings: Objective: AROM: R shoulder AROM WNL 1 episode painful arc abd. 5# overhead with 1-2/10 pain.  Fair control GTB D2     ASSESSMENT/PLAN  Diagnosis: R shoulder pain   DIAGP:  The encounter diagnosis was Pain in joint of right shoulder.  Updated problem list and treatment plan:   Pain - HEP  Decreased ROM/flexibility - HEP  Decreased function - HEP  Decreased strength - HEP  Impaired muscle performance - HEP  STG/LTGs have been met or progress has been made towards goals:  Yes, please see goal flowsheet for most current information  Assessment of Progress: current status is unknown.  Last current status: Assessment of progress: Pt is progressing as expected   Self Management Plans:  HEP  I have re-evaluated this patient and find that the nature, scope, duration and intensity of the therapy is appropriate for the medical condition of the patient.  Prince continues to require the following intervention to meet STG and LTG's:  HEP.    Recommendations:  Discharge with current home program.  Patient to follow up with MD as needed.    Please refer to the daily flowsheet for treatment today, total treatment time and time spent performing 1:1 timed codes.

## 2020-08-12 ENCOUNTER — OFFICE VISIT (OUTPATIENT)
Dept: ORTHOPEDICS | Facility: CLINIC | Age: 45
End: 2020-08-12
Payer: MEDICARE

## 2020-08-12 VITALS
HEIGHT: 63 IN | BODY MASS INDEX: 40.04 KG/M2 | SYSTOLIC BLOOD PRESSURE: 120 MMHG | DIASTOLIC BLOOD PRESSURE: 80 MMHG | WEIGHT: 226 LBS

## 2020-08-12 DIAGNOSIS — M25.511 PAIN IN RIGHT ACROMIOCLAVICULAR JOINT: ICD-10-CM

## 2020-08-12 DIAGNOSIS — M75.41 IMPINGEMENT SYNDROME OF RIGHT SHOULDER: ICD-10-CM

## 2020-08-12 DIAGNOSIS — M75.81 TENDINITIS OF RIGHT ROTATOR CUFF: Primary | ICD-10-CM

## 2020-08-12 PROCEDURE — 20605 DRAIN/INJ JOINT/BURSA W/O US: CPT | Mod: 59 | Performed by: FAMILY MEDICINE

## 2020-08-12 PROCEDURE — 99213 OFFICE O/P EST LOW 20 MIN: CPT | Mod: 25 | Performed by: FAMILY MEDICINE

## 2020-08-12 PROCEDURE — 20611 DRAIN/INJ JOINT/BURSA W/US: CPT | Mod: RT | Performed by: FAMILY MEDICINE

## 2020-08-12 RX ORDER — METHYLPREDNISOLONE ACETATE 40 MG/ML
40 INJECTION, SUSPENSION INTRA-ARTICULAR; INTRALESIONAL; INTRAMUSCULAR; SOFT TISSUE
Status: DISCONTINUED | OUTPATIENT
Start: 2020-08-12 | End: 2021-08-12

## 2020-08-12 RX ADMIN — METHYLPREDNISOLONE ACETATE 40 MG: 40 INJECTION, SUSPENSION INTRA-ARTICULAR; INTRALESIONAL; INTRAMUSCULAR; SOFT TISSUE at 18:08

## 2020-08-12 RX ADMIN — METHYLPREDNISOLONE ACETATE 40 MG: 40 INJECTION, SUSPENSION INTRA-ARTICULAR; INTRALESIONAL; INTRAMUSCULAR; SOFT TISSUE at 18:10

## 2020-08-12 ASSESSMENT — MIFFLIN-ST. JEOR: SCORE: 1646.08

## 2020-08-12 NOTE — PATIENT INSTRUCTIONS
1. Tendinitis of right rotator cuff    2. Impingement syndrome of right shoulder    3. Pain in right acromioclavicular joint      -Patient is following up for right shoulder pain due to rotator cuff tendinitis, bursitis and AC joint pain.  Patient had a nondisplaced distal clavicle fracture approximately 4 months ago  -Patient tolerated subacromial and AC joint cortisone injection today without complications.  Patient was given postprocedure instructions  -Patient will start formal physical therapy and home exercise program in 1 to 2 weeks after the cortisone has decreased  -Patient will follow-up if pain does not improve  -Call direct clinic number [[phone]] at any time with questions or concerns.    Albert Yeo MD CAQSM  [facility]  [facility] [place]

## 2020-08-12 NOTE — LETTER
8/12/2020         RE: Prince Patel  [address]  [address]  [facility] [address]        Dear Colleague,    Thank you for referring your patient, Prince Patel, to the [facility]. Please see a copy of my visit note below.    ASSESSMENT & PLAN  Patient Instructions     1. Tendinitis of right rotator cuff    2. Impingement syndrome of right shoulder    3. Pain in right acromioclavicular joint      -Patient is following up for right shoulder pain due to rotator cuff tendinitis, bursitis and AC joint pain.  Patient had a nondisplaced distal clavicle fracture approximately 4 months ago  -Patient tolerated subacromial and AC joint cortisone injection today without complications.  Patient was given postprocedure instructions  -Patient will start formal physical therapy and home exercise program in 1 to 2 weeks after the cortisone has decreased  -Patient will follow-up if pain does not improve  -Call direct clinic number [[phone]] at any time with questions or concerns.    Albert Yeo MD [facility]  [facility] [place]          -----    SUBJECTIVE:  Prince Patel is a 45 year old female who is seen in follow-up for right shoulder pain due to a fracture, tendinitis and tear.They were last seen 4/3/2020.     Since their last visit reports 0% - (About the same as last time). Still has pain with sleeping because she rolls onto that shoulder. They indicate that their current pain level is 4/10. States still has pain in the clavicle area, and it is a dull achy pain.  They have tried ice, Tylenol and other medications: diclofenac.      The patient is seen by themselves.    Patient's past medical, surgical, social, and family histories were reviewed today and no changes are noted.    REVIEW OF SYSTEMS:  Constitutional: NEGATIVE for fever, chills, change in weight  Skin: NEGATIVE for worrisome rashes, moles or lesions  GI/: NEGATIVE for bowel  "or bladder changes  Neuro: NEGATIVE for weakness, dizziness or paresthesias    OBJECTIVE:  /80   Ht 1.595 m (5' 2.8\")   Wt 102.5 kg (226 lb)   LMP 10/31/2011   BMI 40.29 kg/m     General: healthy, alert and in no distress  HEENT: no scleral icterus or conjunctival erythema  Skin: no suspicious lesions or rash. No jaundice.  CV: regular rhythm by palpation, no pedal edema  Resp: normal respiratory effort without conversational dyspnea   Psych: normal mood and affect  Gait: normal steady gait with appropriate coordination and balance  Neuro: normal light touch sensory exam of the extremities.    MSK:  RIGHT SHOULDER  Inspection:    no swelling, bruising, discoloration, or obvious deformity or asymmetry  Palpation:    Tender about the anterior capsule, AC joint and greater tuberosity. Remainder of bony and tendinous landmarks are nontender.  Active Range of Motion:     Abduction 1450, FF 1650, , IR L4.      Scapular dyskinesis absent  Strength:    Scapular plane abduction 5-/5,  ER 5-/5, IR 5/5, biceps 5/5, triceps 5/5  Special Tests:    Positive: Neer's, Ferrera', supraspinatus (empty can), crossed arm adduction and [place]'s    Negative: drop arm/painful arc, Speed's and Yergason's    Independent visualization of the below image:    Large Joint Injection/Arthocentesis: R subacromial bursa    Date/Time: 8/12/2020 6:08 PM  Performed by: Yeo, Albert, MD  Authorized by: Yeo, Albert, MD     Indications:  Pain  Needle Size:  25 G  Guidance: ultrasound    Approach:  Posterior  Location:  Shoulder      Site:  R subacromial bursa  Medications:  40 mg methylPREDNISolone 40 MG/ML  Outcome:  Tolerated well, no immediate complications  Procedure discussed: discussed risks, benefits, and alternatives    Timeout: timeout called immediately prior to procedure    Prep: patient was prepped and draped in usual sterile fashion    Large Joint Injection/Arthocentesis    Date/Time: 8/12/2020 6:10 PM  Performed by: Yeo, " MD Yfn  Authorized by: Yeo, Albert, MD     Indications:  Pain  Needle Size:  25 G  Guidance: landmark guided    Approach:  Superior  Location:  Shoulder   Location comment:  AC joint      Medications:  40 mg methylPREDNISolone 40 MG/ML  Outcome:  Tolerated well, no immediate complications  Procedure discussed: discussed risks, benefits, and alternatives    Consent Given by:  Patient  Timeout: timeout called immediately prior to procedure    Prep: patient was prepped and draped in usual sterile fashion          Albert Yeo MD, [facility]          Again, thank you for allowing me to participate in the care of your patient.        Sincerely,        Albert Yeo, MD

## 2020-08-12 NOTE — PROGRESS NOTES
"ASSESSMENT & PLAN  Patient Instructions     1. Tendinitis of right rotator cuff    2. Impingement syndrome of right shoulder    3. Pain in right acromioclavicular joint      -Patient is following up for right shoulder pain due to rotator cuff tendinitis, bursitis and AC joint pain.  Patient had a nondisplaced distal clavicle fracture approximately 4 months ago  -Patient tolerated subacromial and AC joint cortisone injection today without complications.  Patient was given postprocedure instructions  -Patient will start formal physical therapy and home exercise program in 1 to 2 weeks after the cortisone has decreased  -Patient will follow-up if pain does not improve  -Call direct clinic number [[phone]] at any time with questions or concerns.    Albert Yeo MD [facility]  [facility]          -----    SUBJECTIVE:  Prince Patel is a 45 year old female who is seen in follow-up for right shoulder pain due to a fracture, tendinitis and tear.They were last seen 4/3/2020.     Since their last visit reports 0% - (About the same as last time). Still has pain with sleeping because she rolls onto that shoulder. They indicate that their current pain level is 4/10. States still has pain in the clavicle area, and it is a dull achy pain.  They have tried ice, Tylenol and other medications: diclofenac.      The patient is seen by themselves.    Patient's past medical, surgical, social, and family histories were reviewed today and no changes are noted.    REVIEW OF SYSTEMS:  Constitutional: NEGATIVE for fever, chills, change in weight  Skin: NEGATIVE for worrisome rashes, moles or lesions  GI/: NEGATIVE for bowel or bladder changes  Neuro: NEGATIVE for weakness, dizziness or paresthesias    OBJECTIVE:  /80   Ht 1.595 m (5' 2.8\")   Wt 102.5 kg (226 lb)   LMP 10/31/2011   BMI 40.29 kg/m     General: healthy, alert and in no distress  HEENT: no scleral icterus or " conjunctival erythema  Skin: no suspicious lesions or rash. No jaundice.  CV: regular rhythm by palpation, no pedal edema  Resp: normal respiratory effort without conversational dyspnea   Psych: normal mood and affect  Gait: normal steady gait with appropriate coordination and balance  Neuro: normal light touch sensory exam of the extremities.    MSK:  RIGHT SHOULDER  Inspection:    no swelling, bruising, discoloration, or obvious deformity or asymmetry  Palpation:    Tender about the anterior capsule, AC joint and greater tuberosity. Remainder of bony and tendinous landmarks are nontender.  Active Range of Motion:     Abduction 1450, FF 1650, , IR L4.      Scapular dyskinesis absent  Strength:    Scapular plane abduction 5-/5,  ER 5-/5, IR 5/5, biceps 5/5, triceps 5/5  Special Tests:    Positive: Neer's, Ferrera', supraspinatus (empty can), crossed arm adduction and [place]'s    Negative: drop arm/painful arc, Speed's and Yergason's    Independent visualization of the below image:    Large Joint Injection/Arthocentesis: R subacromial bursa    Date/Time: 8/12/2020 6:08 PM  Performed by: Yeo, Albert, MD  Authorized by: Yeo, Albert, MD     Indications:  Pain  Needle Size:  25 G  Guidance: ultrasound    Approach:  Posterior  Location:  Shoulder      Site:  R subacromial bursa  Medications:  40 mg methylPREDNISolone 40 MG/ML  Outcome:  Tolerated well, no immediate complications  Procedure discussed: discussed risks, benefits, and alternatives    Timeout: timeout called immediately prior to procedure    Prep: patient was prepped and draped in usual sterile fashion    Large Joint Injection/Arthocentesis    Date/Time: 8/12/2020 6:10 PM  Performed by: Yeo, Albert, MD  Authorized by: Yeo, Albert, MD     Indications:  Pain  Needle Size:  25 G  Guidance: landmark guided    Approach:  Superior  Location:  Shoulder   Location comment:  AC joint      Medications:  40 mg methylPREDNISolone 40 MG/ML  Outcome:  Tolerated well,  no immediate complications  Procedure discussed: discussed risks, benefits, and alternatives    Consent Given by:  Patient  Timeout: timeout called immediately prior to procedure    Prep: patient was prepped and draped in usual sterile fashion          Albert Yeo MD, [facility]

## 2020-08-18 ENCOUNTER — VIRTUAL VISIT (OUTPATIENT)
Dept: BEHAVIORAL HEALTH | Facility: CLINIC | Age: 45
End: 2020-08-18
Payer: MEDICARE

## 2020-08-18 DIAGNOSIS — F33.9 DEPRESSION, MAJOR, RECURRENT (H): Primary | ICD-10-CM

## 2020-08-18 PROCEDURE — 90791 PSYCH DIAGNOSTIC EVALUATION: CPT | Mod: 95 | Performed by: SOCIAL WORKER

## 2020-08-18 ASSESSMENT — COLUMBIA-SUICIDE SEVERITY RATING SCALE - C-SSRS
4. HAVE YOU HAD THESE THOUGHTS AND HAD SOME INTENTION OF ACTING ON THEM?: NO
ATTEMPT LIFETIME: NO
6. HAVE YOU EVER DONE ANYTHING, STARTED TO DO ANYTHING, OR PREPARED TO DO ANYTHING TO END YOUR LIFE?: NO
5. HAVE YOU STARTED TO WORK OUT OR WORKED OUT THE DETAILS OF HOW TO KILL YOURSELF? DO YOU INTEND TO CARRY OUT THIS PLAN?: NO
TOTAL  NUMBER OF ABORTED OR SELF INTERRUPTED ATTEMPTS PAST LIFETIME: NO
6. HAVE YOU EVER DONE ANYTHING, STARTED TO DO ANYTHING, OR PREPARED TO DO ANYTHING TO END YOUR LIFE?: NO
3. HAVE YOU BEEN THINKING ABOUT HOW YOU MIGHT KILL YOURSELF?: NO
2. HAVE YOU ACTUALLY HAD ANY THOUGHTS OF KILLING YOURSELF?: NO
2. HAVE YOU ACTUALLY HAD ANY THOUGHTS OF KILLING YOURSELF?: MIDDLE SCHOOL
2. HAVE YOU ACTUALLY HAD ANY THOUGHTS OF KILLING YOURSELF LIFETIME?: YES
4. HAVE YOU HAD THESE THOUGHTS AND HAD SOME INTENTION OF ACTING ON THEM?: NO
TOTAL  NUMBER OF INTERRUPTED ATTEMPTS LIFETIME: NO
ATTEMPT PAST THREE MONTHS: NO
TOTAL  NUMBER OF ABORTED OR SELF INTERRUPTED ATTEMPTS PAST 3 MONTHS: NO
TOTAL  NUMBER OF INTERRUPTED ATTEMPTS PAST 3 MONTHS: NO
1. IN THE PAST MONTH, HAVE YOU WISHED YOU WERE DEAD OR WISHED YOU COULD GO TO SLEEP AND NOT WAKE UP?: NO
1. IN THE PAST MONTH, HAVE YOU WISHED YOU WERE DEAD OR WISHED YOU COULD GO TO SLEEP AND NOT WAKE UP?: NO
5. HAVE YOU STARTED TO WORK OUT OR WORKED OUT THE DETAILS OF HOW TO KILL YOURSELF? DO YOU INTEND TO CARRY OUT THIS PLAN?: NO

## 2020-08-18 ASSESSMENT — PATIENT HEALTH QUESTIONNAIRE - PHQ9: SUM OF ALL RESPONSES TO PHQ QUESTIONS 1-9: 2

## 2020-08-18 NOTE — PROGRESS NOTES
Treatment Plan    Client's Name: Prince Patel  YOB: 1975    Date: 08/18/2020    DSM-V Diagnoses: 296.31 (F33.0) Major Depressive Disorder, Recurrent Episode, Mild _ and With anxious distress  Psychosocial / Contextual Factors: grief issue related to multiple losses in the last few months  WHODAS: NA    Referral / Collaboration:  Referral to another professional/service is not indicated at this time..    Anticipated number of session or this episode of care: 5-10      MeasurableTreatment Goal(s) related to diagnosis / functional impairment(s)  Goal 1: Client will identify grief responses    I will know I've met my goal when decreased depression.      Objective #A Identify skills to manage grief   Client will Decrease frequency and intensity of feeling down, depressed, hopeless  Improve concentration, focus, and mindfulness in daily activities .  Status: Continued - Date(s):11/18/2020     Intervention(s)  Therapist will assign homework to facilitate growth toward goal.        Patient has reviewed and agreed to the above plan.      Phoebe Clement, [facility]  August 18, 2020
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
## 2020-08-28 DIAGNOSIS — K21.00 GASTROESOPHAGEAL REFLUX DISEASE WITH ESOPHAGITIS: ICD-10-CM

## 2020-08-28 RX ORDER — FAMOTIDINE 20 MG/1
TABLET, FILM COATED ORAL
Qty: 60 TABLET | Refills: 1 | OUTPATIENT
Start: 2020-08-28

## 2020-08-31 ENCOUNTER — THERAPY VISIT (OUTPATIENT)
Dept: PHYSICAL THERAPY | Facility: CLINIC | Age: 45
End: 2020-08-31
Attending: FAMILY MEDICINE
Payer: MEDICARE

## 2020-08-31 DIAGNOSIS — M25.511 PAIN IN JOINT OF RIGHT SHOULDER: ICD-10-CM

## 2020-08-31 DIAGNOSIS — M25.511 PAIN IN RIGHT ACROMIOCLAVICULAR JOINT: ICD-10-CM

## 2020-08-31 DIAGNOSIS — M75.41 IMPINGEMENT SYNDROME OF RIGHT SHOULDER: ICD-10-CM

## 2020-08-31 DIAGNOSIS — M75.81 TENDINITIS OF RIGHT ROTATOR CUFF: ICD-10-CM

## 2020-08-31 PROCEDURE — 97162 PT EVAL MOD COMPLEX 30 MIN: CPT | Mod: GP | Performed by: PHYSICAL THERAPIST

## 2020-08-31 PROCEDURE — 97110 THERAPEUTIC EXERCISES: CPT | Mod: GP | Performed by: PHYSICAL THERAPIST

## 2020-08-31 NOTE — PROGRESS NOTES
[facility] Initial Evaluation -- Upper Extremity    Evaluation Date: August 31, 2020  Prince Patel is a 45 year old female with a R shoulder condition.   Referral: Dr. Yeo  Work mechanical stresses:  /prolonged standing  Employment status:  Part time  Leisure mechanical stresses: exercise bike  Functional disability score (SPADI): see flow sheet  VAS score (0-10): 5/10  Handedness (R/L):  R  Patient goals/expectations:  To get rid of the pain in my shoulder    HISTORY    Present symptoms: R shoulder (front to back over top).    Pain quality (sharp/shooting/stabbing/aching/burning/cramping):  aching    Present since (onset date):  December 2019    Symptoms (improving/unchanging/worsening):  Had been improving after injection (today is more painful).    Symptoms commenced as a result of: falling on ice   Condition occurred in the following environment: apartment building    Symptoms at onset: R shoulder, some R neck pain  Paresthesia (yes/no):  occasional into R hand  Spinal history: some mild episodic neck issues   Cough/Sneeze (pos/neg):  neg    Constant symptoms: R shoulder  Intermittent symptoms: R hand numbness    Symptoms are worse with the following: Always Dressing, Always Reaching, Always Sleeping (prone/sup/side R/L) - on R side, Time of day - Sometimes AM and Other - lifting Always   Symptoms are better with the following: Always When still and Other - meds/Tyleol, injection    Continued use makes the pain (better/worse/no effect): worse    Disturbed night (yes/no):  Yes if sleeping on R side    Pain at rest (yes/no): yes  Site (neck/shoulder/elbow/wrist/hand): R shoulder, neck    Other questions (swelling/catching/clicking/locking/subluxing):  no    Previous episodes: none  Previous treatments: injection    Specific Questions:  General health (excellent/good/fair/poor):  good  Pertinent medical history includes: Migraines/Headaches and Overweight  Medications  "(nil/NSAIDS/analg/steroids/anticoag/other):  NSAIDS  Medical allergies:  none  Imaging (None/Xray/MRI/Other): xrays  Recent or major surgery (yes/no): no  Night pain (yes/no): yes  Accidents (yes/no): fell in December 2019  Unexplained weight loss (yes/no): no  Barriers at home: none  Other red flags: none    Sites for physical examination (neck/shoulder/elbow/wrist/hand): R shoulder    EXAMINATION    Posture:  Sitting (good/fair/poor): fair to poor  Correction of posture (better/worse/no effect/NA): no effect  Standing (good/fair/poor): fair  Other observations:  none    Neurological (NA/motor/sensory/reflexes/dural): +ULNTT R    Baselines (pain or functional activity): reaching overhead, extending neck    Extremities (Shoulder/Elbow/Wrist/Hand): R shoulder    Movement Loss Carson Mod Min Nil Pain   Flexion  120      Extension  48      Abduction  110      Internal Rotation    x    External Rotation    x    Supination        Pronation        Radial Deviation        Ulnar Deviation           Passive Movement (+/- overpressure)/(PDM/ERP):  erp w/passive ROM: full passive ROM but pdm w/flex, abd, EXT  Resisted Test Response (pain): full strength myotomes; mild pain  Other Tests:     Spine:  Movement Loss: R SB=min to mod loss, erp, RET=min loss, erp neck; R Rot=min loss erp, EXT=min to nil loss, incr R shoulder  Effect of repeated movements: rep RET + pt. Op: P \"pinch\" R ant shoulder, P neck/NW/NE ROM arm or neck; rep RET/EXT=P/NW/NE ROM; rep R SB; decr R shoulder \"pinch\"/better, incr R Rot, incr R shoulder Flex w/decreased pain.  Effect of static positioning:   Spine testing (not relevant/relevant/secondary problem): RELEVANT    Baseline Symptoms: not tested due to response from spinal testing  Repeated Tests Symptom Response Mechanical Response   Active/Passive movement, resisted test, functional test During - Produce, Abolish, Increase, Decrease, NE After -   Better, Worse, NB, NW, NE Effect -   ? or ? ROM, strength or " key functional test No Effect                               Effect of static positioning                  Provisional Classification (Extremity/Spine): Spine - Derangement - Asymmetrical, unilateral, symptoms below elbow and Extremity - other      Principle of Management:  Education:  Posture, centralization, traffic light, therapeutic dose of exercise  Equipment provided:  Suggested lumbar roll  Exercise and dosage:  Rep R SB x 10/2 hrs, adding pt. Op if plateau or no effect    ASSESSMENT/PLAN:    Patient is a 45 year old female with cervical and right side shoulder complaints.    Patient has the following significant findings with corresponding treatment plan.                Diagnosis 1:  R shoulder pain  Pain -  hot/cold therapy, manual therapy, self management, education, directional preference exercise and home program  Decreased ROM/flexibility - manual therapy and therapeutic exercise  Inflammation - cold therapy  Decreased function - therapeutic activities  Impaired posture - neuro re-education    Therapy Evaluation Codes:   1) History comprised of:   Personal factors that impact the plan of care:      None.    Comorbidity factors that impact the plan of care are:      Migraines/headaches and Overweight.     Medications impacting care: Anti-inflammatory.  2) Examination of Body Systems comprised of:   Body structures and functions that impact the plan of care:      Cervical spine and Shoulder.   Activity limitations that impact the plan of care are:      Dressing, Lifting, Sitting, Working and Sleeping.  3) Clinical presentation characteristics are:   Evolving/Changing.  4) Decision-Making    Moderate complexity using standardized patient assessment instrument and/or measureable assessment of functional outcome.  Cumulative Therapy Evaluation is: Moderate complexity.    Previous and current functional limitations:  (See Goal Flow Sheet for this information)    Short term and Long term goals: (See Goal Flow  Sheet for this information)     Communication ability:  Patient appears to be able to clearly communicate and understand verbal and written communication and follow directions correctly.  Treatment Explanation - The following has been discussed with the patient:   RX ordered/plan of care  Anticipated outcomes  Possible risks and side effects  This patient would benefit from PT intervention to resume normal activities.   Rehab potential is good.    Frequency:  2 X week, once daily  Duration:  for 2 weeks tapering to 1 X a week over 2 weeks  Discharge Plan:  Achieve all LTG.  Independent in home treatment program.  Reach maximal therapeutic benefit.    Please refer to the daily flowsheet for treatment today, total treatment time and time spent performing 1:1 timed codes.   Answers for HPI/ROS submitted by the patient on 8/31/2020   History Reported by Patient  Reason for Visit:: Counceling/grief of lost ones  General health as reported by patient: good  Please check all that apply to your current or past medical history: migraines/headaches, overweight  Medical allergies: none  Surgeries: other  Other Surgery Detail: partial historectomy  Medications you are currently taking: pain medication  Occupation:: part time   What are your primary job tasks: prolonged standing

## 2020-08-31 NOTE — LETTER
DEPARTMENT OF HEALTH AND HUMAN SERVICES  CENTERS FOR MEDICARE & MEDICAID SERVICES    PLAN/UPDATED PLAN OF PROGRESS FOR OUTPATIENT REHABILITATION@       PATIENTS NAME:  Prince Patel   : 1975  PROVIDER NUMBER:    [id number]  HICN:[id number]   PROVIDER NAME: DREW JULES PT  MEDICAL RECORD NUMBER: [id number]   START OF CARE DATE:  SOC Date: 20   TYPE:  PT  PRIMARY/TREATMENT DIAGNOSIS: (Pertinent Medical Diagnosis)  Tendinitis of right rotator cuff  Impingement syndrome of right shoulder  Pain in right acromioclavicular joint  Pain in joint of right shoulder  VISITS FROM START OF CARE:  Rxs Used: 1     [facility] Initial Evaluation -- Upper Extremity    Evaluation Date: 2020  Prince Patel is a 45 year old female with a R shoulder condition.   Referral: Dr. Yeo  Work mechanical stresses:  /prolonged standing  Employment status:  Part time  Leisure mechanical stresses: exercise bike  Functional disability score (SPADI): see flow sheet  VAS score (0-10): 5/10  Handedness (R/L):  R  Patient goals/expectations:  To get rid of the pain in my shoulder    HISTORY    Present symptoms: R shoulder (front to back over top).    Pain quality (sharp/shooting/stabbing/aching/burning/cramping):  aching    Present since (onset date):  2019    Symptoms (improving/unchanging/worsening):  Had been improving after injection (today is more painful).    Symptoms commenced as a result of: falling on ice   Condition occurred in the following environment: apartment building    Symptoms at onset: R shoulder, some R neck pain  Paresthesia (yes/no):  occasional into R hand  Spinal history: some mild episodic neck issues   Cough/Sneeze (pos/neg):  neg    Constant symptoms: R shoulder  Intermittent symptoms: R hand numbness      PATIENTS NAME:  Prince Patel   : 1975    Symptoms are worse with the following: Always Dressing, Always Reaching, Always Sleeping  (prone/sup/side R/L) - on R side, Time of day - Sometimes AM and Other - lifting Always   Symptoms are better with the following: Always When still and Other - meds/Tyleol, injection    Continued use makes the pain (better/worse/no effect): worse    Disturbed night (yes/no):  Yes if sleeping on R side    Pain at rest (yes/no): yes  Site (neck/shoulder/elbow/wrist/hand): R shoulder, neck    Other questions (swelling/catching/clicking/locking/subluxing):  no    Previous episodes: none  Previous treatments: injection    Specific Questions:  General health (excellent/good/fair/poor):  good  Pertinent medical history includes: Migraines/Headaches and Overweight  Medications (nil/NSAIDS/analg/steroids/anticoag/other):  NSAIDS  Medical allergies:  none  Imaging (None/Xray/MRI/Other): xrays  Recent or major surgery (yes/no): no  Night pain (yes/no): yes  Accidents (yes/no): fell in 2019  Unexplained weight loss (yes/no): no  Barriers at home: none  Other red flags: none    Sites for physical examination (neck/shoulder/elbow/wrist/hand): R shoulder    EXAMINATION    Posture:  Sitting (good/fair/poor): fair to poor  Correction of posture (better/worse/no effect/NA): no effect  Standing (good/fair/poor): fair  Other observations:  none    Neurological (NA/motor/sensory/reflexes/dural): +ULNTT R    Baselines (pain or functional activity): reaching overhead, extending neck                PATIENTS NAME:  Prince Patel   : 1975    Extremities (Shoulder/Elbow/Wrist/Hand): R shoulder    Movement Loss [place] Mod Min Nil Pain   Flexion  120      Extension  48      Abduction  110      Internal Rotation    x    External Rotation    x    Supination        Pronation        Radial Deviation        Ulnar Deviation           Passive Movement (+/- overpressure)/(PDM/ERP):  erp w/passive ROM: full passive ROM but pdm w/flex, abd, EXT  Resisted Test Response (pain): full strength myotomes; mild pain  Other Tests:  "    Spine:  Movement Loss: R SB=min to mod loss, erp, RET=min loss, erp neck; R Rot=min loss erp, EXT=min to nil loss, incr R shoulder  Effect of repeated movements: rep RET + pt. Op: P \"pinch\" R ant shoulder, P neck/NW/NE ROM arm or neck; rep RET/EXT=P/NW/NE ROM; rep R SB; decr R shoulder \"pinch\"/better, incr R Rot, incr R shoulder Flex w/decreased pain.  Effect of static positioning:   Spine testing (not relevant/relevant/secondary problem): RELEVANT    Baseline Symptoms: not tested due to response from spinal testing  Repeated Tests Symptom Response Mechanical Response   Active/Passive movement, resisted test, functional test During - Produce, Abolish, Increase, Decrease, NE After -   Better, Worse, NB, NW, NE Effect -   ? or ? ROM, strength or key functional test No Effect                               Effect of static positioning                    PATIENTS NAME:  Prince Patel   : 1975    Provisional Classification (Extremity/Spine): Spine - Derangement - Asymmetrical, unilateral, symptoms below elbow and Extremity - other      Principle of Management:  Education:  Posture, centralization, traffic light, therapeutic dose of exercise  Equipment provided:  Suggested lumbar roll  Exercise and dosage:  Rep R SB x 10/2 hrs, adding pt. Op if plateau or no effect    ASSESSMENT/PLAN:    Patient is a 45 year old female with cervical and right side shoulder complaints.    Patient has the following significant findings with corresponding treatment plan.                Diagnosis 1:  R shoulder pain  Pain -  hot/cold therapy, manual therapy, self management, education, directional preference exercise and home program  Decreased ROM/flexibility - manual therapy and therapeutic exercise  Inflammation - cold therapy  Decreased function - therapeutic activities  Impaired posture - neuro re-education    Therapy Evaluation Codes:   1) History comprised of:   Personal factors that impact the plan of care:      None. "    Comorbidity factors that impact the plan of care are:      Migraines/headaches and Overweight.     Medications impacting care: Anti-inflammatory.  2) Examination of Body Systems comprised of:   Body structures and functions that impact the plan of care:      Cervical spine and Shoulder.   Activity limitations that impact the plan of care are:      Dressing, Lifting, Sitting, Working and Sleeping.  3) Clinical presentation characteristics are:   Evolving/Changing.  4) Decision-Making    Moderate complexity using standardized patient assessment instrument and/or measureable assessment of functional outcome.  Cumulative Therapy Evaluation is: Moderate complexity.    Previous and current functional limitations:  (See Goal Flow Sheet for this information)    Short term and Long term goals: (See Goal Flow Sheet for this information)     Communication ability:  Patient appears to be able to clearly communicate and understand verbal and written communication and follow directions correctly.  Treatment Explanation - The following has been discussed with the patient:   RX ordered/plan of care  Anticipated outcomes  Possible risks and side effects  This patient would benefit from PT intervention to resume normal activities.   Rehab potential is good.      PATIENTS NAME:  Prince Patel   : 1975    Frequency:  2 X week, once daily  Duration:  for 2 weeks tapering to 1 X a week over 2 weeks  Discharge Plan:  Achieve all LTG.  Independent in home treatment program.  Reach maximal therapeutic benefit.    Please refer to the daily flowsheet for treatment today, total treatment time and time spent performing 1:1 timed codes.   Answers for HPI/ROS submitted by the patient on 2020   History Reported by Patient  Reason for Visit:: Counceling/grief of lost ones  General health as reported by patient: good  Please check all that apply to your current or past medical history: migraines/headaches, overweight  Medical  "allergies: none  Surgeries: other  Other Surgery Detail: partial historectomy  Medications you are currently taking: pain medication  Occupation:: part time   What are your primary job tasks: prolonged standing      Caregiver Signature/Credentials _____________________________ Date ________       Treating Provider: Bernie Manuel PT, Cert MDT     I have reviewed and certified the need for these services and plan of treatment while under my care.        PHYSICIAN'S SIGNATURE:   _____________________________________  Date___________   Albert Yeo, MD    Certification period:  Beginning of Cert date period: 08/31/20 to  End of Cert period date: 11/29/20     Functional Level Progress Report: Please see attached \"Goal Flow sheet for Functional level.\"    ____X____ Continue Services or       ________ DC Services                Service dates: From  SOC Date: 08/31/20 date to present                         "

## 2020-09-01 ENCOUNTER — VIRTUAL VISIT (OUTPATIENT)
Dept: BEHAVIORAL HEALTH | Facility: CLINIC | Age: 45
End: 2020-09-01
Payer: MEDICARE

## 2020-09-01 DIAGNOSIS — F33.9 DEPRESSION, MAJOR, RECURRENT (H): Primary | ICD-10-CM

## 2020-09-01 PROCEDURE — 90832 PSYTX W PT 30 MINUTES: CPT | Mod: 95 | Performed by: SOCIAL WORKER

## 2020-09-01 NOTE — PROGRESS NOTES
[facility] Primary Care: Integrated Behavioral Health  September 1, 2020      Behavioral Health Clinician Progress Note    Patient Name: Prince Patel           Service Type:  Individual      Service Location:   Face to Face in Home / Community video     Session Start Time: 830AM  Session End Time: 900AM      Session Length: 16 - 37      Attendees: Client    Visit Activities (Refresh list every visit): [facility] Only    Diagnostic Assessment Date: 08/18/2020  Treatment Plan Review Date: 11/18/2020  See Flowsheets for today's PHQ-9 and KATHLEEN-7 results  Previous PHQ-9:   PHQ-9 SCORE 11/17/2014 9/2/2015 8/18/2020   PHQ-9 Total Score 1 - -   PHQ-9 Total Score - 0 2     Previous KATHLEEN-7:   KATHLEEN-7 SCORE 8/26/2013 10/1/2013 11/17/2014   Total Score 1 0 0       BINDU LEVEL:  BINDU Score (Last Two) 3/5/2020   BINDU Raw Score 37   Activation Score 79.2   BINDU Level 4       DATA  Extended Session (60+ minutes): No  Interactive Complexity: No  Crisis: No  [facility] Patient: No    Treatment Objective(s) Addressed in This Session:  Target Behavior(s): apporpriate ways to grieve    Grief / Loss: will engage in effective approach to address and resolve grief/loss issues    Current Stressors / Issues:  Ongoing difficulty grieving multiple deaths. Especially her grandfather who she didn't get to say goodbye. Explored some alternative such as writing a letter. Receptive. She and her best friends brother are going to plan a hotdish dinner to memorialize her friend. I encouraged this as a positive way to remember her friend. Some concerns about her job. She was working pt but has been furloughed and return to work date keeps getting moved. Frustrated. She's thinking about looking for a second or different job. Also,wanting help getting her apartment organized. 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 and state. I suggested she contact her  to investigate her options.        Progress on Treatment Objective(s) / Homework:  Satisfactory progress - PREPARATION (Decided to change - considering how); Intervened by negotiating a change plan and determining options / strategies for behavior change, identifying triggers, exploring social supports, and working towards setting a date to begin behavior change    Suggested practical options to problem solve    Care Plan review completed: No    Medication Review:  No changes to current psychiatric medication(s)    Medication Compliance:  Yes    Changes in Health Issues:   None reported    Chemical Use Review:   Substance Use: Chemical use reviewed, no active concerns identified      Tobacco Use: No current tobacco use.      Assessment: Current Emotional / Mental Status (status of significant symptoms):  Risk status (Self / Other harm or suicidal ideation)  Patient denies a history of suicidal ideation, suicide attempts, self-injurious behavior, homicidal ideation, homicidal behavior and and other safety concerns  Patient denies current fears or concerns for personal safety.  Patient denies current or recent suicidal ideation or behaviors.  Patient denies current or recent homicidal ideation or behaviors.  Patient denies current or recent self injurious behavior or ideation.  Patient denies other safety concerns.  A safety and risk management plan has not been developed at this time, however patient was encouraged to call [place] [phone] should there be a change in any of these risk factors.    Appearance:   Appropriate   Eye Contact:   Good   Psychomotor Behavior: Normal   Attitude:   Cooperative   Orientation:   All  Speech   Rate / Production: Normal    Volume:  Normal   Mood:    Normal  Affect:    Appropriate   Thought Content:  Clear   Thought Form:  Coherent  Logical   Insight:    Good     Diagnoses:  1. Depression, major, recurrent (H)        Collateral Reports Completed:  Not Applicable    Plan: (Homework, other):  Patient was given  information about behavioral services and encouraged to schedule a follow up appointment with the clinic [facility] in 2 weeks.  She was also given NA.  CD Recommendations: No indications of CD issues.  Phoebe Clement [facility]      ______________________________________________________________________    Integrated Primary Care Behavioral Health Treatment Plan    Patient's Name: Prince Patel  YOB: 1975    Date: 09/01/2020    DSM-V Diagnoses: 296.31 (F33.0) Major Depressive Disorder, Recurrent Episode, Mild _ and With anxious distress  Psychosocial / Contextual Factors: grief issues  WHODAS: 15    Referral / Collaboration:  Referral to another professional/service is not indicated at this time..    Anticipated number of session or this episode of care: 5-8      MeasurableTreatment Goal(s) related to diagnosis / functional impairment(s)  Goal 1: Patient will identify grief response   will know experiences decrease depression     Objective #A (Patient Action)    Patient will Increase interest, engagement, and pleasure in doing things  Decrease frequency and intensity of feeling down, depressed, hopeless  Feel less tired and more energy during the day   Identify negative self-talk and behaviors: challenge core beliefs, myths, and actions.  Status: Continued - Date(s):11/18/2020         Patient has reviewed and agreed to the above plan.      Phoebe Clement, [facility]  September 1, 2020

## 2020-09-09 ENCOUNTER — THERAPY VISIT (OUTPATIENT)
Dept: PHYSICAL THERAPY | Facility: CLINIC | Age: 45
End: 2020-09-09
Payer: MEDICARE

## 2020-09-09 DIAGNOSIS — M25.511 PAIN IN JOINT OF RIGHT SHOULDER: ICD-10-CM

## 2020-09-09 PROCEDURE — 97112 NEUROMUSCULAR REEDUCATION: CPT | Mod: GP | Performed by: PHYSICAL THERAPIST

## 2020-09-09 PROCEDURE — 97140 MANUAL THERAPY 1/> REGIONS: CPT | Mod: GP | Performed by: PHYSICAL THERAPIST

## 2020-09-09 PROCEDURE — 97110 THERAPEUTIC EXERCISES: CPT | Mod: GP | Performed by: PHYSICAL THERAPIST

## 2020-09-11 ENCOUNTER — THERAPY VISIT (OUTPATIENT)
Dept: PHYSICAL THERAPY | Facility: CLINIC | Age: 45
End: 2020-09-11
Payer: MEDICARE

## 2020-09-11 DIAGNOSIS — M25.511 PAIN IN JOINT OF RIGHT SHOULDER: ICD-10-CM

## 2020-09-11 PROCEDURE — 97112 NEUROMUSCULAR REEDUCATION: CPT | Mod: GP | Performed by: PHYSICAL THERAPIST

## 2020-09-11 PROCEDURE — 97110 THERAPEUTIC EXERCISES: CPT | Mod: GP | Performed by: PHYSICAL THERAPIST

## 2020-09-11 NOTE — PROGRESS NOTES
[facility] Primary Care: Integrated Behavioral Health  September 14, 2020      Behavioral Health Clinician Progress Note    Patient Name: Prince Patel  Telemedicine Visit: The patient's condition can be safely assessed and treated via synchronous audio and visual telemedicine encounter.      Reason for Telemedicine Visit: Patient has requested telehealth visit    Originating Site (Patient Location): Patient's home    Distant Site (Provider Location): Provider Remote Setting    Consent:  The patient/guardian has verbally consented to: the potential risks and benefits of telemedicine (video visit) versus in person care; bill my insurance or make self-payment for services provided; and responsibility for payment of non-covered services.     Mode of Communication:  Video Conference via [employer]    As the provider I attest to compliance with applicable laws and regulations related to telemedicine.             Service Type:  Individual      Service Location:   Face to Face in Home / Community video     Session Start Time: 930AM  Session End Time: 955AM      Session Length: 16 - 37      Attendees: Client    Visit Activities (Refresh list every visit): [facility] Only    Diagnostic Assessment Date: 08/18/2020  Treatment Plan Review Date: 11/18/2020  See Flowsheets for today's PHQ-9 and KATHLEEN-7 results  Previous PHQ-9:   PHQ-9 SCORE 11/17/2014 9/2/2015 8/18/2020   PHQ-9 Total Score 1 - -   PHQ-9 Total Score - 0 2     Previous KATHLEEN-7:   KATHLEEN-7 SCORE 8/26/2013 10/1/2013 11/17/2014   Total Score 1 0 0       BINDU LEVEL:  BINUD Score (Last Two) 3/5/2020   BINDU Raw Score 37   Activation Score 79.2   BINDU Level 4       DATA  Extended Session (60+ minutes): No  Interactive Complexity: No  Crisis: No  [facility] Patient: No    Treatment Objective(s) Addressed in This Session:  Target Behavior(s): apporpriate ways to grieve    Grief / Loss: will engage in effective approach to address and resolve grief/loss issues    Current  Stressors / Issues:   Reports normal mood. Denies anxiety. Older cousin  suddenly. They were not close but she will go to the  if mother goes. Went out in public for first time in awhile. Took proper precautions and felt relatively safe. Continues to look for another job. Hoping to find something close to her.  is helping her. Is still expecting to get called back to her other job but keeps get pushed backed. Frustrated. Problem solved ways to manage frustration. States that keeping busy with cleaning and doing crafts. 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 Sees parents every weekend unless she's not feeling well. Doesn't get to see friends often but doesn't feel too isolated. Will be getting a new agency to work with. Explored ways to manage anxiety around this. Some financial stress due to being off work.       Progress on Treatment Objective(s) / Homework:  Satisfactory progress - PREPARATION (Decided to change - considering how); Intervened by negotiating a change plan and determining options / strategies for behavior change, identifying triggers, exploring social supports, and working towards setting a date to begin behavior change    Client centered, solution focused.     Care Plan review completed: No    Medication Review:  No changes to current psychiatric medication(s)    Medication Compliance:  Yes    Changes in Health Issues:   None reported    Chemical Use Review:   Substance Use: Chemical use reviewed, no active concerns identified      Tobacco Use: No current tobacco use.      Assessment: Current Emotional / Mental Status (status of significant symptoms):  Risk status (Self / Other harm or suicidal ideation)  Patient denies a history of suicidal ideation, suicide attempts, self-injurious behavior, homicidal ideation, homicidal behavior and and other safety concerns  Patient denies current fears or concerns for personal  safety.  Patient denies current or recent suicidal ideation or behaviors.  Patient denies current or recent homicidal ideation or behaviors.  Patient denies current or recent self injurious behavior or ideation.  Patient denies other safety concerns.  A safety and risk management plan has not been developed at this time, however patient was encouraged to call [place] [phone] should there be a change in any of these risk factors.    Appearance:   Appropriate   Eye Contact:   Good   Psychomotor Behavior: Normal   Attitude:   Cooperative   Orientation:   All  Speech   Rate / Production: Normal    Volume:  Normal   Mood:    Normal  Affect:    Appropriate   Thought Content:  Clear   Thought Form:  Coherent  Logical   Insight:    Good     Diagnoses:  1. Depression, major, recurrent (H)        Collateral Reports Completed:  Not Applicable    Plan: (Homework, other):  Patient was given information about behavioral services and encouraged to schedule a follow up appointment with the clinic [facility] in 2 weeks.  She was also given NA.  CD Recommendations: No indications of CD issues.  Phoebe Clement [facility]      ______________________________________________________________________    Integrated Primary Care Behavioral Health Treatment Plan    Patient's Name: Prince Patel  YOB: 1975    Date: 09/14/2020    DSM-V Diagnoses: 296.31 (F33.0) Major Depressive Disorder, Recurrent Episode, Mild _ and With anxious distress  Psychosocial / Contextual Factors: grief issues  WHODAS: 15    Referral / Collaboration:  Referral to another professional/service is not indicated at this time..    Anticipated number of session or this episode of care: 5-8      MeasurableTreatment Goal(s) related to diagnosis / functional impairment(s)  Goal 1: Patient will identify grief response   will know experiences decrease depression     Objective #A (Patient Action)    Patient will Increase interest, engagement, and pleasure in doing  things  Decrease frequency and intensity of feeling down, depressed, hopeless  Feel less tired and more energy during the day   Identify negative self-talk and behaviors: challenge core beliefs, myths, and actions.  Status: Continued - Date(s):11/18/2020         Patient has reviewed and agreed to the above plan.      MIGNON Decker  September 14, 2020

## 2020-09-14 ENCOUNTER — VIRTUAL VISIT (OUTPATIENT)
Dept: BEHAVIORAL HEALTH | Facility: CLINIC | Age: 45
End: 2020-09-14
Payer: MEDICARE

## 2020-09-14 DIAGNOSIS — F33.9 DEPRESSION, MAJOR, RECURRENT (H): Primary | ICD-10-CM

## 2020-09-14 PROCEDURE — 90832 PSYTX W PT 30 MINUTES: CPT | Mod: 95 | Performed by: SOCIAL WORKER

## 2020-09-17 ENCOUNTER — THERAPY VISIT (OUTPATIENT)
Dept: PHYSICAL THERAPY | Facility: CLINIC | Age: 45
End: 2020-09-17
Payer: MEDICARE

## 2020-09-17 DIAGNOSIS — M25.511 PAIN IN JOINT OF RIGHT SHOULDER: ICD-10-CM

## 2020-09-17 PROCEDURE — 97112 NEUROMUSCULAR REEDUCATION: CPT | Mod: GP | Performed by: PHYSICAL THERAPIST

## 2020-09-17 PROCEDURE — 97110 THERAPEUTIC EXERCISES: CPT | Mod: GP | Performed by: PHYSICAL THERAPIST

## 2020-09-22 ENCOUNTER — THERAPY VISIT (OUTPATIENT)
Dept: PHYSICAL THERAPY | Facility: CLINIC | Age: 45
End: 2020-09-22
Payer: MEDICARE

## 2020-09-22 DIAGNOSIS — M25.511 PAIN IN JOINT OF RIGHT SHOULDER: ICD-10-CM

## 2020-09-22 PROCEDURE — 97112 NEUROMUSCULAR REEDUCATION: CPT | Mod: GP | Performed by: PHYSICAL THERAPIST

## 2020-09-22 PROCEDURE — 97110 THERAPEUTIC EXERCISES: CPT | Mod: GP | Performed by: PHYSICAL THERAPIST

## 2020-09-25 NOTE — PROGRESS NOTES
"[facility] Primary Care: Integrated Behavioral Health  September 28, 2020      Behavioral Health Clinician Progress Note    Patient Name: Prince Patel  The patient has been notified of the following:      \"We have found that certain health care needs can be provided without the need for a face to face visit.  This service lets us provide the care you need with a phone conversation.       I will have full access to your [place] medical record during this entire phone call.   I will be taking notes for your medical record.      Since this is like an office visit, we will bill your insurance company for this service.       There are potential benefits and risks of telephone visits (e.g. limits to patient confidentiality) that differ from in-person visits.?  Confidentiality still applies for telephone services, and nobody will record the visit.  It is important to be in a quiet, private space that is free of distractions (including cell phone or other devices) during the visit.??      If during the course of the call I believe a telephone visit is not appropriate, you will not be charged for this service\"     Consent has been obtained for this service by care team member: Yes              Service Type:  Individual      Service Location:   Phone call (patient / identified key support person reached)      Session Start Time: 403PM  Session End Time: 420      Session Length: 16 - 37      Attendees: Client    Visit Activities (Refresh list every visit): [facility] Only    Diagnostic Assessment Date: 08/18/2020  Treatment Plan Review Date: 11/18/2020  See Flowsheets for today's PHQ-9 and KATHLEEN-7 results  Previous PHQ-9:   PHQ-9 SCORE 11/17/2014 9/2/2015 8/18/2020   PHQ-9 Total Score 1 - -   PHQ-9 Total Score - 0 2     Previous KATHLEEN-7:   KATHLEEN-7 SCORE 8/26/2013 10/1/2013 11/17/2014   Total Score 1 0 0       BINDU LEVEL:  BINDU Score (Last Two) 3/5/2020   BINDU Raw Score 37   Activation Score 79.2   BINDU Level 4 "       DATA  Extended Session (60+ minutes): No  Interactive Complexity: No  Crisis: No  [facility] Patient: No    Treatment Objective(s) Addressed in This Session:  Target Behavior(s): apporpriate ways to grieve    Grief / Loss: will engage in effective approach to address and resolve grief/loss issues    Current Stressors / Issues:  Reports normal mood. No crying spells. 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 Going well. Unsure if/when she'll return to other job which is also part time. Feels grief around grandfathers death is improving. Using supports. Still difficult for mother (her father). Explored ways she can memorialize her grandfather. Unsure. She and mother of her friend who  are planning an event to memorialize her in the Spring.       Progress on Treatment Objective(s) / Homework:  Satisfactory progress - PREPARATION (Decided to change - considering how); Intervened by negotiating a change plan and determining options / strategies for behavior change, identifying triggers, exploring social supports, and working towards setting a date to begin behavior change    Client centered, solution focused.     Care Plan review completed: No    Medication Review:  No changes to current psychiatric medication(s)    Medication Compliance:  Yes    Changes in Health Issues:   None reported    Chemical Use Review:   Substance Use: Chemical use reviewed, no active concerns identified      Tobacco Use: No current tobacco use.      Assessment: Current Emotional / Mental Status (status of significant symptoms):  Risk status (Self / Other harm or suicidal ideation)  Patient denies a history of suicidal ideation, suicide attempts, self-injurious behavior, homicidal ideation, homicidal behavior and and other safety concerns  Patient denies current fears or concerns for personal safety.  Patient denies current or recent suicidal ideation or behaviors.  Patient denies current or recent homicidal ideation or  behaviors.  Patient denies current or recent self injurious behavior or ideation.  Patient denies other safety concerns.  A safety and risk management plan has not been developed at this time, however patient was encouraged to call [place] [phone] should there be a change in any of these risk factors.    Appearance:   Appropriate   Eye Contact:   Good   Psychomotor Behavior: Normal   Attitude:   Cooperative   Orientation:   All  Speech   Rate / Production: Normal    Volume:  Normal   Mood:    Normal  Affect:    Appropriate   Thought Content:  Clear   Thought Form:  Coherent  Logical   Insight:    Good     Diagnoses:  1. Depression, major, recurrent (H)        Collateral Reports Completed:  Not Applicable    Plan: (Homework, other):  Patient was given information about behavioral services and encouraged to schedule a follow up appointment with the clinic [facility] in 2 weeks.  She was also given NA.  CD Recommendations: No indications of CD issues.  Phoeeb Clement [facility]      ______________________________________________________________________    Integrated Primary Care Behavioral Health Treatment Plan    Patient's Name: Prince Patel  YOB: 1975    Date: 08/18/2020    DSM-V Diagnoses: 296.31 (F33.0) Major Depressive Disorder, Recurrent Episode, Mild _ and With anxious distress  Psychosocial / Contextual Factors: grief issues  WHODAS: 15    Referral / Collaboration:  Referral to another professional/service is not indicated at this time..    Anticipated number of session or this episode of care: 5-8      MeasurableTreatment Goal(s) related to diagnosis / functional impairment(s)  Goal 1: Patient will identify grief response   will know experiences decrease depression     Objective #A (Patient Action)    Patient will Increase interest, engagement, and pleasure in doing things  Decrease frequency and intensity of feeling down, depressed, hopeless  Feel less tired and more energy during the day    Identify negative self-talk and behaviors: challenge core beliefs, myths, and actions.  Status: Continued - Date(s):11/18/2020         Patient has reviewed and agreed to the above plan.      MIGNON Decker  September 28, 2020

## 2020-09-27 NOTE — PROGRESS NOTES
Pre-Visit Planning     Future Appointments   Date Time Provider Department Center   9/28/2020  4:00 PM Phoebe Clement, LICSW CR CR   9/29/2020  3:10 PM Haase, Susan Rachele, APRN CNP [facility] CR   10/1/2020  2:40 PM Bernie Manuel, PT [facility] [place]   2/5/2021 10:30 AM Haase, Susan Rachele, APRN CNP [facility] CR     Arrival Time for this Appointment:  3:00 PM   Appointment Notes for this encounter:   sh reviewed  plantar wart, grief    Questionnaires Reviewed/Assigned  No additional questionnaires are needed    Hospital/ER visits since last pcp appt? NO     Imaging: MRI shoulder 2/2020, followed up with sports med 3/6, 4/3, 8/16, and continues to do PT   Lab review: No concerns per RN review     MyChart  Patient is active on MyChart.    Specialty Visits - [facility] 8/18, 9/1, 9/14,     Health Maintenance Due   Topic Date Due     HIV SCREENING  05/31/1992     INFLUENZA VACCINE (1) 09/01/2020     Patient preferred phone number: [phone]    BILLIE HAWKNIS sent my chart message with Previsit Planning questions and awaiting patient response - see patient response below   It s for possible ingrown toenails and my flu shot (BILLIE HAWKINS ADDED TO APPT NOTE)     Vicky Acosta Registered Nurse, PAL (Patient Advocate Liason)   [facility]   [phone]

## 2020-09-28 ENCOUNTER — VIRTUAL VISIT (OUTPATIENT)
Dept: BEHAVIORAL HEALTH | Facility: CLINIC | Age: 45
End: 2020-09-28
Payer: MEDICARE

## 2020-09-28 DIAGNOSIS — F33.9 DEPRESSION, MAJOR, RECURRENT (H): Primary | ICD-10-CM

## 2020-09-28 PROCEDURE — 90832 PSYTX W PT 30 MINUTES: CPT | Mod: 95 | Performed by: SOCIAL WORKER

## 2020-09-29 ENCOUNTER — OFFICE VISIT (OUTPATIENT)
Dept: FAMILY MEDICINE | Facility: CLINIC | Age: 45
End: 2020-09-29
Payer: MEDICARE

## 2020-09-29 VITALS
DIASTOLIC BLOOD PRESSURE: 86 MMHG | OXYGEN SATURATION: 98 % | SYSTOLIC BLOOD PRESSURE: 114 MMHG | BODY MASS INDEX: 39.22 KG/M2 | HEART RATE: 78 BPM | WEIGHT: 220 LBS | TEMPERATURE: 98.1 F

## 2020-09-29 DIAGNOSIS — B07.0 PLANTAR WARTS: ICD-10-CM

## 2020-09-29 DIAGNOSIS — L60.0 INGROWN TOENAIL OF BOTH FEET: Primary | ICD-10-CM

## 2020-09-29 DIAGNOSIS — Z23 NEED FOR PROPHYLACTIC VACCINATION AND INOCULATION AGAINST INFLUENZA: ICD-10-CM

## 2020-09-29 PROCEDURE — 17110 DESTRUCTION B9 LES UP TO 14: CPT | Performed by: NURSE PRACTITIONER

## 2020-09-29 PROCEDURE — 90686 IIV4 VACC NO PRSV 0.5 ML IM: CPT | Performed by: NURSE PRACTITIONER

## 2020-09-29 PROCEDURE — G0008 ADMIN INFLUENZA VIRUS VAC: HCPCS | Performed by: NURSE PRACTITIONER

## 2020-09-29 PROCEDURE — 99213 OFFICE O/P EST LOW 20 MIN: CPT | Mod: 25 | Performed by: NURSE PRACTITIONER

## 2020-09-29 RX ORDER — AMOXICILLIN 875 MG
875 TABLET ORAL 2 TIMES DAILY
Qty: 10 TABLET | Refills: 0 | Status: SHIPPED | OUTPATIENT
Start: 2020-09-29 | End: 2020-10-04

## 2020-09-29 NOTE — PROGRESS NOTES
Subjective     Prince Patel is a 45 year old female who presents to clinic today for the following health issues:    HPI       Musculoskeletal problem/pain  Onset/Duration: 1 month  Description  Location: big toe - bilateral  Joint Swelling: YES  Redness: YES  Pain: YES  Warmth: no  Intensity:  moderate, severe  Progression of Symptoms:  same  Accompanying signs and symptoms:   Fevers: no  Numbness/tingling/weakness: no  History  Trauma to the area: no  Recent illness:  no  Previous similar problem: YES - 25 years ago  Previous evaluation:  no  Precipitating or alleviating factors:  Aggravating factors include: none  Therapies tried and outcome: soaking feet - on feet all day, scant purulent drainage.      Wart:  Right heel., last treatment 4 weeks ago, decrease in size.     Review of Systems   CONSTITUTIONAL: NEGATIVE for fever, chills, change in weight  CV: NEGATIVE for chest pain, palpitations or peripheral edema  MUSCULOSKELETAL: see HPI  PSYCHIATRIC: depression and grief improved with counseling.       Objective    LMP 10/31/2011   Body mass index is 39.22 kg/m .   /86 (BP Location: Right arm, Patient Position: Sitting, Cuff Size: Adult Large)   Pulse 78   Temp 98.1  F (36.7  C) (Oral)   Wt 99.8 kg (220 lb)   LMP 10/31/2011   SpO2 98%   BMI 39.22 kg/m    Physical Exam   GENERAL: healthy, alert and no distress  RESP: lungs clear to auscultation - no rales, rhonchi or wheezes  CV: regular rate and rhythm, normal S1 S2, no S3 or S4, no murmur, click or rub, no peripheral edema   MS: medial aspect of bilateral great toes with tenderness upon palpation and slight surrounding erythema, right toe with scan purulent drainage.   Heel of right  foot with 5 mm flesh colored wart no gross musculoskeletal defects noted, no edema  PSYCH: mentation appears normal, affect normal/bright          Assessment & Plan     Ingrown toenail of both feet: will place on an antibiotic, right toe with scant purulent drainage  and erythema. Continue to soak the foot several times per day. Referral placed for podiatry.  - [facility]opedic & Spine  Referral  - amoxicillin (AMOXIL) 875 MG tablet  Dispense: 10 tablet; Refill: 0    Plantar warts; right heel  - DESTRUCT BENIGN LESION, UP TO 14  Procedure explained to patient, verbal consent given, cleansed with alcohol, pared with #15 blade, liquid nitrogen applied x 3 pulses (5 sec each), bandaide placed.  Explained that may take more than 1 treatment, keep covered for next 24 hours, if wart remains return in 3-4 weeks for an additional treatment.  Need for prophylactic vaccination and inoculation against influenza  - INFLUENZA VACCINE IM > 6 MONTHS VALENT IIV4 [43180]        FUTURE APPOINTMENTS:       - Follow-up visit in 6 months, sooner as needed.     Susan Haase, APRN [facility] [place]

## 2020-10-01 ENCOUNTER — THERAPY VISIT (OUTPATIENT)
Dept: PHYSICAL THERAPY | Facility: CLINIC | Age: 45
End: 2020-10-01
Payer: MEDICARE

## 2020-10-01 DIAGNOSIS — M25.511 PAIN IN JOINT OF RIGHT SHOULDER: ICD-10-CM

## 2020-10-01 PROCEDURE — 97112 NEUROMUSCULAR REEDUCATION: CPT | Mod: GP | Performed by: PHYSICAL THERAPIST

## 2020-10-01 PROCEDURE — 97110 THERAPEUTIC EXERCISES: CPT | Mod: GP | Performed by: PHYSICAL THERAPIST

## 2020-10-01 NOTE — PROGRESS NOTES
DISCHARGE REPORT    Progress reporting period is from 8-31-20 to 10-01-20.       SUBJECTIVE  Subjective changes noted by patient:  .  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     Previous pain level was  5/10  .   Changes in function:  Yes (See Goal flowsheet attached for changes in current functional level)  Adverse reaction to treatment or activity: None    OBJECTIVE  Changes noted in objective findings:  The objective findings below are from DOS 10-01-20.  Objective: AROM R shoulder is wnl, strength testing R ER=4+/5; R scap stabs 4-/5      ASSESSMENT/PLAN  Updated problem list and treatment plan: Diagnosis 1:  R shoulder pain  Pain -  manual therapy, self management, education, directional preference exercise and home program  Decreased ROM/flexibility - manual therapy and therapeutic exercise  Decreased strength - therapeutic exercise and therapeutic activities  Impaired muscle performance - neuro re-education  Decreased function - therapeutic activities  STG/LTGs have been met or progress has been made towards goals:  Yes (See Goal flow sheet completed today.)  Assessment of Progress: The patient has met all of their long term goals.  Self Management Plans:  Patient is independent in a home treatment program.  Patient is independent in self management of symptoms.  I have re-evaluated this patient and find that the nature, scope, duration and intensity of the therapy is appropriate for the medical condition of the patient.  Prince continues to require the following intervention to meet STG and LTG's:  PT intervention is no longer required to meet STG/LTG.    Recommendations:  This patient is ready to be discharged from therapy and continue their home treatment program.    Please refer to the daily flowsheet for treatment today, total treatment time and time spent performing 1:1 timed  codes.

## 2020-10-12 DIAGNOSIS — K21.00 GASTROESOPHAGEAL REFLUX DISEASE WITH ESOPHAGITIS: ICD-10-CM

## 2020-10-12 RX ORDER — FAMOTIDINE 20 MG/1
TABLET, FILM COATED ORAL
Qty: 60 TABLET | Refills: 5 | Status: SHIPPED | OUTPATIENT
Start: 2020-10-12 | End: 2021-02-17

## 2020-10-12 NOTE — TELEPHONE ENCOUNTER
Prescription approved per [facility] Refill Protocol.    Russell Apple RN

## 2020-10-16 ENCOUNTER — TELEPHONE (OUTPATIENT)
Dept: FAMILY MEDICINE | Facility: CLINIC | Age: 45
End: 2020-10-16

## 2020-10-20 ENCOUNTER — OFFICE VISIT (OUTPATIENT)
Dept: PODIATRY | Facility: CLINIC | Age: 45
End: 2020-10-20
Attending: NURSE PRACTITIONER
Payer: MEDICARE

## 2020-10-20 VITALS
BODY MASS INDEX: 38.98 KG/M2 | WEIGHT: 220 LBS | SYSTOLIC BLOOD PRESSURE: 118 MMHG | HEIGHT: 63 IN | DIASTOLIC BLOOD PRESSURE: 70 MMHG

## 2020-10-20 DIAGNOSIS — M79.671 FOOT PAIN, BILATERAL: Primary | ICD-10-CM

## 2020-10-20 DIAGNOSIS — M21.42 PES PLANUS OF BOTH FEET: ICD-10-CM

## 2020-10-20 DIAGNOSIS — M79.672 FOOT PAIN, BILATERAL: Primary | ICD-10-CM

## 2020-10-20 DIAGNOSIS — L60.0 INGROWN LEFT GREATER TOENAIL: ICD-10-CM

## 2020-10-20 DIAGNOSIS — L60.0 INGROWN RIGHT GREATER TOENAIL: ICD-10-CM

## 2020-10-20 DIAGNOSIS — M21.41 PES PLANUS OF BOTH FEET: ICD-10-CM

## 2020-10-20 PROCEDURE — 99203 OFFICE O/P NEW LOW 30 MIN: CPT | Mod: 25 | Performed by: PODIATRIST

## 2020-10-20 PROCEDURE — 11750 EXCISION NAIL&NAIL MATRIX: CPT | Mod: 51 | Performed by: PODIATRIST

## 2020-10-20 RX ORDER — SILVER SULFADIAZINE 10 MG/G
CREAM TOPICAL 2 TIMES DAILY
Qty: 25 G | Refills: 1 | Status: SHIPPED | OUTPATIENT
Start: 2020-10-20 | End: 2020-12-04

## 2020-10-20 ASSESSMENT — MIFFLIN-ST. JEOR: SCORE: 1618.86

## 2020-10-20 NOTE — LETTER
10/20/2020         RE: Prince Patel  [address]  [address]  [place] [address]        Dear Colleague,    Thank you for referring your patient, Prince Patel, to the [facility] POD[facility]. Please see a copy of my visit note below.    PATIENT HISTORY:  Susan Haase APRN requested I see this patient for their foot issue.  Prince Patel is a 45 year old female who presents to clinic for pain to both great toes.  She notes that she had ingrown nail procedures done about 5 years ago but notes that they keep growing back.  Pain is 6 out of 10.  She notes pain to her left fifth toe but states she banged it the other day and it was red.  Doing better today.  Also has pain to the bottoms of her feet and is wondering about inserts that she can wear while she is at work as she is on her feet a lot.  Denies injury to the great toes.  Wondering what can be done for the ingrown toenails today.    Review of Systems:  Patient denies fever, chills, rash, wound, stiffness, numbness, weakness, heart burn, blood in stool, chest pain with activity, calf pain when walking, shortness of breath with activity, chronic cough, easy bleeding/bruising, swelling of ankles, excessive thirst, fatigue, depression, anxiety.  Patient admits to limping at times..     PAST MEDICAL HISTORY:   Past Medical History:   Diagnosis Date     Allergic rhinitis, cause unspecified     Allergic rhinitis and blueberries     Congestive heart failure (H)      Intertrigo 11/17/2014     Migraine with aura and without status migrainosus, not intractable 7/18/2016     Migraine, unspecified, without mention of intractable migraine without mention of status migrainosus     Migraine     NONSPECIFIC MEDICAL HISTORY     learning disability, mild MR, ADD?     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)     Other acne         PAST SURGICAL HISTORY:   Past Surgical History:   Procedure Laterality  Date     APPENDECTOMY       CARDIAC SURGERY       COLONOSCOPY N/A 8/3/2018    Procedure: COLONOSCOPY;  colonoscopy;  Surgeon: Isaias Medina MD;  Location: [facility]     LAPAROSCOPIC HYSTERECTOMY SUPRACERVICAL  11/16/2011    Procedure:LAPAROSCOPIC HYSTERECTOMY SUPRACERVICAL; LAPAROSCOPIC HYSTERECTOMY SUPRACERVICAL ; Surgeon:MASOUD WHITE; Location:[facility] OR     SURGICAL HISTORY OF -       ingrown toenails removed     toenail removal          MEDICATIONS:   Current Outpatient Medications:      cetirizine (ZYRTEC) 10 MG tablet, Take 1 tablet (10 mg) by mouth daily, Disp: 30 tablet, Rfl: 6     clotrimazole (LOTRIMIN) 1 % external cream, Apply topically 2 times daily, Disp: 60 g, Rfl: 0     diclofenac (VOLTAREN) 75 MG EC tablet, Take 1 tablet (75 mg) by mouth 2 times daily as needed for moderate pain, Disp: 60 tablet, Rfl: 1     famotidine (PEPCID) 20 MG tablet, TAKE 1 TABLET BY MOUTH TWICE A DAY, Disp: 60 tablet, Rfl: 5     fluticasone (FLONASE) 50 MCG/ACT nasal spray, INSTILL 2 SPRAYS INTO BOTH NOSTRILS DAILY, Disp: 48 mL, Rfl: 1     MULTIVITAMINS OR TABS, 1 tab qd as directed, Disp: , Rfl: 0     SUMAtriptan (IMITREX) 50 MG tablet, Take 1 tablet (50 mg) by mouth at onset of headache for migraine, Disp: 18 tablet, Rfl: 2    Current Facility-Administered Medications:      methylPREDNISolone (DEPO-MEDROL) injection 40 mg, 40 mg, , , Yeo, Albert, MD, 40 mg at 08/12/20 1808     methylPREDNISolone (DEPO-MEDROL) injection 40 mg, 40 mg, , , Yeo, Albert, MD, 40 mg at 08/12/20 1810     ALLERGIES:    Allergies   Allergen Reactions     Keflex [Cephalexin] Rash        SOCIAL HISTORY:   Social History     Socioeconomic History     Marital status: Single     Spouse name: Not on file     Number of children: 0     Years of education: Not on file     Highest education level: Not on file   Occupational History     Occupation: cleaning     Comment: archiver's   Social Needs     Financial resource strain: Not on file     Food insecurity      Worry: Not on file     Inability: Not on file     Transportation needs     Medical: Not on file     Non-medical: Not on file   Tobacco Use     Smoking status: Never Smoker     Smokeless tobacco: Never Used   Substance and Sexual Activity     Alcohol use: No     Drug use: No     Sexual activity: Never     Partners: Male   Lifestyle     Physical activity     Days per week: Not on file     Minutes per session: Not on file     Stress: Not on file   Relationships     Social connections     Talks on phone: Not on file     Gets together: Not on file     Attends [employer] service: Not on file     Active member of club or organization: Not on file     Attends meetings of clubs or organizations: Not on file     Relationship status: Not on file     Intimate partner violence     Fear of current or ex partner: Not on file     Emotionally abused: Not on file     Physically abused: Not on file     Forced sexual activity: Not on file   Other Topics Concern      Service Not Asked     Blood Transfusions Not Asked     Caffeine Concern Yes     Comment: 1 pop daily     Occupational Exposure Not Asked     Hobby Hazards Not Asked     Sleep Concern Not Asked     Stress Concern Not Asked     Weight Concern Not Asked     Special Diet No     Comment: calcium daily     Back Care Not Asked     Exercise Yes     Comment: rollerblade, bike     Bike Helmet Not Asked     Seat Belt Yes     Self-Exams Yes     Parent/sibling w/ CABG, MI or angioplasty before 65F 55M? Yes   Social History Narrative    bowling for special olympics, local tournaments        FAMILY HISTORY:   Family History   Problem Relation Age of Onset     Diabetes Maternal Aunt      C.A.D. Father         MI, angioplasty, 50s     Diabetes Father         Diabetes     Hypertension Father      Neurologic Disorder Paternal Grandmother      Gastrointestinal Disease Paternal Grandmother         Gluten Intolerance      Diabetes Maternal Grandmother         Multiple Sclerosis       "  EXAM:Vitals: /70   Ht 1.595 m (5' 2.8\")   Wt 99.8 kg (220 lb)   LMP 10/31/2011   BMI 39.22 kg/m    BMI= Body mass index is 39.22 kg/m .    General appearance: Patient is alert and fully cooperative with history & exam.  No sign of distress is noted during the visit.     Psychiatric: Affect is pleasant & appropriate.  Patient appears motivated to improve health.     Respiratory: Breathing is regular & unlabored while sitting.     HEENT: Hearing is intact to spoken word.  Speech is clear.  No gross evidence of visual impairment that would impact ambulation.     Dermatologic: Both borders of both great toenails are incurvated.  Pain on palpation.  No signs of acute infection noted.  Left fifth toenail has no redness or lesions noted.  No issues noted with the nail.     Vascular: DP & PT pulses are intact & regular bilaterally.  No significant edema or varicosities noted.  CFT and skin temperature is normal to both lower extremities.     Neurologic: Lower extremity sensation is intact to light touch.  No evidence of weakness or contracture in the lower extremities.  No evidence of neuropathy.     Musculoskeletal: Patient is ambulatory without assistive device or brace.    Decreased arch height.     ASSESSMENT:     Foot pain, bilateral  Ingrown right greater toenail  Ingrown left greater toenail  Pes planus of both feet     PLAN:  Reviewed patient's chart in [facility]. The potential causes and nature of an ingrown toenail were discussed with the patient.  We reviewed the natural history/prognosis of the condition and potential risks if no treatment is provided.      Treatment options discussed included conservative management (oral antibiotics, soaking of foot, adequate width shoes)  as well as surgical management (partial or total nail removal).  The pros and cons of both forms of treatment were reviewed.      After thorough discussion and answering all questions, the patient elected to have both borders of both " great toenails removed permanently.  She will soak her feet twice a day for 6 weeks and apply Silvadene cream.  This was ordered.  She will then apply a Band-Aid to the area.  Recommend super feet arch supports for her feet while she is walking at work.  All questions were answered to patient satisfaction and she will call further questions or concerns.    Procedure: After verbal consent, the right big toe was anesthetized with 5cc's of 1% lidocaine plain. A tourniquet was applied to the toe. The medial and lateral borders were then raised from the nail bed and then cut the length of the nail.  The offending nail borders were then removed.  Three 30 second applications of phenol were applied to the nail bed and nail matrix.  The area was then flushed with copious amounts of alcohol.  Bacitracin was applied to the nail bed.  The tourniquet was removed.  Bandage was applied to the toe.  The patient tolerated the procedure and anesthesia well.    Procedure #2: Same exact procedure done on the left great toenail.    Jennifer Rome DPM, Podiatry/Foot and Ankle Surgery    Weight management plan: Patient was referred to their PCP to discuss a diet and exercise plan.        Again, thank you for allowing me to participate in the care of your patient.        Sincerely,        Jennifer Rome DPM, Podiatry/Foot and Ankle Surgery

## 2020-10-20 NOTE — PATIENT INSTRUCTIONS
Thank you for choosing [facility] Podiatry / Foot & Ankle Surgery!    [facility] SCHEDULE SURGERY: [phone]   [address] BILLING QUESTIONS: [phone]   [place] [address] AFTER HOURS: [phone]   PH: [phone] CONSUMER CHANDRA LINE:[phone]   FAX: [phone] APPOINTMENTS: [phone]     INGROWN TOENAILS  When a toenail is ingrown, it is curved and grows into the skin, usually at the nail borders (the sides of the nail). This  digging in  of the nail irritates the skin, often creating pain, redness, swelling, and warmth in the toe.  If an ingrown nail causes a break in the skin, bacteria may enter and cause an infection in the area, which is often marked by drainage and a foul odor. However, even if the toe isn t painful, red, swollen, or warm, a nail that curves downward into the skin can progress to an infection.  CAUSES:  Heredity: In many people, the tendency for ingrown toenails is inherited.   Trauma: Sometimes an ingrown toenail is the result of trauma, such as stubbing your toe, having an object fall on your toe, or engaging in activities that involve repeated pressure on the toes, such as kicking or running.   Improper Trimming:  The most common cause of ingrown toenails is cutting your nails too short. This encourages the skin next to the nail to fold over the nail.   Improperly Sized Footwear: Ingrown toenails can result from wearing socks and shoes that are tight or short.   Nail Conditions: Ingrown toenails can be caused by nail problems, such as fungal infections or losing a nail due to trauma.   TREATMENT: Sometimes initial treatment for ingrown toenails can be safely performed at home. However, home treatment is strongly discouraged if an infection is suspected, or for those who have medical conditions that put feet at high risk, such as diabetes, nerve damage in the foot, or poor circulation.  Home care: If you don t have an infection or any  of the above medical conditions, you can soak your foot in room-temperature water (adding Epsom s salt may be recommended by your doctor), and gently massage the side of the nail fold to help reduce the inflammation.  Avoid attempting  bathroom surgery.  Repeated cutting of the nail can cause the condition to worsen over time. If your symptoms fail to improve, it s time to see a foot and ankle surgeon.  Physician care: After examining the toe, the foot and ankle surgeon will select the treatment best suited for you. If an infection is present, an oral antibiotic may be prescribed.  Sometimes a minor surgical procedure, often performed in the office, will ease the pain and remove the offending nail. After applying a local anesthetic, the doctor removes part of the nail s side border. Some nails may become ingrown again, requiring removal of the nail root.  Following the nail procedure, a light bandage will be applied. Most people experience very little pain after surgery and may resume normal activity the next day. If your surgeon has prescribed an oral antibiotic, be sure to take all the medication, even if your symptoms have improved.  PREVENTION:  Proper Trimming: Cut toenails in a fairly straight line, and don t cut them too short. You should be able to get your fingernail under the sides and end of the nail.   Well-fitting Footwear: Don t wear shoes that are short or tight in the toe area. Avoid shoes that are loose, because they too cause pressure on the toes, especially when running or walking briskly.     INGROWN TOENAIL REMOVAL AFTERCARE     Go directly home and elevate the affected foot on one or two pillows for the remainder of the day/evening if possible. Your toe may stay numb anywhere from 2-8 hours.     Take Tylenol, ibuprofen or another anti-inflammatory as needed for pain.     Take antibiotic if that has been prescribed. Finish the entire prescribed antibiotic even if your symptoms have improved.      The evening of the procedure, soak/wash the affected area in warm water (you may add Epsom salt) for 5 to 10 minutes. Do this twice a day for 2-4 weeks (6-8 weeks if you had phenol) (you may count showering/bathing as one soak).  After soaks, pat the area dry and then allow to airdry for a few minutes. Apply antibiotic ointment to the area and cover with 2 X 2 gauze and paper tape or band-aid.    You may pursue everyday activities as tolerated with either an open toe shoe or cut-out shoe as needed or you may wear regular shoes if no pain is noted.    Watch for any signs and symptoms of infection such as: redness, red streaks going up the foot/leg, swelling, pus or foul odor. Those that have had the phenol procedure, the toe will drain longer and will look like it is infected because it is a chemical burn.     Please call with questions.

## 2020-10-20 NOTE — PROGRESS NOTES
PATIENT HISTORY:  Susan Haase APRN requested I see this patient for their foot issue.  Prince Patel is a 45 year old female who presents to clinic for pain to both great toes.  She notes that she had ingrown nail procedures done about 5 years ago but notes that they keep growing back.  Pain is 6 out of 10.  She notes pain to her left fifth toe but states she banged it the other day and it was red.  Doing better today.  Also has pain to the bottoms of her feet and is wondering about inserts that she can wear while she is at work as she is on her feet a lot.  Denies injury to the great toes.  Wondering what can be done for the ingrown toenails today.    Review of Systems:  Patient denies fever, chills, rash, wound, stiffness, numbness, weakness, heart burn, blood in stool, chest pain with activity, calf pain when walking, shortness of breath with activity, chronic cough, easy bleeding/bruising, swelling of ankles, excessive thirst, fatigue, depression, anxiety.  Patient admits to limping at times..     PAST MEDICAL HISTORY:   Past Medical History:   Diagnosis Date     Allergic rhinitis, cause unspecified     Allergic rhinitis and blueberries     Congestive heart failure (H)      Intertrigo 11/17/2014     Migraine with aura and without status migrainosus, not intractable 7/18/2016     Migraine, unspecified, without mention of intractable migraine without mention of status migrainosus     Migraine     NONSPECIFIC MEDICAL HISTORY     learning disability, mild MR, ADD?     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)     Other acne         PAST SURGICAL HISTORY:   Past Surgical History:   Procedure Laterality Date     APPENDECTOMY       CARDIAC SURGERY       COLONOSCOPY N/A 8/3/2018    Procedure: COLONOSCOPY;  colonoscopy;  Surgeon: Isaias Medina MD;  Location:  GI     LAPAROSCOPIC HYSTERECTOMY SUPRACERVICAL  11/16/2011    Procedure:LAPAROSCOPIC HYSTERECTOMY SUPRACERVICAL;  LAPAROSCOPIC HYSTERECTOMY SUPRACERVICAL ; Surgeon:MASOUD WHITE; Location:[facility] OR     SURGICAL HISTORY OF -       ingrown toenails removed     toenail removal          MEDICATIONS:   Current Outpatient Medications:      cetirizine (ZYRTEC) 10 MG tablet, Take 1 tablet (10 mg) by mouth daily, Disp: 30 tablet, Rfl: 6     clotrimazole (LOTRIMIN) 1 % external cream, Apply topically 2 times daily, Disp: 60 g, Rfl: 0     diclofenac (VOLTAREN) 75 MG EC tablet, Take 1 tablet (75 mg) by mouth 2 times daily as needed for moderate pain, Disp: 60 tablet, Rfl: 1     famotidine (PEPCID) 20 MG tablet, TAKE 1 TABLET BY MOUTH TWICE A DAY, Disp: 60 tablet, Rfl: 5     fluticasone (FLONASE) 50 MCG/ACT nasal spray, INSTILL 2 SPRAYS INTO BOTH NOSTRILS DAILY, Disp: 48 mL, Rfl: 1     MULTIVITAMINS OR TABS, 1 tab qd as directed, Disp: , Rfl: 0     SUMAtriptan (IMITREX) 50 MG tablet, Take 1 tablet (50 mg) by mouth at onset of headache for migraine, Disp: 18 tablet, Rfl: 2    Current Facility-Administered Medications:      methylPREDNISolone (DEPO-MEDROL) injection 40 mg, 40 mg, , , Yeo, Albert, MD, 40 mg at 08/12/20 1808     methylPREDNISolone (DEPO-MEDROL) injection 40 mg, 40 mg, , , Yeo, Albert, MD, 40 mg at 08/12/20 1810     ALLERGIES:    Allergies   Allergen Reactions     Keflex [Cephalexin] Rash        SOCIAL HISTORY:   Social History     Socioeconomic History     Marital status: Single     Spouse name: Not on file     Number of children: 0     Years of education: Not on file     Highest education level: Not on file   Occupational History     Occupation: cleaning     Comment: archiver's   Social Needs     Financial resource strain: Not on file     Food insecurity     Worry: Not on file     Inability: Not on file     Transportation needs     Medical: Not on file     Non-medical: Not on file   Tobacco Use     Smoking status: Never Smoker     Smokeless tobacco: Never Used   Substance and Sexual Activity     Alcohol use: No     Drug use:  "No     Sexual activity: Never     Partners: Male   Lifestyle     Physical activity     Days per week: Not on file     Minutes per session: Not on file     Stress: Not on file   Relationships     Social connections     Talks on phone: Not on file     Gets together: Not on file     Attends Jainism service: Not on file     Active member of club or organization: Not on file     Attends meetings of clubs or organizations: Not on file     Relationship status: Not on file     Intimate partner violence     Fear of current or ex partner: Not on file     Emotionally abused: Not on file     Physically abused: Not on file     Forced sexual activity: Not on file   Other Topics Concern      Service Not Asked     Blood Transfusions Not Asked     Caffeine Concern Yes     Comment: 1 pop daily     Occupational Exposure Not Asked     Hobby Hazards Not Asked     Sleep Concern Not Asked     Stress Concern Not Asked     Weight Concern Not Asked     Special Diet No     Comment: calcium daily     Back Care Not Asked     Exercise Yes     Comment: rollerblade, bike     Bike Helmet Not Asked     Seat Belt Yes     Self-Exams Yes     Parent/sibling w/ CABG, MI or angioplasty before 65F 55M? Yes   Social History Narrative    bowling for special olympics, local [employer]        FAMILY HISTORY:   Family History   Problem Relation Age of Onset     Diabetes Maternal Aunt      C.A.D. Father         MI, angioplasty, 50s     Diabetes Father         Diabetes     Hypertension Father      Neurologic Disorder Paternal Grandmother      Gastrointestinal Disease Paternal Grandmother         Gluten Intolerance      Diabetes Maternal Grandmother         Multiple Sclerosis        EXAM:Vitals: /70   Ht 1.595 m (5' 2.8\")   Wt 99.8 kg (220 lb)   LMP 10/31/2011   BMI 39.22 kg/m    BMI= Body mass index is 39.22 kg/m .    General appearance: Patient is alert and fully cooperative with history & exam.  No sign of distress is noted during the " visit.     Psychiatric: Affect is pleasant & appropriate.  Patient appears motivated to improve health.     Respiratory: Breathing is regular & unlabored while sitting.     HEENT: Hearing is intact to spoken word.  Speech is clear.  No gross evidence of visual impairment that would impact ambulation.     Dermatologic: Both borders of both great toenails are incurvated.  Pain on palpation.  No signs of acute infection noted.  Left fifth toenail has no redness or lesions noted.  No issues noted with the nail.     Vascular: DP & PT pulses are intact & regular bilaterally.  No significant edema or varicosities noted.  CFT and skin temperature is normal to both lower extremities.     Neurologic: Lower extremity sensation is intact to light touch.  No evidence of weakness or contracture in the lower extremities.  No evidence of neuropathy.     Musculoskeletal: Patient is ambulatory without assistive device or brace.    Decreased arch height.     ASSESSMENT:     Foot pain, bilateral  Ingrown right greater toenail  Ingrown left greater toenail  Pes planus of both feet     PLAN:  Reviewed [facility] in [facility]. The potential causes and nature of an ingrown toenail were discussed with the patient.  We reviewed the natural history/prognosis of the condition and potential risks if no treatment is provided.      Treatment options discussed included conservative management (oral antibiotics, soaking of foot, adequate width shoes)  as well as surgical management (partial or total nail removal).  The pros and cons of both forms of treatment were reviewed.      After thorough discussion and answering all questions, the patient elected to have both borders of both great toenails removed permanently.  She will soak her feet twice a day for 6 weeks and apply Silvadene cream.  This was ordered.  She will then apply a Band-Aid to the area.  Recommend super feet arch supports for her feet while she is walking at work.  All questions  were answered to patient satisfaction and she will call further questions or concerns.    Procedure: After verbal consent, the right big toe was anesthetized with 5cc's of 1% lidocaine plain. A tourniquet was applied to the toe. The medial and lateral borders were then raised from the nail bed and then cut the length of the nail.  The offending nail borders were then removed.  Three 30 second applications of phenol were applied to the nail bed and nail matrix.  The area was then flushed with copious amounts of alcohol.  Bacitracin was applied to the nail bed.  The tourniquet was removed.  Bandage was applied to the toe.  The patient tolerated the procedure and anesthesia well.    Procedure #2: Same exact procedure done on the left great toenail.    Jennifer Rome DPM, Podiatry/Foot and Ankle Surgery    Weight management plan: Patient was referred to their PCP to discuss a diet and exercise plan.

## 2020-11-23 DIAGNOSIS — Z12.31 VISIT FOR SCREENING MAMMOGRAM: ICD-10-CM

## 2020-11-27 ENCOUNTER — TELEPHONE (OUTPATIENT)
Dept: FAMILY MEDICINE | Facility: CLINIC | Age: 45
End: 2020-11-27

## 2020-11-27 NOTE — TELEPHONE ENCOUNTER
General Call:     Who is calling:  Alireza Tolbert    Reason for Call:  Right shoulder pain    What are your questions or concerns:  my right shoulder popped the other day and now it s hurting me again so if I can get another referral so I can get a cortizone shot that would be great    Date of last appointment with provider: 09/29/2020-Susan Haase    Okay to leave a detailed message:Yes at Home number on file [phone] (home)

## 2020-11-29 ENCOUNTER — MYC MEDICAL ADVICE (OUTPATIENT)
Dept: FAMILY MEDICINE | Facility: CLINIC | Age: 45
End: 2020-11-29

## 2020-11-30 NOTE — TELEPHONE ENCOUNTER
See my chart message     Vicky Acosta, Registered Nurse, PAL (Patient Advocate Liason)   [facility]   [phone]

## 2020-11-30 NOTE — TELEPHONE ENCOUNTER
"Patient has a referral to ortho from August 2020, this should be good for one year - phone number given to patient to schedule ortho appt     Patient then states she has had ear ringing for a \"long time\"     appt scheduled in clinic with pcp 12/3/2020    Vicky Acosta, Registered Nurse, PAL (Patient Advocate Liason)   [facility]   [phone]           "

## 2020-12-02 ENCOUNTER — MYC MEDICAL ADVICE (OUTPATIENT)
Dept: FAMILY MEDICINE | Facility: CLINIC | Age: 45
End: 2020-12-02

## 2020-12-02 NOTE — PROGRESS NOTES
Pre-Visit Planning     Future Appointments   Date Time Provider Department Center   12/3/2020  2:05 PM Haase, Susan Rachele, APRN CNP [facility]   2020  3:40 PM Yeo, Albert, MD [facility]   2021 10:30 AM Haase, Susan Rachele, APRN CNP [facility]     Arrival Time for this Appointment:  1:55 PM     Appointment Notes for this encounter:   ear ringing     Questionnaires Reviewed/Assigned  No additional questionnaires are needed    Hospital/ER visits since last pcp appt? No     Imagin20 shoulder MRI - shows abnormalities. Patient is still having shoulder pain and was directed to schedule with ortho 20 by RN   Impression:  1.  Nondisplaced fracture distal tip clavicle with surrounding bone  marrow edema.  2.  Tendinosis of the supraspinatus and subscapularis without full  thickness tear or tendon retraction.  3.  Tearing of the posterior superior labrum.    Lab review:   20 glucose slightly elevated 102     Specialty Visits -   10/1/20 PT - last visit   10/20/20 podiatry   20 behavioral health [facility] last visit   20 sports med     MyChart  Patient is active on MyChart.     Health Maintenance Due   Topic Date Due     HIV SCREENING  1992     HEPATITIS C SCREENING  1995     Patient preferred phone number: [phone]    BILLIE HAWKINS sent my chart message with Previsit Planning questions and awaiting patient response - see patient response   I usually do [facility] in [place] but maybe since I'm getting picked up my [employer] just do the pharmacy at the clinic if there's any prescriptions for tomorrow but I'm getting picked up by 2:30    Vicky Acosta, Registered Nurse, PAL (Patient Advocate Liason)   [facility]   [phone]

## 2020-12-03 ENCOUNTER — OFFICE VISIT (OUTPATIENT)
Dept: FAMILY MEDICINE | Facility: CLINIC | Age: 45
End: 2020-12-03
Payer: MEDICARE

## 2020-12-03 VITALS
WEIGHT: 219 LBS | BODY MASS INDEX: 39.04 KG/M2 | RESPIRATION RATE: 16 BRPM | DIASTOLIC BLOOD PRESSURE: 88 MMHG | SYSTOLIC BLOOD PRESSURE: 134 MMHG | HEART RATE: 84 BPM | TEMPERATURE: 99 F | OXYGEN SATURATION: 99 %

## 2020-12-03 DIAGNOSIS — H69.92 DYSFUNCTION OF LEFT EUSTACHIAN TUBE: Primary | ICD-10-CM

## 2020-12-03 DIAGNOSIS — H61.22 IMPACTED CERUMEN OF LEFT EAR: ICD-10-CM

## 2020-12-03 PROCEDURE — 99213 OFFICE O/P EST LOW 20 MIN: CPT | Mod: 25 | Performed by: NURSE PRACTITIONER

## 2020-12-03 PROCEDURE — 69209 REMOVE IMPACTED EAR WAX UNI: CPT | Mod: LT | Performed by: NURSE PRACTITIONER

## 2020-12-03 RX ORDER — CETIRIZINE HYDROCHLORIDE, PSEUDOEPHEDRINE HYDROCHLORIDE 5; 120 MG/1; MG/1
1 TABLET, FILM COATED, EXTENDED RELEASE ORAL DAILY
Qty: 30 TABLET | Refills: 0 | Status: SHIPPED | OUTPATIENT
Start: 2020-12-03 | End: 2020-12-15

## 2020-12-03 RX ORDER — CETIRIZINE HYDROCHLORIDE, PSEUDOEPHEDRINE HYDROCHLORIDE 5; 120 MG/1; MG/1
1 TABLET, FILM COATED, EXTENDED RELEASE ORAL DAILY
Qty: 30 TABLET | Refills: 0 | Status: SHIPPED | OUTPATIENT
Start: 2020-12-03 | End: 2020-12-03

## 2020-12-03 ASSESSMENT — ANXIETY QUESTIONNAIRES
5. BEING SO RESTLESS THAT IT IS HARD TO SIT STILL: NOT AT ALL
6. BECOMING EASILY ANNOYED OR IRRITABLE: NOT AT ALL
7. FEELING AFRAID AS IF SOMETHING AWFUL MIGHT HAPPEN: NOT AT ALL
2. NOT BEING ABLE TO STOP OR CONTROL WORRYING: NOT AT ALL
4. TROUBLE RELAXING: NOT AT ALL
GAD7 TOTAL SCORE: 0
3. WORRYING TOO MUCH ABOUT DIFFERENT THINGS: NOT AT ALL
GAD7 TOTAL SCORE: 0
1. FEELING NERVOUS, ANXIOUS, OR ON EDGE: NOT AT ALL
GAD7 TOTAL SCORE: 0
7. FEELING AFRAID AS IF SOMETHING AWFUL MIGHT HAPPEN: NOT AT ALL

## 2020-12-03 ASSESSMENT — PATIENT HEALTH QUESTIONNAIRE - PHQ9
10. IF YOU CHECKED OFF ANY PROBLEMS, HOW DIFFICULT HAVE THESE PROBLEMS MADE IT FOR YOU TO DO YOUR WORK, TAKE CARE OF THINGS AT HOME, OR GET ALONG WITH OTHER PEOPLE: NOT DIFFICULT AT ALL
SUM OF ALL RESPONSES TO PHQ QUESTIONS 1-9: 0
SUM OF ALL RESPONSES TO PHQ QUESTIONS 1-9: 0

## 2020-12-03 NOTE — TELEPHONE ENCOUNTER
See my chart     Vicky Acosta, Registered Nurse, PAL (Patient Advocate Liason)   [facility]   [phone]

## 2020-12-03 NOTE — PATIENT INSTRUCTIONS
Patient Education     Earache, No Infection (Adult)   Earaches can happen without an infection. They can occur when air and fluid build up behind the eardrum. They may cause a feeling of fullness and discomfort. They may also impair hearing. This is called otitis media with effusion (OME) or serous otitis media. It means there is fluid in the middle ear. It is not the same as acute otitis media, which is often from an infection.  OME can happen when you have a cold if congestion blocks the passage that drains the middle ear. This passage is called the eustachian tube. OME may also occur with nasal allergies or after a bacterial infection in the middle ear. Other causes are:    Trauma    Improper cleaning of wax from the ear    Bacterial infection of the mastoid bone (mastoiditis)    Tumor    Jaw pain    Changes in pressure, such as from flying or scuba diving    The pain or discomfort may come and go. You may hear clicking or popping sounds when you chew or swallow. You may feel that your balance is off. Or you may hear ringing in the ear.  It often takes from several weeks up to 3 months for the fluid to clear on its own. Oral pain relievers and ear drops help if there is pain. Decongestants and antihistamines sometimes help. Antibiotics don't help since there is no infection. Your healthcare provider may give you a nasal spray to help reduce swelling in the nose and eustachian tube. This can allow the ear to drain.  If your OME doesn't get better after 3 months, surgery may be used to drain the fluid. A small tube may also be put in the eardrum to help with drainage.  Because the middle ear fluid can become infected, watch for signs of an infection. These may develop later. They may include increased ear pain, fever, or drainage from the ear.  Home care  These home-care tips will help you take care of yourself:    You may use over-the-counter medicine as directed by your healthcare provider to control pain,  unless medicine was prescribed. If you have chronic liver or kidney disease or ever had a stomach ulcer or GI bleeding, talk with your healthcare provider before using any medicines.    Aspirin should never be used in anyone younger than age 18 who has a fever. It may cause severe liver damage.    Ask your healthcare provider if you may use over-the-counter decongestants such as phenylephrine or pseudoephedrine. Keep in mind they are not always helpful.    Talk with your healthcare provider about using nasal spray decongestants. Don't use them for more than 3 days, or as directed by your healthcare provider. Longer use can make congestion worse. Prescription nasal sprays from your healthcare provider don't often have such restrictions.    Antihistamines may help if you are also having allergy symptoms.    You may use medicines such as guaifenesin to thin mucus and help with drainage.  Follow-up care  Follow up with your healthcare provider or as advised if you are not feeling better after 3 days.  When to seek medical advice  Call your healthcare provider right away if any of these occur:    Ear pain that gets worse or that does not start to get better     Fever of 100.4 F (38 C) or higher, or as directed by your healthcare provider    Fluid or blood draining from the ear    Headache or sinus pain    Stiff neck    Unusual drowsiness or confusion  [employer] last reviewed this educational content on 12/1/2019 2000-2020 [employer]. [address] [place] [address], [place] [address]. All rights reserved. This information is not intended as a substitute for professional medical care. Always follow your healthcare professional's instructions.

## 2020-12-03 NOTE — NURSING NOTE
"Chief Complaint   Patient presents with     Ear Problem     Initial /88 (BP Location: Right arm, Patient Position: Sitting, Cuff Size: Adult Large)   Pulse 84   Temp 99  F (37.2  C) (Oral)   Resp 16   Wt 99.3 kg (219 lb)   LMP 10/31/2011   SpO2 99%   BMI 39.04 kg/m   Estimated body mass index is 39.04 kg/m  as calculated from the following:    Height as of 10/20/20: 1.595 m (5' 2.8\").    Weight as of this encounter: 99.3 kg (219 lb).  BP completed using cuff size large right arm    Lisa Magill, CMA    "

## 2020-12-04 ASSESSMENT — ANXIETY QUESTIONNAIRES: GAD7 TOTAL SCORE: 0

## 2020-12-11 ENCOUNTER — OFFICE VISIT (OUTPATIENT)
Dept: ORTHOPEDICS | Facility: CLINIC | Age: 45
End: 2020-12-11
Payer: MEDICARE

## 2020-12-11 VITALS
HEIGHT: 63 IN | WEIGHT: 219 LBS | DIASTOLIC BLOOD PRESSURE: 90 MMHG | BODY MASS INDEX: 38.8 KG/M2 | SYSTOLIC BLOOD PRESSURE: 132 MMHG

## 2020-12-11 DIAGNOSIS — M75.21 BICEPS TENDINITIS OF RIGHT UPPER EXTREMITY: Primary | ICD-10-CM

## 2020-12-11 PROCEDURE — 99213 OFFICE O/P EST LOW 20 MIN: CPT | Mod: 25 | Performed by: FAMILY MEDICINE

## 2020-12-11 PROCEDURE — 20550 NJX 1 TENDON SHEATH/LIGAMENT: CPT | Mod: RT | Performed by: FAMILY MEDICINE

## 2020-12-11 PROCEDURE — 76942 ECHO GUIDE FOR BIOPSY: CPT | Performed by: FAMILY MEDICINE

## 2020-12-11 RX ORDER — METHYLPREDNISOLONE ACETATE 40 MG/ML
40 INJECTION, SUSPENSION INTRA-ARTICULAR; INTRALESIONAL; INTRAMUSCULAR; SOFT TISSUE
Status: DISCONTINUED | OUTPATIENT
Start: 2020-12-11 | End: 2021-08-12

## 2020-12-11 RX ADMIN — METHYLPREDNISOLONE ACETATE 40 MG: 40 INJECTION, SUSPENSION INTRA-ARTICULAR; INTRALESIONAL; INTRAMUSCULAR; SOFT TISSUE at 16:12

## 2020-12-11 ASSESSMENT — MIFFLIN-ST. JEOR: SCORE: 1614.33

## 2020-12-11 NOTE — PROGRESS NOTES
"ASSESSMENT & PLAN  Patient Instructions     1. Biceps tendinitis of right upper extremity      -Patient has right shoulder pain due to biceps tendinitis  -Patient is requesting a right shoulder cortisone injection.  Patient tolerated injection of the right biceps tendon sheath without complications.  -Patient will restart home exercise program for the right shoulder.  -Patient will follow up if pain does not improve  -Call direct clinic number [[phone]] at any time with questions or concerns.    Albert Yeo MD [facility]  [facility]          -----    SUBJECTIVE:  Prince Patel is a 45 year old female who is seen in follow-up for right shoulder pain.  They were last seen 8/12/2020 for right shoulder subacromial bursa and AC joint injections.     Since their last visit reports 100% improvement. Last week patient was playing with a puppy and shoulder popped when she handed puppy to her mom. Pain is located anterior right shoulder and is worse with reaching out, pushing open a door. They indicate that their current pain level is 5/10. They have tried ice, home exercises and physical therapy (6 visits).      The patient is seen by themselves.    Patient's past medical, surgical, social, and family histories were reviewed today and no changes are noted.    REVIEW OF SYSTEMS:  Constitutional: NEGATIVE for fever, chills, change in weight  Skin: NEGATIVE for worrisome rashes, moles or lesions  GI/: NEGATIVE for bowel or bladder changes  Neuro: NEGATIVE for weakness, dizziness or paresthesias    OBJECTIVE:  BP (!) 132/90   Ht 1.595 m (5' 2.8\")   Wt 99.3 kg (219 lb)   LMP 10/31/2011   BMI 39.04 kg/m     General: healthy, alert and in no distress  HEENT: no scleral icterus or conjunctival erythema  Skin: no suspicious lesions or rash. No jaundice.  CV: regular rhythm by palpation, no pedal edema  Resp: normal respiratory effort without conversational dyspnea "   Psych: normal mood and affect  Gait: normal steady gait with appropriate coordination and balance  Neuro: normal light touch sensory exam of the extremities.    MSK:  RIGHT SHOULDER  Inspection:    no swelling, bruising, discoloration, or obvious deformity or asymmetry  Palpation:    Tender about the proximal biceps tendon. Remainder of bony and tendinous landmarks are nontender.  Active Range of Motion:     Abduction 1650, FF 1650, , IR L4.      Scapular dyskinesis absent  Strength:    Scapular plane abduction 5-/5,  ER 5-/5, IR 5/5, biceps 5/5, triceps 5/5  Special Tests:    Positive:Speed's     Negative: Neer's, Ferrera', supraspinatus (empty can), crossed arm adduction and Cass's, drop arm/painful arc, and Yergason's    Large Joint Injection/Arthocentesis    Date/Time: 12/11/2020 4:12 PM  Performed by: Yeo, Albert, MD  Authorized by: Yeo, Albert, MD     Indications:  Pain and osteoarthritis  Needle Size:  22 G  Guidance: ultrasound    Approach:  Anterior  Location:  Shoulder   Location comment:  Biceps tendon sheath      Medications:  40 mg methylPREDNISolone 40 MG/ML  Outcome:  Tolerated well, no immediate complications  Procedure discussed: discussed risks, benefits, and alternatives    Consent Given by:  Patient  Timeout: timeout called immediately prior to procedure    Prep: patient was prepped and draped in usual sterile fashion                 Albert Yeo MD, [facility]

## 2020-12-11 NOTE — PATIENT INSTRUCTIONS
1. Biceps tendinitis of right upper extremity      -Patient has right shoulder pain due to biceps tendinitis  -Patient is requesting a right shoulder cortisone injection.  Patient tolerated injection of the right biceps tendon sheath without complications.  -Patient will restart home exercise program for the right shoulder.  -Patient will follow up if pain does not improve  -Call direct clinic number [[phone]] at any time with questions or concerns.    Albert Yeo MD [facility]  [facility] [place]

## 2020-12-11 NOTE — LETTER
"    12/11/2020         RE: Prince Patel  [address]  [address]  [place] [address]        Dear Colleague,    Thank you for referring your patient, Prince Patel, to the [facility] [place]. Please see a copy of my visit note below.    ASSESSMENT & PLAN  Patient Instructions     1. Biceps tendinitis of right upper extremity      -Patient has right shoulder pain due to biceps tendinitis  -Patient is requesting a right shoulder cortisone injection.  Patient tolerated injection of the right biceps tendon sheath without complications.  -Patient will restart home exercise program for the right shoulder.  -Patient will follow up if pain does not improve  -Call direct clinic number [[phone]] at any time with questions or concerns.    Albert Yeo MD [facility]  [facility] [place]          -----    SUBJECTIVE:  Prince Patel is a 45 year old female who is seen in follow-up for right shoulder pain.  They were last seen 8/12/2020 for right shoulder subacromial bursa and AC joint injections.     Since their last visit reports 100% improvement. Last week patient was playing with a puppy and shoulder popped when she handed puppy to her mom. Pain is located anterior right shoulder and is worse with reaching out, pushing open a door. They indicate that their current pain level is 5/10. They have tried ice, home exercises and physical therapy (6 visits).      The patient is seen by themselves.    Patient's past medical, surgical, social, and family histories were reviewed today and no changes are noted.    REVIEW OF SYSTEMS:  Constitutional: NEGATIVE for fever, chills, change in weight  Skin: NEGATIVE for worrisome rashes, moles or lesions  GI/: NEGATIVE for bowel or bladder changes  Neuro: NEGATIVE for weakness, dizziness or paresthesias    OBJECTIVE:  BP (!) 132/90   Ht 1.595 m (5' 2.8\")   Wt 99.3 kg (219 lb)   LMP 10/31/2011  "  BMI 39.04 kg/m     General: healthy, alert and in no distress  HEENT: no scleral icterus or conjunctival erythema  Skin: no suspicious lesions or rash. No jaundice.  CV: regular rhythm by palpation, no pedal edema  Resp: normal respiratory effort without conversational dyspnea   Psych: normal mood and affect  Gait: normal steady gait with appropriate coordination and balance  Neuro: normal light touch sensory exam of the extremities.    MSK:  RIGHT SHOULDER  Inspection:    no swelling, bruising, discoloration, or obvious deformity or asymmetry  Palpation:    Tender about the proximal biceps tendon. Remainder of bony and tendinous landmarks are nontender.  Active Range of Motion:     Abduction 1650, FF 1650, , IR L4.      Scapular dyskinesis absent  Strength:    Scapular plane abduction 5-/5,  ER 5-/5, IR 5/5, biceps 5/5, triceps 5/5  Special Tests:    Positive:Speed's     Negative: Neer's, Ferrera', supraspinatus (empty can), crossed arm adduction and [place]'s, drop arm/painful arc, and Yergason's    Large Joint Injection/Arthocentesis    Date/Time: 12/11/2020 4:12 PM  Performed by: Yeo, Albert, MD  Authorized by: Yeo, Albert, MD     Indications:  Pain and osteoarthritis  Needle Size:  22 G  Guidance: ultrasound    Approach:  Anterior  Location:  Shoulder   Location comment:  Biceps tendon sheath      Medications:  40 mg methylPREDNISolone 40 MG/ML  Outcome:  Tolerated well, no immediate complications  Procedure discussed: discussed risks, benefits, and alternatives    Consent Given by:  Patient  Timeout: timeout called immediately prior to procedure    Prep: patient was prepped and draped in usual sterile fashion                 Albert Yeo MD, [facility]            Again, thank you for allowing me to participate in the care of your patient.        Sincerely,        Albert Yeo, MD

## 2020-12-15 ENCOUNTER — OFFICE VISIT (OUTPATIENT)
Dept: FAMILY MEDICINE | Facility: CLINIC | Age: 45
End: 2020-12-15
Payer: MEDICARE

## 2020-12-15 VITALS
RESPIRATION RATE: 14 BRPM | BODY MASS INDEX: 37.97 KG/M2 | SYSTOLIC BLOOD PRESSURE: 126 MMHG | OXYGEN SATURATION: 100 % | DIASTOLIC BLOOD PRESSURE: 80 MMHG | WEIGHT: 213 LBS | HEART RATE: 80 BPM | TEMPERATURE: 98 F

## 2020-12-15 DIAGNOSIS — H93.13 TINNITUS, BILATERAL: Primary | ICD-10-CM

## 2020-12-15 DIAGNOSIS — R05.9 COUGH: ICD-10-CM

## 2020-12-15 PROCEDURE — 99214 OFFICE O/P EST MOD 30 MIN: CPT | Performed by: NURSE PRACTITIONER

## 2020-12-15 PROCEDURE — U0003 INFECTIOUS AGENT DETECTION BY NUCLEIC ACID (DNA OR RNA); SEVERE ACUTE RESPIRATORY SYNDROME CORONAVIRUS 2 (SARS-COV-2) (CORONAVIRUS DISEASE [COVID-19]), AMPLIFIED PROBE TECHNIQUE, MAKING USE OF HIGH THROUGHPUT TECHNOLOGIES AS DESCRIBED BY CMS-2020-01-R: HCPCS | Performed by: NURSE PRACTITIONER

## 2020-12-15 NOTE — PROGRESS NOTES
Subjective     Prince Patel is a 45 year old female who presents to clinic today for the following health issues:    HPI         General Follow Up    Concern: tinnitus  Problem started: 1 months ago, follow up from 12/3/2020 visit  Progression of symptoms: denies pain, continues to have jovani pitched constant ringing, denies hearing decrease or ear pain.    At last visit had cerumen removed form left ear. Was also  placed on zyrtec D, has been taking daily without change in symptoms.      Cough:  Cough began 7 days ago, has increased, at times starts coughing and has a hard time stopping. Denies shortness of breath, wheezing, chest pain or fever.  Has nasal congestion that she feels is due to allergies. Denies loss of taste or smell. Has not been around anybody with known COVID, works at a hotel as a .        Review of Systems   CONSTITUTIONAL: NEGATIVE for fever, chills, change in weight  ENT/MOUTH: NEGATIVE for  mouth and throat problems  RESP: NEGATIVE for  SOB  CV: NEGATIVE for chest pain, palpitations or peripheral edema  PSYCHIATRIC: NEGATIVE for changes in mood or affect      Objective    LMP 10/31/2011   Body mass index is 37.97 kg/m .   /80 (BP Location: Right arm, Patient Position: Chair, Cuff Size: Adult Regular)   Pulse 80   Temp 98  F (36.7  C) (Oral)   Resp 14   Wt 96.6 kg (213 lb)   LMP 10/31/2011   SpO2 100%   BMI 37.97 kg/m    Physical Exam   GENERAL: healthy, alert and no distress  HENT: ear canals and TM's normal, nose and mouth without ulcers or lesions  NECK: no adenopathy, no asymmetry, masses, or scars and thyroid normal to palpation  RESP: lungs clear to auscultation - no rales, rhonchi or wheezes  CV: regular rate and rhythm, normal S1 S2, no S3 or S4, no murmur, click or rub,   PSYCH: mentation appears normal, affect normal/bright            Assessment & Plan     Tinnitus, bilateral: unclear etiology, denies hearing loss, exam normal. Will refer to ENT for further  evaluation  - OTOLARYNGOLOGY REFERRAL    Cough: onset 1 week ago, increase in frequency, has history of allergies so cough could be related to allergies, continue use of zyrtec. Due to exposure to many people in work environment at the hotel will also obtain test for COVID, discussed need to self isolate until test result returns negative, if positive I will call her and give her further instructions regarding continued isolation.    - Symptomatic COVID-19 Virus (Coronavirus) by PCR  - Symptomatic COVID-19 Virus (Coronavirus) by PCR        Follow up in 6 months, sooner as needed.  Susan Haase, APRN [facility]

## 2020-12-16 LAB
SARS-COV-2 RNA SPEC QL NAA+PROBE: NOT DETECTED
SPECIMEN SOURCE: NORMAL

## 2020-12-17 NOTE — RESULT ENCOUNTER NOTE
Ryan Tolbert,  Your test for COVID 19 is negative, you can return to work.  Sincerely,    Susan Haase, CNP

## 2021-01-04 ENCOUNTER — TRANSFERRED RECORDS (OUTPATIENT)
Dept: HEALTH INFORMATION MANAGEMENT | Facility: CLINIC | Age: 46
End: 2021-01-04

## 2021-02-17 DIAGNOSIS — K21.00 GASTROESOPHAGEAL REFLUX DISEASE WITH ESOPHAGITIS: ICD-10-CM

## 2021-02-17 RX ORDER — FAMOTIDINE 20 MG/1
TABLET, FILM COATED ORAL
Qty: 180 TABLET | Refills: 1 | Status: SHIPPED | OUTPATIENT
Start: 2021-02-17 | End: 2021-11-22

## 2021-02-22 ENCOUNTER — OFFICE VISIT (OUTPATIENT)
Dept: FAMILY MEDICINE | Facility: CLINIC | Age: 46
End: 2021-02-22
Payer: MEDICARE

## 2021-02-22 ENCOUNTER — TELEPHONE (OUTPATIENT)
Dept: FAMILY MEDICINE | Facility: CLINIC | Age: 46
End: 2021-02-22

## 2021-02-22 VITALS
HEART RATE: 94 BPM | DIASTOLIC BLOOD PRESSURE: 82 MMHG | BODY MASS INDEX: 37.79 KG/M2 | OXYGEN SATURATION: 98 % | WEIGHT: 212 LBS | TEMPERATURE: 98 F | RESPIRATION RATE: 18 BRPM | SYSTOLIC BLOOD PRESSURE: 112 MMHG

## 2021-02-22 DIAGNOSIS — R21 RASH: Primary | ICD-10-CM

## 2021-02-22 DIAGNOSIS — H93.13 TINNITUS, BILATERAL: ICD-10-CM

## 2021-02-22 PROCEDURE — 99213 OFFICE O/P EST LOW 20 MIN: CPT | Performed by: NURSE PRACTITIONER

## 2021-02-22 RX ORDER — BENZOCAINE/MENTHOL 6 MG-10 MG
LOZENGE MUCOUS MEMBRANE 2 TIMES DAILY
Qty: 60 G | Refills: 0 | Status: SHIPPED | OUTPATIENT
Start: 2021-02-22 | End: 2021-04-21

## 2021-02-22 RX ORDER — CLOTRIMAZOLE 1 %
CREAM (GRAM) TOPICAL 2 TIMES DAILY
Qty: 60 G | Refills: 0 | Status: SHIPPED | OUTPATIENT
Start: 2021-02-22 | End: 2021-04-21

## 2021-02-22 ASSESSMENT — PATIENT HEALTH QUESTIONNAIRE - PHQ9
SUM OF ALL RESPONSES TO PHQ QUESTIONS 1-9: 0
SUM OF ALL RESPONSES TO PHQ QUESTIONS 1-9: 0
10. IF YOU CHECKED OFF ANY PROBLEMS, HOW DIFFICULT HAVE THESE PROBLEMS MADE IT FOR YOU TO DO YOUR WORK, TAKE CARE OF THINGS AT HOME, OR GET ALONG WITH OTHER PEOPLE: NOT DIFFICULT AT ALL

## 2021-02-22 ASSESSMENT — ANXIETY QUESTIONNAIRES
6. BECOMING EASILY ANNOYED OR IRRITABLE: NOT AT ALL
GAD7 TOTAL SCORE: 0
7. FEELING AFRAID AS IF SOMETHING AWFUL MIGHT HAPPEN: NOT AT ALL
4. TROUBLE RELAXING: NOT AT ALL
2. NOT BEING ABLE TO STOP OR CONTROL WORRYING: NOT AT ALL
GAD7 TOTAL SCORE: 0
7. FEELING AFRAID AS IF SOMETHING AWFUL MIGHT HAPPEN: NOT AT ALL
1. FEELING NERVOUS, ANXIOUS, OR ON EDGE: NOT AT ALL
GAD7 TOTAL SCORE: 0
5. BEING SO RESTLESS THAT IT IS HARD TO SIT STILL: NOT AT ALL
3. WORRYING TOO MUCH ABOUT DIFFERENT THINGS: NOT AT ALL

## 2021-02-22 NOTE — TELEPHONE ENCOUNTER
See note, called Dr Sneed's offce at [phone], talked to Yin WISE, discussed, she will confirm with DR Sneed and follow up with pt tomorrow, ANAI to SH Haase, Danuta Price, APRN CNP  P Cr Triage             Can you please look back at the ENT note and contact the ENT office, Petr Tolbert does not have any idea what they meant by tinnitus masking treatment, she lost the handout and didn't understand the handout.  I don't think they realized her cognitive limitations.  Could you see if an RN from the ENT clinic could call and talk to Petr Tolbert and walk her through the treatment recommendation, she is also not sure when she should go back.   Thank you,   Susan Haase, LEONEL Sawant, RN, BSN  Message handled by CLINIC NURSE.

## 2021-02-22 NOTE — PROGRESS NOTES
Assessment & Plan     Rash: likely tinea, discussed application of below for 5 days after rash is gone, do not share towels, wash sheets/towels in hot water.   - REVIEW OF HEALTH MAINTENANCE PROTOCOL ORDERS  - clotrimazole (LOTRIMIN) 1 % external cream; Apply topically 2 times daily  - hydrocortisone (CORTAID) 1 % external cream; Apply topically 2 times daily    Tinnitus, bilateral: will have RN call ENT to reach back out to Petr Tolbert to explain tinnitus recommendations.     Follow}     FUTURE APPOINTMENTS:       - Follow-up visit in 6 months.    No follow-ups on file.    Susan Haase, APRN CNP  [facility]    Subjective   Petr Tolbert is a 45 year old who presents for the following health issues     History of Present Illness   She consumes 1 sweetened beverage(s) daily.She exercises with enough effort to increase her heart rate 30 to 60 minutes per day.  She exercises with enough effort to increase her heart rate 7 days per week.   She is taking medications regularly.       Rash on abdomen, left armpit  Noticed 3 days, itchiness.  Denies use of any new lotions, laundry soaps. Others in the home without a rash.     Tinnitus:  Seen by ENT on 1/4/2021, Petr Tolbert is unsure of the sheet she was given or what she is suppose to do to help with her tinnitus.      Answers for HPI/ROS submitted by the patient on 2/22/2021   Chronic problems general questions HPI Form  If you checked off any problems, how difficult have these problems made it for you to do your work, take care of things at home, or get along with other people?: Not difficult at all  PHQ9 TOTAL SCORE: 0  KATHLEEN 7 TOTAL SCORE: 0    Review of Systems   CONSTITUTIONAL: NEGATIVE for fever, chills, change in weight  INTEGUMENTARY/SKIN: see HPI  ENT/MOUTH: NEGATIVE for mouth and throat problems. See HPI  RESP: NEGATIVE for significant cough or SOB  CV: NEGATIVE for chest pain, palpitations or peripheral edema  PSYCHIATRIC: NEGATIVE for changes  in mood or affect      Objective    /82 (BP Location: Right arm, Patient Position: Sitting, Cuff Size: Adult Large)   Pulse 94   Temp 98  F (36.7  C) (Oral)   Resp 18   Wt 96.2 kg (212 lb)   LMP 10/31/2011   SpO2 98%   BMI 37.79 kg/m    Body mass index is 37.79 kg/m .  Physical Exam   GENERAL: healthy, alert and no distress  RESP: lungs clear to auscultation - no rales, rhonchi or wheezes  CV: regular rate and rhythm, normal S1 S2, no S3 or S4, no murmur, click or rub, no peripheral edema and peripheral pulses strong  SKIN: left lower abdomen with a 2.5 cm round erythematous area with a raised border, same type of area present on left upper axilla.  PSYCH: mentation appears normal, affect normal/bright

## 2021-02-22 NOTE — Clinical Note
Can you please look back at the ENT note and contact the ENT office, Petr Tolbert does not have any idea what they meant by tinnitus masking treatment, she lost the handout and didn't understand the handout.  I don't think they realized her cognitive limitations.  Could you see if an RN from the ENT clinic could call and talk to Petr Tolbert and walk her through the treatment recommendation, she is also not sure when she should go back.  Thank you,  Susan Haase, CNP

## 2021-02-23 ASSESSMENT — ANXIETY QUESTIONNAIRES: GAD7 TOTAL SCORE: 0

## 2021-02-23 ASSESSMENT — PATIENT HEALTH QUESTIONNAIRE - PHQ9: SUM OF ALL RESPONSES TO PHQ QUESTIONS 1-9: 0

## 2021-03-22 ENCOUNTER — MYC MEDICAL ADVICE (OUTPATIENT)
Dept: FAMILY MEDICINE | Facility: CLINIC | Age: 46
End: 2021-03-22

## 2021-03-23 ENCOUNTER — IMMUNIZATION (OUTPATIENT)
Dept: NURSING | Facility: CLINIC | Age: 46
End: 2021-03-23
Payer: MEDICARE

## 2021-03-23 PROCEDURE — 91300 PR COVID VAC PFIZER DIL RECON 30 MCG/0.3 ML IM: CPT

## 2021-03-23 PROCEDURE — 0001A PR COVID VAC PFIZER DIL RECON 30 MCG/0.3 ML IM: CPT

## 2021-03-31 ENCOUNTER — OFFICE VISIT (OUTPATIENT)
Dept: FAMILY MEDICINE | Facility: CLINIC | Age: 46
End: 2021-03-31
Payer: MEDICARE

## 2021-03-31 VITALS
HEART RATE: 88 BPM | OXYGEN SATURATION: 100 % | TEMPERATURE: 98.4 F | SYSTOLIC BLOOD PRESSURE: 112 MMHG | DIASTOLIC BLOOD PRESSURE: 80 MMHG | WEIGHT: 214.06 LBS | BODY MASS INDEX: 38.16 KG/M2

## 2021-03-31 DIAGNOSIS — J30.2 SEASONAL ALLERGIC RHINITIS, UNSPECIFIED TRIGGER: ICD-10-CM

## 2021-03-31 DIAGNOSIS — R05.9 COUGH: ICD-10-CM

## 2021-03-31 DIAGNOSIS — R06.2 WHEEZING: Primary | ICD-10-CM

## 2021-03-31 PROCEDURE — 99214 OFFICE O/P EST MOD 30 MIN: CPT | Performed by: NURSE PRACTITIONER

## 2021-03-31 NOTE — PROGRESS NOTES
Assessment & Plan     Wheezing  Seasonal allergic rhinitis, unspecified trigger  Cough  Suspect low grade allergy induced asthma. Patient would like to pursue testing. Discussed referral due to not able to due spirometry in clinic.    - ALLERGY/ASTHMA ADULT REFERRAL    Patient has appointment with PCP tomorrow that she would like to keep to discuss her migraine medication and to share exciting information regarding an award she will be receiving later today.                    No follow-ups on file.    ELY Cho CNP  [facility]    Subjective   Petr Tolbert is a 45 year old who presents for the following health issues     HPI     Concern - asthma symptoms  Onset: one week currently, happens annually.   Description: intermittently wheezing.   Intensity: mild  Progression of Symptoms:  same  Accompanying Signs & Symptoms: some wheezing   Previous history of similar problem: none  Precipitating factors:        Worsened by: none  Alleviating factors:        Improved by: none  Therapies tried and outcome: None    Does not know what causes wheeze, sometimes with cough. Denies shortness of breath.   Does have allergies to unknown substance. Would like testing to better understand. Using zyrtec as needed .    Review of Systems   Constitutional, HEENT, cardiovascular, pulmonary, gi and gu systems are negative, except as otherwise noted.      Objective    /80 (BP Location: Right arm, Patient Position: Chair, Cuff Size: Adult Large)   Pulse 88   Temp 98.4  F (36.9  C) (Oral)   Wt 97.1 kg (214 lb 1 oz)   LMP 10/31/2011   SpO2 100%   BMI 38.16 kg/m    Body mass index is 38.16 kg/m .  Physical Exam   GENERAL: healthy, alert and no distress  RESP: lungs clear to auscultation - no rales, rhonchi or wheezes  CV: regular rate and rhythm, normal S1 S2, no S3 or S4, no murmur, click or rub, no peripheral edema and peripheral pulses strong

## 2021-04-01 ENCOUNTER — OFFICE VISIT (OUTPATIENT)
Dept: FAMILY MEDICINE | Facility: CLINIC | Age: 46
End: 2021-04-01
Payer: MEDICARE

## 2021-04-01 VITALS
BODY MASS INDEX: 38.51 KG/M2 | RESPIRATION RATE: 14 BRPM | DIASTOLIC BLOOD PRESSURE: 80 MMHG | SYSTOLIC BLOOD PRESSURE: 122 MMHG | TEMPERATURE: 98 F | OXYGEN SATURATION: 99 % | HEART RATE: 100 BPM | WEIGHT: 216 LBS

## 2021-04-01 DIAGNOSIS — G43.109 MIGRAINE WITH AURA AND WITHOUT STATUS MIGRAINOSUS, NOT INTRACTABLE: Primary | ICD-10-CM

## 2021-04-01 PROCEDURE — 99213 OFFICE O/P EST LOW 20 MIN: CPT | Performed by: NURSE PRACTITIONER

## 2021-04-01 RX ORDER — RIZATRIPTAN BENZOATE 10 MG/1
10 TABLET ORAL
Qty: 18 TABLET | Refills: 3 | Status: SHIPPED | OUTPATIENT
Start: 2021-04-01 | End: 2022-02-18

## 2021-04-01 NOTE — PROGRESS NOTES
"    Assessment & Plan     Migraine with aura and without status migrainosus, not intractable: will change to rizatriptan for headaches, discontinue sumatriptan since it is not working well.  Is having 1-2 headaches per month.      - rizatriptan (MAXALT) 10 MG tablet; Take 1 tablet (10 mg) by mouth at onset of headache for migraine May repeat in 2 hours. Max 3 tablets/24 hours.      Return in about 4 months (around 8/1/2021) for Physical Exam.    Susan Haase, APRN [facility]   Petr Tolbert is a 45 year old who presents for the following health issues     History of Present Illness     Asthma:  She presents for follow up of asthma.  She has no cough, some wheezing, and no shortness of breath. She is not using a relief medication. She does not have a controller medication. Patient is aware of the following triggers: none. The patient has not had a visit to the Emergency Room, Urgent Care or Hospital due to asthma since the last clinic visit.     Migraines:   Since the patient's last clinic visit, headaches are: No change    She is able to do normal daily activities when she has a migraine.  The patient is taking the following rescue/relief medications:  Tylenol   Patient states \"I get only a small amount of relief\" from the rescue/relief medications.   The patient is taking the following medications to prevent migraines:  No medications to prevent migraines  In the past 4 weeks, the patient has gone to an Urgent Care or Emergency Room 0 times times due to headaches.    She eats 2-3 servings of fruits and vegetables daily.She consumes 1 sweetened beverage(s) daily.She exercises with enough effort to increase her heart rate 30 to 60 minutes per day.  She exercises with enough effort to increase her heart rate 3 or less days per week.   She is taking medications regularly.     Takes tylenol at onset, if that doesn't help takes Imitrex 50 mg, does not help decrease the migraine.  " Pain located across the forehead, rates 5/10.   Is getting 2 headaches per month. Sometimes pop that causes aspartame. Sometimes sees stars.  Sometimes has nausea.      Review of Systems   CONSTITUTIONAL: NEGATIVE for fever, chills, change in weight  RESP: NEGATIVE for significant cough or SOB  CV: NEGATIVE for chest pain, palpitations or peripheral edema  NEURO: see HPI  PSYCHIATRIC: NEGATIVE for changes in mood or affect      Objective    LMP 10/31/2011   Body mass index is 38.51 kg/m .   /80 (BP Location: Right arm, Patient Position: Chair, Cuff Size: Adult Large)   Pulse 100   Temp 98  F (36.7  C) (Oral)   Resp 14   Wt 98 kg (216 lb)   LMP 10/31/2011   SpO2 99%   BMI 38.51 kg/m    Physical Exam   GENERAL: healthy, alert and no distress  NECK: no adenopathy, no asymmetry, masses, or scars and thyroid normal to palpation  RESP: lungs clear to auscultation - no rales, rhonchi or wheezes  CV: regular rate and rhythm, normal S1 S2,   NEURO: Normal strength and tone, mentation intact and speech normal

## 2021-04-13 ENCOUNTER — IMMUNIZATION (OUTPATIENT)
Dept: NURSING | Facility: CLINIC | Age: 46
End: 2021-04-13
Attending: INTERNAL MEDICINE
Payer: MEDICARE

## 2021-04-13 PROCEDURE — 91300 PR COVID VAC PFIZER DIL RECON 30 MCG/0.3 ML IM: CPT

## 2021-04-13 PROCEDURE — 0002A PR COVID VAC PFIZER DIL RECON 30 MCG/0.3 ML IM: CPT

## 2021-04-19 ENCOUNTER — TELEPHONE (OUTPATIENT)
Dept: FAMILY MEDICINE | Facility: CLINIC | Age: 46
End: 2021-04-19

## 2021-04-19 NOTE — TELEPHONE ENCOUNTER
Visitation is . Pt would like to leave work at 4. Pt states her work only has  leave for immediate family. If approved pt will print letter from [employer].

## 2021-04-19 NOTE — TELEPHONE ENCOUNTER
General Call:     Who is calling:  Petr Tolbert    Reason for Call:  Letter for work    What are your questions or concerns: Is there a way I could get a doctor's note to excuse me from work about an hour early as I would like to go to my friends visitation that I used to live with found out she just passed away and I know work will not let me out unless I have a doctor's note or I'm sick but I would have to have a doctor's note even if I'm sick with the flu or whatever so if I can get a note from Danuta that would be great     Date of last appointment with provider:     Okay to leave a detailed message:Yes at Home number on file [phone] (home)

## 2021-04-19 NOTE — LETTER
[facility]  [id number] [place] [id number]  Phone: [phone]    April 20, 2021        Prince Patel  [address] [place] [address]          To whom it may concern:    RE: Prince Patel    Please excuse Petr from work at 4 pm on 4/22/2021.     Please contact me for questions or concerns.      Sincerely,        Susan Haase, APRN CNP

## 2021-04-20 NOTE — TELEPHONE ENCOUNTER
Left detailed message on machine that letter has been sent to her MyChart.  Ai Jackson, [facility]

## 2021-04-21 NOTE — TELEPHONE ENCOUNTER
Please ask Petr Tolbert to set up a visit with me about the below request, virtual or at the clinic.  Thanks,  Susan Haase, CNP

## 2021-04-21 NOTE — TELEPHONE ENCOUNTER
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

## 2021-04-28 ENCOUNTER — VIRTUAL VISIT (OUTPATIENT)
Dept: FAMILY MEDICINE | Facility: CLINIC | Age: 46
End: 2021-04-28
Payer: MEDICARE

## 2021-04-28 DIAGNOSIS — M54.50 ACUTE BILATERAL LOW BACK PAIN WITHOUT SCIATICA: Primary | ICD-10-CM

## 2021-04-28 PROCEDURE — 99213 OFFICE O/P EST LOW 20 MIN: CPT | Mod: 95 | Performed by: NURSE PRACTITIONER

## 2021-04-28 NOTE — LETTER
[facility]  [address] [place] [address]  Phone: [phone]    April 28, 2021        Prince Patel  [address] [facility] [address] [address]          To whom it may concern:    RE: Prince Patel    Patient was seen and treated today at our clinic. Please excuse Petr Tolbert from work on 4/26/2021 due to illness.    Please contact me for questions or concerns.      Sincerely,        Susan Haase, APRN CNP

## 2021-04-28 NOTE — PROGRESS NOTES
Petr Tolbert is a 45 year old who is being evaluated via a billable telephone visit.      What phone number would you like to be contacted at? [phone]  How would you like to obtain your AVS? Suziehart    Assessment & Plan     Acute bilateral low back pain without sciatica  Discussed use of heat for 15-30 min tid, also take ibuprofen 400 mg tid prn with food for pain.    RTW note was given.     Follow up in 1 week, sooner as needed.     Susan Haase, APRN CNP  [facility]   Petr Tolbert is a 45 year old who presents for the following health issues   HPI     On Sunday lost her balance on the steps at [employer], she caught herself prior to falling to the ground. Since that time she has been having right shoulder pain and lower back spasms.  Denies lower extremity weakness, numbness, tingling or bowel/bladder dysfunction.    Missed work on 4/26 due to pain, was off on 4/27 and 4/28.  Is scheduled to return to work on 4/29/2021.  Last day at her job is 5/13/2021.        Review of Systems   CONSTITUTIONAL: NEGATIVE for fever, chills, change in weight  MUSCULOSKELETAL: see HPI  NEURO: NEGATIVE for weakness, dizziness or paresthesias      Objective    Vitals - Patient Reported  Weight (Patient Reported): 95.3 kg (210 lb)      Vitals:  No vitals were obtained today due to virtual visit.    Physical Exam   PSYCH: Alert and oriented times 3; coherent speech, normal   rate and volume, able to articulate logical thoughts, able   to abstract reason, no tangential thoughts, no hallucinations   or delusions  Her affect is normal and pleasant  RESP: No cough, no audible wheezing, able to talk in full sentences  Remainder of exam unable to be completed due to telephone visits        Phone call duration: 8 minutes

## 2021-04-28 NOTE — LETTER
[facility]  [id number] [place] [address]  Phone: [phone]    April 28, 2021        Prince Patel  [address] [facility] [address] [address]          To whom it may concern:    RE: Prnice Patel    Patient was seen and treated today at our clinic. Please excuse Prince from work on 4/26/2021 due to illness, also allow her to be late for work on 5/3/2021 (will arrive by 11 am at the latest) due to a clinic visit.      Please contact me for questions or concerns.      Sincerely,        Susan Haase, APRN CNP

## 2021-05-03 ENCOUNTER — OFFICE VISIT (OUTPATIENT)
Dept: FAMILY MEDICINE | Facility: CLINIC | Age: 46
End: 2021-05-03
Payer: MEDICARE

## 2021-05-03 VITALS
SYSTOLIC BLOOD PRESSURE: 131 MMHG | RESPIRATION RATE: 18 BRPM | WEIGHT: 213 LBS | BODY MASS INDEX: 36.37 KG/M2 | HEIGHT: 64 IN | OXYGEN SATURATION: 97 % | HEART RATE: 88 BPM | TEMPERATURE: 98.3 F | DIASTOLIC BLOOD PRESSURE: 86 MMHG

## 2021-05-03 DIAGNOSIS — M72.2 PLANTAR FASCIITIS: Primary | ICD-10-CM

## 2021-05-03 DIAGNOSIS — K08.89 TOOTH PAIN: ICD-10-CM

## 2021-05-03 DIAGNOSIS — B07.0 PLANTAR WARTS: ICD-10-CM

## 2021-05-03 PROCEDURE — 99213 OFFICE O/P EST LOW 20 MIN: CPT | Mod: 25 | Performed by: NURSE PRACTITIONER

## 2021-05-03 PROCEDURE — 17110 DESTRUCTION B9 LES UP TO 14: CPT | Performed by: NURSE PRACTITIONER

## 2021-05-03 ASSESSMENT — ANXIETY QUESTIONNAIRES
7. FEELING AFRAID AS IF SOMETHING AWFUL MIGHT HAPPEN: NOT AT ALL
5. BEING SO RESTLESS THAT IT IS HARD TO SIT STILL: NOT AT ALL
GAD7 TOTAL SCORE: 0
2. NOT BEING ABLE TO STOP OR CONTROL WORRYING: NOT AT ALL
GAD7 TOTAL SCORE: 0
7. FEELING AFRAID AS IF SOMETHING AWFUL MIGHT HAPPEN: NOT AT ALL
4. TROUBLE RELAXING: NOT AT ALL
GAD7 TOTAL SCORE: 0
1. FEELING NERVOUS, ANXIOUS, OR ON EDGE: NOT AT ALL
6. BECOMING EASILY ANNOYED OR IRRITABLE: NOT AT ALL
3. WORRYING TOO MUCH ABOUT DIFFERENT THINGS: NOT AT ALL

## 2021-05-03 ASSESSMENT — PATIENT HEALTH QUESTIONNAIRE - PHQ9
SUM OF ALL RESPONSES TO PHQ QUESTIONS 1-9: 0
10. IF YOU CHECKED OFF ANY PROBLEMS, HOW DIFFICULT HAVE THESE PROBLEMS MADE IT FOR YOU TO DO YOUR WORK, TAKE CARE OF THINGS AT HOME, OR GET ALONG WITH OTHER PEOPLE: NOT DIFFICULT AT ALL
SUM OF ALL RESPONSES TO PHQ QUESTIONS 1-9: 0

## 2021-05-03 ASSESSMENT — MIFFLIN-ST. JEOR: SCORE: 1598.22

## 2021-05-03 NOTE — PROGRESS NOTES
Assessment & Plan     Plantar fasciitis: discussed wearing supportive shoes at all times, even in the house, ice tid for 30 min.    Work note given.       Tooth pain: right lower molar extraction on 5/1, discussed rinsing with warm salt water, take tylenol 650 mg this morning, take tylenol with codeine after work. Declined further refill of tylenol with codeine (has 6 remaining tablets which should be enough).      Plantar warts: right heel  - DESTRUCT BENIGN LESION, UP TO 14  Procedure explained to patient, verbal consent given, cleansed with alcohol, pared with #15 blade, liquid nitrogen applied x 3 pulses (5 sec each), bandaide placed.  Explained that may take more than 1 treatment, keep covered for next 24 hours, if wart remains return in 3-4 weeks for an additional treatment.     Return in about 4 weeks (around 5/31/2021) for Routine Visit, wart removal in clinic.    Susan Haase, APRN [facility]   Petr Tolbert is a 45 year old who presents for the following health issues    History of Present Illness       She eats 2-3 servings of fruits and vegetables daily.She consumes 1 sweetened beverage(s) daily.She exercises with enough effort to increase her heart rate 20 to 29 minutes per day.  She exercises with enough effort to increase her heart rate 3 or less days per week.   She is taking medications regularly.       Concern - letter work  Onset: ongoing for 3 yrs  Description: Pt requesting letter for because of her planter fasciitis,   Intensity: moderate, severe  Progression of Symptoms:  worsening  Accompanying Signs & Symptoms: planter warts  Previous history of similar problem: none  Precipitating factors:        Worsened by: standing  Alleviating factors:        Improved by: none  Therapies tried and outcome: tylenol,epsom salts gives little relief.  Bilateral foot pain after being on her feet for 4-6 hours, currently working at a hotel.    Wart:  Right heal.   "  Tooth extraction:  Had a right lower tooth removed on 5/1, continues to have pain. Taking tylenol with codeine for pain, took 1 yesterday.        Review of Systems   CONSTITUTIONAL: NEGATIVE for fever, chills, change in weight  ENT/MOUTH: NEGATIVE for ear, mouth and throat problems  RESP: NEGATIVE for significant cough or SOB  CV: NEGATIVE for chest pain, palpitations or peripheral edema  MUSCULOSKELETAL: see HPI  SKIN:  See HPI      Objective    LMP 10/31/2011   Body mass index is 37.14 kg/m .   /86 (BP Location: Right arm, Patient Position: Chair, Cuff Size: Adult Large)   Pulse 88   Temp 98.3  F (36.8  C) (Oral)   Resp 18   Ht 1.613 m (5' 3.5\")   Wt 96.6 kg (213 lb)   LMP 10/31/2011   SpO2 97%   BMI 37.14 kg/m    Physical Exam   GENERAL: healthy, alert and no distress  HENT: Right lower molar extracted, extraction site without edema, no bleeding.  RESP: lungs clear to auscultation - no rales, rhonchi or wheezes  CV: regular rate and rhythm, normal S1 S2, no S3 or S4, no murmur, click or rub, no peripheral edema  MS: bilateral plantar aspect of feet with tenderness upon palpation.  SKIN: right heel with a 2 mm flesh colored wart.     "

## 2021-05-03 NOTE — LETTER
[facility]  [address] [place] [address]  Phone: [phone]    May 3, 2021        Prince Patel  [address] [address]          To whom it may concern:    RE: Prince Patel    Patient was seen and treated today at our clinic. Due to medical problems Petr Tolbert is not allowed to work more than 24 hours per week, 8 hours per shift.     Please contact me for questions or concerns.      Sincerely,        Susan Haase, APRN CNP

## 2021-05-04 ASSESSMENT — PATIENT HEALTH QUESTIONNAIRE - PHQ9: SUM OF ALL RESPONSES TO PHQ QUESTIONS 1-9: 0

## 2021-05-04 ASSESSMENT — ANXIETY QUESTIONNAIRES: GAD7 TOTAL SCORE: 0

## 2021-05-24 ENCOUNTER — TRANSFERRED RECORDS (OUTPATIENT)
Dept: HEALTH INFORMATION MANAGEMENT | Facility: CLINIC | Age: 46
End: 2021-05-24

## 2021-06-03 ENCOUNTER — OFFICE VISIT (OUTPATIENT)
Dept: FAMILY MEDICINE | Facility: CLINIC | Age: 46
End: 2021-06-03
Payer: COMMERCIAL

## 2021-06-03 ENCOUNTER — TELEPHONE (OUTPATIENT)
Dept: FAMILY MEDICINE | Facility: CLINIC | Age: 46
End: 2021-06-03

## 2021-06-03 ENCOUNTER — MYC MEDICAL ADVICE (OUTPATIENT)
Dept: FAMILY MEDICINE | Facility: CLINIC | Age: 46
End: 2021-06-03

## 2021-06-03 VITALS
BODY MASS INDEX: 37.14 KG/M2 | HEART RATE: 92 BPM | OXYGEN SATURATION: 98 % | DIASTOLIC BLOOD PRESSURE: 80 MMHG | SYSTOLIC BLOOD PRESSURE: 130 MMHG | WEIGHT: 213 LBS | TEMPERATURE: 98.2 F

## 2021-06-03 DIAGNOSIS — R21 RASH: ICD-10-CM

## 2021-06-03 DIAGNOSIS — J30.89 CHRONIC NON-SEASONAL ALLERGIC RHINITIS: Primary | ICD-10-CM

## 2021-06-03 DIAGNOSIS — J30.89 CHRONIC NON-SEASONAL ALLERGIC RHINITIS: ICD-10-CM

## 2021-06-03 DIAGNOSIS — B07.0 PLANTAR WARTS: Primary | ICD-10-CM

## 2021-06-03 PROCEDURE — 99213 OFFICE O/P EST LOW 20 MIN: CPT | Mod: 25 | Performed by: NURSE PRACTITIONER

## 2021-06-03 PROCEDURE — 17110 DESTRUCTION B9 LES UP TO 14: CPT | Performed by: NURSE PRACTITIONER

## 2021-06-03 RX ORDER — HYDROCORTISONE VALERATE 2 MG/G
OINTMENT TOPICAL 2 TIMES DAILY
Qty: 45 G | Refills: 0 | Status: SHIPPED | OUTPATIENT
Start: 2021-06-03 | End: 2021-06-21

## 2021-06-03 RX ORDER — AZELASTINE 1 MG/ML
1 SPRAY, METERED NASAL DAILY
COMMUNITY
Start: 2021-06-03 | End: 2022-11-16

## 2021-06-03 NOTE — PROGRESS NOTES
Assessment & Plan     Plantar warts: right heel  - DESTRUCT BENIGN LESION, UP TO 14  Procedure explained to patient, verbal consent given, cleansed with alcohol, pared with #15 blade, liquid nitrogen applied x 3 pulses (5 sec each), bandaide placed.  Explained that may take more than 1 treatment, keep covered for next 24 hours, if wart remains return in 3-4 weeks for an additional treatment.    Rash: left upper thigh, apply hydrocortisone twice daily prn     - hydrocortisone valerate (WEST-MU) 0.2 % external ointment; Apply topically 2 times daily    Chronic non-seasonal allergic rhinitis, unspecified trigger: followed by allergy and asthma center, will start allergy shots if covered by insurance.     Follow up in 4 weeks for treatment of wart    Susan Haase, APRN [facility]    Subjective   Petr Tolbert is a 46 year old who presents for the following health issues   HPI     Plantar wart:  Right heel.  is here following up on plantar warts on her right heel. Last cryotherapy 1 month ago.     Rash:  Bumps on left upper thigh, very itchy, present since having allergy testing completed on  5/24/2021.  Has not applied any OTC ointment to the area.    Allergies:  Seen by Asthma/allergy specialists for allergy testing.  Allergic to various dusts and ragweed.  Petr Tolbert was started on another allergy nasal spray, is unsure of the name.     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      Review of Systems   CONSTITUTIONAL: NEGATIVE for fever, chills, change in weight  ENT/MOUTH: NEGATIVE for ear, mouth and throat problems  RESP: NEGATIVE for significant cough or SOB  CV: NEGATIVE for chest pain, palpitations or peripheral edema  MUSCULOSKELETAL: see HPI  SKIN:  See HPI      Objective    /80 (BP Location: Right arm, Patient Position: Sitting, Cuff Size: Adult Regular)   Pulse 92   Temp 98.2  F (36.8  C) (Oral)   Wt 96.6 kg (213 lb)   LMP  10/31/2011   SpO2 98%   BMI 37.14 kg/m    Body mass index is 37.14 kg/m .  Physical Exam   GENERAL: healthy, alert and no distress  NECK: no adenopathy, no asymmetry, masses, or scars and thyroid normal to palpation  RESP: lungs clear to auscultation - no rales, rhonchi or wheezes  CV: regular rate and rhythm, normal S1 S2, no S3 or S4, no murmur, click or rub, no peripheral edema   MS: no gross musculoskeletal defects noted, no edema  SKIN: right heel with plantar wart. Left upper thigh with 3-4 excoriated raised papules.

## 2021-06-03 NOTE — TELEPHONE ENCOUNTER
Pt called and stated that her insurance will not cover the Hydrocortisone ointment prescribed. Please let patient know Susan Haase stated she can buy 1% Hydrocortisone cream over the counter.    Dora Moore/TONIO

## 2021-06-03 NOTE — TELEPHONE ENCOUNTER
Pt returning call, discussed below, informs another message, discussed with nba, confirmed message below, pt agrees, will f/u with pharmacy  Dayana Sawant RN, BSN  Message handled by CLINIC NURSE.

## 2021-06-20 ENCOUNTER — MYC MEDICAL ADVICE (OUTPATIENT)
Dept: FAMILY MEDICINE | Facility: CLINIC | Age: 46
End: 2021-06-20

## 2021-06-21 ENCOUNTER — MYC MEDICAL ADVICE (OUTPATIENT)
Dept: FAMILY MEDICINE | Facility: CLINIC | Age: 46
End: 2021-06-21

## 2021-07-09 ENCOUNTER — OFFICE VISIT (OUTPATIENT)
Dept: FAMILY MEDICINE | Facility: CLINIC | Age: 46
End: 2021-07-09
Payer: COMMERCIAL

## 2021-07-09 VITALS
DIASTOLIC BLOOD PRESSURE: 70 MMHG | BODY MASS INDEX: 38.5 KG/M2 | WEIGHT: 220.8 LBS | SYSTOLIC BLOOD PRESSURE: 130 MMHG | OXYGEN SATURATION: 96 % | HEART RATE: 100 BPM | RESPIRATION RATE: 16 BRPM

## 2021-07-09 DIAGNOSIS — L30.4 INTERTRIGO: Primary | ICD-10-CM

## 2021-07-09 DIAGNOSIS — B07.0 PLANTAR WARTS: ICD-10-CM

## 2021-07-09 PROCEDURE — 99213 OFFICE O/P EST LOW 20 MIN: CPT | Mod: 25 | Performed by: NURSE PRACTITIONER

## 2021-07-09 PROCEDURE — 17110 DESTRUCTION B9 LES UP TO 14: CPT | Performed by: NURSE PRACTITIONER

## 2021-07-09 RX ORDER — CLOTRIMAZOLE 1 %
CREAM (GRAM) TOPICAL 2 TIMES DAILY
Qty: 60 G | Refills: 1 | Status: SHIPPED | OUTPATIENT
Start: 2021-07-09 | End: 2022-11-16

## 2021-07-09 RX ORDER — FLUCONAZOLE 150 MG/1
TABLET ORAL
Qty: 3 TABLET | Refills: 0 | Status: SHIPPED | OUTPATIENT
Start: 2021-07-09 | End: 2021-08-12

## 2021-07-09 RX ORDER — NYSTATIN 100000 [USP'U]/G
POWDER TOPICAL 2 TIMES DAILY
Qty: 60 G | Refills: 4 | Status: SHIPPED | OUTPATIENT
Start: 2021-07-09 | End: 2022-02-18

## 2021-07-09 NOTE — PROGRESS NOTES
Assessment & Plan     Intertrigo:  Abdominal folds, discussed drying area with a hair dryer after showering.     - clotrimazole (LOTRIMIN) 1 % external cream; Apply topically 2 times daily  - nystatin (MYCOSTATIN) 772221 UNIT/GM external powder; Apply topically 2 times daily  - fluconazole (DIFLUCAN) 150 MG tablet; Take 1 tablet once a week for 3 weeks    Plantar warts: plantar aspect of right great toe  - DESTRUCT BENIGN LESION, UP TO 14  Procedure explained to patient, verbal consent given, cleansed with alcohol, pared with #15 blade, liquid nitrogen applied x 3 pulses (5 sec each), bandaide placed.  Explained that may take more than 1 treatment, keep covered for next 24 hours, if wart remains return in 3-4 weeks for an additional treatment.      Return in about 4 weeks (around 8/6/2021) for Routine Visit, follow up rash.    Susan Haase, APRN CNP  [facility]   Petr Tolbert is a 46 year old who presents for the following health issues     History of Present Illness       She eats 2-3 servings of fruits and vegetables daily.She consumes 1 sweetened beverage(s) daily.She exercises with enough effort to increase her heart rate 20 to 29 minutes per day.  She exercises with enough effort to increase her heart rate 5 days per week.   She is taking medications regularly.       Patient states that she is getting a yeast infection under her belly. Been there for a couple days now. 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    And also to look at wart on her right bottom foot.           Review of Systems   CONSTITUTIONAL: NEGATIVE for fever, chills, change in weight  INTEGUMENTARY/SKIN: see HPI  RESP: NEGATIVE for significant cough or SOB  CV: NEGATIVE for chest pain, palpitations or peripheral edema  MUSCULOSKELETAL:see HPI      Objective    /70   Pulse 100   Resp 16   Wt 100.2 kg (220 lb 12.8 oz)   LMP 10/31/2011   SpO2 96%    Breastfeeding No   BMI 38.50 kg/m    Body mass index is 38.5 kg/m .  Physical Exam   GENERAL: healthy, alert and no distress  RESP: lungs clear to auscultation - no rales, rhonchi or wheezes  CV: regular rate and rhythm, normal S1 S2, no S3 or S4, no murmur, click or rub, no peripheral edema and peripheral pulses strong  MS: flesh colored wart on plantar aspect of the right great toe.  SKIN: skin fold under abdomen with red, irritated skin with clear margins.

## 2021-08-12 ENCOUNTER — VIRTUAL VISIT (OUTPATIENT)
Dept: FAMILY MEDICINE | Facility: CLINIC | Age: 46
End: 2021-08-12
Payer: COMMERCIAL

## 2021-08-12 DIAGNOSIS — Z20.822 SUSPECTED 2019 NOVEL CORONAVIRUS INFECTION: ICD-10-CM

## 2021-08-12 DIAGNOSIS — R05.9 COUGH: ICD-10-CM

## 2021-08-12 DIAGNOSIS — R07.0 THROAT PAIN: Primary | ICD-10-CM

## 2021-08-12 PROCEDURE — 99442 PR PHYSICIAN TELEPHONE EVALUATION 11-20 MIN: CPT | Performed by: PHYSICIAN ASSISTANT

## 2021-08-12 RX ORDER — BENZONATATE 100 MG/1
100 CAPSULE ORAL 3 TIMES DAILY PRN
Qty: 25 CAPSULE | Refills: 0 | Status: SHIPPED | OUTPATIENT
Start: 2021-08-12 | End: 2021-09-14

## 2021-08-12 NOTE — PATIENT INSTRUCTIONS
Marianna Tolbert,    Thank you for allowing [facility] to manage your care.    I am unsure of the cause of your symptoms, but we must assume that this is COVID until proven otherwise. Even if your initial COVID test is negative, consider getting repeat testing 2-3 days later as the initial test could be falsely negative.    If you develop worsening/changing symptoms at any time, please go to the emergency department for evaluation.    I sent your prescriptions to your pharmacy.    For your pain, please use Ibuprofen 400mg four times daily with food. Between ibuprofen doses, you may use Tylenol 650mg.     Max acetaminophen (Tylenol) 4,000mg/24 hours  Max ibuprofen 3,200mg/24 hours    For cough, please use benzonatate as prescribed. Do not use this medication while driving, operating machinery, with other sedating medications, or while drinking alcohol as it will make you drowsy.    Drink 8-10 glasses of fluid daily to stay well-hydrated.    Please allow 1-2 business days for our office to contact you in regards to your laboratory/radiological studies.  If not done so, I encourage you to login into [employer] ([email or web address] to review your results as well.     If you have any questions or concerns, please feel free to call us at [phone]    Sincerely,    Valente Ballesteros PA-C    Did you know?      You can schedule a video visit for follow-up appointments as well as future appointments for certain conditions.  Please see the below link.     [email or web address]    If you have not already done so,  I encourage you to sign up for [employer] ([email or web address]).  This will allow you to review your results, securely communicate with a provider, and schedule virtual visits as well.      Patient Education   After Your COVID-19 (Coronavirus) Test  You have been tested for COVID-19 (coronavirus).   If you'll have surgery in the next few days, we'll  "let you know ahead of time if you have the virus. Please call your surgeon's office with any questions.  For all other patients: Results are usually available in [facility] [employer] within 2 to 3 days.   If you do not have a [employer] account, you'll get a letter in the mail in about 7 to 10 days.   MyChart is often the fastest way to get test results. Please sign up if you do not already have a [employer] account. See the handout Getting COVID-19 Test Results in [employer] for help.  What if my test result is positive?  If your test is positive and you have not viewed your result in [employer], you'll get a phone call with your result. (A positive test means that you have the virus.)     Follow the tips under \"How do I self-isolate?\" below for 10 days (20 days if you have a weak immune system).    You don't need to be retested for COVID-19 before going back to school or work. As long as you're fever-free and feeling better, you can go back to school, work and other activities after waiting the 10 or 20 days.  What if I have questions after I get my results?  If you have questions about your results, please visit our testing website at [email or web address].   After 7 to 10 days, if you have not gotten your results:     Call [phone] ([phone]) and ask to speak with our COVID-19 results team.    If you're being treated at an infusion center: Call your infusion center directly.  What are the symptoms of COVID-19?  Cough, fever and trouble breathing are the most common signs of COVID-19.  Other symptoms can include new headaches, new muscle or body aches, new and unexplained fatigue (feeling very tired), chills, sore throat, congestion (stuffy or runny nose), diarrhea (loose poop), loss of taste or smell, belly pain, and nausea or vomiting (feeling sick to your stomach or throwing up).  You may already have symptoms of COVID-19, or they may show up later.  What should I do if I have symptoms?  If " "you're having surgery: Call your surgeon's office.  For all other patients: Stay home and away from others (self-isolate) until ...    You've had no fever--and no medicine that reduces fever--for 1 full day (24 hours), AND    Other symptoms have gotten better. For example, your cough or breathing has improved, AND    At least 10 days have passed since your symptoms first started.  How do I self-isolate?    Stay in your own room, even for meals. Use your own bathroom if you can.    Stay away from others in your home. No hugging, kissing or shaking hands. No visitors.    Don't go to work, school or anywhere else.    Clean \"high touch\" surfaces often (doorknobs, counters, handles). Use household cleaning spray or wipes. You'll find a full list of  on the EPA website: www.epa.gov/pesticide-registration/list-n-disinfectants-use-against-sars-cov-2.    Cover your mouth and nose with a mask or other face covering to avoid spreading germs.    Wash your hands and face often. Use soap and water.    Caregivers in these groups are at risk for severe illness due to COVID-19:  ? People 65 years and older  ? People who live in a nursing home or long-term care facility  ? People with chronic disease (lung, heart, cancer, diabetes, kidney, liver, immunologic)  ? People who have a weakened immune system, including those who:    Are in cancer treatment    Take medicine that weakens the immune system, such as corticosteroids    Had a bone marrow or organ transplant    Have an immune deficiency    Have poorly controlled HIV or AIDS    Are obese (body mass index of 40 or higher)    Smoke regularly    Caregivers should wear gloves while washing dishes, handling laundry and cleaning bedrooms and bathrooms.    Use caution when washing and drying laundry: Don't shake dirty laundry and use the warmest water setting that you can.    For more tips on managing your health at home, go to " www.cdc.gov/coronavirus/2019-ncov/downloads/10Things.pdf.  How can I take care of myself at home?  1. Get lots of rest. Drink extra fluids (unless a doctor has told you not to).  2. Take Tylenol (acetaminophen) for fever or pain. If you have liver or kidney problems, ask your family doctor if it's OK to take Tylenol.   Adults can take either:  ? 650 mg (two 325 mg pills) every 4 to 6 hours, or   ? 1,000 mg (two 500 mg pills) every 8 hours as needed.  ? Note: Don't take more than 3,000 mg in one day. Acetaminophen is found in many medicines (both prescribed and over-the-counter medicines). Read all labels to be sure you don't take too much.   For children, check the Tylenol bottle for the right dose. The dose is based on the child's age or weight.  3. If you have other health problems (like cancer, heart failure, an organ transplant or severe kidney disease): Call your specialty clinic if you don't feel better in the next 2 days.  4. Know when to call 911. Emergency warning signs include:  ? Trouble breathing or shortness of breath  ? Chest pain or pressure that doesn't go away  ? Feeling confused like you haven't felt before, or not being able to wake up  ? Bluish-colored lips or face  5. If your doctor prescribed a blood thinner medicine: Follow their instructions.  Where can I get more information?    [facility] - About COVID-19:   [email or web address]    CDC - If You're Sick: cdc.gov/coronavirus/2019-ncov/about/steps-when-sick.html    CDC - Ending Home Isolation: www.cdc.gov/coronavirus/2019-ncov/hcp/disposition-in-home-patients.html    CDC - Caring for Someone: www.cdc.gov/coronavirus/2019-ncov/if-you-are-sick/care-for-someone.html    [facility] - Interim Guidance for Hospital Discharge to Home: www.health.[facility].mn.us/diseases/coronavirus/hcp/hospdischarge.pdf    [facility] clinical trials (COVID-19 research studies): clinicalaffairs.[facility]-clinical-trials    Below are the COVID-19  hotlines at the [place] Department of Health ([facility]). Interpreters are available.  ? For health questions: Call 236-466-2923 or 1-225.315.2202 (7 a.m. to 7 p.m.)  ? For questions about schools and childcare: Call 525-056-4018 or 1-234.790.1238 (7 a.m. to 7 p.m.)    For informational purposes only. Not to replace the advice of your health care provider. Clinically reviewed by Infection Prevention and the [facility] COVID-19 Clinical Team. Copyright   2020 [facility] Services. All rights reserved. SMARTworks 391496 - Rev 11/11/20.

## 2021-08-12 NOTE — LETTER
August 12, 2021      Prince Patel  [address] [address]        To Whom It May Concern:    Prince Patel was seen in our clinic. She may return to work on 8/16/21 if her COVID test is negative.      Sincerely,        LUKE Henderson

## 2021-08-12 NOTE — PROGRESS NOTES
Petr Tolbert is a 46 year old who is being evaluated via a billable telephone visit.      What phone number would you like to be contacted at? [phone]  How would you like to obtain your AVS? [facility]    Assessment & Plan   Problem List Items Addressed This Visit     None      Visit Diagnoses     Throat pain    -  Primary    Relevant Medications    Benzocaine-Menthol (CEPACOL SORE THROAT) 15-2.6 MG LOZG    Other Relevant Orders    Streptococcus A Rapid Scr w Reflx to PCR - Lab Collect (Completed)    Suspected 2019 novel coronavirus infection        Relevant Orders    Streptococcus A Rapid Scr w Reflx to PCR - Lab Collect (Completed)    Symptomatic COVID-19 Virus (Coronavirus) by PCR    Symptomatic COVID-19 Virus (Coronavirus) by PCR    Cough        Relevant Medications    benzonatate (TESSALON) 100 MG capsule         Impression is likely viral URI including COVID-19. Will order COVID-19 PCR, but she is vaccinated against COVID, so this is less likely. Will test for strep also. Sounds well and non-toxic and I have low suspicion for impending airway obstruction or respiratory distress.  She will push p.o. fluids, use over-the-counter meds for symptoms, tessalon, cepacol, isolation and follow-up with us in 2 weeks if not improving or urgent care/the ER if symptoms worsen/change at any time. Discussed COVID vaccination.    DDx and Dx discussed with and explained to the pt to their satisfaction.  All questions were answered at this time. Pt expressed understanding of and agreement with this dx, tx, and plan. No further workup warranted and standard medication warnings given. I have given the patient a list of pertinent indications for re-evaluation. Will go to the Emergency Department if symptoms worsen or new concerning symptoms arise. Patient left the call in no apparent distress.   16 minutes spent on the date of the encounter doing chart review, history and exam, documentation and further activities per the  note    See Patient Instructions    Return in about 2 weeks (around 8/26/2021) for a recheck of your symptoms if not improving, or call 911/go to an ER anytime if worsening.    LUKE Henderson [facility] WAN Tolbert is a 46 year old who presents for the following health issues     HPI   Headache, productive cough, nasal congestion, tussive chest pain since yesterday. No fevers or sob. Has not had COVID, but has been fully vaccinated.     Concern for COVID-19  About how many days ago did these symptoms start? yesterday  Is this your first visit for this illness? Yes  In the 14 days before your symptoms started, have you had close contact with someone with COVID-19 (Coronavirus)? No  Do you have a fever or chills? No  Are you having new or worsening difficulty breathing? No  Do you have new or worsening cough? Yes, I am coughing up mucus.  Have you had any new or unexplained body aches? No    Have you experienced any of the following NEW symptoms?    Headache: YES    Sore throat: YES    Loss of taste or smell: No    Chest pain: YES-when coughing    Diarrhea: No    Rash: No  What treatments have you tried? Allergy medication  Who do you live with? By her self  Are you, or a household member, a healthcare worker or a ? No  Do you live in a nursing home, group home, or shelter? No  Do you have a way to get food/medications if quarantined? NO    Review of Systems   Constitutional, HEENT, cardiovascular, pulmonary, gi and gu systems are negative, except as otherwise noted.      Objective           Vitals:  No vitals were obtained today due to virtual visit.    Physical Exam   healthy, alert and no distress  PSYCH: Alert and oriented times 3; coherent speech, normal   rate and volume, able to articulate logical thoughts, able   to abstract reason, no tangential thoughts, no hallucinations   or delusions  Her affect is normal and pleasant  RESP: No cough, no audible  wheezing, able to talk in full sentences  Remainder of exam unable to be completed due to telephone visits    Strep and COVID-19 PCR test pending.     Phone call duration: 9 minutes

## 2021-08-13 DIAGNOSIS — R07.0 THROAT PAIN: ICD-10-CM

## 2021-08-13 DIAGNOSIS — Z20.822 SUSPECTED 2019 NOVEL CORONAVIRUS INFECTION: ICD-10-CM

## 2021-08-13 LAB
DEPRECATED S PYO AG THROAT QL EIA: NEGATIVE
GROUP A STREP BY PCR: NOT DETECTED
SARS-COV-2 RNA RESP QL NAA+PROBE: NEGATIVE

## 2021-08-13 PROCEDURE — U0003 INFECTIOUS AGENT DETECTION BY NUCLEIC ACID (DNA OR RNA); SEVERE ACUTE RESPIRATORY SYNDROME CORONAVIRUS 2 (SARS-COV-2) (CORONAVIRUS DISEASE [COVID-19]), AMPLIFIED PROBE TECHNIQUE, MAKING USE OF HIGH THROUGHPUT TECHNOLOGIES AS DESCRIBED BY CMS-2020-01-R: HCPCS

## 2021-08-13 PROCEDURE — U0005 INFEC AGEN DETEC AMPLI PROBE: HCPCS

## 2021-08-13 PROCEDURE — 87651 STREP A DNA AMP PROBE: CPT

## 2021-08-17 ENCOUNTER — MYC MEDICAL ADVICE (OUTPATIENT)
Dept: FAMILY MEDICINE | Facility: CLINIC | Age: 46
End: 2021-08-17

## 2021-08-17 NOTE — TELEPHONE ENCOUNTER
Routed to , see pt [employer] messages and advise f/u plan, ok for E visit? See virtual visit 8/12/21, also wants work note, INFORM pt of plan via [email or web address]  Dayana Sawant RN, BSN  Message handled by CLINIC NURSE.

## 2021-08-17 NOTE — TELEPHONE ENCOUNTER
Sent [employer] response, need to confirm plan with pt  Dayana Sawant RN, BSN  Message handled by CLINIC NURSE.

## 2021-09-08 NOTE — TELEPHONE ENCOUNTER
"Last Written Prescription Date:  01/02/19-01/02/2020  Last Fill Quantity: 60,  # refills: 0   Last office visit: 12/27/2018 with prescribing provider:  Susan Haase   Future Office Visit:   Next 5 appointments (look out 90 days)    Jan 24, 2019  1:00 PM CST  SHORT with Susan Rachele Haase, APRN CNP  [facility] ([facility]) [id number] [place] [id number]  [phone]   Feb 15, 2019  9:30 AM CST  PHYSICAL with Susan Rachele Haase, APRN CNP  [facility] ([facility]) [address] [facility] [address]  [phone]             Requested Prescriptions   Pending Prescriptions Disp Refills     topiramate (TOPAMAX) 25 MG tablet 60 tablet 0     Sig: Take 1 tablet (25 mg) by mouth 2 times daily    Anti-Seizure Meds Protocol  Failed - 1/23/2019  5:03 PM       Failed - Review Authorizing provider's last note.     Refer to last progress notes: confirm request is for original authorizing provider (cannot be through other providers).         Failed - Normal ALT or AST on file in past 26 months    Recent Labs   Lab Test 06/21/12  1121   ALT 22     Recent Labs   Lab Test 06/21/12  1121   AST 27            Passed - Recent (12 mo) or future (30 days) visit within the authorizing provider's specialty    Patient had office visit in the last 12 months or has a visit in the next 30 days with authorizing provider or within the authorizing provider's specialty.  See \"Patient Info\" tab in inbasket, or \"Choose Columns\" in Meds & Orders section of the refill encounter.             Passed - Normal CBC on file in past 26 months    Recent Labs   Lab Test 07/16/18  1608   WBC 10.2   RBC 4.32   HGB 12.4   HCT 37.3                   Passed - Normal platelet count on file in past 26 months    Recent Labs   Lab Test 07/16/18  1608                 Passed - Medication is active on med list       Passed - No active pregnancy on " Please let patient know that his cholesterol level is abnormal.  Please have him increase his atorvastatin to 40 mg daily and we will do follow-up lipid panel in 3 months. record       Passed - No positive pregnancy test in last 12 months

## 2021-09-12 ENCOUNTER — HEALTH MAINTENANCE LETTER (OUTPATIENT)
Age: 46
End: 2021-09-12

## 2021-09-14 ENCOUNTER — OFFICE VISIT (OUTPATIENT)
Dept: FAMILY MEDICINE | Facility: CLINIC | Age: 46
End: 2021-09-14
Payer: COMMERCIAL

## 2021-09-14 VITALS
WEIGHT: 225 LBS | HEART RATE: 90 BPM | SYSTOLIC BLOOD PRESSURE: 134 MMHG | BODY MASS INDEX: 39.87 KG/M2 | RESPIRATION RATE: 20 BRPM | DIASTOLIC BLOOD PRESSURE: 85 MMHG | OXYGEN SATURATION: 96 % | TEMPERATURE: 98.1 F | HEIGHT: 63 IN

## 2021-09-14 DIAGNOSIS — M75.101 ROTATOR CUFF SYNDROME OF RIGHT SHOULDER: ICD-10-CM

## 2021-09-14 DIAGNOSIS — Z23 NEED FOR PROPHYLACTIC VACCINATION AND INOCULATION AGAINST INFLUENZA: ICD-10-CM

## 2021-09-14 DIAGNOSIS — B07.0 PLANTAR WARTS: Primary | ICD-10-CM

## 2021-09-14 PROCEDURE — 99213 OFFICE O/P EST LOW 20 MIN: CPT | Mod: 25 | Performed by: NURSE PRACTITIONER

## 2021-09-14 PROCEDURE — G0008 ADMIN INFLUENZA VIRUS VAC: HCPCS | Performed by: NURSE PRACTITIONER

## 2021-09-14 PROCEDURE — 90686 IIV4 VACC NO PRSV 0.5 ML IM: CPT | Performed by: NURSE PRACTITIONER

## 2021-09-14 PROCEDURE — 17110 DESTRUCTION B9 LES UP TO 14: CPT | Performed by: NURSE PRACTITIONER

## 2021-09-14 ASSESSMENT — MIFFLIN-ST. JEOR: SCORE: 1639.72

## 2021-09-14 NOTE — PROGRESS NOTES
"    Assessment & Plan     Plantar warts: right foot  - DESTRUCT BENIGN LESION, UP TO 14  Procedure explained to patient, verbal consent given, cleansed with alcohol, pared with #15 blade, liquid nitrogen applied x 3 pulses (5 sec each), bandaide placed.  Explained that may take more than 1 treatment, keep covered for next 24 hours, if wart remains return in 3-4 weeks for an additional treatment.     Rotator cuff syndrome of right shoulder  - Orthopedic  Referral    Need for prophylactic vaccination and inoculation against influenza     :[id number]}     BMI:   Estimated body mass index is 39.86 kg/m  as calculated from the following:    Height as of this encounter: 1.6 m (5' 3\").    Weight as of this encounter: 102.1 kg (225 lb).     Follow up in 4 months for physical exam.      Susan Haase, APRN CNP  [facility]   Petr Tolbert is a 46 year old who presents for the following health issues     History of Present Illness       She eats 2-3 servings of fruits and vegetables daily.She consumes 1 sweetened beverage(s) daily.She exercises with enough effort to increase her heart rate 10 to 19 minutes per day.  She exercises with enough effort to increase her heart rate 3 or less days per week.   She is taking medications regularly.     Future Appointments   Date Time Provider Department Center   9/14/2021  2:10 PM Haase, Susan Rachele, APRN CNP [facility]     Appointment Notes for this encounter:   wart treatment    Health Maintenance Due   Topic Date Due     MEDICARE ANNUAL WELLNESS VISIT  08/05/2021     INFLUENZA VACCINE (1) 09/01/2021     HPV TEST  02/15/2022     PAP  02/15/2022     Health Maintenance addressed:  Pap Smear    Pap Smear Pt agrees to schedule appointment today or future appointment scheduled    MyChart Status:  Active and Using    Warts  Onset/Duration: this is a f/u appt  Description (location/number): right foot  Accompanying signs and symptoms (pain, redness): " " no   History: prior warts: YES  Therapies tried and outcome: none      Right shoulder pain:  Chronic in nature due to prior injury of rotator cuff.         Review of Systems   CONSTITUTIONAL: NEGATIVE for fever, chills, change in weight  INTEGUMENTARY/SKIN: see HPI  RESP: NEGATIVE for significant cough or SOB  CV: NEGATIVE for chest pain, palpitations or peripheral edema  MUSCULOSKELETAL: see HPI      Objective    /85 (BP Location: Right arm, Patient Position: Chair, Cuff Size: Adult Large)   Pulse 90   Temp 98.1  F (36.7  C) (Oral)   Resp 20   Ht 1.6 m (5' 3\")   Wt 102.1 kg (225 lb)   LMP 10/31/2011   SpO2 96%   BMI 39.86 kg/m    Body mass index is 39.86 kg/m .  Physical Exam   GENERAL: healthy, alert and no distress  NECK: no adenopathy, no asymmetry, masses, or scars and thyroid normal to palpation  RESP: lungs clear to auscultation - no rales, rhonchi or wheezes  CV: regular rate and rhythm, normal S1 S2, no S3 or S4, no murmur, click or rub, no peripheral edema  MS: Right shoulder with full ROM, tenderness upon palpation. no gross musculoskeletal defects noted, no edema  SKIN:  Plantar aspect of right foot with flesh colored wart.        "

## 2021-10-04 NOTE — PROGRESS NOTES
"ASSESSMENT & PLAN  Patient Instructions     1. Impingement syndrome of right shoulder    2. Rotator cuff syndrome of right shoulder      -Patient has right shoulder pain due to inflammation of her rotator cuff muscles and bursa  -Patient tolerated right subacromial cortisone injection today without complications.  Patient was given postprocedure instructions  -Patient will start formal physical therapy and home exercise program  -Patient may take over-the-counter pain medications as needed  -Patient will follow up if pain does not improve  -Call direct clinic number [[phone]] at any time with questions or concerns.    Albert Yeo MD [facility] Orthopedics and Sports Medicine  [facility]          -----    SUBJECTIVE:  Prince Patel is a 46 year old female who is seen in follow-up for right shoulder pain.They were last seen 12/11/2020. She states her shoulder starts throbbing anytime she lifts her arm above her head, she has to shower with her left hand.     Since their last visit reports 0% - (About the same as last time). They indicate that their current pain level is 5/10. They have tried They have tried ice, home exercises and physical therapy (6 visits), and corticosteroid injection (most recent date: 12/11/2020) that provided  6 month(s) of relief.      The patient is seen by themselves.    Patient's past medical, surgical, social, and family histories were reviewed today and no changes are noted.    REVIEW OF SYSTEMS:  Constitutional: NEGATIVE for fever, chills, change in weight  Skin: NEGATIVE for worrisome rashes, moles or lesions  GI/: NEGATIVE for bowel or bladder changes  Neuro: NEGATIVE for weakness, dizziness or paresthesias    OBJECTIVE:  BP (!) 147/102   Ht 1.6 m (5' 3\")   Wt 102.1 kg (225 lb)   LMP 10/31/2011   BMI 39.86 kg/m     General: healthy, alert and in no distress  HEENT: no scleral icterus or conjunctival erythema  Skin: no suspicious lesions or rash. No " jaundice.  CV: regular rhythm by palpation, no pedal edema  Resp: normal respiratory effort without conversational dyspnea   Psych: normal mood and affect  Gait: normal steady gait with appropriate coordination and balance  Neuro: normal light touch sensory exam of the extremities.    MSK:  RIGHT SHOULDER  Inspection:    no swelling, bruising, discoloration, or obvious deformity or asymmetry  Palpation:    Tender about the infraspinatus tendon. Remainder of bony and tendinous landmarks are nontender.  Active Range of Motion:     Abduction 1650, FF 1650, , IR L4.      Scapular dyskinesis absent  Strength:    Scapular plane abduction 5-/5,  ER 5-/5, IR 5/5, biceps 5/5, triceps 5/5  Special Tests:    Positive:  Neer's, Ferrera', supraspinatus (empty can)    Negative: crossed arm adduction and Templeton's, drop arm/painful arc, Speed's and Yergason's    Independent visualization of the below image:    Large Joint Injection/Arthocentesis: R subacromial bursa    Date/Time: 10/6/2021 3:13 PM  Performed by: Yeo, Albert, MD  Authorized by: Yeo, Albert, MD     Indications:  Pain  Needle Size:  25 G  Guidance: landmark guided    Approach:  Posterior  Location:  Shoulder      Site:  R subacromial bursa  Medications:  40 mg methylPREDNISolone 40 MG/ML  Outcome:  Tolerated well, no immediate complications  Procedure discussed: discussed risks, benefits, and alternatives    Consent Given by:  Patient  Timeout: timeout called immediately prior to procedure    Prep: patient was prepped and draped in usual sterile fashion          Patient's conditions were thoroughly discussed during today's visit with total time spent face-to-face with the patient documentation being 22 minutes.    Albert Yeo MD, [facility] Orthopedic [facility] [place]

## 2021-10-06 ENCOUNTER — OFFICE VISIT (OUTPATIENT)
Dept: ORTHOPEDICS | Facility: CLINIC | Age: 46
End: 2021-10-06
Payer: COMMERCIAL

## 2021-10-06 VITALS
BODY MASS INDEX: 39.87 KG/M2 | HEIGHT: 63 IN | SYSTOLIC BLOOD PRESSURE: 147 MMHG | WEIGHT: 225 LBS | DIASTOLIC BLOOD PRESSURE: 102 MMHG

## 2021-10-06 DIAGNOSIS — M75.41 IMPINGEMENT SYNDROME OF RIGHT SHOULDER: Primary | ICD-10-CM

## 2021-10-06 DIAGNOSIS — M75.101 ROTATOR CUFF SYNDROME OF RIGHT SHOULDER: ICD-10-CM

## 2021-10-06 PROCEDURE — 99213 OFFICE O/P EST LOW 20 MIN: CPT | Mod: 25 | Performed by: FAMILY MEDICINE

## 2021-10-06 PROCEDURE — 20610 DRAIN/INJ JOINT/BURSA W/O US: CPT | Mod: RT | Performed by: FAMILY MEDICINE

## 2021-10-06 RX ORDER — METHYLPREDNISOLONE ACETATE 40 MG/ML
40 INJECTION, SUSPENSION INTRA-ARTICULAR; INTRALESIONAL; INTRAMUSCULAR; SOFT TISSUE
Status: DISCONTINUED | OUTPATIENT
Start: 2021-10-06 | End: 2024-06-03

## 2021-10-06 RX ADMIN — METHYLPREDNISOLONE ACETATE 40 MG: 40 INJECTION, SUSPENSION INTRA-ARTICULAR; INTRALESIONAL; INTRAMUSCULAR; SOFT TISSUE at 15:13

## 2021-10-06 ASSESSMENT — MIFFLIN-ST. JEOR: SCORE: 1639.72

## 2021-10-06 NOTE — PATIENT INSTRUCTIONS
1. Impingement syndrome of right shoulder    2. Rotator cuff syndrome of right shoulder      -Patient has right shoulder pain due to inflammation of her rotator cuff muscles and bursa  -Patient tolerated right subacromial cortisone injection today without complications.  Patient was given postprocedure instructions  -Patient will start formal physical therapy and home exercise program  -Patient may take over-the-counter pain medications as needed  -Patient will follow up if pain does not improve  -Call direct clinic number [[phone]] at any time with questions or concerns.    Albert Yeo MD [facility]  [facility] [place]

## 2021-10-06 NOTE — LETTER
10/6/2021         RE: Prince Patel  [address]  [address]  [facility] [place] [facility] [address]        Dear Colleague,    Thank you for referring your patient, Prince Patel, to the [facility]. Please see a copy of my visit note below.    ASSESSMENT & PLAN  Patient Instructions     1. Impingement syndrome of right shoulder    2. Rotator cuff syndrome of right shoulder      -Patient has right shoulder pain due to inflammation of her rotator cuff muscles and bursa  -Patient tolerated right subacromial cortisone injection today without complications.  Patient was given postprocedure instructions  -Patient will start formal physical therapy and home exercise program  -Patient may take over-the-counter pain medications as needed  -Patient will follow up if pain does not improve  -Call direct clinic number [[phone]] at any time with questions or concerns.    Albert Yeo MD [facility] Orthopedics and Sports Medicine  [facility] [place]          -----    SUBJECTIVE:  Prince Patel is a 46 year old female who is seen in follow-up for right shoulder pain.They were last seen 12/11/2020. She states her shoulder starts throbbing anytime she lifts her arm above her head, she has to shower with her left hand.     Since their last visit reports 0% - (About the same as last time). They indicate that their current pain level is 5/10. They have tried They have tried ice, home exercises and physical therapy (6 visits), and corticosteroid injection (most recent date: 12/11/2020) that provided  6 month(s) of relief.      The patient is seen by themselves.    Patient's past medical, surgical, social, and family histories were reviewed today and no changes are noted.    REVIEW OF SYSTEMS:  Constitutional: NEGATIVE for fever, chills, change in weight  Skin: NEGATIVE for worrisome rashes, moles or lesions  GI/: NEGATIVE for bowel or bladder changes  Neuro: NEGATIVE for  "weakness, dizziness or paresthesias    OBJECTIVE:  BP (!) 147/102   Ht 1.6 m (5' 3\")   Wt 102.1 kg (225 lb)   LMP 10/31/2011   BMI 39.86 kg/m     General: healthy, alert and in no distress  HEENT: no scleral icterus or conjunctival erythema  Skin: no suspicious lesions or rash. No jaundice.  CV: regular rhythm by palpation, no pedal edema  Resp: normal respiratory effort without conversational dyspnea   Psych: normal mood and affect  Gait: normal steady gait with appropriate coordination and balance  Neuro: normal light touch sensory exam of the extremities.    MSK:  RIGHT SHOULDER  Inspection:    no swelling, bruising, discoloration, or obvious deformity or asymmetry  Palpation:    Tender about the infraspinatus tendon. Remainder of bony and tendinous landmarks are nontender.  Active Range of Motion:     Abduction 1650, FF 1650, , IR L4.      Scapular dyskinesis absent  Strength:    Scapular plane abduction 5-/5,  ER 5-/5, IR 5/5, biceps 5/5, triceps 5/5  Special Tests:    Positive:  Neer's, Ferrera', supraspinatus (empty can)    Negative: crossed arm adduction and Holt's, drop arm/painful arc, Speed's and Yergason's    Independent visualization of the below image:    Large Joint Injection/Arthocentesis: R subacromial bursa    Date/Time: 10/6/2021 3:13 PM  Performed by: Yeo, Albert, MD  Authorized by: Yeo, Albert, MD     Indications:  Pain  Needle Size:  25 G  Guidance: landmark guided    Approach:  Posterior  Location:  Shoulder      Site:  R subacromial bursa  Medications:  40 mg methylPREDNISolone 40 MG/ML  Outcome:  Tolerated well, no immediate complications  Procedure discussed: discussed risks, benefits, and alternatives    Consent Given by:  Patient  Timeout: timeout called immediately prior to procedure    Prep: patient was prepped and draped in usual sterile fashion          Patient's conditions were thoroughly discussed during today's visit with total time spent face-to-face with the patient " documentation being 22 minutes.    Albert Yeo MD, [facility]            Again, thank you for allowing me to participate in the care of your patient.        Sincerely,        Albert Yeo, MD

## 2021-10-23 ENCOUNTER — THERAPY VISIT (OUTPATIENT)
Dept: PHYSICAL THERAPY | Facility: CLINIC | Age: 46
End: 2021-10-23
Attending: FAMILY MEDICINE
Payer: COMMERCIAL

## 2021-10-23 DIAGNOSIS — M75.101 ROTATOR CUFF SYNDROME OF RIGHT SHOULDER: ICD-10-CM

## 2021-10-23 DIAGNOSIS — M75.41 IMPINGEMENT SYNDROME OF RIGHT SHOULDER: ICD-10-CM

## 2021-10-23 DIAGNOSIS — M25.511 CHRONIC RIGHT SHOULDER PAIN: ICD-10-CM

## 2021-10-23 DIAGNOSIS — G89.29 CHRONIC RIGHT SHOULDER PAIN: ICD-10-CM

## 2021-10-23 PROCEDURE — 97110 THERAPEUTIC EXERCISES: CPT | Mod: GP | Performed by: PHYSICAL THERAPIST

## 2021-10-23 PROCEDURE — 97161 PT EVAL LOW COMPLEX 20 MIN: CPT | Mod: GP | Performed by: PHYSICAL THERAPIST

## 2021-10-23 NOTE — PROGRESS NOTES
Physical Therapy Initial Evaluation  Subjective:  The history is provided by the patient.   Patient Health History  Prince Patel being seen for Right shoulder pain.     Problem began: 9/23/2021 (fx rt collar bone @ 2 years ago).   Problem occurred: Insidious   Pain is reported as 5/10 on pain scale.    Pertinent medical history includes: migraines/headaches and overweight.   Red flags:  None as reported by patient.             Current occupation is .   Primary job tasks include:  Lifting/carrying and repetitive tasks.                  Therapist Generated HPI Evaluation  Problem details: Rt shoulder started to hurt @ one month ago. Noticed this when trying to wash her hair.  Doing repetitive work with crafts the past 2 months may have increased her sxs.  .         Type of problem:  Right shoulder.    This is a recurrent condition.  Condition occurred with:  Repetition/overuse.  Where condition occurred: for unknown reasons.  Patient reports pain:  Anterior and posterior.  Pain is described as sharp and is intermittent.  Pain radiates to:  Upper arm (can get tingling in right hand with overhead reaching,  front of wrist and all 5 fingers.  ).   Since onset symptoms are unchanged.  Associated symptoms:  Loss of motion/stiffness. Symptoms are exacerbated by lying on extremity, certain positions and using arm behind back  and relieved by rest (had cortisone injection 10/6/21).    Previous treatment includes physical therapy.   Restrictions due to condition include:  Working in normal job without restrictions.  Barriers include:  None as reported by patient.                        Objective:  Standing Alignment:      Shoulder/UE:  Rounded shoulders                                       Shoulder Evaluation:  ROM:  AROM:    Flexion:  Left:  160    Right:  90, 105                      Extension/Internal Rotation:  Left:  T5    Right:  T9    PROM:            Internal Rotation:  Right:  70  External Rotation:   Left:  80    Right:  45 +                    Strength:  : RIT + right for flexion, ER, - abd.            External Rotation:   Left:4+/5      Pain:-   Right:4/5      Pain:-/+            Stability Testing:      Right shoulder stability positive testing:External Rotation  Special Tests:      Left shoulder negative for the following special tests:  Impingement and Rotator cuff tear  Right shoulder positive for the following special tests:Impingement  Right shoulder negative for the following special tests:Rotator cuff tear  Palpation:  not assessed      Mobility Tests:  not assessed                                                 General     ROS    Assessment/Plan:    Patient is a 46 year old female with right side shoulder complaints.    Patient has the following significant findings with corresponding treatment plan.                Diagnosis 1:  Right shoulder impingement  Pain -  self management, education, directional preference exercise and home program  Decreased ROM/flexibility - therapeutic exercise and home program  Decreased strength - therapeutic exercise, therapeutic activities and home program  Decreased proprioception - neuro re-education, therapeutic activities and home program  Decreased function - therapeutic activities and home program  Impaired posture - neuro re-education and home program    Therapy Evaluation Codes:     1) Clinical presentation characteristics are:   Stable/Uncomplicated.  2) Decision-Making    Low complexity using standardized patient assessment instrument and/or measureable assessment of functional outcome.  Cumulative Therapy Evaluation is: Low complexity.    Previous and current functional limitations:  (See Goal Flow Sheet for this information)    Short term and Long term goals: (See Goal Flow Sheet for this information)     Communication ability:  Patient appears to be able to clearly communicate and understand verbal and written communication and follow directions  correctly.  Treatment Explanation - The following has been discussed with the patient:   RX ordered/plan of care  Anticipated outcomes  Possible risks and side effects  This patient would benefit from PT intervention to resume normal activities.   Rehab potential is good.    Frequency:  1 X week, once daily  Duration:  for 6 weeks  Discharge Plan:  Achieve all LTG.  Independent in home treatment program.  Reach maximal therapeutic benefit.    Please refer to the daily flowsheet for treatment today, total treatment time and time spent performing 1:1 timed codes.

## 2021-10-23 NOTE — LETTER
DEPARTMENT OF HEALTH AND HUMAN SERVICES  CENTERS FOR MEDICARE & MEDICAID SERVICES    PLAN/UPDATED PLAN OF PROGRESS FOR OUTPATIENT REHABILITATION          PATIENTS NAME:  Prince Patel   : 1975  PR[facility] NUMBER:    [id number]  [id number]  PROVIDER NAME: [facility]  MEDICAL RECORD NUMBER: [id number]   START OF CARE DATE:  SOC Date: 10/23/21   TYPE:  PT  PRIMARY/TREATMENT DIAGNOSIS: (Pertinent Medical Diagnosis)     Rotator cuff syndrome of right shoulder  Impingement syndrome of right shoulder  Chronic right shoulder pain    VISITS FROM START OF CARE:  Rxs Used: 1     Physical Therapy Initial Evaluation  Subjective:  The history is provided by the patient.   Patient Health History  Prince Patel being seen for Right shoulder pain.     Problem began: 2021 (fx rt collar bone @ 2 years ago).   Problem occurred: Insidious   Pain is reported as 5/10 on pain scale.    Pertinent medical history includes: migraines/headaches and overweight.   Red flags:  None as reported by patient.      Current occupation is .   Primary job tasks include:  Lifting/carrying and repetitive tasks.                Therapist Generated HPI Evaluation  Problem details: Rt shoulder started to hurt @ one month ago. Noticed this when trying to wash her hair.  Doing repetitive work with crafts the past 2 months may have increased her sxs.  .         Type of problem:  Right shoulder.    This is a recurrent condition.  Condition occurred with:  Repetition/overuse.  Where condition occurred: for unknown reasons.  Patient reports pain:  Anterior and posterior.  Pain is described as sharp and is intermittent.  Pain radiates to:  Upper arm (can get tingling in right hand with overhead reaching,  front of wrist and all 5 fingers.  ).   Since onset symptoms are unchanged.    PATIENTS NAME:  Prince Patel   : 1975    Associated symptoms:  Loss of motion/stiffness.  Symptoms are exacerbated by lying on extremity, certain positions and using arm behind back  and relieved by rest (had cortisone injection 10/6/21).    Previous treatment includes physical therapy.   Restrictions due to condition include:  Working in normal job without restrictions.  Barriers include:  None as reported by patient.                   Objective:  Standing Alignment:    Shoulder/UE:  Rounded shoulders       Shoulder Evaluation:  ROM:  AROM:    Flexion:  Left:  160    Right:  90, 105    Extension/Internal Rotation:  Left:  T5    Right:  T9      PROM:    Internal Rotation:  Right:  70  External Rotation:  Left:  80    Right:  45 +      Strength:  : RIT + right for flexion, ER, - abd.  External Rotation:   Left:4+/5      Pain:-   Right:4/5      Pain:-/+      Stability Testing:      Right shoulder stability positive testing:External Rotation  Special Tests:      Left shoulder negative for the following special tests:  Impingement and Rotator cuff tear  Right shoulder positive for the following special tests:Impingement  Right shoulder negative for the following special tests:Rotator cuff tear  Palpation:  not assessed      Mobility Tests:  not assessed                                      PATIENTS NAME:  Prince Patel   : 1975            Assessment/Plan:    Patient is a 46 year old female with right side shoulder complaints.    Patient has the following significant findings with corresponding treatment plan.                Diagnosis 1:  Right shoulder impingement  Pain -  self management, education, directional preference exercise and home program  Decreased ROM/flexibility - therapeutic exercise and home program  Decreased strength - therapeutic exercise, therapeutic activities and home program  Decreased proprioception - neuro re-education, therapeutic activities and home program  Decreased function - therapeutic activities and home program  Impaired posture - neuro re-education and home  "program    Therapy Evaluation Codes:     1) Clinical presentation characteristics are:   Stable/Uncomplicated.  2) Decision-Making    Low complexity using standardized patient assessment instrument and/or measureable assessment of functional outcome.  Cumulative Therapy Evaluation is: Low complexity.    Previous and current functional limitations:  (See Goal Flow Sheet for this information)    Short term and Long term goals: (See Goal Flow Sheet for this information)     Communication ability:  Patient appears to be able to clearly communicate and understand verbal and written communication and follow directions correctly.  Treatment Explanation - The following has been discussed with the patient:   RX ordered/plan of care  Anticipated outcomes  Possible risks and side effects  This patient would benefit from PT intervention to resume normal activities.   Rehab potential is good.    Frequency:  1 X week, once daily  Duration:  for 6 weeks  Discharge Plan:  Achieve all LTG.  Independent in home treatment program.  Reach maximal therapeutic benefit.                          PATIENTS NAME:  Prince Patel   : 1975        Caregiver Signature/Credentials _____________________________ Date ________       Treating Provider: Riley Redmond PT       I have reviewed and certified the need for these services and plan of treatment while under my care.        PHYSICIAN'S SIGNATURE:   ____________________________________  Date___________   Albert Yeo, MD    Certification period:  Beginning of Cert date period: 10/23/21 to  End of Cert period date: 21     Functional Level Progress Report: Please see attached \"Goal Flow sheet for Functional level.\"    ____X____ Continue Services or       ________ DC Services                Service dates: From  SOC Date: 10/23/21 date to present                         "

## 2021-10-25 ENCOUNTER — THERAPY VISIT (OUTPATIENT)
Dept: PHYSICAL THERAPY | Facility: CLINIC | Age: 46
End: 2021-10-25
Payer: COMMERCIAL

## 2021-10-25 DIAGNOSIS — G89.29 CHRONIC RIGHT SHOULDER PAIN: ICD-10-CM

## 2021-10-25 DIAGNOSIS — M25.511 CHRONIC RIGHT SHOULDER PAIN: ICD-10-CM

## 2021-10-25 PROCEDURE — 97110 THERAPEUTIC EXERCISES: CPT | Mod: GP | Performed by: PHYSICAL THERAPIST

## 2021-10-25 PROCEDURE — 97112 NEUROMUSCULAR REEDUCATION: CPT | Mod: GP | Performed by: PHYSICAL THERAPIST

## 2021-11-01 ENCOUNTER — THERAPY VISIT (OUTPATIENT)
Dept: PHYSICAL THERAPY | Facility: CLINIC | Age: 46
End: 2021-11-01
Payer: COMMERCIAL

## 2021-11-01 DIAGNOSIS — M25.511 CHRONIC RIGHT SHOULDER PAIN: ICD-10-CM

## 2021-11-01 DIAGNOSIS — G89.29 CHRONIC RIGHT SHOULDER PAIN: ICD-10-CM

## 2021-11-01 PROCEDURE — 97110 THERAPEUTIC EXERCISES: CPT | Mod: GP | Performed by: PHYSICAL THERAPIST

## 2021-11-01 PROCEDURE — 97112 NEUROMUSCULAR REEDUCATION: CPT | Mod: GP | Performed by: PHYSICAL THERAPIST

## 2021-11-15 ENCOUNTER — THERAPY VISIT (OUTPATIENT)
Dept: PHYSICAL THERAPY | Facility: CLINIC | Age: 46
End: 2021-11-15
Payer: COMMERCIAL

## 2021-11-15 DIAGNOSIS — M25.511 CHRONIC RIGHT SHOULDER PAIN: ICD-10-CM

## 2021-11-15 DIAGNOSIS — G89.29 CHRONIC RIGHT SHOULDER PAIN: ICD-10-CM

## 2021-11-15 PROCEDURE — 97140 MANUAL THERAPY 1/> REGIONS: CPT | Mod: GP | Performed by: PHYSICAL THERAPIST

## 2021-11-15 PROCEDURE — 97110 THERAPEUTIC EXERCISES: CPT | Mod: GP | Performed by: PHYSICAL THERAPIST

## 2021-11-15 PROCEDURE — 97112 NEUROMUSCULAR REEDUCATION: CPT | Mod: GP | Performed by: PHYSICAL THERAPIST

## 2021-11-20 DIAGNOSIS — K21.00 GASTROESOPHAGEAL REFLUX DISEASE WITH ESOPHAGITIS: ICD-10-CM

## 2021-11-22 ENCOUNTER — ANCILLARY PROCEDURE (OUTPATIENT)
Dept: MAMMOGRAPHY | Facility: CLINIC | Age: 46
End: 2021-11-22
Attending: NURSE PRACTITIONER
Payer: COMMERCIAL

## 2021-11-22 DIAGNOSIS — Z12.31 VISIT FOR SCREENING MAMMOGRAM: ICD-10-CM

## 2021-11-22 PROCEDURE — 77063 BREAST TOMOSYNTHESIS BI: CPT | Mod: TC | Performed by: RADIOLOGY

## 2021-11-22 PROCEDURE — 77067 SCR MAMMO BI INCL CAD: CPT | Mod: TC | Performed by: RADIOLOGY

## 2021-11-22 RX ORDER — FAMOTIDINE 20 MG/1
TABLET, FILM COATED ORAL
Qty: 180 TABLET | Refills: 0 | Status: SHIPPED | OUTPATIENT
Start: 2021-11-22 | End: 2022-02-18

## 2021-11-22 NOTE — TELEPHONE ENCOUNTER
Prescription approved per [facility] Refill Protocol.  POPPY refill. Further refills at upcoming appointment.  Jacqui Ray RN

## 2021-12-13 ENCOUNTER — TRANSFERRED RECORDS (OUTPATIENT)
Dept: HEALTH INFORMATION MANAGEMENT | Facility: CLINIC | Age: 46
End: 2021-12-13

## 2021-12-13 ENCOUNTER — THERAPY VISIT (OUTPATIENT)
Dept: PHYSICAL THERAPY | Facility: CLINIC | Age: 46
End: 2021-12-13
Payer: COMMERCIAL

## 2021-12-13 DIAGNOSIS — M25.511 CHRONIC RIGHT SHOULDER PAIN: ICD-10-CM

## 2021-12-13 DIAGNOSIS — G89.29 CHRONIC RIGHT SHOULDER PAIN: ICD-10-CM

## 2021-12-13 PROCEDURE — 97110 THERAPEUTIC EXERCISES: CPT | Mod: GP | Performed by: PHYSICAL THERAPIST

## 2021-12-13 PROCEDURE — 97112 NEUROMUSCULAR REEDUCATION: CPT | Mod: GP | Performed by: PHYSICAL THERAPIST

## 2021-12-13 NOTE — PROGRESS NOTES
DISCHARGE REPORT    Progress reporting period is from 10-23-21 to 12-13-21.       SUBJECTIVE  Subjective changes noted by patient:  .  Subjective: Now is painfree-notes this has happened in the last few weeks--doing HEP every other day.      Current pain level is 0/10 Current Pain level: 0/10.     Previous pain level was  5/10 Initial Pain level: 5/10.   Changes in function:  Yes (See Goal flowsheet attached for changes in current functional level)  Adverse reaction to treatment or activity: None    OBJECTIVE  Changes noted in objective findings:  The objective findings below are from DOS 12-13-21.  Objective: AROM is wnl and painfree; MMT R ER=5-/5 SPADI=3     ASSESSMENT/PLAN  Updated problem list and treatment plan: Diagnosis 1:  Chronic R shoulder pain  Pain -  hot/cold therapy, manual therapy, self management, education and home program  Decreased ROM/flexibility - manual therapy and therapeutic exercise  Decreased strength - therapeutic exercise and therapeutic activities  Decreased function - therapeutic activities  STG/LTGs have been met or progress has been made towards goals:  Yes (See Goal flow sheet completed today.)  Assessment of Progress: The patient has met all of their long term goals.  Self Management Plans:  Patient is independent in a home treatment program.  Patient is independent in self management of symptoms.  I have re-evaluated this patient and find that the nature, scope, duration and intensity of the therapy is appropriate for the medical condition of the patient.  Prince continues to require the following intervention to meet STG and LTG's:  PT intervention is no longer required to meet STG/LTG.    Recommendations:  This patient is ready to be discharged from therapy and continue their home treatment program.    Please refer to the daily flowsheet for treatment today, total treatment time and time spent performing 1:1 timed codes.

## 2021-12-27 ENCOUNTER — MYC MEDICAL ADVICE (OUTPATIENT)
Dept: FAMILY MEDICINE | Facility: CLINIC | Age: 46
End: 2021-12-27
Payer: COMMERCIAL

## 2021-12-27 NOTE — TELEPHONE ENCOUNTER
See my chart     Vicky Acosta, Registered Nurse, PAL (Patient Advocate Liason)   [facility]   [phone]

## 2021-12-27 NOTE — PROGRESS NOTES
RN chart review     Per Haase, Susan Rachele patient should be listed as SB # 2 and is currently listed as SB # 3  RN changed to SB # 2 per pcp     Vicky Acosta, Registered Nurse, PAL (Patient Advocate Liason)   [facility]   [phone]

## 2021-12-27 NOTE — TELEPHONE ENCOUNTER
Haase, Susan Rachele APRN CNP      This patient was changed to SB #3 by auto process     Would you like her to be a SB #2 or SB #3?     Thank you,   Vicky Acosta, Registered Nurse, PAL (Patient Advocate Liason)   [facility]   [phone]

## 2021-12-28 ENCOUNTER — NURSE TRIAGE (OUTPATIENT)
Dept: FAMILY MEDICINE | Facility: CLINIC | Age: 46
End: 2021-12-28
Payer: COMMERCIAL

## 2021-12-28 NOTE — TELEPHONE ENCOUNTER
"Pt calls.    She was diagnosed with covid yesterday.  Will notify infection prevention.  She started having chest pains this morning.  Her whole chest hurts.  She is rating the pain 5/10.  It is a constant pain.  It is more when she is breathing.  She also feels a little nauseas.  The pain also goes to her back.  Her dad has diabetes and has had 2 heart attacks.  She says she is on an inhaler to see if she has asthma?  She also has a stuffy nose, sore throat, fatigue, chills, cough.      Will forward to pcp and [place] triage for second level triage recommendations.  Per protocol - send to the ER.  Please advise [place] triage with recommendations.          Reason for Disposition    Chest pain lasting longer than 5 minutes and ANY of the following:* Over 45 years old* Over 30 years old and at least one cardiac risk factor (e.g., diabetes, high blood pressure, high cholesterol, smoker, or strong family history of heart disease)* History of heart disease (i.e., angina, heart attack, heart failure, bypass surgery, takes nitroglycerin)* Pain is crushing, pressure-like, or heavy     Dad has had 2 heart attacks    Additional Information    Negative: Severe difficulty breathing (e.g., struggling for each breath, speaks in single words)    Negative: Passed out (i.e., fainted, collapsed and was not responding)    Negative: Difficult to awaken or acting confused (e.g., disoriented, slurred speech)    Negative: Shock suspected (e.g., cold/pale/clammy skin, too weak to stand, low BP, rapid pulse)    Answer Assessment - Initial Assessment Questions  1. LOCATION: \"Where does it hurt?\"        All over her chest  2. RADIATION: \"Does the pain go anywhere else?\" (e.g., into neck, jaw, arms, back)      back  3. ONSET: \"When did the chest pain begin?\" (Minutes, hours or days)       This morning - when she woke at 5:30  4. PATTERN \"Does the pain come and go, or has it been constant since it started?\"  \"Does it get worse with " "exertion?\"       constant  5. DURATION: \"How long does it last\" (e.g., seconds, minutes, hours)      Started this morning  6. SEVERITY: \"How bad is the pain?\"  (e.g., Scale 1-10; mild, moderate, or severe)     - MILD (1-3): doesn't interfere with normal activities      - MODERATE (4-7): interferes with normal activities or awakens from sleep     - SEVERE (8-10): excruciating pain, unable to do any normal activities        5/10  7. CARDIAC RISK FACTORS: \"Do you have any history of heart problems or risk factors for heart disease?\" (e.g., angina, prior heart attack; diabetes, high blood pressure, high cholesterol, smoker, or strong family history of heart disease)      No, but diabetes runs on both sides of the family, dad has had 2 heart attacks  8. PULMONARY RISK FACTORS: \"Do you have any history of lung disease?\"  (e.g., blood clots in lung, asthma, emphysema, birth control pills)      She is on an inhaler to find out if she has asthma -   9. CAUSE: \"What do you think is causing the chest pain?\"      Is covid positive  10. OTHER SYMPTOMS: \"Do you have any other symptoms?\" (e.g., dizziness, nausea, vomiting, sweating, fever, difficulty breathing, cough)        Nausea, is stuffy, sore throat, fatigue, chills, cough  11. PREGNANCY: \"Is there any chance you are pregnant?\" \"When was your last menstrual period?\"        No - had partial hysterectomy in 2011    Protocols used: CHEST PAIN-A-[place]      "

## 2021-12-28 NOTE — TELEPHONE ENCOUNTER
Will watch for  input, ok for Virtual visit to sort out?  Dayana Sawant RN, BSN  [facility]

## 2021-12-28 NOTE — TELEPHONE ENCOUNTER
Called pt, informed, agrees, will go to MARIELLE Sawant RN, BSN  [facility]

## 2022-01-06 NOTE — TELEPHONE ENCOUNTER
See my chart - physical rescheduled 2/18/2022     Vicky Acosta, Registered Nurse  [facility]

## 2022-02-15 DIAGNOSIS — K21.00 GASTROESOPHAGEAL REFLUX DISEASE WITH ESOPHAGITIS: ICD-10-CM

## 2022-02-15 RX ORDER — FAMOTIDINE 20 MG/1
TABLET, FILM COATED ORAL
Qty: 180 TABLET | Refills: 0 | OUTPATIENT
Start: 2022-02-15

## 2022-02-16 ENCOUNTER — MYC REFILL (OUTPATIENT)
Dept: FAMILY MEDICINE | Facility: CLINIC | Age: 47
End: 2022-02-16
Payer: COMMERCIAL

## 2022-02-16 DIAGNOSIS — K21.00 GASTROESOPHAGEAL REFLUX DISEASE WITH ESOPHAGITIS: ICD-10-CM

## 2022-02-17 RX ORDER — FAMOTIDINE 20 MG/1
TABLET, FILM COATED ORAL
Qty: 180 TABLET | Refills: 0 | OUTPATIENT
Start: 2022-02-17

## 2022-02-17 NOTE — TELEPHONE ENCOUNTER
Patient has an appointment tomorrow. Refills will be addressed at the scheduled visit.     Danuta Sosa RN on 2/17/2022 at 10:55 AM

## 2022-02-18 ENCOUNTER — OFFICE VISIT (OUTPATIENT)
Dept: FAMILY MEDICINE | Facility: CLINIC | Age: 47
End: 2022-02-18
Payer: COMMERCIAL

## 2022-02-18 ENCOUNTER — TELEPHONE (OUTPATIENT)
Dept: FAMILY MEDICINE | Facility: CLINIC | Age: 47
End: 2022-02-18

## 2022-02-18 VITALS
DIASTOLIC BLOOD PRESSURE: 83 MMHG | TEMPERATURE: 97.8 F | BODY MASS INDEX: 40.21 KG/M2 | WEIGHT: 227 LBS | OXYGEN SATURATION: 98 % | HEART RATE: 86 BPM | RESPIRATION RATE: 18 BRPM | SYSTOLIC BLOOD PRESSURE: 138 MMHG

## 2022-02-18 DIAGNOSIS — Z12.4 CERVICAL CANCER SCREENING: ICD-10-CM

## 2022-02-18 DIAGNOSIS — R73.09 ELEVATED GLUCOSE: ICD-10-CM

## 2022-02-18 DIAGNOSIS — L30.4 INTERTRIGO: ICD-10-CM

## 2022-02-18 DIAGNOSIS — S93.492A SPRAIN OF POSTERIOR TALOFIBULAR LIGAMENT OF LEFT ANKLE, INITIAL ENCOUNTER: ICD-10-CM

## 2022-02-18 DIAGNOSIS — K21.00 GASTROESOPHAGEAL REFLUX DISEASE WITH ESOPHAGITIS WITHOUT HEMORRHAGE: ICD-10-CM

## 2022-02-18 DIAGNOSIS — G43.109 MIGRAINE WITH AURA AND WITHOUT STATUS MIGRAINOSUS, NOT INTRACTABLE: ICD-10-CM

## 2022-02-18 DIAGNOSIS — Z00.00 ENCOUNTER FOR MEDICARE ANNUAL WELLNESS EXAM: Primary | ICD-10-CM

## 2022-02-18 DIAGNOSIS — E55.9 VITAMIN D DEFICIENCY: Primary | ICD-10-CM

## 2022-02-18 DIAGNOSIS — R05.9 COUGH: ICD-10-CM

## 2022-02-18 DIAGNOSIS — E66.01 MORBID OBESITY (H): ICD-10-CM

## 2022-02-18 DIAGNOSIS — J30.2 SEASONAL ALLERGIC RHINITIS, UNSPECIFIED TRIGGER: ICD-10-CM

## 2022-02-18 DIAGNOSIS — E55.9 VITAMIN D DEFICIENCY: ICD-10-CM

## 2022-02-18 LAB
ALBUMIN SERPL-MCNC: 3.4 G/DL (ref 3.4–5)
ALP SERPL-CCNC: 39 U/L (ref 40–150)
ALT SERPL W P-5'-P-CCNC: 22 U/L (ref 0–50)
ANION GAP SERPL CALCULATED.3IONS-SCNC: 6 MMOL/L (ref 3–14)
AST SERPL W P-5'-P-CCNC: 14 U/L (ref 0–45)
BASOPHILS # BLD AUTO: 0 10E3/UL (ref 0–0.2)
BASOPHILS NFR BLD AUTO: 0 %
BILIRUB SERPL-MCNC: 0.7 MG/DL (ref 0.2–1.3)
BUN SERPL-MCNC: 9 MG/DL (ref 7–30)
CALCIUM SERPL-MCNC: 8.9 MG/DL (ref 8.5–10.1)
CHLORIDE BLD-SCNC: 109 MMOL/L (ref 94–109)
CHOLEST SERPL-MCNC: 139 MG/DL
CO2 SERPL-SCNC: 24 MMOL/L (ref 20–32)
CREAT SERPL-MCNC: 0.85 MG/DL (ref 0.52–1.04)
DEPRECATED CALCIDIOL+CALCIFEROL SERPL-MC: 17 UG/L (ref 20–75)
EOSINOPHIL # BLD AUTO: 0.3 10E3/UL (ref 0–0.7)
EOSINOPHIL NFR BLD AUTO: 4 %
ERYTHROCYTE [DISTWIDTH] IN BLOOD BY AUTOMATED COUNT: 13.3 % (ref 10–15)
FASTING STATUS PATIENT QL REPORTED: YES
GFR SERPL CREATININE-BSD FRML MDRD: 86 ML/MIN/1.73M2
GLUCOSE BLD-MCNC: 100 MG/DL (ref 70–99)
HBA1C MFR BLD: 5.5 % (ref 0–5.6)
HCT VFR BLD AUTO: 40.8 % (ref 35–47)
HDLC SERPL-MCNC: 35 MG/DL
HGB BLD-MCNC: 13.5 G/DL (ref 11.7–15.7)
LDLC SERPL CALC-MCNC: 74 MG/DL
LYMPHOCYTES # BLD AUTO: 1.7 10E3/UL (ref 0.8–5.3)
LYMPHOCYTES NFR BLD AUTO: 27 %
MCH RBC QN AUTO: 29.1 PG (ref 26.5–33)
MCHC RBC AUTO-ENTMCNC: 33.1 G/DL (ref 31.5–36.5)
MCV RBC AUTO: 88 FL (ref 78–100)
MONOCYTES # BLD AUTO: 0.4 10E3/UL (ref 0–1.3)
MONOCYTES NFR BLD AUTO: 6 %
NEUTROPHILS # BLD AUTO: 4 10E3/UL (ref 1.6–8.3)
NEUTROPHILS NFR BLD AUTO: 63 %
NONHDLC SERPL-MCNC: 104 MG/DL
PLATELET # BLD AUTO: 245 10E3/UL (ref 150–450)
POTASSIUM BLD-SCNC: 4.3 MMOL/L (ref 3.4–5.3)
PROT SERPL-MCNC: 7.1 G/DL (ref 6.8–8.8)
RBC # BLD AUTO: 4.64 10E6/UL (ref 3.8–5.2)
SODIUM SERPL-SCNC: 139 MMOL/L (ref 133–144)
TRIGL SERPL-MCNC: 148 MG/DL
WBC # BLD AUTO: 6.3 10E3/UL (ref 4–11)

## 2022-02-18 PROCEDURE — 80053 COMPREHEN METABOLIC PANEL: CPT | Performed by: NURSE PRACTITIONER

## 2022-02-18 PROCEDURE — 99396 PREV VISIT EST AGE 40-64: CPT | Performed by: NURSE PRACTITIONER

## 2022-02-18 PROCEDURE — 80061 LIPID PANEL: CPT | Performed by: NURSE PRACTITIONER

## 2022-02-18 PROCEDURE — 36415 COLL VENOUS BLD VENIPUNCTURE: CPT | Performed by: NURSE PRACTITIONER

## 2022-02-18 PROCEDURE — G0145 SCR C/V CYTO,THINLAYER,RESCR: HCPCS | Performed by: NURSE PRACTITIONER

## 2022-02-18 PROCEDURE — 83036 HEMOGLOBIN GLYCOSYLATED A1C: CPT | Performed by: NURSE PRACTITIONER

## 2022-02-18 PROCEDURE — 99214 OFFICE O/P EST MOD 30 MIN: CPT | Mod: 25 | Performed by: NURSE PRACTITIONER

## 2022-02-18 PROCEDURE — 85025 COMPLETE CBC W/AUTO DIFF WBC: CPT | Performed by: NURSE PRACTITIONER

## 2022-02-18 PROCEDURE — 82306 VITAMIN D 25 HYDROXY: CPT | Performed by: NURSE PRACTITIONER

## 2022-02-18 PROCEDURE — 87624 HPV HI-RISK TYP POOLED RSLT: CPT | Performed by: NURSE PRACTITIONER

## 2022-02-18 RX ORDER — AZELASTINE 1 MG/ML
1 SPRAY, METERED NASAL 2 TIMES DAILY
Qty: 30 ML | Refills: 11 | Status: SHIPPED | OUTPATIENT
Start: 2022-02-18

## 2022-02-18 RX ORDER — FLUTICASONE PROPIONATE 50 MCG
2 SPRAY, SUSPENSION (ML) NASAL DAILY
Qty: 48 ML | Refills: 4 | Status: SHIPPED | OUTPATIENT
Start: 2022-02-18 | End: 2024-03-13

## 2022-02-18 RX ORDER — RIZATRIPTAN BENZOATE 10 MG/1
10 TABLET ORAL
Qty: 18 TABLET | Refills: 3 | Status: SHIPPED | OUTPATIENT
Start: 2022-02-18 | End: 2023-08-14

## 2022-02-18 RX ORDER — NYSTATIN 100000 [USP'U]/G
POWDER TOPICAL 2 TIMES DAILY
Qty: 60 G | Refills: 4 | Status: SHIPPED | OUTPATIENT
Start: 2022-02-18 | End: 2022-11-16 | Stop reason: ALTCHOICE

## 2022-02-18 RX ORDER — ALBUTEROL SULFATE 90 UG/1
2 AEROSOL, METERED RESPIRATORY (INHALATION) EVERY 6 HOURS
Qty: 18 G | Refills: 4 | Status: SHIPPED | OUTPATIENT
Start: 2022-02-18 | End: 2023-11-27

## 2022-02-18 RX ORDER — FAMOTIDINE 20 MG/1
20 TABLET, FILM COATED ORAL 2 TIMES DAILY
Qty: 180 TABLET | Refills: 1 | Status: SHIPPED | OUTPATIENT
Start: 2022-02-18 | End: 2022-08-14

## 2022-02-18 RX ORDER — ERGOCALCIFEROL 1.25 MG/1
50000 CAPSULE, LIQUID FILLED ORAL
Qty: 12 CAPSULE | Refills: 0 | Status: SHIPPED | OUTPATIENT
Start: 2022-02-18 | End: 2022-03-31

## 2022-02-18 SDOH — ECONOMIC STABILITY: TRANSPORTATION INSECURITY
IN THE PAST 12 MONTHS, HAS LACK OF TRANSPORTATION KEPT YOU FROM MEETINGS, WORK, OR FROM GETTING THINGS NEEDED FOR DAILY LIVING?: NO

## 2022-02-18 SDOH — HEALTH STABILITY: PHYSICAL HEALTH: ON AVERAGE, HOW MANY DAYS PER WEEK DO YOU ENGAGE IN MODERATE TO STRENUOUS EXERCISE (LIKE A BRISK WALK)?: 2 DAYS

## 2022-02-18 SDOH — ECONOMIC STABILITY: FOOD INSECURITY: WITHIN THE PAST 12 MONTHS, THE FOOD YOU BOUGHT JUST DIDN'T LAST AND YOU DIDN'T HAVE MONEY TO GET MORE.: NEVER TRUE

## 2022-02-18 SDOH — ECONOMIC STABILITY: INCOME INSECURITY: HOW HARD IS IT FOR YOU TO PAY FOR THE VERY BASICS LIKE FOOD, HOUSING, MEDICAL CARE, AND HEATING?: NOT HARD AT ALL

## 2022-02-18 SDOH — ECONOMIC STABILITY: TRANSPORTATION INSECURITY
IN THE PAST 12 MONTHS, HAS THE LACK OF TRANSPORTATION KEPT YOU FROM MEDICAL APPOINTMENTS OR FROM GETTING MEDICATIONS?: NO

## 2022-02-18 SDOH — ECONOMIC STABILITY: INCOME INSECURITY: IN THE LAST 12 MONTHS, WAS THERE A TIME WHEN YOU WERE NOT ABLE TO PAY THE MORTGAGE OR RENT ON TIME?: PATIENT REFUSED

## 2022-02-18 SDOH — HEALTH STABILITY: PHYSICAL HEALTH: ON AVERAGE, HOW MANY MINUTES DO YOU ENGAGE IN EXERCISE AT THIS LEVEL?: 10 MIN

## 2022-02-18 SDOH — ECONOMIC STABILITY: FOOD INSECURITY: WITHIN THE PAST 12 MONTHS, YOU WORRIED THAT YOUR FOOD WOULD RUN OUT BEFORE YOU GOT MONEY TO BUY MORE.: NEVER TRUE

## 2022-02-18 ASSESSMENT — ENCOUNTER SYMPTOMS
EYE PAIN: 0
HEMATURIA: 0
MYALGIAS: 0
DIARRHEA: 0
NAUSEA: 0
SORE THROAT: 0
JOINT SWELLING: 0
HEARTBURN: 1
COUGH: 0
DIZZINESS: 0
PARESTHESIAS: 0
DYSURIA: 0
NERVOUS/ANXIOUS: 0
WEAKNESS: 0
CONSTIPATION: 0
HEMATOCHEZIA: 0
FEVER: 0
CHILLS: 0
ABDOMINAL PAIN: 0
PALPITATIONS: 0
ARTHRALGIAS: 0
SHORTNESS OF BREATH: 0
FREQUENCY: 0
BREAST MASS: 0
HEADACHES: 1

## 2022-02-18 ASSESSMENT — ACTIVITIES OF DAILY LIVING (ADL)
CURRENT_FUNCTION: HOUSEWORK REQUIRES ASSISTANCE
CURRENT_FUNCTION: MONEY MANAGEMENT REQUIRES ASSISTANCE

## 2022-02-18 ASSESSMENT — LIFESTYLE VARIABLES
HOW MANY STANDARD DRINKS CONTAINING ALCOHOL DO YOU HAVE ON A TYPICAL DAY: PATIENT DECLINED
HOW OFTEN DO YOU HAVE SIX OR MORE DRINKS ON ONE OCCASION: NEVER
HOW OFTEN DO YOU HAVE A DRINK CONTAINING ALCOHOL: NEVER

## 2022-02-18 ASSESSMENT — SOCIAL DETERMINANTS OF HEALTH (SDOH)
IN A TYPICAL WEEK, HOW MANY TIMES DO YOU TALK ON THE PHONE WITH FAMILY, FRIENDS, OR NEIGHBORS?: NEVER
HOW OFTEN DO YOU ATTEND CHURCH OR RELIGIOUS SERVICES?: MORE THAN 4 TIMES PER YEAR
ARE YOU MARRIED, WIDOWED, DIVORCED, SEPARATED, NEVER MARRIED, OR LIVING WITH A PARTNER?: NEVER MARRIED
DO YOU BELONG TO ANY CLUBS OR ORGANIZATIONS SUCH AS CHURCH GROUPS UNIONS, FRATERNAL OR ATHLETIC GROUPS, OR SCHOOL GROUPS?: NO
HOW OFTEN DO YOU GET TOGETHER WITH FRIENDS OR RELATIVES?: ONCE A WEEK

## 2022-02-18 NOTE — PATIENT INSTRUCTIONS
Patient Education   Personalized Prevention Plan  You are due for the preventive services outlined below.  Your care team is available to assist you in scheduling these services.  If you have already completed any of these items, please share that information with your care team to update in your medical record.  Health Maintenance Due   Topic Date Due     Annual Wellness Visit  08/05/2021     HPV Screening  02/15/2022     PAP Smear  02/15/2022     ANNUAL REVIEW OF HM ORDERS  02/22/2022

## 2022-02-18 NOTE — PROGRESS NOTES
"SUBJECTIVE:   Prince Patel is a 46 year old female who presents for Preventive Visit.    {SPatient has been advised of split billing requirements and indicates understanding: Yes  Are you in the first 12 months of your Medicare coverage?  No    Healthy Habits:     In general, how would you rate your overall health?  Excellent    Frequency of exercise:  2-3 days/week    Duration of exercise:  15-30 minutes    Do you usually eat at least 4 servings of fruit and vegetables a day, include whole grains    & fiber and avoid regularly eating high fat or \"junk\" foods?  Yes    Taking medications regularly:  Yes    Medication side effects:  None    Ability to successfully perform activities of daily living:  Housework requires assistance and money management requires assistance    Home Safety:  No safety concerns identified    Hearing Impairment:  No hearing concerns    In the past 6 months, have you been bothered by leaking of urine?  No    In general, how would you rate your overall mental or emotional health?  Excellent      PHQ-2 Total Score: 0    Additional concerns today:  No    Do you feel safe in your environment? Yes         Fall risk  Fallen 2 or more times in the past year?: No  Any fall with injury in the past year?: No    Cognitive Screening   1) Repeat 3 items (Leader, Season, Table)    2) Clock draw: NORMAL  3) 3 item recall: Recalls 3 objects  Results: 3 items recalled: COGNITIVE IMPAIRMENT LESS LIKELY    Mini-CogTM Copyright MICHEL Thurman. Licensed by the author for use in [facility]; reprinted with permission (keith@.[facility]). All rights reserved.      Do you have sleep apnea, excessive snoring or daytime drowsiness?: no    Reviewed and updated as needed this visit by clinical staff   Tobacco  Allergies    Med Hx  Surg Hx  Fam Hx  Soc Hx      Cervical cancer screening:  Last Pap 2/2019, NIL  Breast cancer screening:  Last mammogram 11/22/2021  Left ankle pain; slipped on ice this morning and " twisted ankle, has pain along the outer aspect of the left ankle.  Denies swelling or bruising.  Can walk on the ankle without difficulty.   Migraines:  Well controlled, has had 1 in the past year.    Allergic rhinitis: is concerned that she has asthma, at times has wheezing.    Reviewed and updated as needed this visit by Provider                 Social History     Tobacco Use     Smoking status: Never Smoker     Smokeless tobacco: Never Used   Substance Use Topics     Alcohol use: No     If you drink alcohol do you typically have >3 drinks per day or >7 drinks per week? Not applicable    Alcohol Use 2/18/2022   Prescreen: >3 drinks/day or >7 drinks/week? Not Applicable   Prescreen: >3 drinks/day or >7 drinks/week? -               Current providers sharing in care for this patient include:   Patient Care Team:  Haase, Susan Rachele, APRN CNP as PCP - General (Nurse Practitioner)  Haase, Susan Rachele, APRN CNP as Assigned PCP  Yeo, Albert, MD as Assigned Musculoskeletal Provider    The following health maintenance items are reviewed in Epic and correct as of today:  Health Maintenance Due   Topic Date Due     HPV TEST  02/15/2022     PAP  02/15/2022     ANNUAL REVIEW OF HM ORDERS  02/22/2022     Lab work is in process  BP Readings from Last 3 Encounters:   02/18/22 138/83   10/06/21 (!) 147/102   09/14/21 134/85    Wt Readings from Last 3 Encounters:   02/18/22 103 kg (227 lb)   10/06/21 102.1 kg (225 lb)   09/14/21 102.1 kg (225 lb)                  Patient Active Problem List   Diagnosis     CARDIOVASCULAR SCREENING; LDL GOAL LESS THAN 160     S/P partial hysterectomy     Intertrigo     Migraine with aura and without status migrainosus, not intractable     Chronic non-seasonal allergic rhinitis, unspecified trigger     Morbid obesity (H)     Gastroesophageal reflux disease with esophagitis without hemorrhage     Past Surgical History:   Procedure Laterality Date     APPENDECTOMY       CARDIAC SURGERY        COLONOSCOPY N/A 8/3/2018    Procedure: COLONOSCOPY;  colonoscopy;  Surgeon: Isaias Medina MD;  Location: [facility]     LAPAROSCOPIC HYSTERECTOMY SUPRACERVICAL  11/16/2011    Procedure:LAPAROSCOPIC HYSTERECTOMY SUPRACERVICAL; LAPAROSCOPIC HYSTERECTOMY SUPRACERVICAL ; Surgeon:MASOUD WHITE; Location:[facility]     SURGICAL HISTORY OF -       ingrown toenails removed     toenail removal         Social History     Tobacco Use     Smoking status: Never Smoker     Smokeless tobacco: Never Used   Substance Use Topics     Alcohol use: No     Family History   Problem Relation Age of Onset     Obesity Mother      C.A.D. Father         MI, angioplasty, 50s     Diabetes Father         Diabetes     Hypertension Father      Obesity Father      Diabetes Maternal Grandmother         Multiple Sclerosis     Neurologic Disorder Paternal Grandmother      Gastrointestinal Disease Paternal Grandmother         Gluten Intolerance      Diabetes Maternal Aunt          Current Outpatient Medications   Medication Sig Dispense Refill     albuterol (PROAIR HFA/PROVENTIL HFA/VENTOLIN HFA) 108 (90 Base) MCG/ACT inhaler Inhale 2 puffs into the lungs every 6 hours 18 g 4     azelastine (ASTELIN) 0.1 % nasal spray Spray 1 spray into both nostrils 2 times daily 30 mL 11     famotidine (PEPCID) 20 MG tablet Take 1 tablet (20 mg) by mouth 2 times daily 180 tablet 1     fluticasone (FLONASE) 50 MCG/ACT nasal spray Spray 2 sprays into both nostrils daily 48 mL 4     nystatin (MYCOSTATIN) 975462 UNIT/GM external powder Apply topically 2 times daily 60 g 4     rizatriptan (MAXALT) 10 MG tablet Take 1 tablet (10 mg) by mouth at onset of headache for migraine May repeat in 2 hours. Max 3 tablets/24 hours. 18 tablet 3     azelastine (ASTELIN) 0.1 % nasal spray Spray 1 spray into both nostrils daily       clotrimazole (LOTRIMIN) 1 % external cream Apply topically 2 times daily 60 g 1     MULTIVITAMINS OR TABS 1 tab qd as directed  0     Last 3 Pap and HPV  Results:   PAP / HPV Latest Ref Rng & Units 2/15/2019 2/4/2016 2/7/2013   PAP (Historical) - NIL NIL NIL   HPV16 NEG:Negative Negative - -   HPV18 NEG:Negative Negative - -   HRHPV NEG:Negative Negative - -     Any new diagnosis of family breast, ovarian, or bowel cancer? No    FHS-7:   Breast CA Risk Assessment (FHS-7) 11/22/2021   Did any of your first-degree relatives have breast or ovarian cancer? No   Did any of your relatives have bilateral breast cancer? No   Did any man in your family have breast cancer? No   Did any woman in your family have breast and ovarian cancer? No   Did any woman in your family have breast cancer before age 50 y? No   Do you have 2 or more relatives with breast and/or ovarian cancer? No   Do you have 2 or more relatives with breast and/or bowel cancer? No       Mammogram Screening - Offered annual screening and updated Health Maintenance for mutual plan based on risk factor consideration    Pertinent mammograms are reviewed under the imaging tab.    Review of Systems   Constitutional: Negative for chills and fever.   HENT: Positive for hearing loss. Negative for congestion, ear pain and sore throat.    Eyes: Negative for pain and visual disturbance.   Respiratory: Negative for cough and shortness of breath.    Cardiovascular: Negative for chest pain, palpitations and peripheral edema.   Gastrointestinal: Positive for heartburn. Negative for abdominal pain, constipation, diarrhea, hematochezia and nausea.   Breasts:  Negative for tenderness, breast mass and discharge.   Genitourinary: Negative for dysuria, frequency, genital sores, hematuria, pelvic pain, urgency, vaginal bleeding and vaginal discharge.   Musculoskeletal: Negative for arthralgias, joint swelling and myalgias.   Skin: Positive for rash.   Neurological: Positive for headaches. Negative for dizziness, weakness and paresthesias.   Psychiatric/Behavioral: Negative for mood changes. The patient is not nervous/anxious.   "  under skin folds,   Hearing loss:  Seen by ENT, is trying to see if her insurance will cover hearing aides.   Rash:  Under abdominal skin folds, well controlled with nystatin powder    OBJECTIVE:   /83 (BP Location: Right arm, Patient Position: Chair, Cuff Size: Adult Large)   Pulse 86   Temp 97.8  F (36.6  C) (Tympanic)   Resp 18   Wt 103 kg (227 lb)   LMP 10/31/2011   SpO2 98%   BMI 40.21 kg/m   Estimated body mass index is 40.21 kg/m  as calculated from the following:    Height as of 10/6/21: 1.6 m (5' 3\").    Weight as of this encounter: 103 kg (227 lb).  Physical Exam   Completed physical exam with chaperone present, Ai  GENERAL: healthy, alert and no distress  EYES: Eyes grossly normal to inspection,   HENT: ear canals and TM's normal, nose and mouth without ulcers or lesions  NECK: no adenopathy, no asymmetry, masses, or scars and thyroid normal to palpation  RESP: lungs clear to auscultation - no rales, rhonchi or wheezes  BREAST: normal without masses, tenderness or nipple discharge and no palpable axillary masses or adenopathy  CV: regular rate and rhythm, normal S1 S2, no S3 or S4, no murmur, click or rub, no peripheral edema and peripheral pulses strong  ABDOMEN: soft, nontender, no hepatosplenomegaly, no masses and bowel sounds normal   (female): normal female external genitalia, normal urethral meatus, vaginal mucosa pink, moist, well rugated, and normal cervix/adnexa/uterus without masses or discharge  MS: left lateral ankle with slight tenderness, full active and passive ROM, no gross musculoskeletal defects noted, no edema, walking with normal gait.   SKIN: no suspicious lesions or rashes  NEURO: Normal strength and tone, mentation intact and speech normal  PSYCH: mentation appears normal, affect normal/bright      ASSESSMENT / PLAN:   Prince was seen today for wellness visit.    Diagnoses and all orders for this visit:    Encounter for Medicare annual wellness exam  -     " Comprehensive metabolic panel; Future  -     Lipid panel reflex to direct LDL Fasting; Future  -     Comprehensive metabolic panel  -     Lipid panel reflex to direct LDL Fasting    Cervical cancer screening  -     Pap screen with HPV - recommended age 30 - 65 years    Sprain of posterior talofibular ligament of left ankle, initial encounter:  Slight pain of lateral ankle upon palpation otherwise normal exam, xray not indicated, is full weight bearing.  Apply ice only if needed.     Migraine with aura and without status migrainosus, not intractable: well controlled, continue Maxalt as needed.   -     rizatriptan (MAXALT) 10 MG tablet; Take 1 tablet (10 mg) by mouth at onset of headache for migraine May repeat in 2 hours. Max 3 tablets/24 hours.    Seasonal allergic rhinitis, unspecified trigger  -     fluticasone (FLONASE) 50 MCG/ACT nasal spray; Spray 2 sprays into both nostrils daily    Cough:  Uses below on limited basis, plans to see Allergy and Asthma to be tested for Asthma  -     azelastine (ASTELIN) 0.1 % nasal spray; Spray 1 spray into both nostrils 2 times daily  -     albuterol (PROAIR HFA/PROVENTIL HFA/VENTOLIN HFA) 108 (90 Base) MCG/ACT inhaler; Inhale 2 puffs into the lungs every 6 hours    Intertrigo: well controlled, refill to use as needed.   -     nystatin (MYCOSTATIN) 016913 UNIT/GM external powder; Apply topically 2 times daily    Gastroesophageal reflux disease with esophagitis without hemorrhage: well controlled, if stops Pepcid symptoms return.    -     famotidine (PEPCID) 20 MG tablet; Take 1 tablet (20 mg) by mouth 2 times daily  -     CBC with Platelets & Differential; Future  -     CBC with Platelets & Differential    Morbid obesity (H):  Discussed increase in exercise and dietary changes.     Vitamin D deficiency  -     Vitamin D Deficiency; Future  -     Vitamin D Deficiency    Elevated glucose  -     Hemoglobin A1c; Future  -     Hemoglobin A1c    Other orders  -     REVIEW OF HEALTH  "MAINTENANCE PROTOCOL ORDERS            COUNSELING:  Reviewed preventive health counseling, as reflected in patient instructions       Regular exercise       Healthy diet/nutrition    Estimated body mass index is 40.21 kg/m  as calculated from the following:    Height as of 10/6/21: 1.6 m (5' 3\").    Weight as of this encounter: 103 kg (227 lb).    Weight management plan: Discussed healthy diet and exercise guidelines    She reports that she has never smoked. She has never used smokeless tobacco.      Appropriate preventive services were discussed with this patient, including applicable screening as appropriate for cardiovascular disease, diabetes, osteopenia/osteoporosis, and glaucoma.  As appropriate for age/gender, discussed screening for colorectal cancer, prostate cancer, breast cancer, and cervical cancer. Checklist reviewing preventive services available has been given to the patient.    Reviewed patients plan of care and provided an AVS. The Basic Care Plan (routine screening as documented in Health Maintenance) for Prince meets the Care Plan requirement. This Care Plan has been established and reviewed with the Patient.    Counseling Resources:  ATP IV Guidelines  Pooled Cohorts Equation Calculator  Breast Cancer Risk Calculator  Breast Cancer: Medication to Reduce Risk  FRAX Risk Assessment  ICSI Preventive Guidelines  Dietary Guidelines for Americans, 2010  USDA's MyPlate  ASA Prophylaxis  Lung CA Screening    Susan Haase, APRN CNP  [facility]    Identified Health Risks:  "

## 2022-02-18 NOTE — TELEPHONE ENCOUNTER
Received 1 page fax for [employer] for Susan Haase to complete. Form in the in-basket at 's desk.

## 2022-02-21 NOTE — TELEPHONE ENCOUNTER
Form signed and given to TC, please fax form along with visit note from 2/18/2022.  Thanks,  Susan Haase, CNP

## 2022-02-22 LAB
BKR LAB AP GYN ADEQUACY: NORMAL
BKR LAB AP GYN INTERPRETATION: NORMAL
BKR LAB AP HPV REFLEX: NORMAL
BKR LAB AP LMP: NORMAL
BKR LAB AP PREVIOUS ABNORMAL: NORMAL
PATH REPORT.COMMENTS IMP SPEC: NORMAL
PATH REPORT.COMMENTS IMP SPEC: NORMAL
PATH REPORT.RELEVANT HX SPEC: NORMAL

## 2022-02-23 LAB
HUMAN PAPILLOMA VIRUS 16 DNA: NEGATIVE
HUMAN PAPILLOMA VIRUS 18 DNA: NEGATIVE
HUMAN PAPILLOMA VIRUS FINAL DIAGNOSIS: NORMAL
HUMAN PAPILLOMA VIRUS OTHER HR: NEGATIVE

## 2022-03-03 ENCOUNTER — MYC MEDICAL ADVICE (OUTPATIENT)
Dept: FAMILY MEDICINE | Facility: CLINIC | Age: 47
End: 2022-03-03
Payer: COMMERCIAL

## 2022-03-03 DIAGNOSIS — R06.2 WHEEZING: Primary | ICD-10-CM

## 2022-03-28 ENCOUNTER — MYC MEDICAL ADVICE (OUTPATIENT)
Dept: FAMILY MEDICINE | Facility: CLINIC | Age: 47
End: 2022-03-28
Payer: COMMERCIAL

## 2022-03-29 ENCOUNTER — NURSE TRIAGE (OUTPATIENT)
Dept: FAMILY MEDICINE | Facility: CLINIC | Age: 47
End: 2022-03-29
Payer: COMMERCIAL

## 2022-03-29 ENCOUNTER — OFFICE VISIT (OUTPATIENT)
Dept: URGENT CARE | Facility: URGENT CARE | Age: 47
End: 2022-03-29
Payer: COMMERCIAL

## 2022-03-29 VITALS
BODY MASS INDEX: 40.22 KG/M2 | HEIGHT: 63 IN | SYSTOLIC BLOOD PRESSURE: 128 MMHG | OXYGEN SATURATION: 98 % | TEMPERATURE: 97.9 F | DIASTOLIC BLOOD PRESSURE: 84 MMHG | WEIGHT: 227 LBS | HEART RATE: 70 BPM | RESPIRATION RATE: 16 BRPM

## 2022-03-29 DIAGNOSIS — S41.152A DOG BITE OF LEFT UPPER EXTREMITY, INITIAL ENCOUNTER: Primary | ICD-10-CM
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
## 2022-03-29 PROCEDURE — 99213 OFFICE O/P EST LOW 20 MIN: CPT | Mod: 25 | Performed by: PHYSICIAN ASSISTANT

## 2022-03-29 PROCEDURE — 90471 IMMUNIZATION ADMIN: CPT | Performed by: PHYSICIAN ASSISTANT

## 2022-03-29 PROCEDURE — 90715 TDAP VACCINE 7 YRS/> IM: CPT | Performed by: PHYSICIAN ASSISTANT

## 2022-03-29 NOTE — NURSING NOTE
Prior to immunization administration, verified patients identity using patient s name and date of birth. Please see Immunization Activity for additional information.     Screening Questionnaire for Adult Immunization    Are you sick today?   No   Do you have allergies to medications, food, a vaccine component or latex?   No   Have you ever had a serious reaction after receiving a vaccination?   No   Do you have a long-term health problem with heart, lung, kidney, or metabolic disease (e.g., diabetes), asthma, a blood disorder, no spleen, complement component deficiency, a cochlear implant, or a spinal fluid leak?  Are you on long-term aspirin therapy?   No   Do you have cancer, leukemia, HIV/AIDS, or any other immune system problem?   No   Do you have a parent, brother, or sister with an immune system problem?   No   In the past 3 months, have you taken medications that affect  your immune system, such as prednisone, other steroids, or anticancer drugs; drugs for the treatment of rheumatoid arthritis, Crohn s disease, or psoriasis; or have you had radiation treatments?   No   Have you had a seizure, or a brain or other nervous system problem?   No   During the past year, have you received a transfusion of blood or blood    products, or been given immune (gamma) globulin or antiviral drug?   No   For women: Are you pregnant or is there a chance you could become       pregnant during the next month?   No   Have you received any vaccinations in the past 4 weeks?   No     Immunization questionnaire answers were all negative.        Per orders of Dr. mcbride, injection of tdap given by Rich Denson CMA. Patient instructed to remain in clinic for 15 minutes afterwards, and to report any adverse reaction to me immediately.       Screening performed by Rich Denson CMA on 3/29/2022 at 3:11 PM.

## 2022-03-29 NOTE — TELEPHONE ENCOUNTER
Please call Petr Tolbert and ask her to come in this week for a lab only appt to check her vitamin D level, I do not want to order further medication prior to knowing what her level is now.  Thanks,  Susan Haase, CNP

## 2022-03-29 NOTE — TELEPHONE ENCOUNTER
"S-(situation): Pt calling in regarding dog bite.     B-(background): Pt states was bite by friends puppy on 3/19/22 and inquiring about last Td vaccine.     A-(assessment): Pt states puppy is UTD on vaccines, bite is located near pt armpit. States, \"it is a little red.\" Pt denies discharge or streaking, no fever present. Denies bleeding. Reports bite to \"look like punctures.\" Denies pain. Last Td vaccine 9/27/2012.     R-(recommendations): Reviewed advise under care tab, advise pt to be seen in clinic today, pt verbalized understanding. No available appt at requested clinics, advised pt to be seen in . Pt states will got to [facility]. No further questions at this time.       Reason for Disposition    Last tetanus shot > 5 years and any wound (e.g., cut, scrape)    Additional Information    Negative: Major bleeding (actively dripping or spurting) that can't be stopped    Negative: Sounds like a life-threatening emergency to the triager    Negative: Any break in skin from BITE (e.g., cut, puncture, or scratch) and WILD animal at RISK FOR RABIES (e.g., bat, raccoon, little, skunk, coyote, other carnivores)    Negative: Any break in skin from BITE (e.g., cut, puncture, or scratch) and PET animal (e.g., dog, cat, or ferret) at risk for RABIES (e.g., sick, stray, unprovoked bite, developing country)    Negative: Any break in skin from BITE (e.g., cut, puncture, or scratch) and monkey    Negative: Cut (length > 1/8 inch or 3 mm) or skin tear and any animal    Negative: Bleeding won't stop after 10 minutes of direct pressure (using correct technique)    Negative: EXPOSURE of non-intact skin (e.g., exposed person has dermatitis, abrasion, wound) with animal BODY FLUID (e.g., saliva such as licking, blood, brain) and animal at high-risk for RABIES (e.g., bat, raccoon, little, skunk, coyote, other carnivores)    Negative: Sounds like a serious bite injury to the triager    Negative: SEVERE pain (e.g., excruciating)    Negative: " Puncture wound (hole through the skin) from cat (teeth or claws)    Negative: Puncture wound or small cut on face (Exception: puncture from small pet, such as a gerbil, mouse, hamster, puppy; or shallow scratches)    Negative: Puncture wound or small cut on hands or genitals (Exception: puncture from small pet, such as a gerbil, mouse, hamster, puppy; or shallow scratches)    Negative: Puncture wound and weak immune system (e.g., HIV positive, cancer chemo, splenectomy, organ transplant, chronic steroids)    Negative: Bite looks infected (e.g., red area, red streak, pus, or fever)    Negative: No bite dipak and suspected bat exposure (e.g., bat found in same room as sleeping adult)    Negative: No prior tetanus shots (or is not fully vaccinated) and any wound (e.g., cut, scrape)    Protocols used: ANIMAL BITE-A-OH    Lizabeth CASTILLO RN

## 2022-03-29 NOTE — PROGRESS NOTES
"Assessment & Plan     Dog bite of left upper extremity, initial encounter  Wound healing well, no additional treatment at this time.  Tetanus administered.     I spent a total of 20 minutes on the day of the visit.   Time spent doing chart review, history and exam, documentation and further activities per the note    Return in about 10 days (around 4/8/2022) for reevaluation with PCP if symptoms not improving, return earlier if symptoms are worsening.    Tommy Pedersen PA-C  [facility]     Petr Tolbert is a 46 year old female who presents to clinic today for the following health issues:  Chief Complaint   Patient presents with     Dog Bite     under left arm x 19 MARCH -- her mothers neighbor dog --dog has had all the shots  -- tender,   had some bleeding     Imm/Inj     last TDAP was 9/27/2012     HPI  Patient is a 46-year-old female presenting to urgent care for evaluation of a dog bite that occurred 10 days ago.  She also states that she had been advised to get a tetanus test.  Patient notes that the wound has scabbed over and is slightly red around the edges.  Denies any pain.  Denies any fever/chills, swelling, increased pain, or other complaints.  Reports that the dog was completely vaccinated against rabies and other normal immunizations.    Review of Systems  Focused ROS obtained, pertinent positives/negatives reviewed in the HPI.       Objective    /84 (BP Location: Right arm, Patient Position: Chair, Cuff Size: Adult Regular)   Pulse 70   Temp 97.9  F (36.6  C) (Oral)   Resp 16   Ht 1.6 m (5' 3\")   Wt 103 kg (227 lb)   LMP 10/31/2011   SpO2 98%   Breastfeeding No   BMI 40.21 kg/m    Physical Exam   GENERAL: healthy, alert and no distress  SKIN: Left upper arm with single scabbed lesion with very minor surrounding erythema.  No fluctuance or induration.  No tenderness to palpation.  No warmth.      "

## 2022-03-29 NOTE — PATIENT INSTRUCTIONS
Continue observing wound while it heals, observe for signs of infection which include increasing redness, increasing pain, puslike drainage.  Patient Education     Dog Bite  A dog bite can cause a wound deep enough to break the skin. In such cases, the wound is cleaned and sometimes closed. Wounds would be closed if they are gaping open or in cosmetically important areas such as the face. If the wound is closed, it is usually not completely closed. This is so that fluid can drain if the wound becomes infected. Often, wounds will be left open to heal. In addition to wound care, a tetanus shot (injection) may be given, if needed.     Home care    Wash your hands well with soap and warm water before and after caring for the wound. This helps lower the risk of infection.    Care for the wound as directed. If a dressing was applied to the wound, be sure to change it as directed.    If the wound bleeds, place a clean, soft cloth on the wound. Then firmly apply pressure until the bleeding stops. This may take up to 5 minutes. Don't release the pressure and look at the wound during this time.    Most wounds heal within 10 days. But an infection can occur even with correct treatment. So be sure to check the wound daily for signs of infection (see below).    Antibiotics may be prescribed. These help prevent or treat infection. If you re given antibiotics, take them as directed. Also be sure to complete the medicines.  Rabies prevention  Rabies is a virus that can be carried in certain animals. These can include domestic animals such as dogs and cats. Pets fully vaccinated against rabies (2 shots) are at very low risk of infection. But because human rabies is almost always fatal, any biting pet should be confined for 10 days as an extra precaution. In general, if there is a risk for rabies, the following steps may need to be taken:     If someone s pet dog has bitten you, it should be kept in a secure area for the next 10 days  to watch for signs of illness. (If the pet owner won t allow this, contact your local animal control center.) Ask to see the pet's vaccination history records. If the dog becomes ill or dies during that time, contact your local animal control center at once so the animal may be tested for rabies. If the dog stays healthy for the next 10 days, there is no danger of rabies in the animal or you.  ? If a stray dog bit you, contact your local animal control center. They can give information on capture, quarantine, and animal rabies testing.  ? If you can t find the animal that bit you in the next 2 days, and if rabies exists in your area, you may need to receive the rabies vaccine series. Call your healthcare provider right away. Or return to the emergency department promptly.  Follow-up care  Follow up with your healthcare provider, or as directed.  When to get medical advice  Call your healthcare provider or get medical attention right away if any of these occur:     Signs of infection:  ? Spreading redness or warmth from the wound  ? Increased pain or swelling  ? Fever of 100.4 F (38 C) or higher, or as directed by your healthcare provider  ? Colored fluid or pus draining from the wound    Signs of rabies infection. Don't wait for any of these symptoms to occur! If you suspect that the dog that bit you is rabid, or if the dog is lost and can't be found, you should get the vaccine series.  ? Headache  ? Confusion  ? Strange behavior  ? Increased salivating and drooling  ? Seizure  ? Hallucination, anxiety, or agitation  ? Fever    Decreased ability to move any body part near the wound    Bleeding that can't be stopped after 5 minutes of firm pressure  [employer] last reviewed this educational content on 8/1/2019 2000-2020 [employer]. [address] [place] [address], [place] [address]. All rights reserved. This information is not intended as a substitute for professional medical care. Always follow your  healthcare professional's instructions.  This information has been modified by your health care provider with permission from the publisher.

## 2022-03-30 ENCOUNTER — LAB (OUTPATIENT)
Dept: LAB | Facility: CLINIC | Age: 47
End: 2022-03-30
Payer: COMMERCIAL

## 2022-03-30 DIAGNOSIS — E55.9 VITAMIN D DEFICIENCY: ICD-10-CM

## 2022-03-30 PROCEDURE — 36415 COLL VENOUS BLD VENIPUNCTURE: CPT

## 2022-03-30 PROCEDURE — 82306 VITAMIN D 25 HYDROXY: CPT

## 2022-03-31 LAB — DEPRECATED CALCIDIOL+CALCIFEROL SERPL-MC: 31 UG/L (ref 20–75)

## 2022-05-25 ENCOUNTER — MYC MEDICAL ADVICE (OUTPATIENT)
Dept: FAMILY MEDICINE | Facility: CLINIC | Age: 47
End: 2022-05-25
Payer: COMMERCIAL

## 2022-07-20 ENCOUNTER — OFFICE VISIT (OUTPATIENT)
Dept: URGENT CARE | Facility: URGENT CARE | Age: 47
End: 2022-07-20
Payer: COMMERCIAL

## 2022-07-20 VITALS
HEART RATE: 91 BPM | SYSTOLIC BLOOD PRESSURE: 147 MMHG | OXYGEN SATURATION: 97 % | DIASTOLIC BLOOD PRESSURE: 92 MMHG | TEMPERATURE: 98.7 F

## 2022-07-20 DIAGNOSIS — S20.219A CONTUSION OF CHEST WALL, UNSPECIFIED LATERALITY, INITIAL ENCOUNTER: ICD-10-CM

## 2022-07-20 DIAGNOSIS — R06.02 SHORTNESS OF BREATH: Primary | ICD-10-CM

## 2022-07-20 PROCEDURE — 99213 OFFICE O/P EST LOW 20 MIN: CPT | Performed by: STUDENT IN AN ORGANIZED HEALTH CARE EDUCATION/TRAINING PROGRAM

## 2022-07-20 RX ORDER — IBUPROFEN 600 MG/1
600 TABLET, FILM COATED ORAL 3 TIMES DAILY
Qty: 15 TABLET | Refills: 0 | Status: SHIPPED | OUTPATIENT
Start: 2022-07-20 | End: 2022-07-25

## 2022-07-20 NOTE — PROGRESS NOTES
Assessment & Plan     Shortness of breath  No evidence of pneumothorax on chest X ray. Recommend pain control for possible sternum or rib contusion with NSAIDs, tylenol, ice. Discussed symptomatic cares and reasons to return to  or present to ED. Patient in agreement with plan.  - XR Chest 2 Views    Contusion of chest wall, unspecified laterality, initial encounter  - ibuprofen (ADVIL/MOTRIN) 600 MG tablet  Dispense: 15 tablet; Refill: 0           Return in about 3 days (around 7/23/2022), or if symptoms worsen or fail to improve.    Jennifer Tineo MD  [facility] [place]    Subjective     Petr Tolbert is a 47 year old female who presents to clinic today for the following health issues:  Chief Complaint   Patient presents with     Urgent Care     Hit in the chest and now it hurt to breath      HPI  Hit by softball line drive yesterday  Difficulty breathing started last night when going to bed  Hurts to take deep breathe in  This morning had to leave work due to pain  Has been taking tylenol  No overlying bruising  No other injuries  Denies hitting head or loss of consciousness         Objective    BP (!) 147/92 (BP Location: Right arm, Patient Position: Sitting, Cuff Size: Adult Large)   Pulse 91   Temp 98.7  F (37.1  C)   LMP 10/31/2011   SpO2 97%   Physical Exam   GENERAL: healthy, alert and no distress  RESP: lungs clear to auscultation - no rales, rhonchi or wheezes  SKIN: no suspicious lesions or rashes or ecchymoses    CXR - Reviewed and interpreted by me Normal- no infiltrates, effusions, pneumothoraces, cardiomegaly or masses

## 2022-07-20 NOTE — PATIENT INSTRUCTIONS
Try Voltaren (diclofenac) gel on the area  -ibuprofen (Advil or Motrin) in gel form    Also ok to take ibuprofen (Advil or Motrin) for a few days

## 2022-08-13 DIAGNOSIS — K21.00 GASTROESOPHAGEAL REFLUX DISEASE WITH ESOPHAGITIS WITHOUT HEMORRHAGE: ICD-10-CM

## 2022-08-14 RX ORDER — FAMOTIDINE 20 MG/1
TABLET, FILM COATED ORAL
Qty: 180 TABLET | Refills: 1 | Status: SHIPPED | OUTPATIENT
Start: 2022-08-14 | End: 2023-10-17

## 2022-08-14 NOTE — TELEPHONE ENCOUNTER
Prescription approved per [facility] Refill Protocol.    Signed Prescriptions:                        Disp   Refills    famotidine (PEPCID) 20 MG tablet           180 ta*1        Sig: TAKE 1 TABLET BY MOUTH TWICE A DAY  Authorizing Provider: HAASE, SUSAN RACHELE  Ordering User: SYED CASTILLO, Registered Nurse  [facility]

## 2022-11-16 ENCOUNTER — OFFICE VISIT (OUTPATIENT)
Dept: FAMILY MEDICINE | Facility: CLINIC | Age: 47
End: 2022-11-16
Payer: COMMERCIAL

## 2022-11-16 ENCOUNTER — ANCILLARY PROCEDURE (OUTPATIENT)
Dept: GENERAL RADIOLOGY | Facility: CLINIC | Age: 47
End: 2022-11-16
Attending: PHYSICIAN ASSISTANT
Payer: COMMERCIAL

## 2022-11-16 VITALS
WEIGHT: 228.4 LBS | OXYGEN SATURATION: 96 % | RESPIRATION RATE: 16 BRPM | TEMPERATURE: 98 F | HEIGHT: 63 IN | BODY MASS INDEX: 40.47 KG/M2 | SYSTOLIC BLOOD PRESSURE: 146 MMHG | DIASTOLIC BLOOD PRESSURE: 98 MMHG | HEART RATE: 82 BPM

## 2022-11-16 DIAGNOSIS — M62.838 MUSCLE SPASM: ICD-10-CM

## 2022-11-16 DIAGNOSIS — Z12.31 VISIT FOR SCREENING MAMMOGRAM: ICD-10-CM

## 2022-11-16 DIAGNOSIS — R03.0 ELEVATED BLOOD PRESSURE READING WITHOUT DIAGNOSIS OF HYPERTENSION: ICD-10-CM

## 2022-11-16 DIAGNOSIS — E66.01 MORBID OBESITY (H): ICD-10-CM

## 2022-11-16 DIAGNOSIS — R20.2 PARESTHESIA: ICD-10-CM

## 2022-11-16 DIAGNOSIS — M54.89 OTHER ACUTE BACK PAIN: Primary | ICD-10-CM

## 2022-11-16 DIAGNOSIS — L30.4 INTERTRIGO: ICD-10-CM

## 2022-11-16 PROCEDURE — 72100 X-RAY EXAM L-S SPINE 2/3 VWS: CPT | Mod: TC | Performed by: RADIOLOGY

## 2022-11-16 PROCEDURE — 99214 OFFICE O/P EST MOD 30 MIN: CPT | Performed by: PHYSICIAN ASSISTANT

## 2022-11-16 RX ORDER — FLUTICASONE FUROATE 200 UG/1
POWDER RESPIRATORY (INHALATION)
COMMUNITY
Start: 2022-07-28 | End: 2023-12-27 | Stop reason: ALTCHOICE

## 2022-11-16 RX ORDER — TIZANIDINE 2 MG/1
2-4 TABLET ORAL 3 TIMES DAILY PRN
Qty: 30 TABLET | Refills: 1 | Status: SHIPPED | OUTPATIENT
Start: 2022-11-16 | End: 2022-12-23

## 2022-11-16 RX ORDER — CLOTRIMAZOLE 1 %
CREAM (GRAM) TOPICAL 2 TIMES DAILY
Qty: 60 G | Refills: 1 | Status: SHIPPED | OUTPATIENT
Start: 2022-11-16 | End: 2023-10-17

## 2022-11-16 ASSESSMENT — ANXIETY QUESTIONNAIRES
7. FEELING AFRAID AS IF SOMETHING AWFUL MIGHT HAPPEN: NOT AT ALL
2. NOT BEING ABLE TO STOP OR CONTROL WORRYING: NOT AT ALL
7. FEELING AFRAID AS IF SOMETHING AWFUL MIGHT HAPPEN: NOT AT ALL
GAD7 TOTAL SCORE: 0
4. TROUBLE RELAXING: NOT AT ALL
5. BEING SO RESTLESS THAT IT IS HARD TO SIT STILL: NOT AT ALL
GAD7 TOTAL SCORE: 0
IF YOU CHECKED OFF ANY PROBLEMS ON THIS QUESTIONNAIRE, HOW DIFFICULT HAVE THESE PROBLEMS MADE IT FOR YOU TO DO YOUR WORK, TAKE CARE OF THINGS AT HOME, OR GET ALONG WITH OTHER PEOPLE: NOT DIFFICULT AT ALL
6. BECOMING EASILY ANNOYED OR IRRITABLE: NOT AT ALL
GAD7 TOTAL SCORE: 0
8. IF YOU CHECKED OFF ANY PROBLEMS, HOW DIFFICULT HAVE THESE MADE IT FOR YOU TO DO YOUR WORK, TAKE CARE OF THINGS AT HOME, OR GET ALONG WITH OTHER PEOPLE?: NOT DIFFICULT AT ALL
3. WORRYING TOO MUCH ABOUT DIFFERENT THINGS: NOT AT ALL
1. FEELING NERVOUS, ANXIOUS, OR ON EDGE: NOT AT ALL

## 2022-11-16 ASSESSMENT — PATIENT HEALTH QUESTIONNAIRE - PHQ9
10. IF YOU CHECKED OFF ANY PROBLEMS, HOW DIFFICULT HAVE THESE PROBLEMS MADE IT FOR YOU TO DO YOUR WORK, TAKE CARE OF THINGS AT HOME, OR GET ALONG WITH OTHER PEOPLE: NOT DIFFICULT AT ALL
SUM OF ALL RESPONSES TO PHQ QUESTIONS 1-9: 1
SUM OF ALL RESPONSES TO PHQ QUESTIONS 1-9: 1

## 2022-11-16 NOTE — PROGRESS NOTES
Assessment & Plan     Other acute back pain  Patient had a fall. Midline tenderness noted on exam. She is having spasms of the back. Physical therapy ordered to help with both back pain and with evaluation of the paresthesias.  - XR Lumbar Spine 2/3 Views  - tiZANidine (ZANAFLEX) 2 MG tablet; Take 1-2 tablets (2-4 mg) by mouth 3 times daily as needed for muscle spasms  - Physical Therapy Referral; Future    Muscle spasm  Try tizanidine. Side effects reviewed with patient. No driving or drinking alcohol with this medication.  - tiZANidine (ZANAFLEX) 2 MG tablet; Take 1-2 tablets (2-4 mg) by mouth 3 times daily as needed for muscle spasms  - Physical Therapy Referral; Future    Elevated blood pressure reading without diagnosis of hypertension  Patient has not been diagnosed with hypertension. Blood pressure elevated today. Patient noes she believes this is related to her back spasms and pain. Will do a close follow up in 4-6 weeks to recheck.    Morbid obesity (H)  Work on active lifestyle and nutrition.   Pannus contributes to the intertrigo she is experiencing.    Intertrigo  Patient notes she has rash in the fold of her pannus. Nystatin powder has not been helpful. Clotrimazole sent instead.  - clotrimazole (LOTRIMIN) 1 % external cream; Apply topically 2 times daily    Paresthesia  Exam does not confirm carpal tunnel and history not consistent with ulnar nerve impingement. Perhaps nerve impingement at the neck. Physical therapy can help with this and EMG ordered as well.  - Physical Therapy Referral; Future  - EMG; Future    Visit for screening mammogram  Mammogram due soon. Ordered for patient.  - *MA Screening Digital Bilateral; Future    Ordering of each unique test  Prescription drug management  36 minutes spent on the date of the encounter doing chart review, history and exam, documentation and further activities per the note         No follow-ups on file.   Follow-up Visit   Expected date: Dec 28, 2022    "   Follow Up Appointment Details:     Follow-up with whom?: Specify Provider    Provider Name: ASHLEE CASTILLO    Follow-Up for what?: Acute Issue Recheck    Additional Details: Follow up blood pressure, back spasms, paresthesias    How?: In Person                    ASHA 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  She exercises with enough effort to increase her heart rate 4 days per week.   She is taking medications regularly.    Today's PHQ-9         PHQ-9 Total Score: 1    PHQ-9 Q9 Thoughts of better off dead/self-harm past 2 weeks :   Not at all    How difficult have these problems made it for you to do your work, take care of things at home, or get along with other people: Not difficult at all  Today's KATHLEEN-7 Score: 0       Patient notes her hands are falling asleep when using them (doing crafting or when washing hair) as well as sitting no doing anything  She feels like the hands are most affected.  No symptoms    The patient fell 3 days ago. She tripped and fell. Now having back spasms. She denies any pain in the legs or numbness/tingling in the legs.      Review of Systems   GENERAL:  No fevers  MUSCULOSKELETAL: As noted in HPI  NEURO: As noted in HPI        Objective    BP (!) 146/98   Pulse 82   Temp 98  F (36.7  C) (Oral)   Resp 16   Ht 1.6 m (5' 3\")   Wt 103.6 kg (228 lb 6.4 oz)   LMP 10/31/2011   SpO2 96%   BMI 40.46 kg/m    Body mass index is 40.46 kg/m .  Physical Exam   GENERAL: No acute distress  HEENT: Normocephalic  VASCULAR: 2+radial pulses " bilaterally  EXTREMITIES: No midline tenderness over the cervical or thoracic. Some tenderness over the lower lumbar spine. Tenderness over the right lumbar paraspinous muscles. Tenderness over the left lumbar paraspinous muscles.  Right upper extremity: Full range of motion at the wrist (able to flex and extend, deviate to ulnar and radial sides). Able to oppose thumb to all fingers. Negative Tinel's, Negative Phalan's.  Left upper extremity: Full range of motion at the wrist (able to flex and extend, deviate to ulnar and radial sides). Able to oppose thumb to all fingers. Negative Tinel's, Negative Phalan's.  NEURO: Alert, non-focal

## 2022-12-02 ENCOUNTER — MYC MEDICAL ADVICE (OUTPATIENT)
Dept: FAMILY MEDICINE | Facility: CLINIC | Age: 47
End: 2022-12-02

## 2022-12-02 NOTE — TELEPHONE ENCOUNTER
Responded to pt Mychart message advising pt to schedule an appt with PCP. Advised pt to speak with a triage nurse if symptoms haven't resolved since Sunday.    Letty Robertson RN

## 2022-12-14 ENCOUNTER — ANCILLARY PROCEDURE (OUTPATIENT)
Dept: MAMMOGRAPHY | Facility: CLINIC | Age: 47
End: 2022-12-14
Attending: PHYSICIAN ASSISTANT
Payer: COMMERCIAL

## 2022-12-14 DIAGNOSIS — Z12.31 VISIT FOR SCREENING MAMMOGRAM: ICD-10-CM

## 2022-12-14 PROCEDURE — 77067 SCR MAMMO BI INCL CAD: CPT | Mod: TC | Performed by: RADIOLOGY

## 2022-12-14 PROCEDURE — 77063 BREAST TOMOSYNTHESIS BI: CPT | Mod: TC | Performed by: RADIOLOGY

## 2022-12-23 ENCOUNTER — OFFICE VISIT (OUTPATIENT)
Dept: FAMILY MEDICINE | Facility: CLINIC | Age: 47
End: 2022-12-23
Attending: PHYSICIAN ASSISTANT
Payer: COMMERCIAL

## 2022-12-23 VITALS
HEIGHT: 63 IN | OXYGEN SATURATION: 98 % | SYSTOLIC BLOOD PRESSURE: 137 MMHG | HEART RATE: 90 BPM | TEMPERATURE: 97.8 F | WEIGHT: 228 LBS | BODY MASS INDEX: 40.4 KG/M2 | DIASTOLIC BLOOD PRESSURE: 86 MMHG

## 2022-12-23 DIAGNOSIS — B35.4 TINEA CORPORIS: ICD-10-CM

## 2022-12-23 DIAGNOSIS — M62.838 MUSCLE SPASM: ICD-10-CM

## 2022-12-23 DIAGNOSIS — M54.89 OTHER ACUTE BACK PAIN: Primary | ICD-10-CM

## 2022-12-23 DIAGNOSIS — E66.01 MORBID OBESITY (H): ICD-10-CM

## 2022-12-23 DIAGNOSIS — L20.84 INTRINSIC (ALLERGIC) ECZEMA: ICD-10-CM

## 2022-12-23 DIAGNOSIS — R03.0 ELEVATED BLOOD PRESSURE READING WITHOUT DIAGNOSIS OF HYPERTENSION: ICD-10-CM

## 2022-12-23 DIAGNOSIS — Z91.018 FOOD ALLERGY: ICD-10-CM

## 2022-12-23 PROCEDURE — 86003 ALLG SPEC IGE CRUDE XTRC EA: CPT | Performed by: NURSE PRACTITIONER

## 2022-12-23 PROCEDURE — 36415 COLL VENOUS BLD VENIPUNCTURE: CPT | Performed by: NURSE PRACTITIONER

## 2022-12-23 PROCEDURE — 99214 OFFICE O/P EST MOD 30 MIN: CPT | Performed by: NURSE PRACTITIONER

## 2022-12-23 RX ORDER — TIZANIDINE 2 MG/1
2-4 TABLET ORAL 3 TIMES DAILY PRN
Qty: 30 TABLET | Refills: 1 | Status: SHIPPED | OUTPATIENT
Start: 2022-12-23 | End: 2023-03-21

## 2022-12-23 ASSESSMENT — ANXIETY QUESTIONNAIRES
3. WORRYING TOO MUCH ABOUT DIFFERENT THINGS: NOT AT ALL
GAD7 TOTAL SCORE: 0
7. FEELING AFRAID AS IF SOMETHING AWFUL MIGHT HAPPEN: NOT AT ALL
6. BECOMING EASILY ANNOYED OR IRRITABLE: NOT AT ALL
GAD7 TOTAL SCORE: 0
2. NOT BEING ABLE TO STOP OR CONTROL WORRYING: NOT AT ALL
7. FEELING AFRAID AS IF SOMETHING AWFUL MIGHT HAPPEN: NOT AT ALL
5. BEING SO RESTLESS THAT IT IS HARD TO SIT STILL: NOT AT ALL
GAD7 TOTAL SCORE: 0
8. IF YOU CHECKED OFF ANY PROBLEMS, HOW DIFFICULT HAVE THESE MADE IT FOR YOU TO DO YOUR WORK, TAKE CARE OF THINGS AT HOME, OR GET ALONG WITH OTHER PEOPLE?: NOT DIFFICULT AT ALL
4. TROUBLE RELAXING: NOT AT ALL
IF YOU CHECKED OFF ANY PROBLEMS ON THIS QUESTIONNAIRE, HOW DIFFICULT HAVE THESE PROBLEMS MADE IT FOR YOU TO DO YOUR WORK, TAKE CARE OF THINGS AT HOME, OR GET ALONG WITH OTHER PEOPLE: NOT DIFFICULT AT ALL
1. FEELING NERVOUS, ANXIOUS, OR ON EDGE: NOT AT ALL

## 2022-12-23 NOTE — PROGRESS NOTES
"  Assessment & Plan     Other acute back pain  Was improving until repeat fall on ice.   Patient has not been able to start Physical Therapy due to lack of communication from scheduling.   Phone number given to patient today and recommended scheduling.   Refilled tizanidine.   - tiZANidine (ZANAFLEX) 2 MG tablet  Dispense: 30 tablet; Refill: 1    Muscle spasm  As above.   - tiZANidine (ZANAFLEX) 2 MG tablet  Dispense: 30 tablet; Refill: 1    Tinea corporis  Discussed keeping watch band clean and dry. Topical clotrimazole two times per day x 7-10 days. Clean band. Consider a break from wearing watch.     Intrinsic (allergic) eczema  Food allergy  Possible food allergy. Testing warranted.   Consider benadryl for skin symptoms.   Discussed red flags and need for urgent follow-up.    - Allergy adult food panel  - Allergen peanut IgE  - Allergen almonds IgE  - Allergen brazil nut IgE  - Allergen cashew IgE  - Allergen hazelnut IgE  - Allergen macadamia nut IgE  - Allergen pecan nut IgE  - Allergen pine nut IgE  - Allergen pistachio nut IgE  - Allergen walnuts IgE    Elevated blood pressure reading without diagnosis of hypertension  Borderline. Continue to work on weight loss efforts.     Morbid obesity (H)  Has started walking more for exercise to promote weight loss.                BMI:   Estimated body mass index is 40.39 kg/m  as calculated from the following:    Height as of this encounter: 1.6 m (5' 3\").    Weight as of this encounter: 103.4 kg (228 lb).           No follow-ups on file.    ELY Cho CNP  [employer]    Subjective   Petr Tolbert is a 47 year old, presenting for the following health issues:  No chief complaint on file.      History of Present Illness       Back Pain:  She presents for follow up of back pain. Patient's back pain is a recurring problem.  Location of back pain:  Right lower back and left lower back  Description of back pain: dull ache  Back pain spreads: " "nowhere    Since patient first noticed back pain, pain is: unchanged  Does back pain interfere with her job:  No      She eats 2-3 servings of fruits and vegetables daily.She consumes 1 sweetened beverage(s) daily.She exercises with enough effort to increase her heart rate 10 to 19 minutes per day.  She exercises with enough effort to increase her heart rate 3 or less days per week.   She is taking medications regularly.    Today's PHQ-9         PHQ-9 Total Score: 0    PHQ-9 Q9 Thoughts of better off dead/self-harm past 2 weeks :   Not at all    How difficult have these problems made it for you to do your work, take care of things at home, or get along with other people: Not difficult at all  Today's KATHLEEN-7 Score: 0       Had another fall on ice that exacerbated back pain.   Using tizanidine less than once dialy for back pain - is helpful.  Has not been able to do Physical Therapy as has not received phone call.     Continues with bilateral hand numbness and tingling.   No call for EMG, has not had done.     Had a breakout in hives after eating Pecan Pie, no history of food allergies. Would like testing.     Rash to wrist when wearing silicone apple watch band. Has changed wrist and rash follows.     Review of Systems   Constitutional, HEENT, cardiovascular, pulmonary, gi and gu systems are negative, except as otherwise noted.      Objective    /86 (BP Location: Right arm, Patient Position: Sitting, Cuff Size: Adult Large)   Pulse 90   Temp 97.8  F (36.6  C) (Oral)   Ht 1.6 m (5' 3\")   Wt 103.4 kg (228 lb)   LMP 10/31/2011   SpO2 98%   BMI 40.39 kg/m    Body mass index is 40.39 kg/m .  Physical Exam   GENERAL: healthy, alert and no distress  RESP: lungs clear to auscultation - no rales, rhonchi or wheezes  CV: regular rate and rhythm, normal S1 S2, no S3 or S4, no murmur, click or rub, no peripheral edema and peripheral pulses strong  SKIN: red scaling rash of approximately 1 cm round             "

## 2022-12-23 NOTE — PATIENT INSTRUCTIONS
Please call to schedule Physical Therapy - please call [phone] for [facility]    EMG for numbness and Tingling - please call [phone]

## 2022-12-24 ENCOUNTER — MYC MEDICAL ADVICE (OUTPATIENT)
Dept: FAMILY MEDICINE | Facility: CLINIC | Age: 47
End: 2022-12-24

## 2022-12-26 LAB
ALMOND IGE QN: <0.1 KU(A)/L
ALMOND IGE QN: <0.1 KU(A)/L
BRAZIL NUT IGE QN: <0.1 KU(A)/L
CASHEW NUT IGE QN: <0.1 KU(A)/L
CASHEW NUT IGE QN: <0.1 KU(A)/L
CODFISH IGE QN: <0.1 KU(A)/L
COW MILK IGE QN: <0.1 KU(A)/L
EGG WHITE IGE QN: <0.1 KU(A)/L
HAZELNUT IGE QN: <0.1 KU(A)/L
HAZELNUT IGE QN: <0.1 KU(A)/L
IGE SERPL-ACNC: 133 KU/L (ref 0–114)
MACADAMIA IGE QN: <0.1 KU(A)/L
PEANUT IGE QN: <0.1 KU(A)/L
PEANUT IGE QN: <0.1 KU(A)/L
PECAN/HICK NUT IGE QN: <0.1 KU(A)/L
PINE NUT IGE QN: <0.1 KU(A)/L
PISTACHIO IGE QN: <0.1 KU(A)/L
SALMON IGE QN: <0.1 KU(A)/L
SCALLOP IGE QN: 0.14 KU(A)/L
SESAME SEED IGE QN: <0.1 KU(A)/L
SHRIMP IGE QN: <0.1 KU(A)/L
SOYBEAN IGE QN: <0.1 KU(A)/L
TUNA IGE QN: <0.1 KU(A)/L
WALNUT IGE QN: <0.1 KU(A)/L
WALNUT IGE QN: <0.1 KU(A)/L
WHEAT IGE QN: <0.1 KU(A)/L

## 2022-12-26 NOTE — TELEPHONE ENCOUNTER
Please triage patient for chest pain.    Angel Shea PA-C on 12/26/2022 at 10:32 AM (covering for Treva López)

## 2022-12-26 NOTE — TELEPHONE ENCOUNTER
Responded to pt mychart message advising pt to call and speak to a triage nurse. Advised pt to head to ER if experiencing chest pain lasting longer than 5 minutes.    Letty Robertson RN

## 2022-12-26 NOTE — TELEPHONE ENCOUNTER
See [facility] appointment update and advise  Dayana Sawant RN, BSN  [facility]

## 2022-12-27 NOTE — TELEPHONE ENCOUNTER
"AG, pt has not called back, sent a new [employer] message today to have pt call if needed and to update us, see message she sent, we have not instructed yet to go to ER, see VERITO note below, do you want pt to go to ER? Please confirm    \"I forgot to ask when I was in on 12/23 but I have been experiencing pain under my chest when I bend down and come back up. I m getting something off the floor. Is that something I should be worried about?\"    \"Responded to pt [employer] message advising pt to call and speak to a triage nurse. Advised pt to head to ER if experiencing chest pain lasting longer than 5 minutes\"    Dayana Sawant RN, BSN  [facility]    "

## 2022-12-27 NOTE — TELEPHONE ENCOUNTER
No Emergency Department note available, please check with patient.   Thank you,   ELY Cho CNP on 12/27/2022 at 7:55 AM

## 2022-12-27 NOTE — TELEPHONE ENCOUNTER
Sent another [employer] message to confirm f/u plan, will monitor and close when final  Dayana Sawant RN, BSN  [facility]

## 2022-12-28 NOTE — TELEPHONE ENCOUNTER
Pt has viewed [employer] message, will close and wait for pt to follow up since multiple attempts made  Dayana Sawant RN, BSN  [facility]

## 2023-01-09 ENCOUNTER — ALLIED HEALTH/NURSE VISIT (OUTPATIENT)
Dept: PEDIATRICS | Facility: CLINIC | Age: 48
End: 2023-01-09
Payer: COMMERCIAL

## 2023-01-09 VITALS — DIASTOLIC BLOOD PRESSURE: 89 MMHG | HEART RATE: 85 BPM | SYSTOLIC BLOOD PRESSURE: 141 MMHG

## 2023-01-09 DIAGNOSIS — Z13.6 HYPERTENSION SCREEN: Primary | ICD-10-CM

## 2023-01-09 PROCEDURE — 99207 PR NO CHARGE NURSE ONLY: CPT

## 2023-01-09 ASSESSMENT — PAIN SCALES - GENERAL: PAINLEVEL: NO PAIN (0)

## 2023-01-09 NOTE — PROGRESS NOTES
Prince Patel is a 47 year old patient who comes in today for a Blood Pressure check.  Initial BP:  BP (!) 141/89   Pulse 85   LMP 10/31/2011      Data Unavailable  Disposition: results routed to provider    BP Readings from Last 3 Encounters:   01/09/23 (!) 141/89   12/23/22 137/86   11/16/22 (!) 146/98

## 2023-01-10 NOTE — PROGRESS NOTES
Continue to work on diet and exercise for weight loss.   Follow-up in 3 months for blood pressure recheck and consider medication if not improved.   Thank you  ELY Cho CNP on 1/10/2023 at 12:58 PM

## 2023-01-26 ENCOUNTER — MYC MEDICAL ADVICE (OUTPATIENT)
Dept: FAMILY MEDICINE | Facility: CLINIC | Age: 48
End: 2023-01-26
Payer: COMMERCIAL

## 2023-01-27 ENCOUNTER — ANCILLARY PROCEDURE (OUTPATIENT)
Dept: GENERAL RADIOLOGY | Facility: CLINIC | Age: 48
End: 2023-01-27
Attending: PHYSICIAN ASSISTANT
Payer: COMMERCIAL

## 2023-01-27 ENCOUNTER — OFFICE VISIT (OUTPATIENT)
Dept: FAMILY MEDICINE | Facility: CLINIC | Age: 48
End: 2023-01-27
Payer: COMMERCIAL

## 2023-01-27 ENCOUNTER — NURSE TRIAGE (OUTPATIENT)
Dept: NURSING | Facility: CLINIC | Age: 48
End: 2023-01-27

## 2023-01-27 VITALS
HEIGHT: 63 IN | WEIGHT: 230 LBS | HEART RATE: 88 BPM | BODY MASS INDEX: 40.75 KG/M2 | OXYGEN SATURATION: 100 % | DIASTOLIC BLOOD PRESSURE: 82 MMHG | TEMPERATURE: 97.7 F | SYSTOLIC BLOOD PRESSURE: 128 MMHG

## 2023-01-27 DIAGNOSIS — M54.50 LUMBAR BACK PAIN: ICD-10-CM

## 2023-01-27 DIAGNOSIS — R07.9 CHEST PAIN, UNSPECIFIED TYPE: ICD-10-CM

## 2023-01-27 DIAGNOSIS — K59.00 CONSTIPATION, UNSPECIFIED CONSTIPATION TYPE: Primary | ICD-10-CM

## 2023-01-27 PROCEDURE — 93000 ELECTROCARDIOGRAM COMPLETE: CPT | Performed by: PHYSICIAN ASSISTANT

## 2023-01-27 PROCEDURE — 80048 BASIC METABOLIC PNL TOTAL CA: CPT | Performed by: PHYSICIAN ASSISTANT

## 2023-01-27 PROCEDURE — 84443 ASSAY THYROID STIM HORMONE: CPT | Performed by: PHYSICIAN ASSISTANT

## 2023-01-27 PROCEDURE — 36415 COLL VENOUS BLD VENIPUNCTURE: CPT | Performed by: PHYSICIAN ASSISTANT

## 2023-01-27 PROCEDURE — 99214 OFFICE O/P EST MOD 30 MIN: CPT | Performed by: PHYSICIAN ASSISTANT

## 2023-01-27 PROCEDURE — 74019 RADEX ABDOMEN 2 VIEWS: CPT | Mod: TC | Performed by: RADIOLOGY

## 2023-01-27 PROCEDURE — 72100 X-RAY EXAM L-S SPINE 2/3 VWS: CPT | Mod: TC | Performed by: RADIOLOGY

## 2023-01-27 RX ORDER — POLYETHYLENE GLYCOL 3350 17 G/17G
1 POWDER, FOR SOLUTION ORAL 2 TIMES DAILY
Qty: 507 G | Refills: 1 | Status: SHIPPED | OUTPATIENT
Start: 2023-01-27 | End: 2023-03-21

## 2023-01-27 NOTE — TELEPHONE ENCOUNTER
Nurse Triage SBAR    Is this a 2nd Level Triage? YES, LICENSED PRACTITIONER REVIEW IS REQUIRED    Situation: chest pain    Background:   Symptoms started 3 days ago.     Pt states her BP was high a couple of weeks ago when she donated blood at the [place], 140/102. Bought home BP cuff and Bps have been normal.    Assessment:   On and off chest pain, not present at the time of call. Located in central chest area.      Usually lasts 5-10 minutes, severe and hurts to breathe when chest pain is present.   No trigger to chest pain, symptom randomly occurs. Chest pain resolves on its own.  Pt also feels gassy when having the chest pain.     No SOB. Able to take a deep breath.  No heart beat concerns  Pt is currently at work  History of GERD, pt states it does not feel the same as heartburn.  On and off right shoulder pain, but pt attributes this to her right collarbone fracture she's had years ago. Right shoulder hurts when it gets too cold. No shoulder pain at the time of call      Protocol Recommended Disposition:   See in Office Today    Recommendation:   Sent to  for a same day visit    Does the patient meet one of the following criteria for ADS visit consideration? 16+ years old, with an [facility] PCP     TIP  Providers, please consider if this condition is appropriate for management at one of our Acute and Diagnostic Services sites.     If patient is a good candidate, please use dotphrase <dot>triageresponse and select Refer to ADS to document.      Melba Mast RN/[place] Nurse Advisor        Reason for Disposition    All other patients with chest pain (Exception: fleeting chest pain lasting a few seconds)    Additional Information    Negative: SEVERE difficulty breathing (e.g., struggling for each breath, speaks in single words)    Negative: Passed out (i.e., fainted, collapsed and was not responding)    Negative: Difficult to awaken or acting confused (e.g., disoriented, slurred speech)    Negative:  Shock suspected (e.g., cold/pale/clammy skin, too weak to stand, low BP, rapid pulse)    Negative: Chest pain lasting longer than 5 minutes and ANY of the following:* Over 44 years old* Over 30 years old and at least one cardiac risk factor (e.g., diabetes mellitus, high blood pressure, high cholesterol, smoker, or strong family history of heart disease)* History of heart disease (i.e., angina, heart attack, heart failure, bypass surgery, takes nitroglycerin)* Pain is crushing, pressure-like, or heavy    Negative: Heart beating < 50 beats per minute OR > 140 beats per minute    Negative: Visible sweat on face or sweat dripping down face    Negative: Sounds like a life-threatening emergency to the triager    Negative: Followed an injury to chest    Negative: SEVERE chest pain    Negative: Pain also in shoulder(s) or arm(s) or jaw    Negative: Major surgery in the past month    Negative: Hip or leg fracture (broken bone) in past month (or had cast on leg or ankle in past month)    Negative: Illness requiring prolonged bedrest in past month (e.g., immobilization, long hospital stay)    Negative: Long-distance travel in past month (e.g., car, bus, train, plane; with trip lasting 6 or more hours)    Negative: Difficulty breathing    Negative: Cocaine use within last 3 days    Negative: History of prior 'blood clot' in leg or lungs (i.e., deep vein thrombosis, pulmonary embolism)    Negative: History of inherited increased risk of blood clots (e.g., Factor 5 Leiden, Anti-thrombin 3, Protein C or Protein S deficiency, Prothrombin mutation)    Negative: Cancer treatment in the past two months (or has cancer now)    Negative: Heart beating irregularly or very rapidly    Negative: Chest pain lasting longer than 5 minutes and occurred in last 3 days (72 hours) (Exception: feels exactly the same as previously diagnosed heartburn and has accompanying sour taste in mouth)    Negative: Chest pain or 'angina' comes and goes and is  happening more often (increasing in frequency) or getting worse (increasing in severity) (Exception: chest pains that last only a few seconds)    Negative: Dizziness or lightheadedness    Negative: Coughing up blood    Negative: Patient sounds very sick or weak to the triager    Negative: Patient says chest pain feels exactly the same as previously diagnosed 'heartburn' and describes burning in chest and accompanying sour taste in mouth    Negative: Fever > 100.4 F (38.0 C)    Negative: Chest pain(s) lasting a few seconds persists > 3 days    Negative: Rash in same area as pain (may be described as 'small blisters')    Protocols used: CHEST PAIN-A-OH

## 2023-01-27 NOTE — PROGRESS NOTES
Assessment & Plan     Constipation, unspecified constipation type  Etiology for patient's constipation is unknown.  Labs obtained as noted below.  X-ray also obtained as noted below.  This shows moderate stool.  I recommended patient start MiraLAX twice daily until she has a bowel movement.  Then continue MiraLAX daily.  Try fleets enema tonight and tomorrow to see if that gives some relief.  I will reach out to patient in 2 days to see if she has had any relief.  - TSH with free T4 reflex; Future  - Basic metabolic panel  (Ca, Cl, CO2, Creat, Gluc, K, Na, BUN); Future  - polyethylene glycol (MIRALAX) 17 GM/Dose powder; Take 17 g (1 capful) by mouth 2 times daily Until having bowel movements then can decrease to once daily.  - XR Abdomen 2 Views  - TSH with free T4 reflex  - Basic metabolic panel  (Ca, Cl, CO2, Creat, Gluc, K, Na, BUN)    Lumbar back pain  Imaging of the lumbar back obtained due to fall 2 weeks ago and midline tenderness today.  - XR Lumbar Spine 2/3 Views    Chest pain, unspecified type  Patient's chest pain comes and goes unrelated to exertion.  While she does have a family history of coronary artery disease in her father, she has no history.  She has no history of diabetes and does not smoke.  She denies any recent surgeries or hospitalizations.  She denies any recent long travel and is not on hormones.  I believe symptoms are more likely related to her constipation for the past 2 weeks.  She has yet to try anything for this.  X-ray of the abdomen obtained to evaluate for stool load.  Of note EKG is within normal limits.  If at any point symptoms worsen I recommended she present to ER for further evaluation.  - EKG 12-lead complete w/read - Clinics    Review of the result(s) of each unique test - EKG  Ordering of each unique test  Prescription drug management  37 minutes spent on the date of the encounter doing chart review, history and exam, documentation and further activities per the  "note       AS[facility]          Subjective   Petr Tolbert is a 47 year old, presenting for the following health issues:  Chest Pain      History of Present Illness       Reason for visit:  Chest pain / not able to go number 2  Symptom onset:  1-3 days ago (1/23/23 symptoms started in the night)  Symptoms include:  Sharp chest pains (located in the middle of the chest)- lasts 5 minutes at a time, occurs once daily, feeling gassy at that same time  Symptom intensity:  Mild  Symptom progression:  Staying the same  Had these symptoms before:  Yes  Has tried/received treatment for these symptoms:  No  What makes it worse:  Hurts to breathe  What makes it better:  Hot pack seems to help    She eats 2-3 servings of fruits and vegetables daily.She consumes 1 sweetened beverage(s) daily.She exercises with enough effort to increase her heart rate 10 to 19 minutes per day.  She exercises with enough effort to increase her heart rate 4 days per week.   She is taking medications regularly.     When having the sharp pain she has difficulty breathing. She denies any palpation, nausea, vomiting or diaphoresis.    Father has history of of heart disease (2 heart attacks). She denies any personal history of CAD. No history of smoking.    Patient fell 2 weeks ago. She slipped and fell. She landed on her buttocks and left arm. She denies significant pain. She denies any urinary incontinence or retention.     Haven't had bowel movement in 2 weeks, unknown if it's related to the chest pain. Has not tired anything yet. Has a laxative she will try tonight.      Review of Systems   GENERAL:  No fevers  RESP:  As noted in HPI  CARDIAC: As noted in HPI  GI:  As noted in HPI        Objective    /82 (BP Location: Right arm, Patient Position: Sitting, Cuff Size: Adult Large)   Pulse 88   Temp 97.7  F (36.5  C) (Oral)   Ht 1.607 m (5' 3.25\")   Wt 104.3 kg (230 lb)   LMP 10/31/2011   SpO2 " 100%   BMI 40.42 kg/m    Body mass index is 40.42 kg/m .  Physical Exam   GENERAL: No acute distress  HEENT: Normocephalic  CARDIAC: Regular rate and rhythm. No murmurs.  PULMONARY: Lungs are clear to auscultation bilaterally. No wheezes, rhonchi or crackles.  EXTREMITIES: Midline lumbar spine tenderness.   GI: Hypoactive bowel sounds, abdomen is soft and non-tender. No hepatosplenomegaly.   NEURO: Alert and non-focal      EKG - Reviewed and interpreted by me appears normal, NSR, normal axis, normal intervals, no acute ST/T changes c/w ischemia, no LVH by voltage criteria  XR Abdomen 2 Views    Result Date: 1/27/2023  EXAM: XR ABDOMEN 2 VIEWS LOCATION: [facility] DATE/TIME: 1/27/2023 4:53 PM INDICATION: Constipation. COMPARISON: None.     IMPRESSION: Moderate stool formation. No bowel distention or free air. Scoliosis.

## 2023-01-30 LAB
ANION GAP SERPL CALCULATED.3IONS-SCNC: 12 MMOL/L (ref 7–15)
BUN SERPL-MCNC: 7.4 MG/DL (ref 6–20)
CALCIUM SERPL-MCNC: 9.4 MG/DL (ref 8.6–10)
CHLORIDE SERPL-SCNC: 104 MMOL/L (ref 98–107)
CREAT SERPL-MCNC: 0.88 MG/DL (ref 0.51–0.95)
DEPRECATED HCO3 PLAS-SCNC: 23 MMOL/L (ref 22–29)
GFR SERPL CREATININE-BSD FRML MDRD: 82 ML/MIN/1.73M2
GLUCOSE SERPL-MCNC: 104 MG/DL (ref 70–99)
POTASSIUM SERPL-SCNC: 4.6 MMOL/L (ref 3.4–5.3)
SODIUM SERPL-SCNC: 139 MMOL/L (ref 136–145)
TSH SERPL DL<=0.005 MIU/L-ACNC: 3.27 UIU/ML (ref 0.3–4.2)

## 2023-03-01 ENCOUNTER — MYC MEDICAL ADVICE (OUTPATIENT)
Dept: FAMILY MEDICINE | Facility: CLINIC | Age: 48
End: 2023-03-01
Payer: COMMERCIAL

## 2023-03-21 ENCOUNTER — OFFICE VISIT (OUTPATIENT)
Dept: FAMILY MEDICINE | Facility: CLINIC | Age: 48
End: 2023-03-21
Payer: COMMERCIAL

## 2023-03-21 VITALS
WEIGHT: 228.6 LBS | DIASTOLIC BLOOD PRESSURE: 86 MMHG | SYSTOLIC BLOOD PRESSURE: 118 MMHG | HEART RATE: 78 BPM | OXYGEN SATURATION: 100 % | TEMPERATURE: 98.3 F | RESPIRATION RATE: 14 BRPM | BODY MASS INDEX: 40.5 KG/M2 | HEIGHT: 63 IN

## 2023-03-21 DIAGNOSIS — M62.838 MUSCLE SPASM: ICD-10-CM

## 2023-03-21 DIAGNOSIS — M54.89 OTHER ACUTE BACK PAIN: ICD-10-CM

## 2023-03-21 DIAGNOSIS — M79.672 PAIN IN BOTH FEET: Primary | ICD-10-CM

## 2023-03-21 DIAGNOSIS — Z00.00 MEDICARE ANNUAL WELLNESS VISIT, SUBSEQUENT: ICD-10-CM

## 2023-03-21 DIAGNOSIS — M79.671 PAIN IN BOTH FEET: Primary | ICD-10-CM

## 2023-03-21 PROCEDURE — 99396 PREV VISIT EST AGE 40-64: CPT | Performed by: PHYSICIAN ASSISTANT

## 2023-03-21 PROCEDURE — 99214 OFFICE O/P EST MOD 30 MIN: CPT | Mod: 25 | Performed by: PHYSICIAN ASSISTANT

## 2023-03-21 RX ORDER — TIZANIDINE 2 MG/1
2-4 TABLET ORAL 3 TIMES DAILY PRN
Qty: 30 TABLET | Refills: 1 | Status: SHIPPED | OUTPATIENT
Start: 2023-03-21 | End: 2023-05-30

## 2023-03-21 NOTE — PROGRESS NOTES
Assessment & Plan     Pain in both feet  Could be related to being on the feet more today. Try ice and Epsom salt soaks.   Topical diclofenac for pain.  - diclofenac (VOLTAREN) 1 % topical gel; Apply 2 g topically 4 times daily as needed for moderate pain (4-6)    Other acute back pain  Refilled for patient today. She uses this as needed for back pain.  - tiZANidine (ZANAFLEX) 2 MG tablet; Take 1-2 tablets (2-4 mg) by mouth 3 times daily as needed for muscle spasms    Muscle spasm  As noted above.  - tiZANidine (ZANAFLEX) 2 MG tablet; Take 1-2 tablets (2-4 mg) by mouth 3 times daily as needed for muscle spasms    Medicare Annual Wellness  Completed today.    Prescription drug management      Return for previously scheduled visit.    Malika Bowen PA-C  [facility]   Petr Tolbert is a 47 year old, presenting for the following health issues:  Musculoskeletal Problem      History of Present Illness       Reason for visit:  Redness or pinkness and swelling under toes and redness and pinkness on both heels    She eats 2-3 servings of fruits and vegetables daily.She consumes 1 sweetened beverage(s) daily.She exercises with enough effort to increase her heart rate 10 to 19 minutes per day.    She is taking medications regularly.       Pain History:  Where in your body do you have pain? Musculoskeletal problem/pain  Onset/Duration: Yesterday   Description  Location: toes and heel - bilateral  Joint Swelling: YES  Redness: YES  Pain: YES  Warmth: No  Intensity:  Pt is unsure   Progression of Symptoms:  same  Accompanying signs and symptoms:   Fevers: No  Numbness/tingling/weakness: No  History  Trauma to the area: No  Recent illness:  No  Previous similar problem: No  Previous evaluation:  No  Precipitating or alleviating factors:  Aggravating factors include: none  Therapies tried and outcome: acetaminophen    Patient does use tennis balls for the plantar fasciitis.      Annual  "Wellness Visit    Patient has been advised of split billing requirements and indicates understanding: Yes     Are you in the first 12 months of your Medicare Part B coverage?  No    Physical Health:    In general, how would you rate your overall physical health? good    Outside of work, how many days during the week do you exercise?4-5 days/week    Outside of work, approximately how many minutes a day do you exercise?less than 15 minutes    If you drink alcohol do you typically have >3 drinks per day or >7 drinks per week? No    Do you usually eat at least 4 servings of fruit and vegetables a day, include whole grains & fiber and avoid regularly eating high fat or \"junk\" foods? Yes    Do you have any problems taking medications regularly? No    Do you have any side effects from medications? none    Needs assistance for the following daily activities: no assistance needed    Which of the following safety concerns are present in your home?  none identified     Hearing impairment: No    In the past 6 months, have you been bothered by leaking of urine? no    Mental Health:    In general, how would you rate your overall mental or emotional health? good  PHQ-2 Score:      Do you feel safe in your environment? Yes    Have you ever done Advance Care Planning? (For example, a Health Directive, POLST, or a discussion with a medical provider or your loved ones about your wishes)? No, advance care planning information given to patient to review.  Patient plans to discuss their wishes with loved ones or provider.      Fall risk:    Cognitive Screenin) Repeat 3 items (Leader, Season, Table)    2) Clock draw: NORMAL  3) 3 item recall: Recalls 2 objects   Results: NORMAL clock, 1-2 items recalled: COGNITIVE IMPAIRMENT LESS LIKELY    Mini-CogTM Copyright MICHLE Thurman. Licensed by the author for use in [facility]; reprinted with permission (keith@.[email or web address]). All rights reserved.      Do you have sleep apnea, excessive " "snoring or daytime drowsiness?: no    Current providers sharing in care for this patient include:   Patient Care Team:  Treva López APRN CNP as PCP - General (Family Medicine)  Haase, Susan Rachele, APRN CNP as Assigned PCP    Patient has been advised of split billing requirements and indicates understanding: Yes    Review of Systems   GENERAL:  No fevers   MUSCULOSKELETAL: As noted in HPI          Objective    /86 (BP Location: Right arm, Patient Position: Sitting, Cuff Size: Adult Large)   Pulse 78   Temp 98.3  F (36.8  C) (Oral)   Resp 14   Ht 1.607 m (5' 3.25\")   Wt 103.7 kg (228 lb 9.6 oz)   LMP 10/31/2011   SpO2 100%   BMI 40.18 kg/m    Body mass index is 40.18 kg/m .  Physical Exam   GENERAL: No acute distress  HEENT: Normocephalic  VASCULAR: 1-2+ DP and PT pulses bilaterally  EXTREMITIES:   Right lower extremity: No obvious deformity, no edema or ecchymosis. Mild erythema with some tenderness over the balls of the feet bilaterally. Unable to reproduce pain in the heels. No tenderness in the arch of the feet.  Left lower extremity: No obvious deformity, no edema or ecchymosis.   NEURO: Alert, non-focal                "

## 2023-04-13 NOTE — TELEPHONE ENCOUNTER
Patient room air saturation 92% patient placed on oxygen via NC at 2 liters Pt calls, hx of migraines, ran out of imitrex, will need refill, taking amitriptyline 25 mg at bedtime, ? Needs refill on this, PT did help but sessions completed, this headache lasting one week, not sure what to do now, SH aware, miserable and tired of h/a, wants message sent to SH to advise plan, routed, inform pt on cell of plan    Migraine with aura and without status migrainosus, not intractable: greatly improved with PT.    Follow up in 2-3 months for physical exam, sooner as needed.       Telephone Information:   Mobile [phone]     Dayana Sawant RN, BSN  Message handled by Nurse Triage.

## 2023-05-08 DIAGNOSIS — M79.671 PAIN IN BOTH FEET: ICD-10-CM

## 2023-05-08 DIAGNOSIS — M79.672 PAIN IN BOTH FEET: ICD-10-CM

## 2023-05-19 ENCOUNTER — TRANSFERRED RECORDS (OUTPATIENT)
Dept: HEALTH INFORMATION MANAGEMENT | Facility: CLINIC | Age: 48
End: 2023-05-19
Payer: COMMERCIAL

## 2023-05-30 ENCOUNTER — OFFICE VISIT (OUTPATIENT)
Dept: FAMILY MEDICINE | Facility: CLINIC | Age: 48
End: 2023-05-30
Attending: NURSE PRACTITIONER
Payer: COMMERCIAL

## 2023-05-30 VITALS
DIASTOLIC BLOOD PRESSURE: 88 MMHG | SYSTOLIC BLOOD PRESSURE: 138 MMHG | BODY MASS INDEX: 41.25 KG/M2 | WEIGHT: 232.8 LBS | RESPIRATION RATE: 17 BRPM | OXYGEN SATURATION: 98 % | HEART RATE: 90 BPM | HEIGHT: 63 IN | TEMPERATURE: 97.7 F

## 2023-05-30 DIAGNOSIS — E66.01 MORBID OBESITY (H): ICD-10-CM

## 2023-05-30 DIAGNOSIS — M62.838 MUSCLE SPASM: ICD-10-CM

## 2023-05-30 DIAGNOSIS — M54.89 OTHER ACUTE BACK PAIN: ICD-10-CM

## 2023-05-30 PROCEDURE — 99214 OFFICE O/P EST MOD 30 MIN: CPT | Performed by: NURSE PRACTITIONER

## 2023-05-30 RX ORDER — TIZANIDINE 2 MG/1
2-4 TABLET ORAL 3 TIMES DAILY PRN
Qty: 30 TABLET | Refills: 1 | Status: SHIPPED | OUTPATIENT
Start: 2023-05-30 | End: 2023-09-20

## 2023-05-30 SDOH — ECONOMIC STABILITY: TRANSPORTATION INSECURITY
IN THE PAST 12 MONTHS, HAS LACK OF TRANSPORTATION KEPT YOU FROM MEETINGS, WORK, OR FROM GETTING THINGS NEEDED FOR DAILY LIVING?: PATIENT DECLINED

## 2023-05-30 SDOH — ECONOMIC STABILITY: TRANSPORTATION INSECURITY
IN THE PAST 12 MONTHS, HAS THE LACK OF TRANSPORTATION KEPT YOU FROM MEDICAL APPOINTMENTS OR FROM GETTING MEDICATIONS?: PATIENT DECLINED

## 2023-05-30 SDOH — ECONOMIC STABILITY: INCOME INSECURITY: HOW HARD IS IT FOR YOU TO PAY FOR THE VERY BASICS LIKE FOOD, HOUSING, MEDICAL CARE, AND HEATING?: PATIENT DECLINED

## 2023-05-30 SDOH — HEALTH STABILITY: PHYSICAL HEALTH: ON AVERAGE, HOW MANY DAYS PER WEEK DO YOU ENGAGE IN MODERATE TO STRENUOUS EXERCISE (LIKE A BRISK WALK)?: 4 DAYS

## 2023-05-30 SDOH — HEALTH STABILITY: PHYSICAL HEALTH: ON AVERAGE, HOW MANY MINUTES DO YOU ENGAGE IN EXERCISE AT THIS LEVEL?: 20 MIN

## 2023-05-30 SDOH — ECONOMIC STABILITY: FOOD INSECURITY: WITHIN THE PAST 12 MONTHS, YOU WORRIED THAT YOUR FOOD WOULD RUN OUT BEFORE YOU GOT MONEY TO BUY MORE.: PATIENT DECLINED

## 2023-05-30 SDOH — ECONOMIC STABILITY: INCOME INSECURITY: IN THE LAST 12 MONTHS, WAS THERE A TIME WHEN YOU WERE NOT ABLE TO PAY THE MORTGAGE OR RENT ON TIME?: PATIENT REFUSED

## 2023-05-30 SDOH — ECONOMIC STABILITY: FOOD INSECURITY: WITHIN THE PAST 12 MONTHS, THE FOOD YOU BOUGHT JUST DIDN'T LAST AND YOU DIDN'T HAVE MONEY TO GET MORE.: PATIENT DECLINED

## 2023-05-30 ASSESSMENT — SOCIAL DETERMINANTS OF HEALTH (SDOH)
HOW OFTEN DO YOU GET TOGETHER WITH FRIENDS OR RELATIVES?: ONCE A WEEK
HOW OFTEN DO YOU ATTEND CHURCH OR RELIGIOUS SERVICES?: MORE THAN 4 TIMES PER YEAR
IN A TYPICAL WEEK, HOW MANY TIMES DO YOU TALK ON THE PHONE WITH FAMILY, FRIENDS, OR NEIGHBORS?: MORE THAN THREE TIMES A WEEK
ARE YOU MARRIED, WIDOWED, DIVORCED, SEPARATED, NEVER MARRIED, OR LIVING WITH A PARTNER?: NEVER MARRIED
DO YOU BELONG TO ANY CLUBS OR ORGANIZATIONS SUCH AS CHURCH GROUPS UNIONS, FRATERNAL OR ATHLETIC GROUPS, OR SCHOOL GROUPS?: YES

## 2023-05-30 ASSESSMENT — LIFESTYLE VARIABLES
SKIP TO QUESTIONS 9-10: 0
HOW OFTEN DO YOU HAVE SIX OR MORE DRINKS ON ONE OCCASION: PATIENT DECLINED
HOW MANY STANDARD DRINKS CONTAINING ALCOHOL DO YOU HAVE ON A TYPICAL DAY: PATIENT DOES NOT DRINK
AUDIT-C TOTAL SCORE: -1
HOW OFTEN DO YOU HAVE A DRINK CONTAINING ALCOHOL: PATIENT DECLINED

## 2023-05-30 ASSESSMENT — ENCOUNTER SYMPTOMS
ARTHRALGIAS: 0
WEAKNESS: 0
DYSURIA: 0
ABDOMINAL PAIN: 0
PALPITATIONS: 0
HEMATURIA: 0
CHILLS: 0
SORE THROAT: 0
EYE PAIN: 0
HEADACHES: 1
HEMATOCHEZIA: 0
FEVER: 0
NERVOUS/ANXIOUS: 0
NAUSEA: 0
PARESTHESIAS: 0
COUGH: 0
JOINT SWELLING: 0
DIARRHEA: 0
DIZZINESS: 0
SHORTNESS OF BREATH: 0
FREQUENCY: 0
HEARTBURN: 0
CONSTIPATION: 0
MYALGIAS: 0

## 2023-05-30 ASSESSMENT — PATIENT HEALTH QUESTIONNAIRE - PHQ9: SUM OF ALL RESPONSES TO PHQ QUESTIONS 1-9: 3

## 2023-05-30 ASSESSMENT — ACTIVITIES OF DAILY LIVING (ADL): CURRENT_FUNCTION: NO ASSISTANCE NEEDED

## 2023-05-30 NOTE — PROGRESS NOTES
"  Assessment & Plan     Morbid obesity (H)  Diet and activity discussed.     Other acute back pain  Sparing use surrounding highly active days, good benefits.   - tiZANidine (ZANAFLEX) 2 MG tablet  Dispense: 30 tablet; Refill: 1    Muscle spasm  As above.   - tiZANidine (ZANAFLEX) 2 MG tablet  Dispense: 30 tablet; Refill: 1               BMI:   Estimated body mass index is 40.91 kg/m  as calculated from the following:    Height as of this encounter: 1.607 m (5' 3.25\").    Weight as of this encounter: 105.6 kg (232 lb 12.8 oz).           ELY Cho CNP  [facility]    Subjective   Petr Tolbert is a 47 year old, presenting for the following health issues:  Recheck Medication        5/30/2023     1:30 PM   Additional Questions   Roomed by Daria MAS     Medication Followup of Tizanidine    Taking Medication as prescribed: yes    Side Effects:  None    Medication Helping Symptoms:  yes    Using sparingly, used a little more frequently in softball season.     Medication Followup of Voltaren 1%     Taking Medication as prescribed: yes    Side Effects:  None    Medication Helping Symptoms:  Yes    Using to knees and bottom of feet for plantar fascitis as needed. Good benefits.     Staying active with softball.   Meals prepared by self, some assist from staff.       Review of Systems   Constitutional, HEENT, cardiovascular, pulmonary, gi and gu systems are negative, except as otherwise noted.      Objective    /88 (BP Location: Right arm, Patient Position: Sitting, Cuff Size: Adult Large)   Pulse 90   Temp 97.7  F (36.5  C) (Oral)   Resp 17   Ht 1.607 m (5' 3.25\")   Wt 105.6 kg (232 lb 12.8 oz)   LMP 10/31/2011   SpO2 98%   BMI 40.91 kg/m    Body mass index is 40.91 kg/m .  Physical Exam                       "

## 2023-05-30 NOTE — PROGRESS NOTES
"SUBJECTIVE:   Petr Tolbert is a 47 year old who presents for Preventive Visit.      5/30/2023     1:30 PM   Additional Questions   Roomed by Daria Carbajal   {Split Bill scripting  The purpose of this visit is to discuss your medical history and prevent health problems before you are sick. You may be responsible for a co-pay, coinsurance, or deductible if your visit today includes services such as checking on a sore throat, having an x-ray or lab test, or treating and evaluating a new or existing condition :528177}  Patient has been advised of split billing requirements and indicates understanding: Yes  Are you in the first 12 months of your Medicare coverage?  No    Healthy Habits:    In general, how would you rate your overall health?  Good    Frequency of exercise:  2-3 days/week    Duration of exercise:  15-30 minutes    Do you usually eat at least 4 servings of fruit and vegetables a day, include whole grains    & fiber and avoid regularly eating high fat or \"junk\" foods?  Yes    Taking medications regularly:  Yes    Medication side effects:  None    Ability to successfully perform activities of daily living:  No assistance needed    Home Safety:  No safety concerns identified    Hearing Impairment:  No hearing concerns    In the past 6 months, have you been bothered by leaking of urine?  No    In general, how would you rate your overall mental or emotional health?  Good      PHQ-2 Total Score:    Additional concerns today:  No        Have you ever done Advance Care Planning? (For example, a Health Directive, POLST, or a discussion with a medical provider or your loved ones about your wishes): { :246144}      Fall risk  { :462812}  {If any of the above assessments are answered yes, consider ordering appropriate referrals (Optional):802682::\"click delete button to remove this line now\"}  Cognitive Screening { :645026}    {Do you have sleep apnea, excessive snoring or daytime drowsiness? " "(Optional):321903}    Reviewed and updated as needed this visit by clinical staff                  Reviewed and updated as needed this visit by Provider                 Social History     Tobacco Use     Smoking status: Never     Smokeless tobacco: Never   Vaping Use     Vaping status: Never Used   Substance Use Topics     Alcohol use: No     {Rooming staff  Click this link to complete the Prescreen if response below is not for today's visit  Alcohol Use Prescreen >3 drinks/day or > 7 drinks/week.  If the prescreen question answer is YES, complete the full AUDIT  :140895}        5/30/2023     1:28 PM   Alcohol Use   Prescreen: >3 drinks/day or >7 drinks/week? Not Applicable   {add AUDIT responses (Optional) (A score of 7 for adult men is an indication of hazardous drinking; a score of 8 or more is an indication of an alcohol use disorder.  A score of 7 or more for adult women is an indication of hazardous drinking or an alchohol use disorder):478056}  Do you have a current opioid prescription? { :511558}  Do you use any other controlled substances or medications that are not prescribed by a provider? {Substance Use :958551::\"None\"}  {Provider  If there are gaps in the social history shown above, please follow the link and refresh the note Link to Social and Substance History :663918}    {Outside tests to abstract? :397019}    {additional problems to add (Optional):388308}    Current providers sharing in care for this patient include: {Rooming staff:  Please update Care Team from storyboard, refresh this note and then delete this statement}  Patient Care Team:  Treva López APRN CNP as PCP - General (Family Medicine)  Haase, Susan Rachele, APRN CNP as Assigned PCP    The following health maintenance items are reviewed in Epic and correct as of today:  Health Maintenance   Topic Date Due     HEPATITIS B IMMUNIZATION (1 of 3 - 3-dose series) Never done     ANNUAL REVIEW OF HM ORDERS  02/18/2023     PHQ-9  " "06/23/2023     MAMMO SCREENING  12/14/2023     MEDICARE ANNUAL WELLNESS VISIT  03/21/2024     HPV TEST  02/18/2027     LIPID  02/18/2027     PAP  02/18/2027     ADVANCE CARE PLANNING  03/21/2028     COLORECTAL CANCER SCREENING  08/03/2028     DTAP/TDAP/TD IMMUNIZATION (4 - Td or Tdap) 03/29/2032     DEPRESSION ACTION PLAN  Completed     MIGRAINE ACTION PLAN  Completed     INFLUENZA VACCINE  Completed     COVID-19 Vaccine  Completed     Pneumococcal Vaccine: Pediatrics (0 to 5 Years) and At-Risk Patients (6 to 64 Years)  Aged Out     IPV IMMUNIZATION  Aged Out     MENINGITIS IMMUNIZATION  Aged Out     HEPATITIS C SCREENING  Discontinued     HIV SCREENING  Discontinued     {Chronicprobdata (optional):425311}  {Decision Support (Optional):699023}        5/30/2023     1:30 PM   Breast CA Risk Assessment (FHS-7)   Do you have a family history of breast, colon, or ovarian cancer? No / Unknown       {If any of the questions to the BCRA (FHS-7) are answered yes, consider ordering referral for genetic counseling (Optional) :090158::\"click delete button to remove this line now\"}  {AMB Mammogram Decision Support (Optional) :359520}  Pertinent mammograms are reviewed under the imaging tab.    Review of Systems   Constitutional: Negative for chills and fever.   HENT: Positive for hearing loss. Negative for congestion and sore throat.    Eyes: Negative for pain and visual disturbance.   Respiratory: Negative for cough and shortness of breath.    Cardiovascular: Negative for chest pain, palpitations and peripheral edema.   Gastrointestinal: Negative for abdominal pain, constipation, diarrhea, heartburn, hematochezia and nausea.   Genitourinary: Negative for dysuria, frequency, genital sores, hematuria and urgency.   Musculoskeletal: Negative for arthralgias, joint swelling and myalgias.   Skin: Negative for rash.   Neurological: Positive for headaches. Negative for dizziness, weakness and paresthesias.   Psychiatric/Behavioral: " "Negative for mood changes. The patient is not nervous/anxious.      {ROS COMP (Optional):331073}    OBJECTIVE:   LMP 10/31/2011  Estimated body mass index is 40.18 kg/m  as calculated from the following:    Height as of 3/21/23: 1.607 m (5' 3.25\").    Weight as of 3/21/23: 103.7 kg (228 lb 9.6 oz).  Physical Exam  {Exam (Optional) :825425}    {Diagnostic Test Results (Optional):523513::\"Diagnostic Test Results:\",\"Labs reviewed in Epic\"}    ASSESSMENT / PLAN:   {Diag Picklist:925786}    {Patient advised of split billing (Optional):077470}      COUNSELING:  {Medicare Counselin}      BMI:   Estimated body mass index is 40.18 kg/m  as calculated from the following:    Height as of 3/21/23: 1.607 m (5' 3.25\").    Weight as of 3/21/23: 103.7 kg (228 lb 9.6 oz).   {Weight Management Plan needed for ACO:916587}      She reports that she has never smoked. She has never used smokeless tobacco.      Appropriate preventive services were discussed with this patient, including applicable screening as appropriate for cardiovascular disease, diabetes, osteopenia/osteoporosis, and glaucoma.  As appropriate for age/gender, discussed screening for colorectal cancer, prostate cancer, breast cancer, and cervical cancer. Checklist reviewing preventive services available has been given to the patient.    Reviewed patients plan of care and provided an AVS. The {CarePlan:277834} for [place] meets the Care Plan requirement. This Care Plan has been established and reviewed with the {PATIENT, FAMILY MEMBER, CAREGIVER:918471}.    {Counseling Resources  [place] Preventive Services Task Force  Cholesterol Screening  Health diet/nutrition  Pooled Cohorts Equation Calculator  USDA's MyPlate  ASA Prophylaxis  Lung CA Screening  Osteoporosis prevention/bone health :186911}  {Breast Cancer Risk Calculator  BRCA-Related Cancer Risk Assessment FHS-7 Tool :910361}    ELY Cho [facility] " Risks:  {Medicare required documentation of substance and opioid use disorders screening :408779}

## 2023-06-01 ENCOUNTER — MYC MEDICAL ADVICE (OUTPATIENT)
Dept: FAMILY MEDICINE | Facility: CLINIC | Age: 48
End: 2023-06-01
Payer: COMMERCIAL

## 2023-06-01 NOTE — TELEPHONE ENCOUNTER
See [employer] message, routed to AG, please advise/inform if pt needs 6/8/23 appointment  BP Readings from Last 2 Encounters:   05/30/23 138/88   03/21/23 118/86     Lab Results   Component Value Date    CR 0.88 01/27/2023    CR 0.92 08/05/2020     Lab Results   Component Value Date    POTASSIUM 4.6 01/27/2023    POTASSIUM 4.3 02/18/2022    POTASSIUM 4.4 08/05/2020     Dayana Sawant RN, BSN  [facility]

## 2023-07-13 ENCOUNTER — OFFICE VISIT (OUTPATIENT)
Dept: FAMILY MEDICINE | Facility: CLINIC | Age: 48
End: 2023-07-13
Payer: COMMERCIAL

## 2023-07-13 VITALS
WEIGHT: 236.5 LBS | TEMPERATURE: 97.9 F | BODY MASS INDEX: 41.9 KG/M2 | OXYGEN SATURATION: 98 % | SYSTOLIC BLOOD PRESSURE: 129 MMHG | HEIGHT: 63 IN | RESPIRATION RATE: 14 BRPM | DIASTOLIC BLOOD PRESSURE: 85 MMHG | HEART RATE: 95 BPM

## 2023-07-13 DIAGNOSIS — N39.46 MIXED STRESS AND URGE URINARY INCONTINENCE: ICD-10-CM

## 2023-07-13 DIAGNOSIS — F81.9 COGNITIVE DEVELOPMENTAL DELAY: ICD-10-CM

## 2023-07-13 DIAGNOSIS — Z02.9 ADMINISTRATIVE ENCOUNTER: Primary | ICD-10-CM

## 2023-07-13 PROCEDURE — 99214 OFFICE O/P EST MOD 30 MIN: CPT | Performed by: NURSE PRACTITIONER

## 2023-07-13 NOTE — PROGRESS NOTES
"  Assessment & Plan     Administrative encounter  Cognitive developmental delay  Mixed stress and urge urinary incontinence  Patient would be appropriate for coverage for incontinence DME. Documentation completed and forms faxed today.   - Incontinence Supplies Order for DME - ONLY FOR DME               BMI:   Estimated body mass index is 41.89 kg/m  as calculated from the following:    Height as of this encounter: 1.6 m (5' 3\").    Weight as of this encounter: 107.3 kg (236 lb 8 oz).           ELY Cho [facility]   Petr Tolbert is a 48 year old, presenting for the following health issues:  Forms        7/13/2023     1:02 PM   Additional Questions   Roomed by Maryjo     History of Present Illness       Reason for visit:  Bladder leakage occassionaly    She eats 2-3 servings of fruits and vegetables daily.She consumes 1 sweetened beverage(s) daily.She exercises with enough effort to increase her heart rate 30 to 60 minutes per day.  She exercises with enough effort to increase her heart rate 4 days per week.   She is taking medications regularly.       Is in need of documentation for support of incontinence pads.   Does have daily stress and urge incontinence.   Does through 2 adult disposable underwear per day.   [employer] transports patient to work and appointments.       Review of Systems   Constitutional, HEENT, cardiovascular, pulmonary, gi and gu systems are negative, except as otherwise noted.      Objective    /85 (BP Location: Right arm, Patient Position: Sitting, Cuff Size: Adult Large)   Pulse 95   Temp 97.9  F (36.6  C) (Oral)   Resp 14   Ht 1.6 m (5' 3\")   Wt 107.3 kg (236 lb 8 oz)   [facility] 10/31/2011   SpO2 98%   BMI 41.89 kg/m    Body mass index is 41.89 kg/m .  Physical Exam                       "

## 2023-07-14 ENCOUNTER — MYC MEDICAL ADVICE (OUTPATIENT)
Dept: FAMILY MEDICINE | Facility: CLINIC | Age: 48
End: 2023-07-14
Payer: COMMERCIAL

## 2023-07-17 NOTE — TELEPHONE ENCOUNTER
Responded to pt [employer] message advising pt submit e-visit for ringworm and upload attached image into e-visit.    Letty FARAH RN

## 2023-08-12 ENCOUNTER — MYC MEDICAL ADVICE (OUTPATIENT)
Dept: FAMILY MEDICINE | Facility: CLINIC | Age: 48
End: 2023-08-12
Payer: COMMERCIAL

## 2023-08-12 DIAGNOSIS — G43.109 MIGRAINE WITH AURA AND WITHOUT STATUS MIGRAINOSUS, NOT INTRACTABLE: ICD-10-CM

## 2023-08-14 RX ORDER — RIZATRIPTAN BENZOATE 10 MG/1
TABLET ORAL
Qty: 18 TABLET | Refills: 1 | Status: SHIPPED | OUTPATIENT
Start: 2023-08-14 | End: 2023-12-11

## 2023-08-14 NOTE — TELEPHONE ENCOUNTER
See mychart of 8/12/23.      I haven t used any because I haven t needed to. This is the first migraine I ve had in the last three days.  Sujata Raphael RN   to Petr Patel   K[facility]      8/14/23  8:33 AM  Hi Petr Tolbert,     Can you verify how many rizatriptan you have used in the last 30 days?  We did receive a refill request on 8/12/23.  We do ask for 3 business days to process.  Are you out?     Thanks,     Sujata Raphael RN    Last read by Petr Patel at  8:36 AM on 8/14/2023.

## 2023-09-19 ASSESSMENT — PATIENT HEALTH QUESTIONNAIRE - PHQ9
10. IF YOU CHECKED OFF ANY PROBLEMS, HOW DIFFICULT HAVE THESE PROBLEMS MADE IT FOR YOU TO DO YOUR WORK, TAKE CARE OF THINGS AT HOME, OR GET ALONG WITH OTHER PEOPLE: SOMEWHAT DIFFICULT
SUM OF ALL RESPONSES TO PHQ QUESTIONS 1-9: 1
SUM OF ALL RESPONSES TO PHQ QUESTIONS 1-9: 1

## 2023-09-19 ASSESSMENT — ANXIETY QUESTIONNAIRES
1. FEELING NERVOUS, ANXIOUS, OR ON EDGE: NOT AT ALL
2. NOT BEING ABLE TO STOP OR CONTROL WORRYING: NOT AT ALL
6. BECOMING EASILY ANNOYED OR IRRITABLE: NOT AT ALL
IF YOU CHECKED OFF ANY PROBLEMS ON THIS QUESTIONNAIRE, HOW DIFFICULT HAVE THESE PROBLEMS MADE IT FOR YOU TO DO YOUR WORK, TAKE CARE OF THINGS AT HOME, OR GET ALONG WITH OTHER PEOPLE: NOT DIFFICULT AT ALL
7. FEELING AFRAID AS IF SOMETHING AWFUL MIGHT HAPPEN: NOT AT ALL
3. WORRYING TOO MUCH ABOUT DIFFERENT THINGS: NOT AT ALL
4. TROUBLE RELAXING: NOT AT ALL
GAD7 TOTAL SCORE: 0
5. BEING SO RESTLESS THAT IT IS HARD TO SIT STILL: NOT AT ALL
GAD7 TOTAL SCORE: 0

## 2023-09-20 ENCOUNTER — OFFICE VISIT (OUTPATIENT)
Dept: FAMILY MEDICINE | Facility: CLINIC | Age: 48
End: 2023-09-20
Payer: COMMERCIAL

## 2023-09-20 VITALS
BODY MASS INDEX: 41.99 KG/M2 | HEART RATE: 89 BPM | DIASTOLIC BLOOD PRESSURE: 88 MMHG | SYSTOLIC BLOOD PRESSURE: 142 MMHG | TEMPERATURE: 97.9 F | OXYGEN SATURATION: 99 % | HEIGHT: 63 IN | WEIGHT: 237 LBS

## 2023-09-20 DIAGNOSIS — R06.2 WHEEZING: ICD-10-CM

## 2023-09-20 DIAGNOSIS — I10 BENIGN ESSENTIAL HYPERTENSION: Primary | ICD-10-CM

## 2023-09-20 PROCEDURE — 99214 OFFICE O/P EST MOD 30 MIN: CPT | Performed by: PHYSICIAN ASSISTANT

## 2023-09-20 RX ORDER — AMLODIPINE BESYLATE 2.5 MG/1
2.5 TABLET ORAL DAILY
Qty: 30 TABLET | Refills: 5 | Status: SHIPPED | OUTPATIENT
Start: 2023-09-20 | End: 2023-10-12

## 2023-09-20 NOTE — PROGRESS NOTES
Assessment & Plan     Benign essential hypertension  Blood pressure is elevated. Started on medications as noted below. Discussed risk and benefits including side effects.  Continue to monitor blood pressure at home. I will reach out via [employer] in 2 weeks to see what it has been at home.  Given information about the DASH diet.  - amLODIPine (NORVASC) 2.5 MG tablet; Take 1 tablet (2.5 mg) by mouth daily    Wheezing  Was told she might have asthma but never had testing. Testing ordered. She reports this occurs mostly with activity.  - General PFT Lab (Please always keep checked); Future  - Pulmonary Function Test; Future    Review of prior external note(s) from - [facility]where information from [facility] reviewed  Review of the result(s) of each unique test - labs from urgent care visit  Prescription drug management  32 minutes spent by me on the date of the encounter doing chart review, history and exam, documentation and further activities per the note        Malika Bowen PA-C  [facility]   Petr Tolbert is a 48 year old, presenting for the following health issues:  Hypertension (Follow up)        9/20/2023     1:50 PM   Additional Questions   Roomed by Vero   Accompanied by Kathryn-Support staff       History of Present Illness       Hypertension: She presents for follow up of hypertension.  She does check blood pressure  regularly outside of the clinic. Outside blood pressures have been over 140/90. She does not follow a low salt diet.     She eats 2-3 servings of fruits and vegetables daily.She consumes 1 sweetened beverage(s) daily.She exercises with enough effort to increase her heart rate 30 to 60 minutes per day.  She exercises with enough effort to increase her heart rate 4 days per week.   She is taking medications regularly.       Hypertension Follow-up    Do you check your blood pressure regularly outside of the clinic? Yes   Are you following a low salt diet?  "No  Are your blood pressures ever more than 140 on the top number (systolic) OR more   than 90 on the bottom number (diastolic), for example 140/90? Yes    Headaches on and off.      Review of Systems   GENERAL:  No fevers  NEURO:  As noted in HPI        Objective    BP (!) 142/88   Pulse 89   Temp 97.9  F (36.6  C) (Oral)   Ht 1.607 m (5' 3.25\")   Wt 107.5 kg (237 lb)   LMP 10/31/2011   SpO2 99%   BMI 41.65 kg/m    Body mass index is 41.65 kg/m .  Physical Exam   GENERAL: No acute distress  HEENT: Normocephalic  NEURO: Alert and non-focal                "

## 2023-09-22 ENCOUNTER — MYC MEDICAL ADVICE (OUTPATIENT)
Dept: FAMILY MEDICINE | Facility: CLINIC | Age: 48
End: 2023-09-22
Payer: COMMERCIAL

## 2023-10-04 ENCOUNTER — MYC MEDICAL ADVICE (OUTPATIENT)
Dept: FAMILY MEDICINE | Facility: CLINIC | Age: 48
End: 2023-10-04
Payer: COMMERCIAL

## 2023-10-05 NOTE — TELEPHONE ENCOUNTER
Malika Bowen, PAC    Please see my chart message, home BP     Thank you   Vicky Acosta, Registered Nurse  [facility]

## 2023-10-12 DIAGNOSIS — I10 BENIGN ESSENTIAL HYPERTENSION: ICD-10-CM

## 2023-10-12 RX ORDER — AMLODIPINE BESYLATE 2.5 MG/1
2.5 TABLET ORAL DAILY
Qty: 90 TABLET | Refills: 1 | Status: SHIPPED | OUTPATIENT
Start: 2023-10-12 | End: 2023-10-17

## 2023-10-12 NOTE — TELEPHONE ENCOUNTER
Resent for #90  Prescription approved per [facility] Refill Protocol.    Dayana Sawant RN, BSN  [facility]

## 2023-10-13 NOTE — TELEPHONE ENCOUNTER
labs drawn from left antecubital area. 23 gauge WONC used. Gauze and Tape/Band-Aid dressing applied. Site appears clean dry and intact. Patient tolerated procedure well, no adverse reactions noted. Instructed patient to call with any questions or concerns.     Next appointment scheduled: No future appointments.    [facility]   "Requested Prescriptions   Pending Prescriptions Disp Refills     topiramate (TOPAMAX) 25 MG tablet [Pharmacy Med Name: TOPIRAMATE 25 MG TABLET] 60 tablet 0    Last Written Prescription Date:  10/12/2018  Last Fill Quantity: 60 tablet,  # refills: 1   Last office visit: 11/30/2018 with prescribing provider:  11/30/2018   Future Office Visit:   Next 5 appointments (look out 90 days)    Feb 15, 2019  9:30 AM CST  PHYSICAL with Susan Rachele Haase, APRN CNP  [facility] [address] [place] [id number] [phone]          Sig: TAKE 1 TABLET BY MOUTH TWICE A DAY    Anti-Seizure Meds Protocol  Failed - 12/10/2018  6:43 PM       Failed - Review Authorizing provider's last note.     Refer to last progress notes: confirm request is for original authorizing provider (cannot be through other providers).         Failed - Normal ALT or AST on file in past 26 months    Recent Labs   Lab Test 06/21/12  1121   ALT 22     Recent Labs   Lab Test 06/21/12  1121   AST 27            Passed - Recent (12 mo) or future (30 days) visit within the authorizing provider's specialty    Patient had office visit in the last 12 months or has a visit in the next 30 days with authorizing provider or within the authorizing provider's specialty.  See \"Patient Info\" tab in inbasket, or \"Choose Columns\" in Meds & Orders section of the refill encounter.             Passed - Normal CBC on file in past 26 months    Recent Labs   Lab Test 07/16/18  1608   WBC 10.2   RBC 4.32   HGB 12.4   HCT 37.3                   Passed - Normal platelet count on file in past 26 months    Recent Labs   Lab Test 07/16/18  1608                 Passed - No active pregnancy on record       Passed - No positive pregnancy test in last 12 months        Donn ESPITIAT  "

## 2023-10-16 NOTE — TELEPHONE ENCOUNTER
Malika Bowen, PAC    Please see my chart BP readings     Thank you   Vicky Acosta, Registered Nurse  [facility]

## 2023-10-17 ENCOUNTER — OFFICE VISIT (OUTPATIENT)
Dept: FAMILY MEDICINE | Facility: CLINIC | Age: 48
End: 2023-10-17
Payer: COMMERCIAL

## 2023-10-17 VITALS
HEART RATE: 88 BPM | TEMPERATURE: 97.7 F | WEIGHT: 229 LBS | HEIGHT: 63 IN | OXYGEN SATURATION: 96 % | DIASTOLIC BLOOD PRESSURE: 80 MMHG | RESPIRATION RATE: 18 BRPM | SYSTOLIC BLOOD PRESSURE: 138 MMHG | BODY MASS INDEX: 40.57 KG/M2

## 2023-10-17 DIAGNOSIS — R41.83 BORDERLINE INTELLECTUAL FUNCTIONING: ICD-10-CM

## 2023-10-17 DIAGNOSIS — J30.89 CHRONIC NON-SEASONAL ALLERGIC RHINITIS: ICD-10-CM

## 2023-10-17 DIAGNOSIS — L30.4 INTERTRIGO: ICD-10-CM

## 2023-10-17 DIAGNOSIS — K21.00 GASTROESOPHAGEAL REFLUX DISEASE WITH ESOPHAGITIS WITHOUT HEMORRHAGE: ICD-10-CM

## 2023-10-17 DIAGNOSIS — I10 BENIGN ESSENTIAL HYPERTENSION: Primary | ICD-10-CM

## 2023-10-17 PROCEDURE — 99214 OFFICE O/P EST MOD 30 MIN: CPT | Performed by: PHYSICIAN ASSISTANT

## 2023-10-17 RX ORDER — AMLODIPINE BESYLATE 5 MG/1
5 TABLET ORAL DAILY
Qty: 30 TABLET | Refills: 1 | Status: SHIPPED | OUTPATIENT
Start: 2023-10-17 | End: 2023-11-08

## 2023-10-17 RX ORDER — FEXOFENADINE HCL 180 MG/1
180 TABLET ORAL DAILY
Qty: 90 TABLET | Refills: 1 | Status: SHIPPED | OUTPATIENT
Start: 2023-10-17 | End: 2024-06-07

## 2023-10-17 RX ORDER — CLOTRIMAZOLE 1 %
CREAM (GRAM) TOPICAL 2 TIMES DAILY
Qty: 60 G | Refills: 1 | Status: SHIPPED | OUTPATIENT
Start: 2023-10-17 | End: 2023-11-16

## 2023-10-17 RX ORDER — FAMOTIDINE 20 MG/1
20 TABLET, FILM COATED ORAL 2 TIMES DAILY
Qty: 180 TABLET | Refills: 1 | Status: SHIPPED | OUTPATIENT
Start: 2023-10-17 | End: 2024-04-12

## 2023-10-17 NOTE — PROGRESS NOTES
Assessment & Plan     Benign essential hypertension  Increase amlodipine to 5 mg daily. She has not taken her blood pressure medication yet today.  - amLODIPine (NORVASC) 5 MG tablet; Take 1 tablet (5 mg) by mouth daily    Gastroesophageal reflux disease with esophagitis without hemorrhage  Refilled for patient today.  - famotidine (PEPCID) 20 MG tablet; Take 1 tablet (20 mg) by mouth 2 times daily    Chronic non-seasonal allergic rhinitis, unspecified trigger  Refilled Allegra for patient.  - fexofenadine (ALLEGRA) 180 MG tablet; Take 1 tablet (180 mg) by mouth daily    Borderline intellectual functioning  Completed form for [employer] for patient.    Intertrigo  Refilled for patient. She uses as needed for yeast infection in skin folds.  - clotrimazole (LOTRIMIN) 1 % external cream; Apply topically 2 times daily    Review of external notes as documented elsewhere in note  Prescription drug management  30 minutes spent by me on the date of the encounter doing chart review, history and exam, documentation and further activities per the note      Malika Bowen PA-C  [facility]   Petr Tolbert is a 48 year old, presenting for the following health issues:  Forms ([employer] form) and Blood Pressure Check        10/17/2023     7:44 AM   Additional Questions   Roomed by Vero   Accompanied by Self       History of Present Illness       Reason for visit:  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  She exercises with enough effort to increase her heart rate 4 days per week.   She is taking medications regularly.     Hypertension Follow-up    Do you check your blood pressure regularly outside of the clinic? Yes   Are you following a low salt diet? Yes  Are your blood pressures ever more  "than 140 on the top number (systolic) OR more   than 90 on the bottom number (diastolic), for example 140/90? 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  Physical Exam   GENERAL: healthy, alert and no distress  EYES: Eyes grossly normal to inspection, PERRL and conjunctivae and sclerae normal  HENT: ear canals and TM's normal, nose and mouth without ulcers or lesions  NECK: no adenopathy, no asymmetry, masses, or scars  RESP: lungs clear to auscultation - no rales, rhonchi or wheezes  BREAST: normal without masses, tenderness or nipple discharge and no palpable axillary masses or adenopathy  CV: regular rate and rhythm, normal S1 S2, no S3 or S4, no murmur, click or rub, no peripheral edema  ABDOMEN: soft, nontender, no hepatosplenomegaly, no masses and bowel sounds normal  MS: no gross musculoskeletal defects noted, no edema  SKIN: no suspicious lesions or rashes  NEURO: Normal strength and tone, mentation intact and speech normal  PSYCH: mentation appears normal, affect normal/bright                "

## 2023-10-17 NOTE — LETTER
October 17, 2023      Prince Patel  [id number] [id number]        To Whom It May Concern:    Prince Patel  was seen on 10/17/23.  Please excuse her if she is late this morning.        Sincerely,        Malika Bowen PA-C

## 2023-10-19 ENCOUNTER — HOSPITAL ENCOUNTER (OUTPATIENT)
Dept: RESPIRATORY THERAPY | Facility: CLINIC | Age: 48
Discharge: HOME | End: 2023-10-19
Attending: PHYSICIAN ASSISTANT
Payer: COMMERCIAL

## 2023-10-19 ENCOUNTER — LAB (OUTPATIENT)
Dept: LAB | Facility: CLINIC | Age: 48
End: 2023-10-19
Attending: PHYSICIAN ASSISTANT
Payer: COMMERCIAL

## 2023-10-19 DIAGNOSIS — R06.2 WHEEZING: ICD-10-CM

## 2023-10-19 LAB — HGB BLD-MCNC: 12.5 G/DL (ref 11.7–15.7)

## 2023-10-19 PROCEDURE — 250N000009 HC RX 250

## 2023-10-19 PROCEDURE — 94060 EVALUATION OF WHEEZING: CPT

## 2023-10-19 PROCEDURE — 36415 COLL VENOUS BLD VENIPUNCTURE: CPT | Performed by: PHYSICIAN ASSISTANT

## 2023-10-19 PROCEDURE — 94726 PLETHYSMOGRAPHY LUNG VOLUMES: CPT

## 2023-10-19 PROCEDURE — 94729 DIFFUSING CAPACITY: CPT

## 2023-10-19 PROCEDURE — 85018 HEMOGLOBIN: CPT | Performed by: PHYSICIAN ASSISTANT

## 2023-10-19 RX ORDER — ALBUTEROL SULFATE 0.83 MG/ML
SOLUTION RESPIRATORY (INHALATION)
Status: COMPLETED
Start: 2023-10-19 | End: 2023-10-19

## 2023-10-19 RX ADMIN — ALBUTEROL SULFATE 2.5 MG: 2.5 SOLUTION RESPIRATORY (INHALATION) at 16:02

## 2023-10-19 NOTE — PROGRESS NOTES
Patient completed pulmonary function testing with pre/post spirometry, lung volumes and diffusion. Good patient effort and cooperation. The results of this test met the ATS standards for acceptability and repeatability. Albuterol neb 2.5 mg given for bronchial dilation.

## 2023-10-20 LAB
DLCOCOR-%PRED-PRE: 97 %
DLCOCOR-PRE: 20.06 ML/MIN/MMHG
DLCOUNC-%PRED-PRE: 94 %
DLCOUNC-PRE: 19.48 ML/MIN/MMHG
DLCOUNC-PRED: 20.67 ML/MIN/MMHG
ERV-%PRED-PRE: 53 %
ERV-PRE: 0.62 L
ERV-PRED: 1.17 L
EXPTIME-PRE: 5.35 SEC
FEF2575-%PRED-POST: 116 %
FEF2575-%PRED-PRE: 144 %
FEF2575-POST: 3.16 L/SEC
FEF2575-PRE: 3.93 L/SEC
FEF2575-PRED: 2.72 L/SEC
FEFMAX-%PRED-PRE: 112 %
FEFMAX-PRE: 7.65 L/SEC
FEFMAX-PRED: 6.77 L/SEC
FEV1-%PRED-PRE: 106 %
FEV1-PRE: 2.78 L
FEV1FEV6-PRE: 88 %
FEV1FEV6-PRED: 83 %
FEV1FVC-PRE: 88 %
FEV1FVC-PRED: 82 %
FEV1SVC-PRE: 87 %
FEV1SVC-PRED: 77 %
FIFMAX-PRE: 3.14 L/SEC
FRCPLETH-%PRED-PRE: 73 %
FRCPLETH-PRE: 1.96 L
FRCPLETH-PRED: 2.66 L
FVC-%PRED-PRE: 99 %
FVC-PRE: 3.18 L
FVC-PRED: 3.2 L
GAW-%PRED-PRE: 52 %
GAW-PRE: 0.54 L/S/CMH2O
GAW-PRED: 1.03 L/S/CMH2O
IC-%PRED-PRE: 110 %
IC-PRE: 2.58 L
IC-PRED: 2.34 L
RVPLETH-%PRED-PRE: 80 %
RVPLETH-PRE: 1.34 L
RVPLETH-PRED: 1.66 L
SGAW-%PRED-PRE: 216 %
SGAW-PRE: 0.22 1/CMH2O*S
SGAW-PRED: 0.1 1/CMH2O*S
SRAW-%PRED-PRE: 95 %
SRAW-PRE: 4.56 CMH2O*S
SRAW-PRED: 4.76 CMH2O*S
TLCPLETH-%PRED-PRE: 93 %
TLCPLETH-PRE: 4.54 L
TLCPLETH-PRED: 4.86 L
VA-%PRED-PRE: 92 %
VA-PRE: 4.4 L
VC-%PRED-PRE: 94 %
VC-PRE: 3.2 L
VC-PRED: 3.38 L

## 2023-10-23 ENCOUNTER — TELEPHONE (OUTPATIENT)
Dept: FAMILY MEDICINE | Facility: CLINIC | Age: 48
End: 2023-10-23
Payer: COMMERCIAL

## 2023-10-23 DIAGNOSIS — R06.2 WHEEZING: Primary | ICD-10-CM

## 2023-10-23 NOTE — TELEPHONE ENCOUNTER
----- Message from Malika Bowen PA-C sent at 10/23/2023  7:29 AM CDT -----  Please call patient and let her know that her lung testing is normal and shows no change with the albuterol. It is still possible she has asthma but I would recommend seeing pulmonology to discuss further. I placed a referral and they should call her.

## 2023-10-23 NOTE — TELEPHONE ENCOUNTER
Called patient and left voicemail to call back and ask to speak to any triage nurse.    Letty FARAH RN

## 2023-10-24 DIAGNOSIS — R06.2 WHEEZING: Primary | ICD-10-CM

## 2023-10-27 ENCOUNTER — MYC REFILL (OUTPATIENT)
Dept: FAMILY MEDICINE | Facility: CLINIC | Age: 48
End: 2023-10-27
Payer: COMMERCIAL

## 2023-10-27 RX ORDER — FLUTICASONE FUROATE 200 UG/1
POWDER RESPIRATORY (INHALATION)
OUTPATIENT
Start: 2023-10-27

## 2023-10-27 NOTE — TELEPHONE ENCOUNTER
Please call patient. We have never prescribed the inhaler for her. Request should be sent to whoever filled it last.    Angel Shea PA-C on 10/27/2023 at 11:42 AM (covering for Treva López)

## 2023-10-31 ENCOUNTER — MYC MEDICAL ADVICE (OUTPATIENT)
Dept: FAMILY MEDICINE | Facility: CLINIC | Age: 48
End: 2023-10-31
Payer: COMMERCIAL

## 2023-10-31 DIAGNOSIS — L30.4 INTERTRIGO: ICD-10-CM

## 2023-11-06 NOTE — TELEPHONE ENCOUNTER
Malika Bowen, PAC     See my chart response     Vicky Acosta, Registered Nurse  [facility]

## 2023-11-07 ENCOUNTER — TELEPHONE (OUTPATIENT)
Dept: FAMILY MEDICINE | Facility: CLINIC | Age: 48
End: 2023-11-07

## 2023-11-07 ENCOUNTER — ALLIED HEALTH/NURSE VISIT (OUTPATIENT)
Dept: FAMILY MEDICINE | Facility: CLINIC | Age: 48
End: 2023-11-07
Payer: COMMERCIAL

## 2023-11-07 VITALS — HEART RATE: 95 BPM | DIASTOLIC BLOOD PRESSURE: 84 MMHG | SYSTOLIC BLOOD PRESSURE: 132 MMHG

## 2023-11-07 DIAGNOSIS — Z01.30 BP CHECK: Primary | ICD-10-CM

## 2023-11-07 PROCEDURE — 99207 PR NO CHARGE NURSE ONLY: CPT

## 2023-11-07 NOTE — PROGRESS NOTES
Prince Patel is a 48 year old patient who comes in today for a Blood Pressure check.  Initial BP:  LMP 10/31/2011      Data Unavailable  Disposition: BP elevated.  Triage RN notified, patient asked to wait

## 2023-11-07 NOTE — PROGRESS NOTES
Prince Patel is a 48 year old year old patient who comes in today for a Blood Pressure check because of medication change and ongoing blood pressure monitoring.    Vital Signs as repeated by /84 (Left arm, large cuff, sitting)   Patient is taking medication as prescribed  Patient is tolerating medications well.    Patient is monitoring Blood Pressure at home.  Average readings if yes are: 130's/70's, pt states she lost her BP cuff approx 1 week ago, was previously checking BP twice daily (AM/PM)    Current complaints: headaches- no headache present today, but occurs approx once per week, pt states headaches last entire day, takes Aleve for relief   -Pt hx of migraines, has current rx for rizatriptan (MAXALT) but pt reports she hasn't had to use it yet     Disposition:    -Purchase new BP cuff at any pharmacy, resume checking BP daily   -Scheduled pt for follow-up OV with Ivelisse Fischer PA-C 2/5/24 at 12:40pm, establish care as new PCP  -Advised pt to call and speak with a triage nurse if BP readings continue to be elevated at home or if she begins to experience new or worsening symptoms   -Informed member of care team will reach out if further recommendations are given by Malika Bowen PA-C     Patient was given an opportunity to ask questions, verbalized understanding of plan, and is agreeable.    Routing to Malika Bowen PA-C to review and advise     Lily VARGAS RN  Patient Advocate Liaison - [facility]  [phone]

## 2023-11-08 DIAGNOSIS — I10 BENIGN ESSENTIAL HYPERTENSION: ICD-10-CM

## 2023-11-08 RX ORDER — AMLODIPINE BESYLATE 5 MG/1
5 TABLET ORAL DAILY
Qty: 90 TABLET | Refills: 1 | Status: SHIPPED | OUTPATIENT
Start: 2023-11-08 | End: 2024-05-01

## 2023-11-13 NOTE — TELEPHONE ENCOUNTER
Malika Bowen, PAC   Please see my chart update BP     Thank you   Vicky Acosta, Registered Nurse  [facility]

## 2023-11-15 NOTE — TELEPHONE ENCOUNTER
See pt's [facility] message.     -Replied via [facility].     Mirta CAMERON RN   PAL (Patient Advocate Liaison)  [facility]

## 2023-11-16 RX ORDER — CLOTRIMAZOLE 1 %
CREAM (GRAM) TOPICAL 2 TIMES DAILY
Qty: 60 G | Refills: 1 | Status: SHIPPED | OUTPATIENT
Start: 2023-11-16

## 2023-11-27 ENCOUNTER — OFFICE VISIT (OUTPATIENT)
Dept: PULMONOLOGY | Facility: CLINIC | Age: 48
End: 2023-11-27
Payer: COMMERCIAL

## 2023-11-27 VITALS
OXYGEN SATURATION: 100 % | DIASTOLIC BLOOD PRESSURE: 94 MMHG | BODY MASS INDEX: 41.38 KG/M2 | HEART RATE: 109 BPM | WEIGHT: 233.6 LBS | SYSTOLIC BLOOD PRESSURE: 142 MMHG

## 2023-11-27 DIAGNOSIS — J45.40 MODERATE PERSISTENT ASTHMA WITHOUT COMPLICATION: Primary | ICD-10-CM

## 2023-11-27 DIAGNOSIS — Z91.09 ENVIRONMENTAL ALLERGIES: ICD-10-CM

## 2023-11-27 PROCEDURE — 99204 OFFICE O/P NEW MOD 45 MIN: CPT | Performed by: NURSE PRACTITIONER

## 2023-11-27 RX ORDER — BUDESONIDE AND FORMOTEROL FUMARATE DIHYDRATE 80; 4.5 UG/1; UG/1
2 AEROSOL RESPIRATORY (INHALATION) 2 TIMES DAILY
Qty: 10.2 G | Refills: 11 | Status: SHIPPED | OUTPATIENT
Start: 2023-11-27 | End: 2024-06-03

## 2023-11-27 RX ORDER — FLUTICASONE FUROATE 200 UG/1
1 POWDER RESPIRATORY (INHALATION) DAILY
Qty: 30 EACH | Refills: 11 | Status: CANCELLED | OUTPATIENT
Start: 2023-11-27

## 2023-11-27 RX ORDER — ALBUTEROL SULFATE 90 UG/1
2 AEROSOL, METERED RESPIRATORY (INHALATION) EVERY 6 HOURS
Qty: 18 G | Refills: 11 | Status: SHIPPED | OUTPATIENT
Start: 2023-11-27 | End: 2024-06-03

## 2023-11-27 ASSESSMENT — ASTHMA QUESTIONNAIRES: ACT_TOTALSCORE: 21

## 2023-11-27 NOTE — PROGRESS NOTES
Pulmonary Clinic Consultation          Assessment/Plan:     48 year old female with a history of allergic rhinitis, GERD, presenting for evaluation of wheezing.      Moderate persistent asthma  Environmental allergies  Lifelong non-smoker presents for evaluation of wheezing.  She notices this occasionally, when she wakes and when she is exercising.  She has known hx of allergies to cats, ragweed, and dust mites.  She also has hx of GERD which is currently controlled with famotidine.  She uses Albuterol for wheezing, with benefit.  She did try Arnuity Ellipta, but only used this as needed for one week.  ACT score 21 today.  Plan:  - start Symbicort (budesonide/formoterol) 80/4.5, two puffs BID, rinse/gargle after use.  - continue Albuterol inhaler PRN, sent new script today.  - continue azelastine and fluticasone nasal sprays  - continue fexofenadine 180mg daily  - continue famotidine 20mg BID  - she is UTD with COVID vaccine and booster, and annual influenza vaccine.  - Action plan: prednisone 40mg x5 days    Follow-up  - one month    Merly Kelly CNP  Pulmonary Medicine  [facility]  [phone]       CC:     Wheezing evaluation     HPI:     48 year old female with a history of allergic rhinitis, GERD, presenting for evaluation of wheezing.      She reports main symptoms is wheezing in morning, also with sports.  She participates in softball and bowling.  Prescribed Albuterol few months.  Does find benefit from this.  Prescribed Arnuity Ellipta few months ago, took as needed. Only tried for one week.  Shortness of breath with running during softball.  Did okay walking down our long hallway.  Does have sharp pains in chest when bending over.  Known Allergies to cats, ragweed, dust mites.  Does have acid reflux, controlled currently with pepcid.  No nighttime awakenings.    Medical records reviewed for this visit include primary care provider notes.        11/27/2023     9:40  AM   ACT Total Scores   ACT TOTAL SCORE (Goal Greater than or Equal to 20) 21   In the past 12 months, how many times did you visit the emergency room for your asthma without being admitted to the hospital? 0   In the past 12 months, how many times were you hospitalized overnight because of your asthma? 0          ROS:     A 12-system review was obtained and was negative with the exception of the symptoms endorsed in the HPI.       Medical history:       PMH:  Past Medical History:   Diagnosis Date    Allergic rhinitis, cause unspecified     Allergic rhinitis and blueberries    Congestive heart failure (H)     Intertrigo 11/17/2014    Migraine with aura and without status migrainosus, not intractable 7/18/2016    Migraine, unspecified, without mention of intractable migraine without mention of status migrainosus     Migraine    NONSPECIFIC MEDICAL HISTORY     learning disability, mild MR, ADD?    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)    Other acne     Sprain and strain of unspecified site of knee and leg 1/24/2008       PSH:  Past Surgical History:   Procedure Laterality Date    APPENDECTOMY      CARDIAC SURGERY      COLONOSCOPY N/A 8/3/2018    Procedure: COLONOSCOPY;  colonoscopy;  Surgeon: Isaias Medina MD;  Location:  GI    LAPAROSCOPIC HYSTERECTOMY SUPRACERVICAL  11/16/2011    Procedure:LAPAROSCOPIC HYSTERECTOMY SUPRACERVICAL; LAPAROSCOPIC HYSTERECTOMY SUPRACERVICAL ; Surgeon:MASOUD WHITE; Location: OR    SURGICAL HISTORY OF -       ingrown toenails removed    toenail removal         Allergies:  Allergies   Allergen Reactions    Cats     Dust Mites     Keflex [Cephalexin] Rash    Ragweeds     Shellfish-Derived Products Other (See Comments)       Family Hx:  Family History   Problem Relation Age of Onset    Obesity Mother     C.A.D. Father         MI, angioplasty, 50s    Diabetes Father         Diabetes    Hypertension Father     Obesity Father      Diabetes Maternal Grandmother         Multiple Sclerosis    Neurologic Disorder Paternal Grandmother     Gastrointestinal Disease Paternal Grandmother         Gluten Intolerance     Diabetes Maternal Aunt        Social Hx:  Social History     Socioeconomic History    Marital status: Single     Spouse name: Not on file    Number of children: 0    Years of education: Not on file    Highest education level: Not on file   Occupational History    Occupation: cleaning     Comment: archiver's   Tobacco Use    Smoking status: Never    Smokeless tobacco: Never   Vaping Use    Vaping Use: Never used   Substance and Sexual Activity    Alcohol use: No    Drug use: No    Sexual activity: Never     Partners: Male   Other Topics Concern     Service Not Asked    Blood Transfusions Not Asked    Caffeine Concern Yes     Comment: 1 pop daily    Occupational Exposure Not Asked    Hobby Hazards Not Asked    Sleep Concern Not Asked    Stress Concern Not Asked    Weight Concern Not Asked    Special Diet No     Comment: calcium daily    Back Care Not Asked    Exercise Yes     Comment: rollerblade, bike    Bike Helmet Not Asked    Seat Belt Yes    Self-Exams Yes    Parent/sibling w/ CABG, MI or angioplasty before 65F 55M? 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[employer], local [employer]     Social Determinants of Health     Financial Resource Strain: Low Risk  (9/20/2023)    Financial Resource Strain     Within the past 12 months, have you or your family members you live with been unable to get utilities (heat, electricity) when it was really needed?: No   Food Insecurity: Low Risk  (9/20/2023)    Food Insecurity     Within the past 12 months, did you worry that your food would run out before you got money to buy more?: No     Within the past 12 months, did the food you bought just not last and you didn t have money to get more?: No    Transportation Needs: Low Risk  (9/20/2023)    Transportation Needs     Within the past 12 months, has lack of transportation kept you from medical appointments, getting your medicines, non-medical meetings or appointments, work, or from getting things that you need?: No   Physical Activity: Insufficiently Active (5/30/2023)    Exercise Vital Sign     Days of Exercise per Week: 4 days     Minutes of Exercise per Session: 20 min   Stress: No Stress Concern Present (5/30/2023)    [employer] Occupational [employer] - Occupational Stress Questionnaire     Feeling of Stress : Not at all   Social Connections: Moderately Integrated (5/30/2023)    Social Connection and Isolation Panel [NHANES]     Frequency of Communication with Friends and Family: More than three times a week     Frequency of Social Gatherings with Friends and Family: Once a week     Attends Hinduism Services: More than 4 times per year     Active Member of Clubs or Organizations: Yes     Attends Club or Organization Meetings: Not on file     Marital Status: Never    Interpersonal Safety: Low Risk  (9/20/2023)    Interpersonal Safety     Do you feel physically and emotionally safe where you currently live?: Yes     Within the past 12 months, have you been hit, slapped, kicked or otherwise physically hurt by someone?: No     Within the past 12 months, have you been humiliated or emotionally abused in other ways by your partner or ex-partner?: No   Housing Stability: Low Risk  (9/20/2023)    Housing Stability     Do you have housing? : Yes     Are you worried about losing your housing?: No       Current Meds:  Current Outpatient Medications   Medication Sig Dispense Refill    albuterol (PROAIR HFA/PROVENTIL HFA/VENTOLIN HFA) 108 (90 Base) MCG/ACT inhaler Inhale 2 puffs into the lungs every 6 hours 18 g 4    amLODIPine (NORVASC) 5 MG tablet TAKE 1 TABLET BY MOUTH EVERY DAY 90 tablet 1    ARNUITY ELLIPTA 200 MCG/ACT inhaler       azelastine  (ASTELIN) 0.1 % nasal spray Spray 1 spray into both nostrils 2 times daily 30 mL 11    clotrimazole (LOTRIMIN) 1 % external cream Apply topically 2 times daily 60 g 1    diclofenac (VOLTAREN) 1 % topical gel APPLY 2 G TOPICALLY 4 TIMES DAILY AS NEEDED FOR MODERATE PAIN (4-6) 200 g 1    famotidine (PEPCID) 20 MG tablet Take 1 tablet (20 mg) by mouth 2 times daily 180 tablet 1    fexofenadine (ALLEGRA) 180 MG tablet Take 1 tablet (180 mg) by mouth daily 90 tablet 1    fluticasone (FLONASE) 50 MCG/ACT nasal spray Spray 2 sprays into both nostrils daily 48 mL 4    MULTIVITAMINS OR TABS 1 tab qd as directed  0    rizatriptan (MAXALT) 10 MG tablet 1 TAB ORAL AT ONSET OF MIGRAINE. MAY REPEAT IN 2 HR. MAX 3 TAB/24 HR 18 tablet 1          Physical Exam:     BP (!) 142/94 (BP Location: Left arm, Patient Position: Sitting, Cuff Size: Adult Large)   Pulse 109   Wt 106 kg (233 lb 9.6 oz)   LMP 10/31/2011   SpO2 100%   BMI 41.38 kg/m    Gen: adult female , appears in NAD  HEENT: clear conjunctivae, moist mucous membranes  CV: RRR, no M/G/R  Resp: CTAB, no focal crackles or wheezes.  Respirations even and unlabored.  On RA.   Skin: no apparent rashes on visible skin  Ext: no cyanosis, clubbing or edema  Neuro: alert and answering questions appropriately       Data:     Labs:  reviewed    Imaging studies:  I have personally reviewed all pertinent imaging studies and PFT results unless otherwise noted.    Chest Xray 8/14/23:  Impression  Heart is normal in size. Lungs are clear.    Pulmonary Function Testing      The FVC, FEV1, and FEV1/FVC ratio are within normal limits. Lung volumes are within normal limits.  Following administration of bronchodilators, there is no significant response.  The diffusing capacity is normal.   IMPRESSION:   Normal spirometry, lung volumes and diffusing capacity.   No significant change following bronchodilators.

## 2023-11-27 NOTE — PATIENT INSTRUCTIONS
It was a pleasure to see you in clinic today.   Here is what we discussed:    Your pulmonary function testing was normal.  Start Symbicort two puffs twice daily, rinse/gargle after use.  Continue Albuterol every 4-6 hours as needed for shortness of breath or wheezing.  Continue your allergy regimen:  Allegra, Azelastine, Flonase.  Call my nurse, Von ([phone]) with any change or worsening of your breathing.  Follow-up in one month.    Merly Kelly, CNP  Pulmonary Medicine  [facility]  [phone]

## 2023-12-01 ENCOUNTER — TELEPHONE (OUTPATIENT)
Dept: PULMONOLOGY | Facility: CLINIC | Age: 48
End: 2023-12-01
Payer: COMMERCIAL

## 2023-12-01 NOTE — TELEPHONE ENCOUNTER
Anita call from patient. States she has not been able to obtain the Symbicort that was prescribed on 11/27 office visit.  Pharmacy says they are out of stock and do not know when they will be getting this in.     I was able to contact patient's pharmacy and they did receive this in today.  They will get it ready for patient to .

## 2023-12-11 DIAGNOSIS — G43.109 MIGRAINE WITH AURA AND WITHOUT STATUS MIGRAINOSUS, NOT INTRACTABLE: ICD-10-CM

## 2023-12-11 RX ORDER — RIZATRIPTAN BENZOATE 10 MG/1
TABLET ORAL
Qty: 18 TABLET | Refills: 0 | Status: SHIPPED | OUTPATIENT
Start: 2023-12-11 | End: 2024-01-29

## 2023-12-12 ENCOUNTER — TRANSFERRED RECORDS (OUTPATIENT)
Dept: HEALTH INFORMATION MANAGEMENT | Facility: CLINIC | Age: 48
End: 2023-12-12
Payer: COMMERCIAL

## 2023-12-27 ENCOUNTER — OFFICE VISIT (OUTPATIENT)
Dept: PULMONOLOGY | Facility: CLINIC | Age: 48
End: 2023-12-27
Attending: NURSE PRACTITIONER
Payer: COMMERCIAL

## 2023-12-27 VITALS
DIASTOLIC BLOOD PRESSURE: 84 MMHG | BODY MASS INDEX: 41.77 KG/M2 | OXYGEN SATURATION: 97 % | WEIGHT: 235.8 LBS | SYSTOLIC BLOOD PRESSURE: 134 MMHG | HEART RATE: 94 BPM

## 2023-12-27 DIAGNOSIS — Z91.09 ENVIRONMENTAL ALLERGIES: ICD-10-CM

## 2023-12-27 DIAGNOSIS — J45.40 MODERATE PERSISTENT ASTHMA WITHOUT COMPLICATION: Primary | ICD-10-CM

## 2023-12-27 PROCEDURE — 99213 OFFICE O/P EST LOW 20 MIN: CPT | Performed by: NURSE PRACTITIONER

## 2023-12-27 ASSESSMENT — ASTHMA QUESTIONNAIRES: ACT_TOTALSCORE: 23

## 2023-12-27 NOTE — PATIENT INSTRUCTIONS
It was a pleasure to see you in clinic today.   Here is what we discussed:    Continue Symbicort two puffs twice daily, rinse/gargle after use.  Continue Albuterol every 4-6 hours as needed for shortness of breath or wheezing.  Continue allergy medications:  azelastine, fluticasone, fexofenadine.  Call my nurse, Von ([phone]) with any change or worsening of your breathing.  Follow-up in 6 months.    Merly Kelly CNP  Pulmonary Medicine  [facility]  [phone]

## 2023-12-27 NOTE — PROGRESS NOTES
Pulmonary Clinic Follow-up          Assessment/Plan:     48 year old female with a history of allergic rhinitis, GERD, presenting for one month asthma follow-up.     Moderate persistent asthma  Environmental allergies  Lifelong non-smoker presented last visit for evaluation of wheezing.  She notices this occasionally, when she wakes and when she is exercising.  She has known hx of allergies to cats, ragweed, and dust mites.  She also has hx of GERD which is currently controlled with famotidine.  Last visit she was initiated on Symbicort BID and encouraged to continue allergy regimen.  She reports improvement in wheezing since starting Symbicort.  She is able to exercise more, without issue.  Plan:  - continue Symbicort (budesonide/formoterol) 80/4.5, two puffs BID, rinse/gargle after use.  - continue Albuterol inhaler PRN.  - continue azelastine and fluticasone nasal sprays  - continue fexofenadine 180mg daily  - continue famotidine 20mg BID  - she is UTD with COVID vaccine and booster, and annual influenza vaccine.  - Action plan: prednisone 40mg x5 days    Follow-up  - 6 months    Merly Kelly CNP  Pulmonary Medicine  [facility]  [phone]       CC:     Asthma follow-up     HPI:     48 year old female with a history of allergic rhinitis, GERD, presenting for one month asthma follow-up.     Since last visit one month ago, breathing is improved with symbicort.  Not wheezing as much.  Able to exercise more without issue.  Allergies are controlled, no issue.    Previous HPI:  She reports main symptoms is wheezing in morning, also with sports.  She participates in softball and bowling.  Prescribed Albuterol few months.  Does find benefit from this.  Prescribed Arnuity Ellipta few months ago, took as needed. Only tried for one week.  Shortness of breath with running during softball.  Did okay walking down our long hallway.  Does have sharp pains in chest when bending  over.  Known Allergies to cats, ragweed, dust mites.  Does have acid reflux, controlled currently with pepcid.  No nighttime awakenings.          11/27/2023     9:40 AM 12/27/2023     2:07 PM   ACT Total Scores   ACT TOTAL SCORE (Goal Greater than or Equal to 20) 21 23   In the past 12 months, how many times did you visit the emergency room for your asthma without being admitted to the hospital? 0 0   In the past 12 months, how many times were you hospitalized overnight because of your asthma? 0 0          ROS:     A 6-system review was obtained and was negative with the exception of the symptoms endorsed in the HPI.       Medical history:       PMH:  Past Medical History:   Diagnosis Date    Allergic rhinitis, cause unspecified     Allergic rhinitis and blueberries    Congestive heart failure (H)     Intertrigo 11/17/2014    Migraine with aura and without status migrainosus, not intractable 7/18/2016    Migraine, unspecified, without mention of intractable migraine without mention of status migrainosus     Migraine    NONSPECIFIC MEDICAL HISTORY     learning disability, mild MR, ADD?    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)    Other acne     Sprain and strain of unspecified site of knee and leg 1/24/2008       PSH:  Past Surgical History:   Procedure Laterality Date    APPENDECTOMY      CARDIAC SURGERY      COLONOSCOPY N/A 8/3/2018    Procedure: COLONOSCOPY;  colonoscopy;  Surgeon: Isaias Medina MD;  Location:  GI    LAPAROSCOPIC HYSTERECTOMY SUPRACERVICAL  11/16/2011    Procedure:LAPAROSCOPIC HYSTERECTOMY SUPRACERVICAL; LAPAROSCOPIC HYSTERECTOMY SUPRACERVICAL ; Surgeon:MASOUD WHITE; Location: OR    SURGICAL HISTORY OF -       ingrown toenails removed    toenail removal         Allergies:  Allergies   Allergen Reactions    Cats     Dust Mites     Keflex [Cephalexin] Rash    Ragweeds     Shellfish-Derived Products Other (See Comments)       Family  Hx:  Family History   Problem Relation Age of Onset    Obesity Mother     C.A.D. Father         MI, angioplasty, 50s    Diabetes Father         Diabetes    Hypertension Father     Obesity Father     Diabetes Maternal Grandmother         Multiple Sclerosis    Neurologic Disorder Paternal Grandmother     Gastrointestinal Disease Paternal Grandmother         Gluten Intolerance     Diabetes Maternal Aunt        Social Hx:  Social History     Socioeconomic History    Marital status: Single     Spouse name: Not on file    Number of children: 0    Years of education: Not on file    Highest education level: Not on file   Occupational History    Occupation: cleaning     Comment: archiver's   Tobacco Use    Smoking status: Never    Smokeless tobacco: Never   Vaping Use    Vaping Use: Never used   Substance and Sexual Activity    Alcohol use: No    Drug use: No    Sexual activity: Never     Partners: Male   Other Topics Concern     Service Not Asked    Blood Transfusions Not Asked    Caffeine Concern Yes     Comment: 1 pop daily    Occupational Exposure Not Asked    Hobby Hazards Not Asked    Sleep Concern Not Asked    Stress Concern Not Asked    Weight Concern Not Asked    Special Diet No     Comment: calcium daily    Back Care Not Asked    Exercise Yes     Comment: rollerblade, bike    Bike Helmet Not Asked    Seat Belt Yes    Self-Exams Yes    Parent/sibling w/ CABG, MI or angioplasty before 65F 55M? 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olympics, local tournaments     Social Determinants of Health     Financial Resource Strain: Low Risk  (9/20/2023)    Financial Resource Strain     Within the past 12 months, have you or your family members you live with been unable to get utilities (heat, electricity) when it was really needed?: No   Food Insecurity: Low Risk  (9/20/2023)    Food Insecurity     Within the past 12 months, did  you worry that your food would run out before you got money to buy more?: No     Within the past 12 months, did the food you bought just not last and you didn t have money to get more?: No   Transportation Needs: Low Risk  (9/20/2023)    Transportation Needs     Within the past 12 months, has lack of transportation kept you from medical appointments, getting your medicines, non-medical meetings or appointments, work, or from getting things that you need?: No   Physical Activity: Insufficiently Active (5/30/2023)    Exercise Vital Sign     Days of Exercise per Week: 4 days     Minutes of Exercise per Session: 20 min   Stress: No Stress Concern Present (5/30/2023)    [employer] [place] [employer] - Occupational Stress Questionnaire     Feeling of Stress : Not at all   Social Connections: Moderately Integrated (5/30/2023)    Social Connection and Isolation Panel [NHANES]     Frequency of Communication with Friends and Family: More than three times a week     Frequency of Social Gatherings with Friends and Family: Once a week     Attends Gnosticist Services: More than 4 times per year     Active Member of Clubs or Organizations: Yes     Attends Club or Organization Meetings: Not on file     Marital Status: Never    Interpersonal Safety: Low Risk  (9/20/2023)    Interpersonal Safety     Do you feel physically and emotionally safe where you currently live?: Yes     Within the past 12 months, have you been hit, slapped, kicked or otherwise physically hurt by someone?: No     Within the past 12 months, have you been humiliated or emotionally abused in other ways by your partner or ex-partner?: No   Housing Stability: Low Risk  (9/20/2023)    Housing Stability     Do you have housing? : Yes     Are you worried about losing your housing?: No       Current Meds:  Current Outpatient Medications   Medication Sig Dispense Refill    albuterol (PROAIR HFA/PROVENTIL HFA/VENTOLIN HFA) 108 (90 Base) MCG/ACT inhaler  Inhale 2 puffs into the lungs every 6 hours 18 g 11    amLODIPine (NORVASC) 5 MG tablet TAKE 1 TABLET BY MOUTH EVERY DAY 90 tablet 1    azelastine (ASTELIN) 0.1 % nasal spray Spray 1 spray into both nostrils 2 times daily 30 mL 11    budesonide-formoterol (SYMBICORT) 80-4.5 MCG/ACT Inhaler Inhale 2 puffs into the lungs 2 times daily 10.2 g 11    clotrimazole (LOTRIMIN) 1 % external cream Apply topically 2 times daily 60 g 1    famotidine (PEPCID) 20 MG tablet Take 1 tablet (20 mg) by mouth 2 times daily 180 tablet 1    fexofenadine (ALLEGRA) 180 MG tablet Take 1 tablet (180 mg) by mouth daily 90 tablet 1    fluticasone (FLONASE) 50 MCG/ACT nasal spray Spray 2 sprays into both nostrils daily 48 mL 4    MULTIVITAMINS OR TABS 1 tab qd as directed  0    rizatriptan (MAXALT) 10 MG tablet 1 TAB ORAL AT ONSET OF MIGRAINE. MAY REPEAT IN 2 HR. MAX 3 TAB/24 HR 18 tablet 0          Physical Exam:     /84 (BP Location: Left arm, Patient Position: Sitting, Cuff Size: Adult Large)   Pulse 94   Wt 107 kg (235 lb 12.8 oz)   LMP 10/31/2011   SpO2 97%   BMI 41.77 kg/m    Gen: adult female , appears in NAD  HEENT: clear conjunctivae, moist mucous membranes  CV: RRR, no M/G/R  Resp: CTAB, no focal crackles or wheezes.  Respirations even and unlabored.  On RA.  No cough.  Skin: no apparent rashes on visible skin  Ext: no cyanosis, clubbing or edema  Neuro: alert and answering questions appropriately       Data:     Labs:  reviewed    Imaging studies:  I have personally reviewed all pertinent imaging studies and PFT results unless otherwise noted.    Chest Xray 8/14/23:  Impression  Heart is normal in size. Lungs are clear.    Pulmonary Function Testing      The FVC, FEV1, and FEV1/FVC ratio are within normal limits. Lung volumes are within normal limits.  Following administration of bronchodilators, there is no significant response.  The diffusing capacity is normal.   IMPRESSION:   Normal spirometry, lung volumes and  diffusing capacity.   No significant change following bronchodilators.

## 2024-01-08 ENCOUNTER — OFFICE VISIT (OUTPATIENT)
Dept: FAMILY MEDICINE | Facility: CLINIC | Age: 49
End: 2024-01-08
Payer: COMMERCIAL

## 2024-01-08 VITALS
DIASTOLIC BLOOD PRESSURE: 96 MMHG | TEMPERATURE: 97.9 F | WEIGHT: 233 LBS | SYSTOLIC BLOOD PRESSURE: 137 MMHG | RESPIRATION RATE: 16 BRPM | HEIGHT: 63 IN | HEART RATE: 90 BPM | BODY MASS INDEX: 41.29 KG/M2 | OXYGEN SATURATION: 96 %

## 2024-01-08 DIAGNOSIS — H10.33 ACUTE CONJUNCTIVITIS OF BOTH EYES, UNSPECIFIED ACUTE CONJUNCTIVITIS TYPE: ICD-10-CM

## 2024-01-08 DIAGNOSIS — L08.9 LOCAL INFECTION OF SKIN AND SUBCUTANEOUS TISSUE: Primary | ICD-10-CM

## 2024-01-08 DIAGNOSIS — T63.304A: ICD-10-CM

## 2024-01-08 PROCEDURE — 99214 OFFICE O/P EST MOD 30 MIN: CPT | Performed by: FAMILY MEDICINE

## 2024-01-08 RX ORDER — PREDNISONE 20 MG/1
40 TABLET ORAL DAILY
Qty: 10 TABLET | Refills: 0 | Status: SHIPPED | OUTPATIENT
Start: 2024-01-08 | End: 2024-01-13

## 2024-01-08 RX ORDER — CIPROFLOXACIN HYDROCHLORIDE 3.5 MG/ML
1 SOLUTION/ DROPS TOPICAL EVERY 4 HOURS
Qty: 10 ML | Refills: 0 | Status: SHIPPED | OUTPATIENT
Start: 2024-01-08 | End: 2024-01-13

## 2024-01-08 RX ORDER — SULFAMETHOXAZOLE/TRIMETHOPRIM 800-160 MG
1 TABLET ORAL 2 TIMES DAILY
Qty: 14 TABLET | Refills: 0 | Status: SHIPPED | OUTPATIENT
Start: 2024-01-08 | End: 2024-01-15

## 2024-01-08 ASSESSMENT — ENCOUNTER SYMPTOMS: EYE PAIN: 1

## 2024-01-08 NOTE — PROGRESS NOTES
Assessment & Plan     Local infection of skin and subcutaneous tissue  Patient unsure started yesterday .  Few bumps on the side of chin .    Erythema around the eye and right side of face .  Will start bactrim likely Cellulitis   Discussed ER visit if symptoms get any worst .  2 Day follow up scheduled .     - sulfamethoxazole-trimethoprim (BACTRIM DS) 800-160 MG tablet; Take 1 tablet by mouth 2 times daily for 7 days    Acute conjunctivitis of both eyes, unspecified acute conjunctivitis type  - ciprofloxacin (CILOXAN) 0.3 % ophthalmic solution; Place 1 drop into both eyes every 4 hours for 5 days    Allergic reaction to spider bite, undetermined intent, initial encounter    - predniSONE (DELTASONE) 20 MG tablet; Take 2 tablets (40 mg) by mouth daily for 5 days    Recommend to contact with any new or worsening symptoms .        Claudia Florian MD  [facility]   Petr Tolbert is a 48 year old, presenting for the following health issues:  Eye Problem (Bilateral eye irritation since last night)        1/8/2024     4:52 PM   Additional Questions   Roomed by Opal Veras       History of Present Illness       Reason for visit:  Yesterday  Symptoms include:  Rash around right eye and bumps on chin/neck area  Symptom intensity:  Mild  Symptom progression:  Staying the same  Had these symptoms before:  No  What makes it worse:  No  What makes it better:  Havent tried anything yet    She eats 2-3 servings of fruits and vegetables daily.She consumes 1 sweetened beverage(s) daily.She exercises with enough effort to increase her heart rate 10 to 19 minutes per day.  She exercises with enough effort to increase her heart rate 4 days per week.   She is taking medications regularly.         Review of Systems   Eyes:  Positive for pain.   Skin:  Positive for rash.        Around the right eye .   Right side of face erythema   Warm to touch .  Spider bite on the side of the face .            Objective  "   BP (!) 137/96 (BP Location: Right arm, Patient Position: Chair, Cuff Size: Adult Large)   Pulse 90   Temp 97.9  F (36.6  C) (Oral)   Resp 16   Ht 1.6 m (5' 3\")   Wt 105.7 kg (233 lb)   LMP 10/31/2011   SpO2 96%   BMI 41.27 kg/m    Body mass index is 41.27 kg/m .  Physical Exam  Cardiovascular:      Rate and Rhythm: Normal rate.   Pulmonary:      Effort: Pulmonary effort is normal.   Abdominal:      General: Abdomen is flat.   Skin:     General: Skin is warm.      Findings: Erythema, lesion and rash present.      Comments: Rigth side 3 bumps .  Erythema right side of the face .   Warm to touch .     Conjunctiva erythema .   Neurological:      General: No focal deficit present.                  "

## 2024-01-08 NOTE — LETTER
January 8, 2024      Prince Patel  [id number] [id number]        To Whom It May Concern:    Prince Patel was seen in our clinic.   Please allow patient to be off work on 1/09/2024       Sincerely,        Claudia Florian MD

## 2024-01-09 ENCOUNTER — MYC MEDICAL ADVICE (OUTPATIENT)
Dept: FAMILY MEDICINE | Facility: CLINIC | Age: 49
End: 2024-01-09
Payer: COMMERCIAL

## 2024-01-10 NOTE — TELEPHONE ENCOUNTER
LMTCB concerning rash around eye. Possible cellulitis.    Also sent mychart response.    Trinidad Townsend RN

## 2024-01-12 ENCOUNTER — ANCILLARY PROCEDURE (OUTPATIENT)
Dept: MAMMOGRAPHY | Facility: CLINIC | Age: 49
End: 2024-01-12
Attending: NURSE PRACTITIONER
Payer: COMMERCIAL

## 2024-01-12 ENCOUNTER — OFFICE VISIT (OUTPATIENT)
Dept: FAMILY MEDICINE | Facility: CLINIC | Age: 49
End: 2024-01-12
Payer: COMMERCIAL

## 2024-01-12 VITALS
SYSTOLIC BLOOD PRESSURE: 130 MMHG | BODY MASS INDEX: 41.46 KG/M2 | HEART RATE: 88 BPM | TEMPERATURE: 98.4 F | RESPIRATION RATE: 18 BRPM | DIASTOLIC BLOOD PRESSURE: 88 MMHG | WEIGHT: 234 LBS | OXYGEN SATURATION: 98 % | HEIGHT: 63 IN

## 2024-01-12 DIAGNOSIS — Z12.31 VISIT FOR SCREENING MAMMOGRAM: ICD-10-CM

## 2024-01-12 DIAGNOSIS — L03.90 CELLULITIS, UNSPECIFIED CELLULITIS SITE: Primary | ICD-10-CM

## 2024-01-12 PROCEDURE — 77063 BREAST TOMOSYNTHESIS BI: CPT | Mod: TC | Performed by: STUDENT IN AN ORGANIZED HEALTH CARE EDUCATION/TRAINING PROGRAM

## 2024-01-12 PROCEDURE — 99213 OFFICE O/P EST LOW 20 MIN: CPT | Performed by: FAMILY MEDICINE

## 2024-01-12 PROCEDURE — 77067 SCR MAMMO BI INCL CAD: CPT | Mod: TC | Performed by: STUDENT IN AN ORGANIZED HEALTH CARE EDUCATION/TRAINING PROGRAM

## 2024-01-12 ASSESSMENT — PAIN SCALES - GENERAL: PAINLEVEL: NO PAIN (0)

## 2024-01-12 NOTE — PROGRESS NOTES
"  Assessment & Plan     Cellulitis, unspecified cellulitis site  Face look much better   Erythema improved .  Recommend to complete antibiotics .       BMI:   Estimated body mass index is 41.45 kg/m  as calculated from the following:    Height as of this encounter: 1.6 m (5' 3\").    Weight as of this encounter: 106.1 kg (234 lb).   Weight management plan: Discussed healthy diet and exercise guidelines        Claudia Florian MD  [facility]    Subjective   Petr Tolbert is a 48 year old, presenting for the following health issues:  Follow Up (Recheck left eye pink eye and bite marks on her right back. )        1/12/2024     2:47 PM   Additional Questions   Roomed by Lin Diaz CMA   Accompanied by Support Staff- Kathryn       Face improving with antibiotics .         Review of Systems   HENT:  Negative for congestion.    Musculoskeletal:  Negative for arthralgias and back pain.   Skin:  Positive for color change.   Neurological:  Negative for headaches.   Psychiatric/Behavioral:  Negative for agitation.             Objective    /88 (BP Location: Right arm, Patient Position: Sitting, Cuff Size: Adult Large)   Pulse 88   Temp 98.4  F (36.9  C) (Oral)   Resp 18   Ht 1.6 m (5' 3\")   Wt 106.1 kg (234 lb)   LMP 10/31/2011   SpO2 98%   BMI 41.45 kg/m    Body mass index is 41.45 kg/m .  Physical Exam  Skin:     General: Skin is warm.      Findings: Erythema and rash present.      Comments: Much improved since last time .   Neurological:      General: No focal deficit present.   Psychiatric:         Mood and Affect: Mood normal.                      "

## 2024-01-15 ASSESSMENT — ENCOUNTER SYMPTOMS
AGITATION: 0
COLOR CHANGE: 1
HEADACHES: 0
ARTHRALGIAS: 0
BACK PAIN: 0

## 2024-01-27 DIAGNOSIS — G43.109 MIGRAINE WITH AURA AND WITHOUT STATUS MIGRAINOSUS, NOT INTRACTABLE: ICD-10-CM

## 2024-01-29 RX ORDER — RIZATRIPTAN BENZOATE 10 MG/1
TABLET ORAL
Qty: 18 TABLET | Refills: 0 | Status: SHIPPED | OUTPATIENT
Start: 2024-01-29 | End: 2024-03-18

## 2024-03-13 ENCOUNTER — OFFICE VISIT (OUTPATIENT)
Dept: FAMILY MEDICINE | Facility: CLINIC | Age: 49
End: 2024-03-13
Payer: COMMERCIAL

## 2024-03-13 VITALS
BODY MASS INDEX: 41.29 KG/M2 | HEIGHT: 63 IN | OXYGEN SATURATION: 100 % | RESPIRATION RATE: 18 BRPM | TEMPERATURE: 97.7 F | DIASTOLIC BLOOD PRESSURE: 84 MMHG | HEART RATE: 96 BPM | SYSTOLIC BLOOD PRESSURE: 138 MMHG | WEIGHT: 233 LBS

## 2024-03-13 DIAGNOSIS — J30.81 ALLERGY TO CATS: ICD-10-CM

## 2024-03-13 DIAGNOSIS — J30.2 SEASONAL ALLERGIC RHINITIS, UNSPECIFIED TRIGGER: ICD-10-CM

## 2024-03-13 DIAGNOSIS — Z13.220 SCREENING FOR HYPERLIPIDEMIA: ICD-10-CM

## 2024-03-13 DIAGNOSIS — Z13.1 SCREENING FOR DIABETES MELLITUS: ICD-10-CM

## 2024-03-13 DIAGNOSIS — E55.9 VITAMIN D DEFICIENCY: ICD-10-CM

## 2024-03-13 DIAGNOSIS — I10 BENIGN ESSENTIAL HYPERTENSION: Primary | ICD-10-CM

## 2024-03-13 PROCEDURE — 80048 BASIC METABOLIC PNL TOTAL CA: CPT

## 2024-03-13 PROCEDURE — 99214 OFFICE O/P EST MOD 30 MIN: CPT

## 2024-03-13 PROCEDURE — 80061 LIPID PANEL: CPT

## 2024-03-13 PROCEDURE — 36415 COLL VENOUS BLD VENIPUNCTURE: CPT

## 2024-03-13 PROCEDURE — 82306 VITAMIN D 25 HYDROXY: CPT

## 2024-03-13 RX ORDER — FLUTICASONE PROPIONATE 50 MCG
2 SPRAY, SUSPENSION (ML) NASAL DAILY
Qty: 48 ML | Refills: 4 | Status: SHIPPED | OUTPATIENT
Start: 2024-03-13

## 2024-03-13 RX ORDER — LORATADINE 10 MG/1
10 TABLET ORAL DAILY
Qty: 30 TABLET | Refills: 1 | Status: SHIPPED | OUTPATIENT
Start: 2024-03-13 | End: 2024-05-22

## 2024-03-13 RX ORDER — AMLODIPINE BESYLATE 10 MG/1
10 TABLET ORAL DAILY
Qty: 90 TABLET | Refills: 0 | Status: SHIPPED | OUTPATIENT
Start: 2024-03-13 | End: 2024-04-17

## 2024-03-13 ASSESSMENT — ANXIETY QUESTIONNAIRES
2. NOT BEING ABLE TO STOP OR CONTROL WORRYING: NOT AT ALL
5. BEING SO RESTLESS THAT IT IS HARD TO SIT STILL: NOT AT ALL
GAD7 TOTAL SCORE: 0
1. FEELING NERVOUS, ANXIOUS, OR ON EDGE: NOT AT ALL
IF YOU CHECKED OFF ANY PROBLEMS ON THIS QUESTIONNAIRE, HOW DIFFICULT HAVE THESE PROBLEMS MADE IT FOR YOU TO DO YOUR WORK, TAKE CARE OF THINGS AT HOME, OR GET ALONG WITH OTHER PEOPLE: NOT DIFFICULT AT ALL
6. BECOMING EASILY ANNOYED OR IRRITABLE: NOT AT ALL
3. WORRYING TOO MUCH ABOUT DIFFERENT THINGS: NOT AT ALL
GAD7 TOTAL SCORE: 0
7. FEELING AFRAID AS IF SOMETHING AWFUL MIGHT HAPPEN: NOT AT ALL

## 2024-03-13 ASSESSMENT — PATIENT HEALTH QUESTIONNAIRE - PHQ9
SUM OF ALL RESPONSES TO PHQ QUESTIONS 1-9: 0
5. POOR APPETITE OR OVEREATING: NOT AT ALL

## 2024-03-13 ASSESSMENT — ASTHMA QUESTIONNAIRES: ACT_TOTALSCORE: 20

## 2024-03-13 NOTE — PROGRESS NOTES
Assessment & Plan     (I10) Benign essential hypertension  (primary encounter diagnosis)  Blood pressure borderline today. Home readings in the high 130's to 140's systolic. Increase Amlodipine from 5 mg to 10 mg daily. Nurse/MA visit in 2-3 weeks; sooner with any acute concerns. No new side effects of the medication. Refill provided.  Plan: amLODIPine (NORVASC) 10 MG tablet, Basic         metabolic panel  (Ca, Cl, CO2, Creat, Gluc, K,         Na, BUN)          (J30.81) Allergy to cats  Allergy referral given to discuss any further testing/follow up.   Plan: Adult Allergy/Asthma  Referral          (J30.2) Seasonal allergic rhinitis, unspecified trigger  Well controlled with use of flonase and allergra typically. Recently less controlled so will switch from allegra to loratadine. Allergy referral given for seasonal allergies as well as cat allergy. No new side effects of the medication. Refill provided.  Plan: Adult Allergy/Asthma  Referral,         fluticasone (FLONASE) 50 MCG/ACT nasal spray,         loratadine (CLARITIN) 10 MG tablet          (E55.9) Vitamin D deficiency  Check Vitamin D level today. Currently using 2,000 international unit(s) daily.   Plan: Vitamin D Deficiency          (Z13.1) Screening for diabetes mellitus  Check BMP today-patient is not fasting at this time.   Plan: Basic metabolic panel  (Ca, Cl, CO2, Creat,         Gluc, K, Na, BUN)          (Z13.220) Screening for hyperlipidemia  Check lipid panel today-patient is not fasting.   Plan: Lipid panel reflex to direct LDL Non-fasting            Follow up for physical.     Subjective   Petr Tolbert is a 48 year old, presenting for the following health issues:  Establish Care, Hypertension, and Headache    History of Present Illness       Hypertension: She presents for follow up of hypertension.  She does check blood pressure  regularly outside of the clinic. Outside blood pressures have been over 140/90. She does not follow a low  "salt diet.     Headaches:   Since the patient's last clinic visit, headaches are: no change  The patient is getting headaches:  Once a month  She is able to do normal daily activities when she has a migraine.  The patient is taking the following rescue/relief medications:  Naproxyn (Aleve), Tylenol and Relpax   Patient states \"I get some relief\" from the rescue/relief medications.   The patient is taking the following medications to prevent migraines:  No medications to prevent migraines  In the past 4 weeks, the patient has gone to an Urgent Care or Emergency Room 0 times times due to headaches.    She eats 2-3 servings of fruits and vegetables daily.She consumes 1 sweetened beverage(s) daily.She exercises with enough effort to increase her heart rate 10 to 19 minutes per day.  She exercises with enough effort to increase her heart rate 4 days per week.   She is taking medications regularly.     -Getting headaches which feels related to blood pressure being slightly higher recently. Home pressures 130's-140's systolic.       Review of Systems  Constitutional, HEENT, cardiovascular, pulmonary, gi and gu systems are negative, except as otherwise noted.        Objective    /84 (BP Location: Right arm, Patient Position: Sitting, Cuff Size: Adult Large)   Pulse 96   Temp 97.7  F (36.5  C) (Oral)   Resp 18   Ht 1.6 m (5' 3\")   Wt 105.7 kg (233 lb)   LMP 10/31/2011   SpO2 100%   BMI 41.27 kg/m    Body mass index is 41.27 kg/m .  Physical Exam   GENERAL: alert and no distress  RESP: lungs clear to auscultation - no rales, rhonchi or wheezes  CV: regular rate and rhythm, normal S1 S2, no S3 or S4, no murmur, click or rub  MS: no gross musculoskeletal defects noted, no edema      Signed Electronically by: Ivelisse Fischer PA-C    "

## 2024-03-14 LAB
ANION GAP SERPL CALCULATED.3IONS-SCNC: 12 MMOL/L (ref 7–15)
BUN SERPL-MCNC: 10.3 MG/DL (ref 6–20)
CALCIUM SERPL-MCNC: 9 MG/DL (ref 8.6–10)
CHLORIDE SERPL-SCNC: 105 MMOL/L (ref 98–107)
CHOLEST SERPL-MCNC: 128 MG/DL
CREAT SERPL-MCNC: 0.95 MG/DL (ref 0.51–0.95)
DEPRECATED HCO3 PLAS-SCNC: 23 MMOL/L (ref 22–29)
EGFRCR SERPLBLD CKD-EPI 2021: 74 ML/MIN/1.73M2
FASTING STATUS PATIENT QL REPORTED: NO
GLUCOSE SERPL-MCNC: 143 MG/DL (ref 70–99)
HDLC SERPL-MCNC: 34 MG/DL
LDLC SERPL CALC-MCNC: 66 MG/DL
NONHDLC SERPL-MCNC: 94 MG/DL
POTASSIUM SERPL-SCNC: 4 MMOL/L (ref 3.4–5.3)
SODIUM SERPL-SCNC: 140 MMOL/L (ref 135–145)
TRIGL SERPL-MCNC: 138 MG/DL
VIT D+METAB SERPL-MCNC: 28 NG/ML (ref 20–50)

## 2024-03-15 DIAGNOSIS — R73.09 ELEVATED GLUCOSE: Primary | ICD-10-CM

## 2024-03-18 DIAGNOSIS — G43.109 MIGRAINE WITH AURA AND WITHOUT STATUS MIGRAINOSUS, NOT INTRACTABLE: ICD-10-CM

## 2024-03-18 RX ORDER — RIZATRIPTAN BENZOATE 10 MG/1
TABLET ORAL
Qty: 18 TABLET | Refills: 0 | Status: SHIPPED | OUTPATIENT
Start: 2024-03-18

## 2024-03-25 ENCOUNTER — ALLIED HEALTH/NURSE VISIT (OUTPATIENT)
Dept: FAMILY MEDICINE | Facility: CLINIC | Age: 49
End: 2024-03-25
Payer: COMMERCIAL

## 2024-03-25 VITALS — OXYGEN SATURATION: 99 % | DIASTOLIC BLOOD PRESSURE: 70 MMHG | HEART RATE: 95 BPM | SYSTOLIC BLOOD PRESSURE: 122 MMHG

## 2024-03-25 DIAGNOSIS — Z01.30 BP CHECK: Primary | ICD-10-CM

## 2024-03-25 PROCEDURE — 99207 PR NO CHARGE NURSE ONLY: CPT

## 2024-03-25 NOTE — PROGRESS NOTES
Prince Patel is a 48 year old patient who comes in today for a Blood Pressure check.  Initial BP:  /70 (BP Location: Right arm, Patient Position: Sitting, Cuff Size: Adult Large)   Pulse 95   LMP 10/31/2011   SpO2 99%      95  Disposition: follow-up as previously indicated by provider

## 2024-04-12 DIAGNOSIS — K21.00 GASTROESOPHAGEAL REFLUX DISEASE WITH ESOPHAGITIS WITHOUT HEMORRHAGE: ICD-10-CM

## 2024-04-12 RX ORDER — FAMOTIDINE 20 MG/1
20 TABLET, FILM COATED ORAL 2 TIMES DAILY
Qty: 180 TABLET | Refills: 1 | Status: SHIPPED | OUTPATIENT
Start: 2024-04-12 | End: 2024-06-03

## 2024-04-16 NOTE — TELEPHONE ENCOUNTER
FUTURE VISIT INFORMATION      FUTURE VISIT INFORMATION:  Date: 4/24/2024  Time: 7:45AM  Location:  Allergy   REFERRAL INFORMATION:  Referring provider:  Ivelisse Fischer PA-C   Referring providers clinic:  [facility]   Reason for visit/diagnosis  Allergy to cats, Seasonal allergic rhinitis, unspecified trigger     RECORDS REQUESTED FROM:       Clinic name Comments Records Status Imaging Status   OV 12/27/2023, 11/27/2023  Pulmonology- Environmental Allergies  In EPIC

## 2024-04-17 DIAGNOSIS — I10 BENIGN ESSENTIAL HYPERTENSION: ICD-10-CM

## 2024-04-17 RX ORDER — AMLODIPINE BESYLATE 10 MG/1
10 TABLET ORAL DAILY
Qty: 90 TABLET | Refills: 0 | Status: SHIPPED | OUTPATIENT
Start: 2024-04-17 | End: 2024-05-01

## 2024-04-23 NOTE — PROGRESS NOTES
[facility] Dermato-allergology Note  Office visit  Encounter Date: Apr 24, 2024  ____________________________________________    CC: Allergy Consult (Ref for J30.81 (ICD-10-CM) - Allergy to cats; J30.2 (ICD-10-CM) - Seasonal allergic rhinitis, unspecified trigger. Per pt, seasonal allergies, cats, and possible food allergies (blueberries, shellfish, etc). Year round nasal systems, daily antihistamines (none take for 7 days).//)      HPI:  (Apr 24, 2024)  Ms. Prince Patel is a(n) 48 year old female who presents today as new patient for allergy consultation  - Referred by LUKE Kebede () for testing for cat allergy and seasonal allergic rhinitis with unspecific trigger  - Stopped antihistamines x 7 days; usually alternates between Claritin and Allegra  - Does a lot of  for cats and dogs; wants to get tested to ensure she does not have severe allergies to cats, and she gets stuffy nose and watery eyes occasionally (happens more around longhaired cats)  - Does not have animals at home; mother has a dog and she visits often; she does not have a reaction to that dog  - Has perennial allergies with stuffy nose and red eyes  - Anytime she eats anything containing with blueberries, she gets diffuse itchiness  - Over the summer, she had a blood test that showed she is allergic to shellfish  - Never has been a fan of fish and doesn't eat it often  - Eats fish without issue  - Has mild reaction to apples  - Otherwise feeling well in usual state of health    Physical Exam:  General: In no acute distress, well-developed, well-nourished  Eyes: no conjunctivitis  ENT: no signs of rhinitis   Pulmonary: no wheezing or coughing  Skin: Focused examination of the skin on test sites was performed = see test results below    Past Medical History:   Patient Active Problem List   Diagnosis    CARDIOVASCULAR SCREENING; LDL GOAL LESS THAN 160    S/P partial hysterectomy    Intertrigo    Migraine with aura  and without status migrainosus, not intractable    Chronic non-seasonal allergic rhinitis, unspecified trigger    Morbid obesity (H)    Gastroesophageal reflux disease with esophagitis without hemorrhage    Borderline intellectual functioning     Past Medical History:   Diagnosis Date    Allergic rhinitis, cause unspecified     Allergic rhinitis and blueberries    Congestive heart failure (H)     Intertrigo 11/17/2014    Migraine with aura and without status migrainosus, not intractable 7/18/2016    Migraine, unspecified, without mention of intractable migraine without mention of status migrainosus     Migraine    NONSPECIFIC MEDICAL HISTORY     learning disability, mild MR, ADD?    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)    Other acne     Sprain and strain of unspecified site of knee and leg 1/24/2008       Allergies:  Allergies   Allergen Reactions    Cats     Dust Mites     Keflex [Cephalexin] Rash    Ragweeds     Shellfish-Derived Products Other (See Comments)       Medications:  Current Outpatient Medications   Medication Sig Dispense Refill    albuterol (PROAIR HFA/PROVENTIL HFA/VENTOLIN HFA) 108 (90 Base) MCG/ACT inhaler Inhale 2 puffs into the lungs every 6 hours 18 g 11    amLODIPine (NORVASC) 10 MG tablet Take 1 tablet (10 mg) by mouth daily 90 tablet 0    azelastine (ASTELIN) 0.1 % nasal spray Spray 1 spray into both nostrils 2 times daily 30 mL 11    budesonide-formoterol (SYMBICORT) 80-4.5 MCG/ACT Inhaler Inhale 2 puffs into the lungs 2 times daily 10.2 g 11    clotrimazole (LOTRIMIN) 1 % external cream Apply topically 2 times daily 60 g 1    famotidine (PEPCID) 20 MG tablet TAKE 1 TABLET BY MOUTH TWICE A  tablet 1    fexofenadine (ALLEGRA) 180 MG tablet Take 1 tablet (180 mg) by mouth daily 90 tablet 1    fluticasone (FLONASE) 50 MCG/ACT nasal spray Spray 2 sprays into both nostrils daily 48 mL 4    loratadine (CLARITIN) 10 MG tablet Take 1 tablet (10  mg) by mouth daily 30 tablet 1    MULTIVITAMINS OR TABS 1 tab qd as directed  0    rizatriptan (MAXALT) 10 MG tablet TAKE 1 TAB BY MOUTH AT ONSET OF MIGRAINE. MAY REPEAT IN 2 HR. 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 Patient has the following hobbies or non-occupational exposure: win[place] art; cat and dog sitting.     Family History:  Family History   Problem Relation Age of Onset    Obesity Mother     C.A.D. Father         MI, angioplasty, 50s    Diabetes Father         Diabetes    Hypertension Father     Obesity Father     Diabetes Maternal Grandmother         Multiple Sclerosis    Neurologic Disorder Paternal Grandmother     Gastrointestinal Disease Paternal Grandmother         Gluten Intolerance     Diabetes Maternal Aunt        Previous Labs, Allergy Tests, Dermatopathology, Imaging:  3/13/24 LUKE Kebede ()  Allergy to cats  Allergy referral given to discuss any further testing/follow up.   Plan: Adult Allergy/Asthma  Referral          Seasonal allergic rhinitis, unspecified trigger  Well controlled with use of flonase and allergra typically. Recently less controlled so will switch from allegra to loratadine. Allergy referral given for seasonal allergies as well as cat allergy. No new side effects of the medication. Refill provided.  Plan: Adult Allergy/Asthma  Referral,         fluticasone (FLONASE) 50 MCG/ACT nasal spray,         loratadine (CLARITIN) 10 MG tablet    1/12/24 Dr. Claudia Florian ()  CC:  Recheck left eye pink eye and bite marks on her right back.     HPI:  Face improving with antibiotics      A&P:  Cellulitis, unspecified cellulitis site  Face look much better   Erythema improved .  Recommend to complete antibiotics .    1/8/24 Dr. Claudia Florian ()  From [facility]:  Reason for visit:  Yesterday  Symptoms include:  Rash around right eye and bumps on chin/neck area  Symptom intensity:  Mild  Symptom progression:  Staying the same  Had these symptoms before:  No  What makes it worse:  No  What makes it better:  Havent tried anything yet    A&P:  Local infection of skin and subcutaneous tissue  Patient unsure started yesterday .  Few bumps on the side of chin .  Erythema around the eye and right side of face .  Will start bactrim likely Cellulitis   Discussed ER visit if symptoms get any worst .  2 Day follow up scheduled  - sulfamethoxazole-trimethoprim (BACTRIM DS) 800-160 MG tablet; Take 1 tablet by mouth 2 times daily for 7 days     Acute conjunctivitis of both eyes, unspecified acute conjunctivitis type  - ciprofloxacin (CILOXAN) 0.3 % ophthalmic solution; Place 1 drop into both eyes every 4 hours for 5 days     Allergic reaction to spider bite, undetermined intent, initial encounter  - predniSONE (DELTASONE) 20 MG tablet; Take 2 tablets (40 mg) by mouth daily for 5 days    12/27/23 Merly Kelly NP (pulm)  From [facility]:  Female with a history of allergic rhinitis, GERD, presenting for one month asthma follow-up.   Since last visit one month ago, breathing is improved with symbicort.  Not wheezing as much.  Able to exercise more without issue.  Allergies are controlled, no issue.  Previous HPI:  She reports main symptoms is wheezing in morning, also with sports.  She participates in softball and bowling.  Prescribed Albuterol few months.  Does find benefit from this.  Prescribed Arnuity Ellipta few months ago, took as needed. Only tried for one week.  Shortness of breath with running during softball.  Did okay walking down our long hallway.  Does have sharp pains in chest when bending over.  Known  Allergies to cats, ragweed, dust mites.  Does have acid reflux, controlled currently with pepcid.  No nighttime awakenings.     From A&P:  Moderate persistent asthma  Environmental allergies  Lifelong non-smoker presented last visit for evaluation of wheezing.  She notices this occasionally, when she wakes and when she is exercising.  She has known hx of allergies to cats, ragweed, and dust mites.  She also has hx of GERD which is currently controlled with famotidine.  Last visit she was initiated on Symbicort BID and encouraged to continue allergy regimen.  She reports improvement in wheezing since starting Symbicort.  She is able to exercise more, without issue.  Plan:  - continue Symbicort (budesonide/formoterol) 80/4.5, two puffs BID, rinse/gargle after use.  - continue Albuterol inhaler PRN.  - continue azelastine and fluticasone nasal sprays  - continue fexofenadine 180mg daily  - continue famotidine 20mg BID  - she is UTD with COVID vaccine and booster, and annual influenza vaccine.  - Action plan: prednisone 40mg x5 days    12/23/22 Treva López NP ()  From [facility]:  Had a breakout in hives after eating Pecan Pie, no history of food allergies. Would like testing.   Rash to wrist when wearing silicone apple watch band. Has changed wrist and rash follows.     From A&P:  Tinea corporis  Discussed keeping watch band clean and dry. Topical clotrimazole two times per day x 7-10 days. Clean band. Consider a break from wearing watch.     Intrinsic (allergic) eczema  Food allergy  Possible food allergy. Testing warranted.   Consider benadryl for skin symptoms.   Discussed red flags and need for urgent follow-up.     Referred By: Ivelisse Fischer PA-C ()  [address] [place] [address]     Allergy Tests:  Component      Latest Ref Rng 12/23/2022  3:39 PM 12/23/2022  4:29 PM   IGE      0 - 114 kU/L 133 (H)     Allergen [place]      <0.10 KU(A)/L <0.10  <0.10    Allergen Cashew      <0.10 KU(A)/L <0.10   <0.10    Allergen Fish(Cod)      <0.10 KU(A)/L <0.10     Allergen Egg White      <0.10 KU(A)/L <0.10     Allergen, Hazelnut      <0.10 KU(A)/L <0.10  <0.10    Allergen Milk      <0.10 KU(A)/L <0.10     Allergen Peanut      <0.10 KU(A)/L <0.10  <0.10    Allergen, Rudy      <0.10 KU(A)/L <0.10     Allergen Scallop      <0.10 KU(A)/L 0.14 (H)     Allergen, Sesame Seed      <0.10 KU(A)/L <0.10     Allergen Shrimp      <0.10 KU(A)/L <0.10     Allergen Soybean IgE      <0.10 KU(A)/L <0.10     Allergen Tuna      <0.10 KU(A)/L <0.10     Allergen, [place]      <0.10 KU(A)/L <0.10  <0.10    Allergen Wheat      <0.10 KU(A)/L <0.10     Allergen, Brazil Nut      <0.10 KU(A)/L  <0.10    Allergen, Macadamia Nut      <0.10 KU(A)/L  <0.10    Allergen Pecan      <0.10 KU(A)/L  <0.10    Allergen Pine Nut      <0.10 KU(A)/L  <0.10    Allergen Pistachio      <0.10 KU(A)/L  <0.10      Order for Future Allergy Testing:    [x] Outpatient  [] Inpatient: Hernandez..../ Bed ....       Skin Atopy (atopic dermatitis) [] Yes   [x] No .........  Contact allergies:   [x] Yes   [] No ..........previously to silicone Apple Watch band  Hand eczema:   [] Yes   [x] No           Leading hand:   [] R   [] L       [] Ambidextrous         Drug allergies:        [x] Yes   [] No  which?......years ago after taking Keflex, got a rash (she does not remember the details)    Urticaria/Angioedema  [] Yes   [x] No .........  Food Allergy:  [x] Yes   [] No  which?......see HPI  Pets :  [x] Yes   [] No  which?......see HPI       [x]  Rhinitis   [x] Conjunctivitis   [] Sinusitis   [] Polyposis   [] Otitis   [] Pharyngitis         []  Postnasal drip    []  none  Operations:   [] Tonsils   [] Septum   [] Sinus   [] Polyposis        [x] Asthma bronchiale (recently diagnosed)  [] Coughing      []  none  Symptoms (mostly Rhinoconjunctivitis and Asthma) aggravated by:  Season   [] I   [] II   [] III   [] IV   []V   []VI   []VII   []VIII   []IX   []X   []XI   []XII     [x]  perennial   Day time      [] morning   [] noon      [] evening        [] night    [] whole day........  [x]  none  Location/changes    [] inside        [] outside   [] mountains    [] sea     [] others.............   [x]  none  Triggers, specific     [x] animals     [] plants     [] dust              [] others ...........................    []  none  Triggers, others       [] work          [] psyche    [] sport            [] others .............................  [x]  none  Irritant                [] phys efforts [] smoke    [] heat/cold     [] odors  []others............... [x]  none    Order for PATCH TESTS  Reason for tests (suspected allergy): not necessary  Known previous allergies: n/a  Standardized panels  [] Standard panel (40 tests)  [] Preservatives & Antimicrobials (31 tests)  [] Emulsifiers & Additives (25 tests)   [] Perfumes/Flavours & Plants (25 tests)  [] Hairdresser panel (12 tests)  [] Rubber Chemicals (22 tests)  [] Plastics (26 tests)  [] Colorants/Dyes/Food additives (20 tests)  [] Metals (implants/dental) (24 tests)  [] Local anaesthetics/NSAIDs (13 tests)  [] Antibiotics & Antimycotics (14 tests)   [] Corticosteroids (15 tests)   [] Photopatch test (62 tests)   [] others: ...      [] Patient's own products: ...  DO NOT test if chemical or biological identity is unknown!   always ask from patient the product information and safety sheets (MSDS)       Order for PRICK TESTS    Reason for tests (suspected allergy): perennial RC; asthma  Known previous allergies: scallops (in blood test)    Standardized prick panels  [x] Atopic panel (20 tests)  [] Pediatric Panel (12 tests)  [] Milk, Meat, Eggs, Grains (20 tests)   [] Dust, Epithelia, Feathers (10 tests)  [x] Shellfish ONLY (17 tests)  [] Nuts, Beans (14 tests)  [] Spice, Vegetable, Fruit (17 tests)  [] Pollen Panel = Tree, Grass, Weed (24 tests)  [] Others: ...      [] Patient's own products: ...  DO NOT test if chemical or biological identity is  unknown!   always ask from patient the product information and safety sheets (MSDS)     Standardized intradermal tests  [x] Penicillium notatum [x] Aspergillus fumigatus [] House dust mites D.far & D. pteron  [x] Cat    [x] dog  [] Others: ...  [] Bee venom   [] Wasp venom  !!Specific protocol with dilutions!!       Order for Drug allergy tests (prick & Intradermal & patch tests)    [] Penicillin G  [] Ampicillin [x] Cefazolin   [x] Ceftriaxone   [] Ceftazidime  [] Bactrim    [x] Others: cephalexin  Order for ... as test date    [] Patient needs consultation with Allergy team (changes of tests may apply)  [] Tests discussed with Allergy team (can have direct appointment for allergy tests)     Atopy Screen (Placed Apr 24, 2024)  No Substance Readings (15 min) Evaluation   POS Histamine 1mg/ml +/++    NEG NaCl 0.9% -      No Substance Readings (15 min) Evaluation   1 Alternaria alternata (tenuis)  -    2 Cladosporium herbarum -    3 Aspergillus fumigatus -    4 Penicillium notatum -    5 Dermatophagoides pteronyssinus +++    6 Dermatophagoides farinae +++    7 Dog epithelium (canis spp) -    8 Cat hair (piero catus) -    9 Cockroach   (Blatella americana & germanica) -    10 Grass mix midwest   (Mae, Orchard, Redtop, Tomas) -    11 Cory grass (sorghum halepense) -    12 Weed mix   (common Cocklebur, Lamb s quarters, rough redroot Pigweed, Dock/Sorrel) -    13 Mug wort (artemisia vulgare) -    14 Ragweed giant/short (ambrosia spp) ++    15 White birch (Betula papyrifera) -    16 Tree mix 1 (Pecan, Maple BHR, Oak RVW, american [place], black Aurora) -    17 Red cedar (juniperus virginia) -    18 Tree mix 2   (white [place], river/red Birch, black [place], common Maypearl, american Elm) -    19 Box elder/Maple mix (acer spp) -    20 Chesapeake shagbark (carya ovata) -    Conclusion:    Fish and Seafood (Placed Apr 24, 2024)  No Substance Readings (15min) Evaluation   11 Crab (callinectes spp) -    12 Lobster (homarus  americanus) -    13 Shrimp white/brown/pink (farfantepenaeus&litopenaeus) -    14 Kingcrab (paralithodes camtschatica) -    15 Scallop (placopecten magellanicus) -    16 Clam (mercenaria mercenaria) -    17 Oyster (cstrea/crassostrea) -    Conclusion: Blood test performed in 2022 was borderline. In patient history and with today's negative prick test, patient does not have a sensitization to shellfish.      Intradermal Testing (Placed Apr 24, 2024)  No Substance Conc.  Reading (15min)  immediate Papule [mm] / Erythema [mm] Reading   (... 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    ==> Treatment Plan:    >> Considering negative prick tests for shellfish, along with no signs for sensitization in history, she can proceed with eating shellfish as tolerated.    >> Informational handout provided for house dust mite reduction.    >> Can resume antihistamine. Recommend Allegra 180 mg 1 tablet at bedtime.     >> For asthma, reviewed Symbicort and albuterol inhaler uses as prescribed by pulmonology. Recommend using Symbicort nightly when taking Allegra.    Procedures Performed: Allergy prick and IDT  tests    Staff and Scribe:  Provider    Scribe Disclosure:   I, MARTY AGRAWAL, am serving as a scribe to document services personally performed by Sonu Alford MD based on data collection and the provider's statements to me.     Staff Physician Comments:  I was present with the scribe who participated in the documentation of the note. I have verified the history and personally performed the physical exam and medical decision making. I agree with the assessment and plan as documented in the note. I have reviewed and if necessary amended the note.      Sonu Alford MD  Professor  Head of Dermato-Allergy Division  Department of Dermatology  [facility]    Follow-up in Derm-Allergy clinic in 2 months    I spent a total of 37minutes with Prince Patel. This time was spent counseling the patient and/or coordinating care, explaining the allergy tests, performing allergy tests and assessing the clinical relevance.

## 2024-04-24 ENCOUNTER — OFFICE VISIT (OUTPATIENT)
Dept: ALLERGY | Facility: CLINIC | Age: 49
End: 2024-04-24
Payer: COMMERCIAL

## 2024-04-24 ENCOUNTER — PRE VISIT (OUTPATIENT)
Dept: ALLERGY | Facility: CLINIC | Age: 49
End: 2024-04-24

## 2024-04-24 DIAGNOSIS — T78.1XXD POLLEN-FOOD ALLERGY, SUBSEQUENT ENCOUNTER: ICD-10-CM

## 2024-04-24 DIAGNOSIS — Z91.09 HOUSE DUST MITE ALLERGY: ICD-10-CM

## 2024-04-24 DIAGNOSIS — Z88.9 DRUG ALLERGY: Primary | ICD-10-CM

## 2024-04-24 DIAGNOSIS — J45.21 MILD INTERMITTENT ASTHMA WITH ACUTE EXACERBATION: ICD-10-CM

## 2024-04-24 DIAGNOSIS — J30.81 ALLERGY TO CATS: ICD-10-CM

## 2024-04-24 DIAGNOSIS — J30.2 SEASONAL ALLERGIC RHINITIS, UNSPECIFIED TRIGGER: ICD-10-CM

## 2024-04-24 DIAGNOSIS — Z91.018 FOOD ALLERGY: ICD-10-CM

## 2024-04-24 PROCEDURE — 99203 OFFICE O/P NEW LOW 30 MIN: CPT | Mod: 25 | Performed by: DERMATOLOGY

## 2024-04-24 PROCEDURE — 95024 IQ TESTS W/ALLERGENIC XTRCS: CPT | Performed by: DERMATOLOGY

## 2024-04-24 PROCEDURE — 95004 PERQ TESTS W/ALRGNC XTRCS: CPT | Performed by: DERMATOLOGY

## 2024-04-24 NOTE — LETTER
4/24/2024         RE: Prince Patel  [address]  [address]  [facility] [id number]        Dear Colleague,    Thank you for referring your patient, Prince Patel, to the [facility] [place]. Please see a copy of my visit note below.    [facility] Dermato-allergology Note  Office visit  Encounter Date: Apr 24, 2024  ____________________________________________    CC: Allergy Consult (Ref for J30.81 (ICD-10-CM) - Allergy to cats; J30.2 (ICD-10-CM) - Seasonal allergic rhinitis, unspecified trigger. Per pt, seasonal allergies, cats, and possible food allergies (blueberries, shellfish, etc). Year round nasal systems, daily antihistamines (none take for 7 days).//)      HPI:  (Apr 24, 2024)  Ms. Prince Patel is a(n) 48 year old female who presents today as new patient for allergy consultation  - Referred by LUKE Kebede () for testing for cat allergy and seasonal allergic rhinitis with unspecific trigger  - Stopped antihistamines x 7 days; usually alternates between Claritin and Allegra  - Does a lot of  for cats and dogs; wants to get tested to ensure she does not have severe allergies to cats, and she gets stuffy nose and watery eyes occasionally (happens more around longhaired cats)  - Does not have animals at home; mother has a dog and she visits often; she does not have a reaction to that dog  - Has perennial allergies with stuffy nose and red eyes  - Anytime she eats anything containing with blueberries, she gets diffuse itchiness  - Over the summer, she had a blood test that showed she is allergic to shellfish  - Never has been a fan of fish and doesn't eat it often  - Eats fish without issue  - Has mild reaction to apples  - Otherwise feeling well in usual state of health    Physical Exam:  General: In no acute distress, well-developed, well-nourished  Eyes: no conjunctivitis  ENT: no signs of rhinitis   Pulmonary: no wheezing or  coughing  Skin: Focused examination of the skin on test sites was performed = see test results below  {Skin Exam:[id number]}    Past Medical History:   Patient Active Problem List   Diagnosis     CARDIOVASCULAR SCREENING; LDL GOAL LESS THAN 160     S/P partial hysterectomy     Intertrigo     Migraine with aura and without status migrainosus, not intractable     Chronic non-seasonal allergic rhinitis, unspecified trigger     Morbid obesity (H)     Gastroesophageal reflux disease with esophagitis without hemorrhage     Borderline intellectual functioning     Past Medical History:   Diagnosis Date     Allergic rhinitis, cause unspecified     Allergic rhinitis and blueberries     Congestive heart failure (H)      Intertrigo 11/17/2014     Migraine with aura and without status migrainosus, not intractable 7/18/2016     Migraine, unspecified, without mention of intractable migraine without mention of status migrainosus     Migraine     NONSPECIFIC MEDICAL HISTORY     learning disability, mild MR, ADD?     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)     Other acne      Sprain and strain of unspecified site of knee and leg 1/24/2008       Allergies:  Allergies   Allergen Reactions     Cats      Dust Mites      Keflex [Cephalexin] Rash     Ragweeds      Shellfish-Derived Products Other (See Comments)       Medications:  Current Outpatient Medications   Medication Sig Dispense Refill     albuterol (PROAIR HFA/PROVENTIL HFA/VENTOLIN HFA) 108 (90 Base) MCG/ACT inhaler Inhale 2 puffs into the lungs every 6 hours 18 g 11     amLODIPine (NORVASC) 10 MG tablet Take 1 tablet (10 mg) by mouth daily 90 tablet 0     azelastine (ASTELIN) 0.1 % nasal spray Spray 1 spray into both nostrils 2 times daily 30 mL 11     budesonide-formoterol (SYMBICORT) 80-4.5 MCG/ACT Inhaler Inhale 2 puffs into the lungs 2 times daily 10.2 g 11     clotrimazole (LOTRIMIN) 1 % external cream Apply topically 2 times daily 60  g 1     famotidine (PEPCID) 20 MG tablet TAKE 1 TABLET BY MOUTH TWICE A  tablet 1     fexofenadine (ALLEGRA) 180 MG tablet Take 1 tablet (180 mg) by mouth daily 90 tablet 1     fluticasone (FLONASE) 50 MCG/ACT nasal spray Spray 2 sprays into both nostrils daily 48 mL 4     loratadine (CLARITIN) 10 MG tablet Take 1 tablet (10 mg) by mouth daily 30 tablet 1     MULTIVITAMINS OR TABS 1 tab qd as directed  0     rizatriptan (MAXALT) 10 MG tablet TAKE 1 TAB BY MOUTH AT ONSET OF MIGRAINE. MAY REPEAT IN 2 HR. 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 Patient has the following hobbies or non-occupational exposure: win[place] art; cat and dog sitting.     Family History:  Family History   Problem Relation Age of Onset     Obesity Mother      C.A.D. Father         MI, angioplasty, 50s     Diabetes Father         Diabetes     Hypertension Father      Obesity Father      Diabetes Maternal Grandmother         Multiple Sclerosis     Neurologic Disorder Paternal Grandmother      Gastrointestinal Disease Paternal Grandmother         Gluten Intolerance      Diabetes Maternal Aunt        Previous Labs, Allergy Tests, Dermatopathology, Imaging:  3/13/24 LUKE Kebede (FM)  Allergy to cats  Allergy referral given to discuss any further testing/follow up.   Plan: Adult Allergy/Asthma  Referral          Seasonal allergic rhinitis, unspecified trigger  Well controlled with use of flonase and allergra typically. Recently less controlled so will switch from allegra to loratadine. Allergy referral given for seasonal allergies as well as cat allergy. No new  side effects of the medication. Refill provided.  Plan: Adult Allergy/Asthma  Referral,         fluticasone (FLONASE) 50 MCG/ACT nasal spray,         loratadine (CLARITIN) 10 MG tablet    1/12/24 Dr. Claudia Florian ()  CC:  Recheck left eye pink eye and bite marks on her right back.     HPI:  Face improving with antibiotics     A&P:  Cellulitis, unspecified cellulitis site  Face look much better   Erythema improved .  Recommend to complete antibiotics .    1/8/24 Dr. Claudia Florian ()  From HPI:  Reason for visit:  Yesterday  Symptoms include:  Rash around right eye and bumps on chin/neck area  Symptom intensity:  Mild  Symptom progression:  Staying the same  Had these symptoms before:  No  What makes it worse:  No  What makes it better:  Havent tried anything yet    A&P:  Local infection of skin and subcutaneous tissue  Patient unsure started yesterday .  Few bumps on the side of chin .  Erythema around the eye and right side of face .  Will start bactrim likely Cellulitis   Discussed ER visit if symptoms get any worst .  2 Day follow up scheduled  - sulfamethoxazole-trimethoprim (BACTRIM DS) 800-160 MG tablet; Take 1 tablet by mouth 2 times daily for 7 days     Acute conjunctivitis of both eyes, unspecified acute conjunctivitis type  - ciprofloxacin (CILOXAN) 0.3 % ophthalmic solution; Place 1 drop into both eyes every 4 hours for 5 days     Allergic reaction to spider bite, undetermined intent, initial encounter  - predniSONE (DELTASONE) 20 MG tablet; Take 2 tablets (40 mg) by mouth daily for 5 days    12/27/23 Merly Kelly NP (pulm)  From HPI:  Female with a history of allergic rhinitis, GERD, presenting for one month asthma follow-up.   Since last visit one month ago, breathing is improved with symbicort.  Not wheezing as much.  Able to exercise more without issue.  Allergies are controlled, no issue.  Previous HPI:  She reports main symptoms is wheezing in morning, also with sports.  She  participates in softball and bowling.  Prescribed Albuterol few months.  Does find benefit from this.  Prescribed Arnuity Ellipta few months ago, took as needed. Only tried for one week.  Shortness of breath with running during softball.  Did okay walking down our long hallway.  Does have sharp pains in chest when bending over.  Known Allergies to cats, ragweed, dust mites.  Does have acid reflux, controlled currently with pepcid.  No nighttime awakenings.     From A&P:  Moderate persistent asthma  Environmental allergies  Lifelong non-smoker presented last visit for evaluation of wheezing.  She notices this occasionally, when she wakes and when she is exercising.  She has known hx of allergies to cats, ragweed, and dust mites.  She also has hx of GERD which is currently controlled with famotidine.  Last visit she was initiated on Symbicort BID and encouraged to continue allergy regimen.  She reports improvement in wheezing since starting Symbicort.  She is able to exercise more, without issue.  Plan:  - continue Symbicort (budesonide/formoterol) 80/4.5, two puffs BID, rinse/gargle after use.  - continue Albuterol inhaler PRN.  - continue azelastine and fluticasone nasal sprays  - continue fexofenadine 180mg daily  - continue famotidine 20mg BID  - she is UTD with COVID vaccine and booster, and annual influenza vaccine.  - Action plan: prednisone 40mg x5 days    12/23/22 Treva López NP ()  From [facility]:  Had a breakout in hives after eating Pecan Pie, no history of food allergies. Would like testing.   Rash to wrist when wearing silicone apple watch band. Has changed wrist and rash follows.     From A&P:  Tinea corporis  Discussed keeping watch band clean and dry. Topical clotrimazole two times per day x 7-10 days. Clean band. Consider a break from wearing watch.     Intrinsic (allergic) eczema  Food allergy  Possible food allergy. Testing warranted.   Consider benadryl for skin symptoms.   Discussed red flags and  need for urgent follow-up.     Referred By: Ivelisse Fischer PA-C () [id number] [place] [address]     Allergy Tests:  Component      Latest Ref Rng 12/23/2022  3:39 PM 12/23/2022  4:29 PM   IGE      0 - 114 kU/L 133 (H)     Allergen [place]      <0.10 KU(A)/L <0.10  <0.10    Allergen Cashew      <0.10 KU(A)/L <0.10  <0.10    Allergen Fish(Cod)      <0.10 KU(A)/L <0.10     Allergen Egg White      <0.10 KU(A)/L <0.10     Allergen, Hazelnut      <0.10 KU(A)/L <0.10  <0.10    Allergen Milk      <0.10 KU(A)/L <0.10     Allergen Peanut      <0.10 KU(A)/L <0.10  <0.10    Allergen, Rufus      <0.10 KU(A)/L <0.10     Allergen Scallop      <0.10 KU(A)/L 0.14 (H)     Allergen, Sesame Seed      <0.10 KU(A)/L <0.10     Allergen Shrimp      <0.10 KU(A)/L <0.10     Allergen Soybean IgE      <0.10 KU(A)/L <0.10     Allergen Tuna      <0.10 KU(A)/L <0.10     Allergen, [place]      <0.10 KU(A)/L <0.10  <0.10    Allergen Wheat      <0.10 KU(A)/L <0.10     Allergen, Brazil Nut      <0.10 KU(A)/L  <0.10    Allergen, Macadamia Nut      <0.10 KU(A)/L  <0.10    Allergen Pecan      <0.10 KU(A)/L  <0.10    Allergen Pine Nut      <0.10 KU(A)/L  <0.10    Allergen Pistachio      <0.10 KU(A)/L  <0.10      Order for Future Allergy Testing:    [x] Outpatient  [] Inpatient: Hernandez..../ Bed ....       Skin Atopy (atopic dermatitis) [] Yes   [x] No .........  Contact allergies:   [x] Yes   [] No ..........previously to silicone Apple Watch band  Hand eczema:   [] Yes   [x] No           Leading hand:   [] R   [] L       [] Ambidextrous         Drug allergies:        [x] Yes   [] No  which?......years ago after taking Keflex, got a rash (she does not remember the details)    Urticaria/Angioedema  [] Yes   [x] No .........  Food Allergy:  [x] Yes   [] No  which?......see HPI  Pets :  [x] Yes   [] No  which?......see HPI       [x]  Rhinitis   [x] Conjunctivitis   [] Sinusitis   [] Polyposis   [] Otitis   [] Pharyngitis         []  Postnasal  drip    []  none  Operations:   [] Tonsils   [] Septum   [] Sinus   [] Polyposis        [x] Asthma bronchiale (recently diagnosed)  [] Coughing      []  none  Symptoms (mostly Rhinoconjunctivitis and Asthma) aggravated by:  Season   [] I   [] II   [] III   [] IV   []V   []VI   []VII   []VIII   []IX   []X   []XI   []XII     [x] perennial   Day time      [] morning   [] noon      [] evening        [] night    [] whole day........  [x]  none  Location/changes    [] inside        [] outside   [] mountains    [] sea     [] others.............   [x]  none  Triggers, specific     [x] animals     [] plants     [] dust              [] others ...........................    []  none  Triggers, others       [] work          [] psyche    [] sport            [] others .............................  [x]  none  Irritant                [] phys efforts [] smoke    [] heat/cold     [] odors  []others............... [x]  none    Order for PATCH TESTS  Reason for tests (suspected allergy): ***  Known previous allergies: ***  Standardized panels  [] Standard panel (40 tests)  [] Preservatives & Antimicrobials (31 tests)  [] Emulsifiers & Additives (25 tests)   [] Perfumes/Flavours & Plants (25 tests)  [] Hairdresser panel (12 tests)  [] Rubber Chemicals (22 tests)  [] Plastics (26 tests)  [] Colorants/Dyes/Food additives (20 tests)  [] Metals (implants/dental) (24 tests)  [] Local anaesthetics/NSAIDs (13 tests)  [] Antibiotics & Antimycotics (14 tests)   [] Corticosteroids (15 tests)   [] Photopatch test (62 tests)   [] others: ...      [] Patient's own products: ...  DO NOT test if chemical or biological identity is unknown!   always ask from patient the product information and safety sheets (MSDS)       Order for PRICK TESTS    Reason for tests (suspected allergy): perennial RC; asthma  Known previous allergies: scallops (in blood test)    Standardized prick panels  [x] Atopic panel (20 tests)  [] Pediatric Panel (12 tests)  [] Milk,  Meat, Eggs, Grains (20 tests)   [] Dust, Epithelia, Feathers (10 tests)  [x] Shellfish ONLY (17 tests)  [] Nuts, Beans (14 tests)  [] Spice, Vegetable, Fruit (17 tests)  [] Pollen Panel = Tree, Grass, Weed (24 tests)  [] Others: ...      [] Patient's own products: ...  DO NOT test if chemical or biological identity is unknown!   always ask from patient the product information and safety sheets (MSDS)     Standardized intradermal tests  [x] Penicillium notatum [x] Aspergillus fumigatus [] House dust mites D.far & D. pteron  [x] Cat    [x] dog  [] Others: ...  [] Bee venom   [] Wasp venom  !!Specific protocol with dilutions!!       Order for Drug allergy tests (prick & Intradermal & patch tests)    [] Penicillin G  [] Ampicillin [x] Cefazolin   [x] Ceftriaxone   [] Ceftazidime  [] Bactrim    [x] Others: cephalexin  Order for ... as test date    [] Patient needs consultation with Allergy team (changes of tests may apply)  [] Tests discussed with Allergy team (can have direct appointment for allergy tests)     Atopy Screen (Placed Apr 24, 2024)  No Substance Readings (15 min) Evaluation   POS Histamine 1mg/ml +/++    NEG NaCl 0.9% -      No Substance Readings (15 min) Evaluation   1 Alternaria alternata (tenuis)  -    2 Cladosporium herbarum -    3 Aspergillus fumigatus -    4 Penicillium notatum -    5 Dermatophagoides pteronyssinus +++    6 Dermatophagoides farinae +++    7 Dog epithelium (canis spp) -    8 Cat hair (piero catus) -    9 Cockroach   (Blatella americana & germanica) -    10 Grass mix midwest   (Mae, Orchard, Redtop, Tomas) -    11 Cory grass (sorghum halepense) -    12 Weed mix   (common Cocklebur, Lamb s quarters, rough redroot Pigweed, Dock/Sorrel) -    13 Mug wort (artemisia vulgare) -    14 Ragweed giant/short (ambrosia spp) ++    15 White birch (Betula papyrifera) -    16 Tree mix 1 (Pecan, Maple BHR, Oak RVW, american [place], black [place]) -    17 Red cedar (juniperus virginia) -    18  Tree mix 2   (white [place], river/red Birch, black [place], common [place], american Elm) -    19 Box elder/Maple mix (acer spp) -    20 [place] shagbark (carya ovata) -    Conclusion:    Fish and Seafood (Placed Apr 24, 2024)  No Substance Readings (15min) Evaluation   11 Crab (callinectes spp) -    12 Lobster (homarus americanus) -    13 Shrimp white/brown/pink (farfantepenaeus&litopenaeus) -    14 Kingcrab (paralithodes camtschatica) -    15 Scallop (placopecten magellanicus) -    16 Clam (mercenaria mercenaria) -    17 Oyster (cstrea/crassostrea) -    Conclusion: Blood test performed in 2022 was borderline. In patient history and with today's negative prick test, patient does not have a sensitization to shellfish.      Intradermal Testing (Placed Apr 24, 2024)  No Substance Conc.  Reading (15min)  immediate Papule [mm] / Erythema [mm] Reading   (... 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    ==> Treatment Plan:    >> Considering negative prick tests for shellfish, along with no signs for sensitization in history, she can proceed with eating shellfish as tolerated.    >> Informational handout  provided for house dust mite reduction.    >> Can resume antihistamine. Recommend Allegra 180 mg 1 tablet at bedtime.     >> For asthma, reviewed Symbicort and albuterol inhaler uses as prescribed by pulmonology. Recommend using Symbicort nightly when taking Allegra.    Procedures Performed: Allergy prick and IDT tests    Staff and Scribe:  Provider    Scribe Disclosure:   I, MARTY AGRAWAL, am serving as a scribe to document services personally performed by Sonu Alford MD based on data collection and the provider's statements to me.     Staff Physician Comments:  I was present with the scribe who participated in the documentation of the note. I have verified the history and personally performed the physical exam and medical decision making. I agree with the assessment and plan as documented in the note. I have reviewed and if necessary amended the note.      Sonu Alford MD  Professor  Head of Dermato-Allergy Division  Department of Dermatology  [facility]    Follow-up in Derm-Allergy clinic in 2 months    I spent a total of 37minutes with Prince Patel. This time was spent counseling the patient and/or coordinating care, explaining the allergy tests, performing allergy tests and assessing the clinical relevance.      Again, thank you for allowing me to participate in the care of your patient.        Sincerely,        Sonu Alford MD

## 2024-04-24 NOTE — PATIENT INSTRUCTIONS
House Dust Mite Allergy        The house dust mite is an arachnid about 0.3 mm in size and not visible to the naked eye. There are around 150 species of house dust mites in the world. One mite produces up to 40 fecal droppings a day. One teaspoonful of bedroom dust contains an average of nearly 1000 mites and 250,000 minute droppings.    Causes and triggers of house dust mite allergy  The house dust mite requires a warm, moist environment without light in order to live and reproduce. Our beds are ideal. The mite feeds on human and animal skin scales. The allergen is mainly contained in the mite's feces. The feces contain allergy-triggering constituents which are spread in fine dust, are breathed in and can cause an allergic reaction.    Symptoms  When the allergens come into contact with the mucous membranes in the eyes, nose, mouth and throat, sufferers develop symptoms typical of an allergic cold (allergic rhinitis) or an allergic inflammation of the conjunctiva (allergic conjunctivitis): blocked or runny nose, sneezing, red, itchy eyes. If all of these symptoms are present, then the condition is also known as rhinoconjunctivitis. Often, the upper respiratory tract becomes chronically inflamed, primarily because house dust mites are present all year round.  The symptoms of house dust mite allergy typically occur in the morning and are more frequent in the cold months of the year.    Therapy and treatment  As a first step, mattress, pillows and duvet/comforter should be placed in mite-proof or anti-mite covers, sometimes known as encasings. Alternatively you can use pillows or comforter that can be washed at over 130 F monthly. At the same time, house dust should be minimized. If necessary, the symptoms can be treated with medication, for example antihistamines in the form of nasal sprays, eye drops and tablets. Desensitization/specific immunotherapy (SIT) is recommended for house dust allergy if all the measures  "above are not sufficient.    Tips and tricks:  Keep room temperature at 66-70 F and relative air humidity at a maximum of 50%.  Ideally, thoroughly air your home two to three times a day for 5 to 10 minutes each time.  Wash bed linens in at least 130 F every week.  Remove stuffed animals or freeze them every other week.  Keep ceiling fans off in the bedroom as they can stir up dust mite allergens.  Remove dust from furniture with a damp cloth and regularly wet mop floors.  Do not put pot and hydroponic plants in the bedroom and also avoid putting too many in living areas, as they increase room humidity.  When staying overnight in other accommodations, we recommend taking your own bed linen and the above anti-mite mattress covers with you.  Remove upholstered furniture from the bedroom and consider removing the carpets. Ideally, use sealed parquet or laminate jeff, cork tiles or jeff made of wood, novilon or PVC.  Maybe additionally reduce dust mites in mattress, upholstery, or jeff using hot steam .      Modified from \"House Dust Mite Allergy\" by [employer].   "

## 2024-04-24 NOTE — NURSING NOTE
Chief Complaint   Patient presents with    Allergy Consult     Ref for J30.81 (ICD-10-CM) - Allergy to cats; J30.2 (ICD-10-CM) - Seasonal allergic rhinitis, unspecified trigger. Per pt, seasonal allergies, cats, and possible food allergies (blueberries, shellfish, etc). Year round nasal systems, daily antihistamines (none take for 7 days).         Iva Allen RN

## 2024-05-01 ENCOUNTER — OFFICE VISIT (OUTPATIENT)
Dept: FAMILY MEDICINE | Facility: CLINIC | Age: 49
End: 2024-05-01
Attending: PHYSICIAN ASSISTANT
Payer: COMMERCIAL

## 2024-05-01 VITALS
SYSTOLIC BLOOD PRESSURE: 136 MMHG | HEART RATE: 101 BPM | WEIGHT: 232 LBS | RESPIRATION RATE: 15 BRPM | OXYGEN SATURATION: 99 % | TEMPERATURE: 98.1 F | DIASTOLIC BLOOD PRESSURE: 83 MMHG | HEIGHT: 63 IN | BODY MASS INDEX: 41.11 KG/M2

## 2024-05-01 DIAGNOSIS — I10 BENIGN ESSENTIAL HYPERTENSION: ICD-10-CM

## 2024-05-01 DIAGNOSIS — R60.0 PERIPHERAL EDEMA: ICD-10-CM

## 2024-05-01 DIAGNOSIS — B07.0 PLANTAR WARTS: ICD-10-CM

## 2024-05-01 DIAGNOSIS — Z00.00 ENCOUNTER FOR MEDICARE ANNUAL WELLNESS EXAM: Primary | ICD-10-CM

## 2024-05-01 DIAGNOSIS — E66.01 MORBID OBESITY (H): ICD-10-CM

## 2024-05-01 DIAGNOSIS — R73.09 ELEVATED GLUCOSE: ICD-10-CM

## 2024-05-01 LAB — HBA1C MFR BLD: 5.8 % (ref 0–5.6)

## 2024-05-01 PROCEDURE — 83036 HEMOGLOBIN GLYCOSYLATED A1C: CPT | Performed by: PHYSICIAN ASSISTANT

## 2024-05-01 PROCEDURE — 17110 DESTRUCTION B9 LES UP TO 14: CPT | Performed by: PHYSICIAN ASSISTANT

## 2024-05-01 PROCEDURE — 36415 COLL VENOUS BLD VENIPUNCTURE: CPT | Performed by: PHYSICIAN ASSISTANT

## 2024-05-01 PROCEDURE — G0439 PPPS, SUBSEQ VISIT: HCPCS | Performed by: PHYSICIAN ASSISTANT

## 2024-05-01 PROCEDURE — 99213 OFFICE O/P EST LOW 20 MIN: CPT | Mod: 25 | Performed by: PHYSICIAN ASSISTANT

## 2024-05-01 RX ORDER — AMLODIPINE BESYLATE 10 MG/1
10 TABLET ORAL DAILY
Qty: 90 TABLET | Refills: 3 | Status: SHIPPED | OUTPATIENT
Start: 2024-05-01 | End: 2024-05-21 | Stop reason: DRUGHIGH

## 2024-05-01 SDOH — HEALTH STABILITY: PHYSICAL HEALTH: ON AVERAGE, HOW MANY DAYS PER WEEK DO YOU ENGAGE IN MODERATE TO STRENUOUS EXERCISE (LIKE A BRISK WALK)?: 4 DAYS

## 2024-05-01 SDOH — HEALTH STABILITY: PHYSICAL HEALTH: ON AVERAGE, HOW MANY MINUTES DO YOU ENGAGE IN EXERCISE AT THIS LEVEL?: 60 MIN

## 2024-05-01 ASSESSMENT — SOCIAL DETERMINANTS OF HEALTH (SDOH)
HOW OFTEN DO YOU ATTENT MEETINGS OF THE CLUB OR ORGANIZATION YOU BELONG TO?: NEVER
HOW OFTEN DO YOU GET TOGETHER WITH FRIENDS OR RELATIVES?: ONCE A WEEK
IN A TYPICAL WEEK, HOW MANY TIMES DO YOU TALK ON THE PHONE WITH FAMILY, FRIENDS, OR NEIGHBORS?: ONCE A WEEK
HOW OFTEN DO YOU ATTEND CHURCH OR RELIGIOUS SERVICES?: MORE THAN 4 TIMES PER YEAR
ARE YOU MARRIED, WIDOWED, DIVORCED, SEPARATED, NEVER MARRIED, OR LIVING WITH A PARTNER?: NEVER MARRIED
DO YOU BELONG TO ANY CLUBS OR ORGANIZATIONS SUCH AS CHURCH GROUPS UNIONS, FRATERNAL OR ATHLETIC GROUPS, OR SCHOOL GROUPS?: NO
HOW OFTEN DO YOU GET TOGETHER WITH FRIENDS OR RELATIVES?: ONCE A WEEK

## 2024-05-01 ASSESSMENT — LIFESTYLE VARIABLES
SKIP TO QUESTIONS 9-10: 1
HOW OFTEN DO YOU HAVE SIX OR MORE DRINKS ON ONE OCCASION: NEVER
HOW OFTEN DO YOU HAVE A DRINK CONTAINING ALCOHOL: NEVER
AUDIT-C TOTAL SCORE: 0
HOW MANY STANDARD DRINKS CONTAINING ALCOHOL DO YOU HAVE ON A TYPICAL DAY: PATIENT DOES NOT DRINK

## 2024-05-01 ASSESSMENT — PAIN SCALES - GENERAL: PAINLEVEL: MILD PAIN (2)

## 2024-05-01 NOTE — PROGRESS NOTES
Preventive Care Visit  [facility]  Radu Gordon PA-C, Family Medicine  May 1, 2024      Assessment & Plan     Encounter for Medicare annual wellness exam  Stable wellness.  Discussed weight.  Patient has goals to increase exercise and continue healthy diet.  Goal is to be under 200 in approximately 13 months.  Of note, A1c is pending.  Recent fasting glucose was 143.  Discussed weight loss medications which patient is interested in.  However, her insurance will not cover these for weight management.  But, she by chance has type 2 diabetes Ozempic or Mounjaro could be considered.  Will await results of her A1c and will follow-up with these.    Benign essential hypertension  Stable on current regimen.  Patient admits to increased swelling in her ankles over the last couple of months.  This is likely due to her increased dose of her amlodipine.  Her blood pressure is controlled on this.  The edema is not bothering patient.  Will continue for now but if any pain or concerns with this edema persists in the future consider reducing the amlodipine dose and starting additional medication  - amLODIPine (NORVASC) 10 MG tablet; Take 1 tablet (10 mg) by mouth daily  Medication use and side effects discussed with the patient. Patient is in complete understanding and agreement with plan.     Elevated glucose  As above  - Hemoglobin A1c; Future    Morbid obesity (H)  As above    Plantar warts  Patient noticed this recently on the bottom of her foot and would like it treated  - DESTRUCT BENIGN LESION, UP TO 14  SUBJECTIVE: 48 year old female complains of warts.   They have been present for 1 month(s).    OBJECTIVE: 1 wart(s) noted on the foot right. Size range is 1/6 cm    ASSESSMENT: Warts (Verruca Vulgaris)    PLAN: The viral etiology and natural history has been discussed.   Various treatment methods, side effects and failure rates have been   discussed.  A choice of liquid nitrogen was made,  and the expected   blistering or scabbing reaction explained.  Liquid nitrogen was   applied to 1 wart(s);  the patient will return at 2-4 week intervals   for retreatments as needed.     Peripheral edema  As above    Patient has been advised of split billing requirements and indicates understanding: Yes          Counseling  Appropriate preventive services were discussed with this patient, including applicable screening as appropriate for fall prevention, nutrition, physical activity, Tobacco-use cessation, weight loss and cognition.  Checklist reviewing preventive services available has been given to the patient.  Reviewed patient's diet, addressing concerns and/or questions.   Discussed possible causes of fatigue.       Subjective   Petr Tolbert is a 48 year old, presenting for the following:  Annual Visit        5/1/2024     2:20 PM   Additional Questions   Roomed by Doretha Rashid         Health Care Directive  Patient does not have a Health Care Directive or Living Will: Discussed advance care planning with patient; however, patient declined at this time.    HPI        Patient has a history hypertension and continues her current medications without side effects.  Her amlodipine was increased from 5 to 10 mg.  She states is tolerating well without any side effects but needs refill of this.  Note, patient has noticed increased ankle swelling over the last 1 to 2 months.  No pain or redness or concerns.  She is wondering why this could be.    Patient also has a history of migraines.  She states she does not need a refill of her Maxalt.  Denies refill today.      5/1/2024   General Health   How would you rate your overall physical health? Good   Feel stress (tense, anxious, or unable to sleep) Not at all    Not at all         5/1/2024   Nutrition   Diet: Regular (no restrictions)         5/1/2024   Exercise   Days per week of moderate/strenous exercise 4 days    4 days   Average minutes spent exercising at this level  60 min    60 min         5/1/2024   Social Factors   Frequency of gathering with friends or relatives Once a week    Once a week   Worry food won't last until get money to buy more No    No   Food not last or not have enough money for food? No    No   Do you have housing?  Yes    Yes   Are you worried about losing your housing? No    No   Lack of transportation? No    No   Unable to get utilities (heat,electricity)? No    No         5/1/2024   Fall Risk   Fallen 2 or more times in the past year? No   Trouble with walking or balance? No          5/1/2024   Activities of Daily Living- Home Safety   Needs help with the following daily activites None of the above   Safety concerns in the home None of the above         5/1/2024   Dental   Dentist two times every year? Yes         5/1/2024   Hearing Screening   Hearing concerns? None of the above         5/1/2024   Driving Risk Screening   Patient/family members have concerns about driving No         5/1/2024   General Alertness/Fatigue Screening   Have you been more tired than usual lately? (!) YES         5/1/2024   Urinary Incontinence Screening   Bothered by leaking urine in past 6 months No         5/1/2024   TB Screening   Were you born outside of the [place]? No         Today's PHQ-2 Score:       5/1/2024     1:45 PM   PHQ-2 ( 1999 [employer])   Q1: Little interest or pleasure in doing things 0   Q2: Feeling down, depressed or hopeless 0   PHQ-2 Score 0   Q1: Little interest or pleasure in doing things Not at all   Q2: Feeling down, depressed or hopeless Not at all   PHQ-2 Score 0           5/1/2024   Substance Use   Alcohol more than 3/day or more than 7/wk No   Do you have a current opioid prescription? No   How severe/bad is pain from 1 to 10? 5/10   Do you use any other substances recreationally? No     Social History     Tobacco Use    Smoking status: Never     Passive exposure: Never    Smokeless tobacco: Never   Vaping Use    Vaping status: Never Used   Substance Use  Topics    Alcohol use: No    Drug use: No             5/1/2024   Breast Cancer Screening   Family history of breast, colon, or ovarian cancer? No / Unknown         1/12/2024   LAST FHS-7 RESULTS   1st degree relative breast or ovarian cancer Unknown   Any relative bilateral breast cancer Unknown   Any male have breast cancer Unknown   Any ONE woman have BOTH breast AND ovarian cancer Unknown   Any woman with breast cancer before 50yrs Unknown   2 or more relatives with breast AND/OR ovarian cancer Unknown   2 or more relatives with breast AND/OR bowel cancer Unknown        Mammogram Screening - Mammogram every 1-2 years updated in Health Maintenance based on mutual decision making      History of abnormal Pap smear: NO - age 30-65 PAP every 5 years with negative HPV co-testing recommended        Latest Ref Rng & Units 2/18/2022     8:34 AM 2/15/2019     9:35 AM 2/15/2019     9:30 AM   PAP / HPV   PAP  Negative for Intraepithelial Lesion or Malignancy (NILM)      PAP (Historical)   NIL     HPV 16 DNA Negative Negative   Negative    HPV 18 DNA Negative Negative   Negative    Other HR HPV Negative Negative   Negative      ASCVD Risk   The ASCVD Risk score (Ben HUERTA, et al., 2019) failed to calculate for the following reasons:    The valid total cholesterol range is 130 to 320 mg/dL        Reviewed and updated as needed this visit by Provider   Tobacco  Allergies  Meds  Problems  Med Hx  Surg Hx  Fam Hx            Past Medical History:   Diagnosis Date    Allergic rhinitis, cause unspecified     Allergic rhinitis and blueberries    Congestive heart failure (H)     Intertrigo 11/17/2014    Migraine with aura and without status migrainosus, not intractable 7/18/2016    Migraine, unspecified, without mention of intractable migraine without mention of status migrainosus     Migraine    NONSPECIFIC MEDICAL HISTORY     learning disability, mild MR, ADD?    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)    Other acne     Sprain and strain of unspecified site of knee and leg 1/24/2008     Past Surgical History:   Procedure Laterality Date    APPENDECTOMY      CARDIAC SURGERY      COLONOSCOPY N/A 8/3/2018    Procedure: COLONOSCOPY;  colonoscopy;  Surgeon: Isaias Medina MD;  Location: [facility]    LAPAROSCOPIC HYSTERECTOMY SUPRACERVICAL  11/16/2011    Procedure:LAPAROSCOPIC HYSTERECTOMY SUPRACERVICAL; LAPAROSCOPIC HYSTERECTOMY SUPRACERVICAL ; Surgeon:MASOUD WHITE; Location:[facility] OR    SURGICAL HISTORY OF -       ingrown toenails removed    toenail removal       Current providers sharing in care for this patient include:  Patient Care Team:  Ivelisse Fischer PA-C as PCP - General (Family Medicine)  Merly Kelly NP as Nurse Practitioner (Pulmonary Disease)  Claudia Florian MD as Assigned PCP  Merly Kelly NP as Assigned Pulmonology Provider  Sonu Alford MD as MD (Dermatology)  Ivelisse Fischer PA-C as Physician Assistant (Family Medicine)    The following health maintenance items are reviewed in Epic and correct as of today:  Health Maintenance   Topic Date Due    ASTHMA ACTION PLAN  Never done    Pneumococcal Vaccine: Pediatrics (0 to 5 Years) and At-Risk Patients (6 to 64 Years) (1 of 2 - PCV) Never done    HEPATITIS B IMMUNIZATION (1 of 3 - 19+ 3-dose series) Never done    ANNUAL REVIEW OF HM ORDERS  05/30/2024    ASTHMA CONTROL TEST  09/13/2024    MEDICARE ANNUAL WELLNESS VISIT  05/01/2025    MAMMO SCREENING  01/12/2026    HPV TEST  02/18/2027    PAP  02/18/2027    GLUCOSE  03/13/2027    COLORECTAL CANCER SCREENING  08/03/2028    LIPID  03/13/2029    ADVANCE CARE PLANNING  05/01/2029    DTAP/TDAP/TD IMMUNIZATION (4 - Td or Tdap) 03/29/2032    MIGRAINE ACTION PLAN  Completed    PHQ-2 (once per calendar year)  Completed    INFLUENZA VACCINE  Completed    COVID-19 Vaccine  Completed    IPV IMMUNIZATION  Aged Out    HPV IMMUNIZATION  Aged Out    MENINGITIS  "IMMUNIZATION  Aged Out    RSV MONOCLONAL ANTIBODY  Aged Out    HEPATITIS C SCREENING  Discontinued    HIV SCREENING  Discontinued          Objective    Exam  /83 (BP Location: Right arm, Patient Position: Sitting, Cuff Size: Adult Large)   Pulse 101   Temp 98.1  F (36.7  C) (Oral)   Resp 15   Ht 1.6 m (5' 3\")   Wt 105.2 kg (232 lb)   LMP 10/31/2011   SpO2 99%   Breastfeeding No   BMI 41.10 kg/m     Estimated body mass index is 41.1 kg/m  as calculated from the following:    Height as of this encounter: 1.6 m (5' 3\").    Weight as of this encounter: 105.2 kg (232 lb).    Physical Exam  GENERAL: alert, no distress, and obese  EYES: Eyes grossly normal to inspection, PERRL and conjunctivae and sclerae normal  HENT: ear canals and TM's normal, nose and mouth without ulcers or lesions  NECK: no adenopathy, no asymmetry, masses, or scars  RESP: lungs clear to auscultation - no rales, rhonchi or wheezes  BREAST: normal without masses, tenderness or nipple discharge and no palpable axillary masses or adenopathy  CV: regular rate and rhythm, normal S1 S2, no S3 or S4, no murmur, click or rub, no peripheral edema  ABDOMEN: soft, nontender, no hepatosplenomegaly, no masses and bowel sounds normal  MS: no gross musculoskeletal defects noted, no edema  SKIN: Plantar wart noted on right plantar heel.  Otherwise, no suspicious lesions or rashes  NEURO: Normal strength and tone, mentation intact and speech normal  PSYCH: mentation appears normal, affect normal/bright  LYMPH: no cervical adenopathy        5/1/2024   Mini Cog   Clock Draw Score 2 Normal   3 Item Recall 2 objects recalled   Mini Cog Total Score 4         Signed Electronically by: Radu Gordon PA-C    "

## 2024-05-01 NOTE — PATIENT INSTRUCTIONS
Preventive Care Advice   This is general advice given by our system to help you stay healthy. However, your care team may have specific advice just for you. Please talk to your care team about your preventive care needs.  Nutrition  Eat 5 or more servings of fruits and vegetables each day.  Try wheat bread, brown rice and whole grain pasta (instead of white bread, rice, and pasta).  Get enough calcium and vitamin D. Check the label on foods and aim for 100% of the RDA (recommended daily allowance).  Lifestyle  Exercise at least 150 minutes each week   (30 minutes a day, 5 days a week).  Do muscle strengthening activities 2 days a week. These help control your weight and prevent disease.  No smoking.  Wear sunscreen to prevent skin cancer.  Have a dental exam and cleaning every 6 months.  Yearly exams  See your health care team every year to talk about:  Any changes in your health.  Any medicines your care team has prescribed.  Preventive care, family planning, and ways to prevent chronic diseases.  Shots (vaccines)   HPV shots (up to age 26), if you've never had them before.  Hepatitis B shots (up to age 59), if you've never had them before.  COVID-19 shot: Get this shot when it's due.  Flu shot: Get a flu shot every year.  Tetanus shot: Get a tetanus shot every 10 years.  Pneumococcal, hepatitis A, and RSV shots: Ask your care team if you need these based on your risk.  Shingles shot (for age 50 and up).  General health tests  Diabetes screening:  Starting at age 35, Get screened for diabetes at least every 3 years.  If you are younger than age 35, ask your care team if you should be screened for diabetes.  Cholesterol test: At age 39, start having a cholesterol test every 5 years, or more often if advised.  Bone density scan (DEXA): At age 50, ask your care team if you should have this scan for osteoporosis (brittle bones).  Hepatitis C: Get tested at least once in your life.  STIs (sexually transmitted  infections)  Before age 24: Ask your care team if you should be screened for STIs.  After age 24: Get screened for STIs if you're at risk. You are at risk for STIs (including HIV) if:  You are sexually active with more than one person.  You don't use condoms every time.  You or a partner was diagnosed with a sexually transmitted infection.  If you are at risk for HIV, ask about PrEP medicine to prevent HIV.  Get tested for HIV at least once in your life, whether you are at risk for HIV or not.  Cancer screening tests  Cervical cancer screening: If you have a cervix, begin getting regular cervical cancer screening tests at age 21. Most people who have regular screenings with normal results can stop after age 65. Talk about this with your provider.  Breast cancer scan (mammogram): If you've ever had breasts, begin having regular mammograms starting at age 40. This is a scan to check for breast cancer.  Colon cancer screening: It is important to start screening for colon cancer at age 45.  Have a colonoscopy test every 10 years (or more often if you're at risk) Or, ask your provider about stool tests like a FIT test every year or Cologuard test every 3 years.  To learn more about your testing options, visit: https://www.[employer]/506102.pdf.  For help making a decision, visit: https://bit.ly/qa27035.  Prostate cancer screening test: If you have a prostate and are age 55 to 69, ask your provider if you would benefit from a yearly prostate cancer screening test.  Lung cancer screening: If you are a current or former smoker age 50 to 80, ask your care team if ongoing lung cancer screenings are right for you.  For informational purposes only. Not to replace the advice of your health care provider. Copyright   2023 [employer]. All rights reserved. Clinically reviewed by the [facility]itions Program. [employer] 289349 - REV 01/24.    Learning About Sleeping Well  What does sleeping well mean?      Sleeping well means getting enough sleep to feel good and stay healthy. How much sleep is enough varies among people.  The number of hours you sleep and how you feel when you wake up are both important. If you do not feel refreshed, you probably need more sleep. Another sign of not getting enough sleep is feeling tired during the day.  Experts recommend that adults get at least 7 or more hours of sleep per day. Children and older adults need more sleep.  Why is getting enough sleep important?  Getting enough quality sleep is a basic part of good health. When your sleep suffers, your physical health, mood, and your thoughts can suffer too. You may find yourself feeling more grumpy or stressed. Not getting enough sleep also can lead to serious problems, including injury, accidents, anxiety, and depression.  What might cause poor sleeping?  Many things can cause sleep problems, including:  Changes to your sleep schedule.  Stress. Stress can be caused by fear about a single event, such as giving a speech. Or you may have ongoing stress, such as worry about work or school.  Depression, anxiety, and other mental or emotional conditions.  Changes in your sleep habits or surroundings. This includes changes that happen where you sleep, such as noise, light, or sleeping in a different bed. It also includes changes in your sleep pattern, such as having jet lag or working a late shift.  Health problems, such as pain, breathing problems, and restless legs syndrome.  Lack of regular exercise.  Using alcohol, nicotine, or caffeine before bed.  How can you help yourself?  Here are some tips that may help you sleep more soundly and wake up feeling more refreshed.  Your sleeping area   Use your bedroom only for sleeping and sex. A bit of light reading may help you fall asleep. But if it doesn't, do your reading elsewhere in the house. Try not to use your TV, computer, smartphone, or tablet while you are in bed.  Be sure your bed is  "big enough to stretch out comfortably, especially if you have a sleep partner.  Keep your bedroom quiet, dark, and cool. Use curtains, blinds, or a sleep mask to block out light. To block out noise, use earplugs, soothing music, or a \"white noise\" machine.  Your evening and bedtime routine   Create a relaxing bedtime routine. You might want to take a warm shower or bath, or listen to soothing music.  Go to bed at the same time every night. And get up at the same time every morning, even if you feel tired.  What to avoid   Limit caffeine (coffee, tea, caffeinated sodas) during the day, and don't have any for at least 6 hours before bedtime.  Avoid drinking alcohol before bedtime. Alcohol can cause you to wake up more often during the night.  Try not to smoke or use tobacco, especially in the evening. Nicotine can keep you awake.  Limit naps during the day, especially close to bedtime.  Avoid lying in bed awake for too long. If you can't fall asleep or if you wake up in the middle of the night and can't get back to sleep within about 20 minutes, get out of bed and go to another room until you feel sleepy.  Avoid taking medicine right before bed that may keep you awake or make you feel hyper or energized. Your doctor can tell you if your medicine may do this and if you can take it earlier in the day.  If you can't sleep   Imagine yourself in a peaceful, pleasant scene. Focus on the details and feelings of being in a place that is relaxing.  Get up and do a quiet or boring activity until you feel sleepy.  Avoid drinking any liquids before going to bed to help prevent waking up often to use the bathroom.  Where can you learn more?  Go to https://www.healthFlossonic.net/patiented  Enter J942 in the search box to learn more about \"Learning About Sleeping Well.\"  Current as of: July 10, 2023               Content Version: 14.0    1598-2149 [employer].   Care instructions adapted under license by your healthcare " professional. If you have questions about a medical condition or this instruction, always ask your healthcare professional. [employer] disclaims any warranty or liability for your use of this information.

## 2024-05-19 ENCOUNTER — MYC MEDICAL ADVICE (OUTPATIENT)
Dept: FAMILY MEDICINE | Facility: CLINIC | Age: 49
End: 2024-05-19
Payer: COMMERCIAL

## 2024-05-21 ENCOUNTER — OFFICE VISIT (OUTPATIENT)
Dept: INTERNAL MEDICINE | Facility: CLINIC | Age: 49
End: 2024-05-21
Payer: COMMERCIAL

## 2024-05-21 VITALS
HEART RATE: 104 BPM | SYSTOLIC BLOOD PRESSURE: 140 MMHG | TEMPERATURE: 97.6 F | DIASTOLIC BLOOD PRESSURE: 84 MMHG | OXYGEN SATURATION: 98 % | BODY MASS INDEX: 41.15 KG/M2 | WEIGHT: 232.3 LBS | RESPIRATION RATE: 18 BRPM

## 2024-05-21 DIAGNOSIS — R60.0 PERIPHERAL EDEMA: ICD-10-CM

## 2024-05-21 DIAGNOSIS — I10 BENIGN ESSENTIAL HYPERTENSION: Primary | ICD-10-CM

## 2024-05-21 PROCEDURE — 99214 OFFICE O/P EST MOD 30 MIN: CPT | Performed by: PHYSICIAN ASSISTANT

## 2024-05-21 RX ORDER — LOSARTAN POTASSIUM 25 MG/1
25 TABLET ORAL DAILY
Qty: 30 TABLET | Refills: 1 | Status: SHIPPED | OUTPATIENT
Start: 2024-05-21 | End: 2024-06-03 | Stop reason: DRUGHIGH

## 2024-05-21 RX ORDER — AMLODIPINE BESYLATE 5 MG/1
5 TABLET ORAL DAILY
Qty: 90 TABLET | Refills: 1 | Status: SHIPPED | OUTPATIENT
Start: 2024-05-21 | End: 2024-06-03 | Stop reason: ALTCHOICE

## 2024-05-21 NOTE — PATIENT INSTRUCTIONS
Decrease amlodipine to 5 mg daily  Add new blood pressure medication losartan 25 mg daily  Follow up with primary office in 3 - 4 weeks for recheck of blood pressure

## 2024-05-21 NOTE — PROGRESS NOTES
Assessment & Plan     Benign essential hypertension  Decrease norvasc   And start additional bp med  - amLODIPine (NORVASC) 5 MG tablet; Take 1 tablet (5 mg) by mouth daily  - losartan (COZAAR) 25 MG tablet; Take 1 tablet (25 mg) by mouth daily    Peripheral edema  Reviewed treatment,   Elevated legs and changes in BP meds            See Patient Instructions    Subjective   Petr Tolbert is a 48 year old, presenting for the following health issues:  Hypertension and Musculoskeletal Problem      5/21/2024     2:10 PM   Additional Questions   Roomed by Leonila   Accompanied by Self     Musculoskeletal Problem    History of Present Illness       Reason for visit:  Swellon feet and ankles and injured achilis tendon    She eats 2-3 servings of fruits and vegetables daily.She consumes 1 sweetened beverage(s) daily.She exercises with enough effort to increase her heart rate 20 to 29 minutes per day.  She exercises with enough effort to increase her heart rate 4 days per week.   She is taking medications regularly.     Patient with concerns about swelling in feet and ankles  See epic message from yesterday.   Concerns about this at last office visit with PCP office  Plan to monitor if not improving then to change bp med dosage etc.                   Objective    BP (!) 140/84   Pulse 104   Temp 97.6  F (36.4  C) (Temporal)   Resp 18   Wt 105.4 kg (232 lb 4.8 oz)   LMP 10/31/2011   SpO2 98%   BMI 41.15 kg/m    Body mass index is 41.15 kg/m .  Physical Exam   GENERAL: alert and no distress  RESP: lungs clear to auscultation - no rales, rhonchi or wheezes  CV: regular rates and rhythm and normal S1 S2, no S3 or S4  MS: nonpitting edema noted ankles and top of feet today             Signed Electronically by: Katlyn Bonilla PA-C

## 2024-05-22 ENCOUNTER — MYC REFILL (OUTPATIENT)
Dept: FAMILY MEDICINE | Facility: CLINIC | Age: 49
End: 2024-05-22
Payer: COMMERCIAL

## 2024-05-22 DIAGNOSIS — J30.2 SEASONAL ALLERGIC RHINITIS, UNSPECIFIED TRIGGER: ICD-10-CM

## 2024-05-22 RX ORDER — LORATADINE 10 MG/1
10 TABLET ORAL DAILY
Qty: 30 TABLET | Refills: 11 | Status: SHIPPED | OUTPATIENT
Start: 2024-05-22

## 2024-06-03 ENCOUNTER — OFFICE VISIT (OUTPATIENT)
Dept: FAMILY MEDICINE | Facility: CLINIC | Age: 49
End: 2024-06-03
Attending: PHYSICIAN ASSISTANT
Payer: COMMERCIAL

## 2024-06-03 VITALS
HEART RATE: 98 BPM | HEIGHT: 63 IN | SYSTOLIC BLOOD PRESSURE: 138 MMHG | DIASTOLIC BLOOD PRESSURE: 84 MMHG | OXYGEN SATURATION: 100 % | WEIGHT: 231 LBS | TEMPERATURE: 98.2 F | BODY MASS INDEX: 40.93 KG/M2 | RESPIRATION RATE: 14 BRPM

## 2024-06-03 DIAGNOSIS — M25.571 PAIN IN JOINT, ANKLE AND FOOT, RIGHT: Primary | ICD-10-CM

## 2024-06-03 DIAGNOSIS — J45.40 MODERATE PERSISTENT ASTHMA WITHOUT COMPLICATION: ICD-10-CM

## 2024-06-03 DIAGNOSIS — I10 BENIGN ESSENTIAL HYPERTENSION: ICD-10-CM

## 2024-06-03 DIAGNOSIS — K21.00 GASTROESOPHAGEAL REFLUX DISEASE WITH ESOPHAGITIS WITHOUT HEMORRHAGE: ICD-10-CM

## 2024-06-03 PROCEDURE — 99214 OFFICE O/P EST MOD 30 MIN: CPT

## 2024-06-03 PROCEDURE — 80048 BASIC METABOLIC PNL TOTAL CA: CPT

## 2024-06-03 PROCEDURE — 36415 COLL VENOUS BLD VENIPUNCTURE: CPT

## 2024-06-03 RX ORDER — BUDESONIDE AND FORMOTEROL FUMARATE DIHYDRATE 80; 4.5 UG/1; UG/1
2 AEROSOL RESPIRATORY (INHALATION) 2 TIMES DAILY
Qty: 10.2 G | Refills: 11 | Status: SHIPPED | OUTPATIENT
Start: 2024-06-03

## 2024-06-03 RX ORDER — LOSARTAN POTASSIUM 50 MG/1
50 TABLET ORAL DAILY
Qty: 90 TABLET | Refills: 1 | Status: SHIPPED | OUTPATIENT
Start: 2024-06-03

## 2024-06-03 RX ORDER — FAMOTIDINE 20 MG/1
20 TABLET, FILM COATED ORAL 2 TIMES DAILY
Qty: 180 TABLET | Refills: 3 | Status: SHIPPED | OUTPATIENT
Start: 2024-06-03

## 2024-06-03 RX ORDER — ALBUTEROL SULFATE 90 UG/1
2 AEROSOL, METERED RESPIRATORY (INHALATION) EVERY 6 HOURS
Qty: 18 G | Refills: 11 | Status: SHIPPED | OUTPATIENT
Start: 2024-06-03

## 2024-06-03 ASSESSMENT — PAIN SCALES - GENERAL: PAINLEVEL: MODERATE PAIN (4)

## 2024-06-03 NOTE — PROGRESS NOTES
Assessment & Plan     (M25.571) Pain in joint, ankle and foot, right  (primary encounter diagnosis)  Imaging of the right ankle and foot. Physical therapy referral given. Ankle brace given in clinic today for comfort/support. Follow up if not improving or worsening.   Plan: XR Foot Right G/E 3 Views, XR Ankle Right G/E 3        Views, Physical Therapy  Referral,         Ankle/Foot Bracing Supplies Order Ankle Brace;         Right    (I10) Benign essential hypertension  Continues to have mild lower extremity edema. Discussed discontinuation of amlodipine and increase in losartan. She is in agreement with this plan. Will increase losartan from 25 mg to 50 mg daily. Check BMP today with recent blood pressure medication changes. No new side effects of the medication. Refill provided.  Plan: losartan (COZAAR) 50 MG tablet, Basic metabolic        panel  (Ca, Cl, CO2, Creat, Gluc, K, Na, BUN)          (K21.00) Gastroesophageal reflux disease with esophagitis without hemorrhage  Well controlled with use of famotidine twice daily. No new side effects of the medication. Refill provided.  Plan: famotidine (PEPCID) 20 MG tablet          (J45.40) Moderate persistent asthma without complication  Well controlled with use of Symbicort inhaler. Has albuterol inhaler for PRN use. No new side effects of the medication. Refill provided.  Plan: albuterol (PROAIR HFA/PROVENTIL HFA/VENTOLIN         HFA) 108 (90 Base) MCG/ACT inhaler,         budesonide-formoterol (SYMBICORT) 80-4.5         MCG/ACT Inhaler           Follow up in 3 months for recheck; sooner with any acute concerns.     Subjective   Petr Tolbert is a 49 year old, presenting for the following health issues:  Hypertension        6/3/2024     7:50 AM   Additional Questions   Roomed by Doretha Rashid     History of Present Illness       Hypertension: She presents for follow up of hypertension.  She does check blood pressure  regularly outside of the clinic. Outside  "blood pressures have been over 140/90. She does not follow a low salt diet.     She eats 2-3 servings of fruits and vegetables daily.She consumes 1 sweetened beverage(s) daily.She exercises with enough effort to increase her heart rate 30 to 60 minutes per day.  She exercises with enough effort to increase her heart rate 4 days per week.   She is taking medications regularly.     Medication Followup of Amlodipine (NORVASC) 5 MG  Taking Medication as prescribed: yes  Side Effects:  Swelling   Medication Helping Symptoms:  yes    Medication Followup of Losartan (COZAAR) 25 MG  Taking Medication as prescribed: yes  Side Effects:  None  Medication Helping Symptoms:  yes      Pain History:  When did you first notice your pain? 4 weeks   Have you seen anyone else for your pain? No  How has your pain affected your ability to work? Pain does not limit ability to work   What type of work do you or did you do?    Where in your body do you have pain?  Right ankle       Review of Systems  Constitutional, HEENT, cardiovascular, pulmonary, gi and gu systems are negative, except as otherwise noted.        Objective    /84 (BP Location: Right arm, Patient Position: Sitting, Cuff Size: Adult Large)   Pulse 98   Temp 98.2  F (36.8  C) (Oral)   Resp 14   Ht 1.6 m (5' 3\")   Wt 104.8 kg (231 lb)   LMP 10/31/2011   SpO2 100%   Breastfeeding No   BMI 40.92 kg/m    Body mass index is 40.92 kg/m .  Physical Exam   GENERAL: alert and no distress  RESP: lungs clear to auscultation - no rales, rhonchi or wheezes  CV: regular rate and rhythm, normal S1 S2, no S3 or S4, no murmur, click or rub  MS: no swelling or bruising of the right ankle, normal ROM, pain with palpation of the posterior right ankle      Signed Electronically by: Ivelisse Fischer PA-C    "

## 2024-06-04 ENCOUNTER — ANCILLARY PROCEDURE (OUTPATIENT)
Dept: GENERAL RADIOLOGY | Facility: CLINIC | Age: 49
End: 2024-06-04
Payer: COMMERCIAL

## 2024-06-04 DIAGNOSIS — M25.571 PAIN IN JOINT, ANKLE AND FOOT, RIGHT: ICD-10-CM

## 2024-06-04 LAB
ANION GAP SERPL CALCULATED.3IONS-SCNC: 9 MMOL/L (ref 7–15)
BUN SERPL-MCNC: 8.9 MG/DL (ref 6–20)
CALCIUM SERPL-MCNC: 9 MG/DL (ref 8.6–10)
CHLORIDE SERPL-SCNC: 104 MMOL/L (ref 98–107)
CREAT SERPL-MCNC: 0.84 MG/DL (ref 0.51–0.95)
DEPRECATED HCO3 PLAS-SCNC: 24 MMOL/L (ref 22–29)
EGFRCR SERPLBLD CKD-EPI 2021: 86 ML/MIN/1.73M2
GLUCOSE SERPL-MCNC: 100 MG/DL (ref 70–99)
POTASSIUM SERPL-SCNC: 4.4 MMOL/L (ref 3.4–5.3)
SODIUM SERPL-SCNC: 137 MMOL/L (ref 135–145)

## 2024-06-04 PROCEDURE — 73610 X-RAY EXAM OF ANKLE: CPT | Mod: TC | Performed by: RADIOLOGY

## 2024-06-05 NOTE — PROGRESS NOTES
[facility] Dermato-allergology Note  Office visit  Encounter Date: Jun 7, 2024  ____________________________________________    CC: Allergy Testing Followup (Follow up after HDM reduction. Did HDM reduction, symptoms still present but are improved since last visit.)      HPI:  (Jun 7, 2024)  Ms. Prince Patel is a(n) 49 year old female who presents today for follow-up  for allergy consultation  - Follow-up in Derm-Allergy clinic in 2 months  - Did HDM reduction as recommended  - Symptoms still persist, but improved since last visit  - Reviewed details of Keflex reaction: only had a rash on the arm  - Otherwise feeling well in usual state of health    Physical Exam:  General: In no acute distress, well-developed, well-nourished  Eyes: no conjunctivitis  ENT: no signs of rhinitis   Pulmonary: no wheezing or coughing  Skin: no active eczematous skin lesions on visible skin    Earlier History and Allergy Exams:  (Apr 24, 2024)  New patient for allergy consultation  - Referred by LUKE Kebede () for testing for cat allergy and seasonal allergic rhinitis with unspecific trigger  - Stopped antihistamines x 7 days; usually alternates between Claritin and Allegra  - Does a lot of  for cats and dogs; wants to get tested to ensure she does not have severe allergies to cats, and she gets stuffy nose and watery eyes occasionally (happens more around longhaired cats)  - Does not have animals at home; mother has a dog and she visits often; she does not have a reaction to that dog  - Has perennial allergies with stuffy nose and red eyes  - Anytime she eats anything containing with blueberries, she gets diffuse itchiness  - Over the summer, she had a blood test that showed she is allergic to shellfish  - Never has been a fan of fish and doesn't eat it often  - Eats fish without issue  - Has mild reaction to apples    Past Medical History:   Patient Active Problem List   Diagnosis     CARDIOVASCULAR SCREENING; LDL GOAL LESS THAN 160    S/P partial hysterectomy    Intertrigo    Migraine with aura and without status migrainosus, not intractable    Chronic non-seasonal allergic rhinitis, unspecified trigger    Morbid obesity (H)    Gastroesophageal reflux disease with esophagitis without hemorrhage    Borderline intellectual functioning    Benign essential hypertension     Past Medical History:   Diagnosis Date    Allergic rhinitis, cause unspecified     Allergic rhinitis and blueberries    Benign essential hypertension 5/21/2024    Congestive heart failure (H)     Intertrigo 11/17/2014    Migraine with aura and without status migrainosus, not intractable 7/18/2016    Migraine, unspecified, without mention of intractable migraine without mention of status migrainosus     Migraine    NONSPECIFIC MEDICAL HISTORY     learning disability, mild MR, ADD?    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)    Other acne     Sprain and strain of unspecified site of knee and leg 1/24/2008     Allergies:  Allergies   Allergen Reactions    Cats     Dust Mites     Keflex [Cephalexin] Rash    Ragweeds     Shellfish-Derived Products Other (See Comments)     Medications:  Current Outpatient Medications   Medication Sig Dispense Refill    albuterol (PROAIR HFA/PROVENTIL HFA/VENTOLIN HFA) 108 (90 Base) MCG/ACT inhaler Inhale 2 puffs into the lungs every 6 hours 18 g 11    azelastine (ASTELIN) 0.1 % nasal spray Spray 1 spray into both nostrils 2 times daily 30 mL 11    budesonide-formoterol (SYMBICORT) 80-4.5 MCG/ACT Inhaler Inhale 2 puffs into the lungs 2 times daily 10.2 g 11    cholecalciferol (VITAMIN D3) 25 mcg (1000 units) capsule Take 1 capsule by mouth daily      clotrimazole (LOTRIMIN) 1 % external cream Apply topically 2 times daily 60 g 1    famotidine (PEPCID) 20 MG tablet Take 1 tablet (20 mg) by mouth 2 times daily 180 tablet 3    fexofenadine (ALLEGRA) 180 MG tablet  Take 1 tablet (180 mg) by mouth daily 90 tablet 1    fluticasone (FLONASE) 50 MCG/ACT nasal spray Spray 2 sprays into both nostrils daily 48 mL 4    loratadine (CLARITIN) 10 MG tablet Take 1 tablet (10 mg) by mouth daily 30 tablet 11    losartan (COZAAR) 50 MG tablet Take 1 tablet (50 mg) by mouth daily 90 tablet 1    MULTIVITAMINS OR TABS 1 tab qd as directed  0    rizatriptan (MAXALT) 10 MG tablet TAKE 1 TAB BY MOUTH AT ONSET OF MIGRAINE. MAY REPEAT IN 2 HR. MAX 3 TAB/24 HR - INS LIMITS 18 tablet 0     No current facility-administered medications for this visit.     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 Patient has the following hobbies or non-occupational exposure: win[place] art; cat and dog sitting.     Family History:  Family History   Problem Relation Age of Onset    Obesity Mother     C.A.D. Father         MI, angioplasty, 50s    Diabetes Father         Diabetes    Hypertension Father     Obesity Father     Diabetes Maternal Grandmother         Multiple Sclerosis    Neurologic Disorder Paternal Grandmother     Gastrointestinal Disease Paternal Grandmother         Gluten Intolerance     Diabetes Maternal Aunt      Previous Labs, Allergy Tests, Dermatopathology, Imaging:  3/13/24 LUKE Kebede ()  Allergy to cats  Allergy referral given to discuss any further testing/follow up.   Plan: Adult Allergy/Asthma  Referral          Seasonal allergic rhinitis, unspecified trigger  Well controlled with use of flonase and allergra typically. Recently less controlled so will switch from allegra to loratadine. Allergy referral given for seasonal allergies as well as cat allergy. No new side effects of the medication. Refill provided.  Plan: Adult Allergy/Asthma  Referral,         fluticasone (FLONASE) 50 MCG/ACT nasal spray,         loratadine (CLARITIN) 10 MG tablet    1/12/24 Dr. Claudia Florian ()  CC:  Recheck left eye pink eye and bite marks on her right back.      HPI:  Face improving with antibiotics     A&P:  Cellulitis, unspecified cellulitis site  Face look much better   Erythema improved .  Recommend to complete antibiotics .    1/8/24 Dr. Claudia Florian ()  From [facility]:  Reason for visit:  Yesterday  Symptoms include:  Rash around right eye and bumps on chin/neck area  Symptom intensity:  Mild  Symptom progression:  Staying the same  Had these symptoms before:  No  What makes it worse:  No  What makes it better:  Havent tried anything yet    A&P:  Local infection of skin and subcutaneous tissue  Patient unsure started yesterday .  Few bumps on the side of chin .  Erythema around the eye and right side of face .  Will start bactrim likely Cellulitis   Discussed ER visit if symptoms get any worst .  2 Day follow up scheduled  - sulfamethoxazole-trimethoprim (BACTRIM DS) 800-160 MG tablet; Take 1 tablet by mouth 2 times daily for 7 days     Acute conjunctivitis of both eyes, unspecified acute conjunctivitis type  - ciprofloxacin (CILOXAN) 0.3 % ophthalmic solution; Place 1 drop into both eyes every 4 hours for 5 days     Allergic reaction to spider bite, undetermined intent, initial encounter  - predniSONE (DELTASONE) 20 MG tablet; Take 2 tablets (40 mg) by mouth daily for 5 days    12/27/23 Merly Kelly NP (pulm)  From [facility]:  Female with a history of allergic rhinitis, GERD, presenting for one month asthma follow-up.   Since last visit one month ago, breathing is improved with symbicort.  Not wheezing as much.  Able to exercise more without issue.  Allergies are controlled, no issue.  Previous HPI:  She reports main symptoms is wheezing in morning, also with sports.  She participates in softball and bowling.  Prescribed Albuterol few months.  Does find benefit from this.  Prescribed Arnuity Ellipta few months ago, took as needed. Only tried for one week.  Shortness of breath with running during softball.  Did okay walking down our long hallway.  Does have sharp  pains in chest when bending over.  Known Allergies to cats, ragweed, dust mites.  Does have acid reflux, controlled currently with pepcid.  No nighttime awakenings.     From A&P:  Moderate persistent asthma  Environmental allergies  Lifelong non-smoker presented last visit for evaluation of wheezing.  She notices this occasionally, when she wakes and when she is exercising.  She has known hx of allergies to cats, ragweed, and dust mites.  She also has hx of GERD which is currently controlled with famotidine.  Last visit she was initiated on Symbicort BID and encouraged to continue allergy regimen.  She reports improvement in wheezing since starting Symbicort.  She is able to exercise more, without issue.  Plan:  - continue Symbicort (budesonide/formoterol) 80/4.5, two puffs BID, rinse/gargle after use.  - continue Albuterol inhaler PRN.  - continue azelastine and fluticasone nasal sprays  - continue fexofenadine 180mg daily  - continue famotidine 20mg BID  - she is UTD with COVID vaccine and booster, and annual influenza vaccine.  - Action plan: prednisone 40mg x5 days    12/23/22 Treva López NP ()  From [facility]:  Had a breakout in hives after eating Pecan Pie, no history of food allergies. Would like testing.   Rash to wrist when wearing silicone apple watch band. Has changed wrist and rash follows.     From A&P:  Tinea corporis  Discussed keeping watch band clean and dry. Topical clotrimazole two times per day x 7-10 days. Clean band. Consider a break from wearing watch.     Intrinsic (allergic) eczema  Food allergy  Possible food allergy. Testing warranted.   Consider benadryl for skin symptoms.   Discussed red flags and need for urgent follow-up.     Referred By: Ivelisse Fischer PA-C ()  [address] [place] [address]     Allergy Tests:  Component      Latest Ref Rng 12/23/2022  3:39 PM 12/23/2022  4:29 PM   IGE      0 - 114 kU/L 133 (H)     Allergen [place]      <0.10 KU(A)/L <0.10  <0.10     Allergen Cashew      <0.10 KU(A)/L <0.10  <0.10    Allergen Fish(Cod)      <0.10 KU(A)/L <0.10     Allergen Egg White      <0.10 KU(A)/L <0.10     Allergen, Hazelnut      <0.10 KU(A)/L <0.10  <0.10    Allergen Milk      <0.10 KU(A)/L <0.10     Allergen Peanut      <0.10 KU(A)/L <0.10  <0.10    Allergen, [place]      <0.10 KU(A)/L <0.10     Allergen Scallop      <0.10 KU(A)/L 0.14 (H)     Allergen, Sesame Seed      <0.10 KU(A)/L <0.10     Allergen Shrimp      <0.10 KU(A)/L <0.10     Allergen Soybean IgE      <0.10 KU(A)/L <0.10     Allergen Tuna      <0.10 KU(A)/L <0.10     Allergen, [place]      <0.10 KU(A)/L <0.10  <0.10    Allergen Wheat      <0.10 KU(A)/L <0.10     Allergen, Brazil Nut      <0.10 KU(A)/L  <0.10    Allergen, Macadamia Nut      <0.10 KU(A)/L  <0.10    Allergen Pecan      <0.10 KU(A)/L  <0.10    Allergen Pine Nut      <0.10 KU(A)/L  <0.10    Allergen Pistachio      <0.10 KU(A)/L  <0.10      Order for Future Allergy Testing:    [x] Outpatient  [] Inpatient: Hernandez..../ Bed ....       Skin Atopy (atopic dermatitis) [] Yes   [x] No .........  Contact allergies:   [x] Yes   [] No ..........previously to silicone Apple Watch band  Hand eczema:   [] Yes   [x] No           Leading hand:   [] R   [] L       [] Ambidextrous         Drug allergies:        [x] Yes   [] No  which?......years ago after taking Keflex, got a rash (she does not remember the details)    Urticaria/Angioedema  [] Yes   [x] No .........  Food Allergy:  [x] Yes   [] No  which?......see HPI  Pets :  [x] Yes   [] No  which?......see HPI       [x]  Rhinitis   [x] Conjunctivitis   [] Sinusitis   [] Polyposis   [] Otitis   [] Pharyngitis         []  Postnasal drip    []  none  Operations:   [] Tonsils   [] Septum   [] Sinus   [] Polyposis        [x] Asthma bronchiale (recently diagnosed)  [] Coughing      []  none  Symptoms (mostly Rhinoconjunctivitis and Asthma) aggravated by:  Season   [] I   [] II   [] III   [] IV   []V   []VI   []VII    []VIII   []IX   []X   []XI   []XII     [x] perennial   Day time      [] morning   [] noon      [] evening        [] night    [] whole day........  [x]  none  Location/changes    [] inside        [] outside   [] mountains    [] sea     [] others.............   [x]  none  Triggers, specific     [x] animals     [] plants     [] dust              [] others ...........................    []  none  Triggers, others       [] work          [] psyche    [] sport            [] others .............................  [x]  none  Irritant                [] phys efforts [] smoke    [] heat/cold     [] odors  []others............... [x]  none    Order for PATCH TESTS  Reason for tests (suspected allergy): not necessary  Known previous allergies: n/a  Standardized panels  [] Standard panel (40 tests)  [] Preservatives & Antimicrobials (31 tests)  [] Emulsifiers & Additives (25 tests)   [] Perfumes/Flavours & Plants (25 tests)  [] Hairdresser panel (12 tests)  [] Rubber Chemicals (22 tests)  [] Plastics (26 tests)  [] Colorants/Dyes/Food additives (20 tests)  [] Metals (implants/dental) (24 tests)  [] Local anaesthetics/NSAIDs (13 tests)  [] Antibiotics & Antimycotics (14 tests)   [] Corticosteroids (15 tests)   [] Photopatch test (62 tests)   [] others: ...      [] Patient's own products: ...  DO NOT test if chemical or biological identity is unknown!   always ask from patient the product information and safety sheets (MSDS)       Order for PRICK TESTS    Reason for tests (suspected allergy): perennial RC; asthma  Known previous allergies: scallops (in blood test)    Standardized prick panels  [x] Atopic panel (20 tests)  [] Pediatric Panel (12 tests)  [] Milk, Meat, Eggs, Grains (20 tests)   [] Dust, Epithelia, Feathers (10 tests)  [x] Shellfish ONLY (17 tests)  [] Nuts, Beans (14 tests)  [] Spice, Vegetable, Fruit (17 tests)  [] Pollen Panel = Tree, Grass, Weed (24 tests)  [] Others: ...      [] Patient's own products: ...  DO NOT  test if chemical or biological identity is unknown!   always ask from patient the product information and safety sheets (MSDS)     Standardized intradermal tests  [x] Penicillium notatum [x] Aspergillus fumigatus [] House dust mites D.far & D. pteron  [x] Cat    [x] dog  [] Others: ...  [] Bee venom   [] Wasp venom  !!Specific protocol with dilutions!!       Order for Drug allergy tests (prick & Intradermal & patch tests)    [] Penicillin G  [] Ampicillin [x] Cefazolin   [x] Ceftriaxone   [] Ceftazidime  [] Bactrim    [x] Others: cephalexin  Order for ... as test date    [] Patient needs consultation with Allergy team (changes of tests may apply)  [] Tests discussed with Allergy team (can have direct appointment for allergy tests)     Atopy Screen (Placed Apr 24, 2024)  No Substance Readings (15 min) Evaluation   POS Histamine 1mg/ml +/++    NEG NaCl 0.9% -      No Substance Readings (15 min) Evaluation   1 Alternaria alternata (tenuis)  -    2 Cladosporium herbarum -    3 Aspergillus fumigatus -    4 Penicillium notatum -    5 Dermatophagoides pteronyssinus +++    6 Dermatophagoides farinae +++    7 Dog epithelium (canis spp) -    8 Cat hair (piero catus) -    9 Cockroach   (Blatella americana & germanica) -    10 Grass mix midwest   (Mae, Orchard, Redtop, Tomas) -    11 Cory grass (sorghum halepense) -    12 Weed mix   (common Cocklebur, Lamb s quarters, rough redroot Pigweed, Dock/Sorrel) -    13 Mug wort (artemisia vulgare) -    14 Ragweed giant/short (ambrosia spp) ++    15 White birch (Betula papyrifera) -    16 Tree mix 1 (Pecan, Maple BHR, Oak RVW, american [place], black [place]) -    17 Red cedar (juniperus virginia) -    18 Tree mix 2   (white [place], river/red Birch, black [place], common [place], american Elm) -    19 Box elder/Maple mix (acer spp) -    20 Germfask shagbark (carya ovata) -    Conclusion:    Fish and Seafood (Placed Apr 24, 2024)  No Substance Readings (15min) Evaluation   11 Crab  (callinectes spp) -    12 Lobster (homarus americanus) -    13 Shrimp white/brown/pink (farfantepenaeus&litopenaeus) -    14 Kingcrab (paralithodes camtschatica) -    15 Scallop (placopecten magellanicus) -    16 Clam (mercenaria mercenaria) -    17 Oyster (cstrea/crassostrea) -    Conclusion: Blood test performed in 2022 was borderline. In patient history and with today's negative prick test, patient does not have a sensitization to shellfish.    Intradermal Testing (Placed Apr 24, 2024)  No Substance Conc.  Reading (15min)  immediate Papule [mm] / Erythema [mm] Reading   (... days)  delayed Papule [mm] / Erythema [mm] Remarks   A Aspergillus fumigatus  1:10 - - - -    P Penicillium notatum 1:10 - - - -     Dog epithelium 1:10 + 5/10 - -     Cat epithelium 1:10 + 5/10 - -    Conclusion: Slight immediate-type reaction to dog and cat dander. No delayed-type reactions.      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 Not necessary to proceed with drug allergy tests as previously planned.    These conclusions are made at the best of one's knowledge and belief based on the provided evidence such as patient's history and allergy test results and they can change over time or can be incomplete because of missing information.    ==> Treatment Plan:    >> Most importantly, patient needs to proceed with pulmonology appointment as scheduled for 7/2/24.    >> For asthma, reviewed Symbicort and albuterol inhaler uses as prescribed by pulmonology. Recommend using Symbicort nightly when taking  Allegra.    >> Continue with Allegra 180 mg 1 tablet at bedtime and HDM reduction measures for dust mite allergy.  - Contact her PCP for refills. Currently next scheduled to follow up with PCP LUKE Kebede on 5/9/25.    >> Epic allergy list updated:  - Shellfish-derived products removed after 4/24/24 prick tests were negative.  - Cat allergy removed due to 4/24/24 prick testing being negative and it not being a drug-related allergy.  - Dust mites allergy removed due to it being a non-drug-related allergy.  - After reviewing Keflex reaction details with patient, she had a localized rash that was not a true allergic reaction. Not necessary to proceed with drug allergy tests as previously planned.    Procedures Performed: None    Staff and Scribe: provider    Scribe Disclosure:   I, MARTY AGRAWAL, am serving as a scribe to document services personally performed by Sonu Alford MD based on data collection and the provider's statements to me.     Staff Physician Comments:  I was present with the scribe who participated in the documentation of the note. I have verified the history and personally performed the physical exam and medical decision making. I agree with the assessment and plan as documented in the note. I have reviewed and if necessary amended the note.      Sonu Alford MD  Professor  Head of Dermato-Allergy Division  Department of Dermatology  [facility]    Follow-up in Derm-Allergy clinic PRN  ___________________________    I spent a total of 20 minutes with Prince Patel during today s visit. This time was spent discussing all the individual test results, correlating them to the clinical relevance, counseling the patient and/or coordinating care.

## 2024-06-07 ENCOUNTER — OFFICE VISIT (OUTPATIENT)
Dept: ALLERGY | Facility: CLINIC | Age: 49
End: 2024-06-07
Payer: COMMERCIAL

## 2024-06-07 DIAGNOSIS — J30.89 CHRONIC NON-SEASONAL ALLERGIC RHINITIS: ICD-10-CM

## 2024-06-07 DIAGNOSIS — Z91.09 HOUSE DUST MITE ALLERGY: Primary | ICD-10-CM

## 2024-06-07 DIAGNOSIS — J30.2 SEASONAL ALLERGIC RHINITIS, UNSPECIFIED TRIGGER: ICD-10-CM

## 2024-06-07 DIAGNOSIS — J45.21 MILD INTERMITTENT ASTHMA WITH ACUTE EXACERBATION: ICD-10-CM

## 2024-06-07 DIAGNOSIS — J30.81 ALLERGY TO CATS: ICD-10-CM

## 2024-06-07 PROCEDURE — 99213 OFFICE O/P EST LOW 20 MIN: CPT | Performed by: DERMATOLOGY

## 2024-06-07 RX ORDER — FEXOFENADINE HCL 180 MG/1
180 TABLET ORAL DAILY
Qty: 90 TABLET | Refills: 3 | Status: SHIPPED | OUTPATIENT
Start: 2024-06-07

## 2024-06-07 NOTE — LETTER
6/7/2024      Prince Patel  [address]  [address]  [facility] [address]      Dear Colleague,    Thank you for referring your patient, Prince Patel, to the [facility] [place]. Please see a copy of my visit note below.    [facility] Dermato-allergology Note  Office visit  Encounter Date: Jun 7, 2024  ____________________________________________    CC: Allergy Testing Followup (Follow up after HDM reduction. Did HDM reduction, symptoms still present but are improved since last visit.)      HPI:  (Jun 7, 2024)  Ms. Prince Patel is a(n) 49 year old female who presents today for follow-up  for allergy consultation  - Follow-up in Derm-Allergy clinic in 2 months  - Did HDM reduction as recommended  - Symptoms still persist, but improved since last visit  - Reviewed details of Keflex reaction: only had a rash on the arm  - Otherwise feeling well in usual state of health    Physical Exam:  General: In no acute distress, well-developed, well-nourished  Eyes: no conjunctivitis  ENT: no signs of rhinitis   Pulmonary: no wheezing or coughing  Skin: no active eczematous skin lesions on visible skin    Earlier History and Allergy Exams:  (Apr 24, 2024)  New patient for allergy consultation  - Referred by LUKE Kebede (FM) for testing for cat allergy and seasonal allergic rhinitis with unspecific trigger  - Stopped antihistamines x 7 days; usually alternates between Claritin and Allegra  - Does a lot of  for cats and dogs; wants to get tested to ensure she does not have severe allergies to cats, and she gets stuffy nose and watery eyes occasionally (happens more around longhaired cats)  - Does not have animals at home; mother has a dog and she visits often; she does not have a reaction to that dog  - Has perennial allergies with stuffy nose and red eyes  - Anytime she eats anything containing with blueberries, she gets diffuse itchiness  - Over the  summer, she had a blood test that showed she is allergic to shellfish  - Never has been a fan of fish and doesn't eat it often  - Eats fish without issue  - Has mild reaction to apples    Past Medical History:   Patient Active Problem List   Diagnosis     CARDIOVASCULAR SCREENING; LDL GOAL LESS THAN 160     S/P partial hysterectomy     Intertrigo     Migraine with aura and without status migrainosus, not intractable     Chronic non-seasonal allergic rhinitis, unspecified trigger     Morbid obesity (H)     Gastroesophageal reflux disease with esophagitis without hemorrhage     Borderline intellectual functioning     Benign essential hypertension     Past Medical History:   Diagnosis Date     Allergic rhinitis, cause unspecified     Allergic rhinitis and blueberries     Benign essential hypertension 5/21/2024     Congestive heart failure (H)      Intertrigo 11/17/2014     Migraine with aura and without status migrainosus, not intractable 7/18/2016     Migraine, unspecified, without mention of intractable migraine without mention of status migrainosus     Migraine     NONSPECIFIC MEDICAL HISTORY     learning disability, mild MR, ADD?     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)     Other acne      Sprain and strain of unspecified site of knee and leg 1/24/2008     Allergies:  Allergies   Allergen Reactions     Cats      Dust Mites      Keflex [Cephalexin] Rash     Ragweeds      Shellfish-Derived Products Other (See Comments)     Medications:  Current Outpatient Medications   Medication Sig Dispense Refill     albuterol (PROAIR HFA/PROVENTIL HFA/VENTOLIN HFA) 108 (90 Base) MCG/ACT inhaler Inhale 2 puffs into the lungs every 6 hours 18 g 11     azelastine (ASTELIN) 0.1 % nasal spray Spray 1 spray into both nostrils 2 times daily 30 mL 11     budesonide-formoterol (SYMBICORT) 80-4.5 MCG/ACT Inhaler Inhale 2 puffs into the lungs 2 times daily 10.2 g 11     cholecalciferol (VITAMIN  D3) 25 mcg (1000 units) capsule Take 1 capsule by mouth daily       clotrimazole (LOTRIMIN) 1 % external cream Apply topically 2 times daily 60 g 1     famotidine (PEPCID) 20 MG tablet Take 1 tablet (20 mg) by mouth 2 times daily 180 tablet 3     fexofenadine (ALLEGRA) 180 MG tablet Take 1 tablet (180 mg) by mouth daily 90 tablet 1     fluticasone (FLONASE) 50 MCG/ACT nasal spray Spray 2 sprays into both nostrils daily 48 mL 4     loratadine (CLARITIN) 10 MG tablet Take 1 tablet (10 mg) by mouth daily 30 tablet 11     losartan (COZAAR) 50 MG tablet Take 1 tablet (50 mg) by mouth daily 90 tablet 1     MULTIVITAMINS OR TABS 1 tab qd as directed  0     rizatriptan (MAXALT) 10 MG tablet TAKE 1 TAB BY MOUTH AT ONSET OF MIGRAINE. MAY REPEAT IN 2 HR. MAX 3 TAB/24 HR - INS LIMITS 18 tablet 0     No current facility-administered medications for this visit.     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 Patient has the following hobbies or non-occupational exposure: winston art; cat and dog sitting.     Family History:  Family History   Problem Relation Age of Onset     Obesity Mother      C.A.D. Father         MI, angioplasty, 50s     Diabetes Father         Diabetes     Hypertension Father      Obesity Father      Diabetes Maternal Grandmother         Multiple Sclerosis     Neurologic Disorder Paternal Grandmother      Gastrointestinal Disease Paternal Grandmother         Gluten Intolerance      Diabetes Maternal Aunt      Previous Labs, Allergy Tests, Dermatopathology, Imaging:  3/13/24 LUKE Kebede ([facility])  Allergy to cats  Allergy referral given to discuss any further testing/follow up.   Plan: Adult Allergy/Asthma  Referral          Seasonal allergic rhinitis, unspecified trigger  Well controlled with use of flonase and allergra typically. Recently less controlled so will switch from allegra to loratadine. Allergy referral given for seasonal allergies as well as cat allergy. No  new side effects of the medication. Refill provided.  Plan: Adult Allergy/Asthma  Referral,         fluticasone (FLONASE) 50 MCG/ACT nasal spray,         loratadine (CLARITIN) 10 MG tablet    1/12/24 Dr. Claudia Florian ()  CC:  Recheck left eye pink eye and bite marks on her right back.     HPI:  Face improving with antibiotics     A&P:  Cellulitis, unspecified cellulitis site  Face look much better   Erythema improved .  Recommend to complete antibiotics .    1/8/24 Dr. Claudia Florian ()  From HPI:  Reason for visit:  Yesterday  Symptoms include:  Rash around right eye and bumps on chin/neck area  Symptom intensity:  Mild  Symptom progression:  Staying the same  Had these symptoms before:  No  What makes it worse:  No  What makes it better:  Havent tried anything yet    A&P:  Local infection of skin and subcutaneous tissue  Patient unsure started yesterday .  Few bumps on the side of chin .  Erythema around the eye and right side of face .  Will start bactrim likely Cellulitis   Discussed ER visit if symptoms get any worst .  2 Day follow up scheduled  - sulfamethoxazole-trimethoprim (BACTRIM DS) 800-160 MG tablet; Take 1 tablet by mouth 2 times daily for 7 days     Acute conjunctivitis of both eyes, unspecified acute conjunctivitis type  - ciprofloxacin (CILOXAN) 0.3 % ophthalmic solution; Place 1 drop into both eyes every 4 hours for 5 days     Allergic reaction to spider bite, undetermined intent, initial encounter  - predniSONE (DELTASONE) 20 MG tablet; Take 2 tablets (40 mg) by mouth daily for 5 days    12/27/23 Merly Kelly NP (pulm)  From HPI:  Female with a history of allergic rhinitis, GERD, presenting for one month asthma follow-up.   Since last visit one month ago, breathing is improved with symbicort.  Not wheezing as much.  Able to exercise more without issue.  Allergies are controlled, no issue.  Previous HPI:  She reports main symptoms is wheezing in morning, also with sports.  She  participates in softball and bowling.  Prescribed Albuterol few months.  Does find benefit from this.  Prescribed Arnuity Ellipta few months ago, took as needed. Only tried for one week.  Shortness of breath with running during softball.  Did okay walking down our long hallway.  Does have sharp pains in chest when bending over.  Known Allergies to cats, ragweed, dust mites.  Does have acid reflux, controlled currently with pepcid.  No nighttime awakenings.     From A&P:  Moderate persistent asthma  Environmental allergies  Lifelong non-smoker presented last visit for evaluation of wheezing.  She notices this occasionally, when she wakes and when she is exercising.  She has known hx of allergies to cats, ragweed, and dust mites.  She also has hx of GERD which is currently controlled with famotidine.  Last visit she was initiated on Symbicort BID and encouraged to continue allergy regimen.  She reports improvement in wheezing since starting Symbicort.  She is able to exercise more, without issue.  Plan:  - continue Symbicort (budesonide/formoterol) 80/4.5, two puffs BID, rinse/gargle after use.  - continue Albuterol inhaler PRN.  - continue azelastine and fluticasone nasal sprays  - continue fexofenadine 180mg daily  - continue famotidine 20mg BID  - she is UTD with COVID vaccine and booster, and annual influenza vaccine.  - Action plan: prednisone 40mg x5 days    12/23/22 Treva López NP ()  From [facility]:  Had a breakout in hives after eating Pecan Pie, no history of food allergies. Would like testing.   Rash to wrist when wearing silicone apple watch band. Has changed wrist and rash follows.     From A&P:  Tinea corporis  Discussed keeping watch band clean and dry. Topical clotrimazole two times per day x 7-10 days. Clean band. Consider a break from wearing watch.     Intrinsic (allergic) eczema  Food allergy  Possible food allergy. Testing warranted.   Consider benadryl for skin symptoms.   Discussed red flags and  need for urgent follow-up.     Referred By: Ivelisse Fischer PA-C () [id number] [place] [address]     Allergy Tests:  Component      Latest Ref Rng 12/23/2022  3:39 PM 12/23/2022  4:29 PM   IGE      0 - 114 kU/L 133 (H)     Allergen Erie      <0.10 KU(A)/L <0.10  <0.10    Allergen Cashew      <0.10 KU(A)/L <0.10  <0.10    Allergen Fish(Cod)      <0.10 KU(A)/L <0.10     Allergen Egg White      <0.10 KU(A)/L <0.10     Allergen, Hazelnut      <0.10 KU(A)/L <0.10  <0.10    Allergen Milk      <0.10 KU(A)/L <0.10     Allergen Peanut      <0.10 KU(A)/L <0.10  <0.10    Allergen, [place]      <0.10 KU(A)/L <0.10     Allergen Scallop      <0.10 KU(A)/L 0.14 (H)     Allergen, Sesame Seed      <0.10 KU(A)/L <0.10     Allergen Shrimp      <0.10 KU(A)/L <0.10     Allergen Soybean IgE      <0.10 KU(A)/L <0.10     Allergen Tuna      <0.10 KU(A)/L <0.10     Allergen, Traphill      <0.10 KU(A)/L <0.10  <0.10    Allergen Wheat      <0.10 KU(A)/L <0.10     Allergen, Brazil Nut      <0.10 KU(A)/L  <0.10    Allergen, Macadamia Nut      <0.10 KU(A)/L  <0.10    Allergen Pecan      <0.10 KU(A)/L  <0.10    Allergen Pine Nut      <0.10 KU(A)/L  <0.10    Allergen Pistachio      <0.10 KU(A)/L  <0.10      Order for Future Allergy Testing:    [x] Outpatient  [] Inpatient: Hernandez..../ Bed ....       Skin Atopy (atopic dermatitis) [] Yes   [x] No .........  Contact allergies:   [x] Yes   [] No ..........previously to silicone Apple Watch band  Hand eczema:   [] Yes   [x] No           Leading hand:   [] R   [] L       [] Ambidextrous         Drug allergies:        [x] Yes   [] No  which?......years ago after taking Keflex, got a rash (she does not remember the details)    Urticaria/Angioedema  [] Yes   [x] No .........  Food Allergy:  [x] Yes   [] No  which?......see HPI  Pets :  [x] Yes   [] No  which?......see HPI       [x]  Rhinitis   [x] Conjunctivitis   [] Sinusitis   [] Polyposis   [] Otitis   [] Pharyngitis         []  Postnasal  drip    []  none  Operations:   [] Tonsils   [] Septum   [] Sinus   [] Polyposis        [x] Asthma bronchiale (recently diagnosed)  [] Coughing      []  none  Symptoms (mostly Rhinoconjunctivitis and Asthma) aggravated by:  Season   [] I   [] II   [] III   [] IV   []V   []VI   []VII   []VIII   []IX   []X   []XI   []XII     [x] perennial   Day time      [] morning   [] noon      [] evening        [] night    [] whole day........  [x]  none  Location/changes    [] inside        [] outside   [] mountains    [] sea     [] others.............   [x]  none  Triggers, specific     [x] animals     [] plants     [] dust              [] others ...........................    []  none  Triggers, others       [] work          [] psyche    [] sport            [] others .............................  [x]  none  Irritant                [] phys efforts [] smoke    [] heat/cold     [] odors  []others............... [x]  none    Order for PATCH TESTS  Reason for tests (suspected allergy): not necessary  Known previous allergies: n/a  Standardized panels  [] Standard panel (40 tests)  [] Preservatives & Antimicrobials (31 tests)  [] Emulsifiers & Additives (25 tests)   [] Perfumes/Flavours & Plants (25 tests)  [] Hairdresser panel (12 tests)  [] Rubber Chemicals (22 tests)  [] Plastics (26 tests)  [] Colorants/Dyes/Food additives (20 tests)  [] Metals (implants/dental) (24 tests)  [] Local anaesthetics/NSAIDs (13 tests)  [] Antibiotics & Antimycotics (14 tests)   [] Corticosteroids (15 tests)   [] Photopatch test (62 tests)   [] others: ...      [] Patient's own products: ...  DO NOT test if chemical or biological identity is unknown!   always ask from patient the product information and safety sheets (MSDS)       Order for PRICK TESTS    Reason for tests (suspected allergy): perennial RC; asthma  Known previous allergies: scallops (in blood test)    Standardized prick panels  [x] Atopic panel (20 tests)  [] Pediatric Panel (12  tests)  [] Milk, Meat, Eggs, Grains (20 tests)   [] Dust, Epithelia, Feathers (10 tests)  [x] Shellfish ONLY (17 tests)  [] Nuts, Beans (14 tests)  [] Spice, Vegetable, Fruit (17 tests)  [] Pollen Panel = Tree, Grass, Weed (24 tests)  [] Others: ...      [] Patient's own products: ...  DO NOT test if chemical or biological identity is unknown!   always ask from patient the product information and safety sheets (MSDS)     Standardized intradermal tests  [x] Penicillium notatum [x] Aspergillus fumigatus [] House dust mites D.far & D. pteron  [x] Cat    [x] dog  [] Others: ...  [] Bee venom   [] Wasp venom  !!Specific protocol with dilutions!!       Order for Drug allergy tests (prick & Intradermal & patch tests)    [] Penicillin G  [] Ampicillin [x] Cefazolin   [x] Ceftriaxone   [] Ceftazidime  [] Bactrim    [x] Others: cephalexin  Order for ... as test date    [] Patient needs consultation with Allergy team (changes of tests may apply)  [] Tests discussed with Allergy team (can have direct appointment for allergy tests)     Atopy Screen (Placed Apr 24, 2024)  No Substance Readings (15 min) Evaluation   POS Histamine 1mg/ml +/++    NEG NaCl 0.9% -      No Substance Readings (15 min) Evaluation   1 Alternaria alternata (tenuis)  -    2 Cladosporium herbarum -    3 Aspergillus fumigatus -    4 Penicillium notatum -    5 Dermatophagoides pteronyssinus +++    6 Dermatophagoides farinae +++    7 Dog epithelium (canis spp) -    8 Cat hair (piero catus) -    9 Cockroach   (Blatella americana & germanica) -    10 Grass mix midwest   (Mae, Orchard, Redtop, Tomas) -    11 Cory grass (sorghum halepense) -    12 Weed mix   (common Cocklebur, Lamb s quarters, rough redroot Pigweed, Dock/Sorrel) -    13 Mug wort (artemisia vulgare) -    14 Ragweed giant/short (ambrosia spp) ++    15 White birch (Betula papyrifera) -    16 Tree mix 1 (Pecan, Maple BHR, Oak RVW, american [place], black [place]) -    17 Red cedar (juniperus  virginia) -    18 Tree mix 2   (white [place], river/red Birch, black [place], common [place], american Elm) -    19 Box elder/Maple mix (acer spp) -    20 [place] shagbark (carya ovata) -    Conclusion:    Fish and Seafood (Placed Apr 24, 2024)  No Substance Readings (15min) Evaluation   11 Crab (callinectes spp) -    12 Lobster (homarus americanus) -    13 Shrimp white/brown/pink (farfantepenaeus&litopenaeus) -    14 Kingcrab (paralithodes camtschatica) -    15 Scallop (placopecten magellanicus) -    16 Clam (mercenaria mercenaria) -    17 Oyster (cstrea/crassostrea) -    Conclusion: Blood test performed in 2022 was borderline. In patient history and with today's negative prick test, patient does not have a sensitization to shellfish.    Intradermal Testing (Placed Apr 24, 2024)  No Substance Conc.  Reading (15min)  immediate Papule [mm] / Erythema [mm] Reading   (... days)  delayed Papule [mm] / Erythema [mm] Remarks   A Aspergillus fumigatus  1:10 - - - -    P Penicillium notatum 1:10 - - - -     Dog epithelium 1:10 + 5/10 - -     Cat epithelium 1:10 + 5/10 - -    Conclusion: Slight immediate-type reaction to dog and cat dander. No delayed-type reactions.      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 Not necessary to proceed with drug allergy tests as previously planned.    These conclusions are made at the best of one's knowledge and belief based on the provided evidence such as patient's history and allergy test results and they can change over time or can be incomplete  because of missing information.    ==> Treatment Plan:    >> Most importantly, patient needs to proceed with pulmonology appointment as scheduled for 7/2/24.    >> For asthma, reviewed Symbicort and albuterol inhaler uses as prescribed by pulmonology. Recommend using Symbicort nightly when taking Allegra.    >> Continue with Allegra 180 mg 1 tablet at bedtime and HDM reduction measures for dust mite allergy.  - Contact her PCP for refills. Currently next scheduled to follow up with PCP LUKE Kebede on 5/9/25.    >> Epic allergy list updated:  - Shellfish-derived products removed after 4/24/24 prick tests were negative.  - Cat allergy removed due to 4/24/24 prick testing being negative and it not being a drug-related allergy.  - Dust mites allergy removed due to it being a non-drug-related allergy.  - After reviewing Keflex reaction details with patient, she had a localized rash that was not a true allergic reaction. Not necessary to proceed with drug allergy tests as previously planned.    Procedures Performed: None    Staff and Scribe: provider    Scribe Disclosure:   I, MARTY AGRAWAL, am serving as a scribe to document services personally performed by Sonu Alford MD based on data collection and the provider's statements to me.     Staff Physician Comments:  I was present with the scribe who participated in the documentation of the note. I have verified the history and personally performed the physical exam and medical decision making. I agree with the assessment and plan as documented in the note. I have reviewed and if necessary amended the note.      Sonu Alford MD  Professor  Head of Dermato-Allergy Division  Department of Dermatology  [facility]    Follow-up in Derm-Allergy clinic PRN  ___________________________    I spent a total of 20 minutes with Prince Patel during today s visit. This time was spent discussing all the individual test results, correlating them  to the clinical relevance, counseling the patient and/or coordinating care.      Again, thank you for allowing me to participate in the care of your patient.        Sincerely,        Sonu Alford MD

## 2024-06-07 NOTE — NURSING NOTE
Chief Complaint   Patient presents with    Allergy Testing Followup     Follow up after HDM reduction. Did HDM reduction, symptoms still present but are improved since last visit.     Iva Allen RN

## 2024-06-17 ENCOUNTER — OFFICE VISIT (OUTPATIENT)
Dept: PODIATRY | Facility: CLINIC | Age: 49
End: 2024-06-17
Payer: COMMERCIAL

## 2024-06-17 VITALS — BODY MASS INDEX: 40.92 KG/M2 | DIASTOLIC BLOOD PRESSURE: 80 MMHG | SYSTOLIC BLOOD PRESSURE: 125 MMHG | WEIGHT: 231 LBS

## 2024-06-17 DIAGNOSIS — M76.61 RIGHT ACHILLES TENDINITIS: Primary | ICD-10-CM

## 2024-06-17 DIAGNOSIS — M25.571 PAIN IN JOINT, ANKLE AND FOOT, RIGHT: ICD-10-CM

## 2024-06-17 PROCEDURE — 99203 OFFICE O/P NEW LOW 30 MIN: CPT | Performed by: PODIATRIST

## 2024-06-17 NOTE — PROGRESS NOTES
"Foot & Ankle Surgery  June 17, 2024    CC: \"Recheck on my ankle and foot\"    I was asked to see Prince Patel regarding the chief complaint by:  JONATHAN Fischer PA-C    HPI:  Pt is a 49 year old female who presents with above complaint.  6-week history of posterior right heel pain.  She was playing softball, running from 2nd-3rd, when she felt \"something pop in there\".  She was seen in clinic on 6 3 and had x-rays of the right ankle that demonstrated prominent posterior calcaneal spur.  She was given an ankle brace although states this is too tight and cuts off the circulation in her leg.  Pain 5 out of 10, daily, worse with \"stretching it towards the ceiling\".    ROS:   Pos for CC.  The patient denies current nausea, vomiting, chills, fevers, belly pain, calf pain, chest pain or SOB.  Complete remainder of ROS is otherwise neg.    VITALS:    Vitals:    06/17/24 1414   BP: 125/80   Weight: 104.8 kg (231 lb)       PMH:    Past Medical History:   Diagnosis Date    Allergic rhinitis, cause unspecified     Allergic rhinitis and blueberries    Benign essential hypertension 5/21/2024    Congestive heart failure (H)     Intertrigo 11/17/2014    Migraine with aura and without status migrainosus, not intractable 7/18/2016    Migraine, unspecified, without mention of intractable migraine without mention of status migrainosus     Migraine    NONSPECIFIC MEDICAL HISTORY     learning disability, mild MR, ADD?    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)    Other acne     Sprain and strain of unspecified site of knee and leg 1/24/2008       SXHX:    Past Surgical History:   Procedure Laterality Date    APPENDECTOMY      CARDIAC SURGERY      COLONOSCOPY N/A 8/3/2018    Procedure: COLONOSCOPY;  colonoscopy;  Surgeon: Isaias Medina MD;  Location:  GI    LAPAROSCOPIC HYSTERECTOMY SUPRACERVICAL  11/16/2011    Procedure:LAPAROSCOPIC HYSTERECTOMY SUPRACERVICAL; LAPAROSCOPIC HYSTERECTOMY " SUPRACERVICAL ; Surgeon:MASOUD WHITE; Location:[facility] OR    SURGICAL HISTORY OF -       ingrown toenails removed    toenail removal          MEDS:    Current Outpatient Medications   Medication Sig Dispense Refill    albuterol (PROAIR HFA/PROVENTIL HFA/VENTOLIN HFA) 108 (90 Base) MCG/ACT inhaler Inhale 2 puffs into the lungs every 6 hours 18 g 11    azelastine (ASTELIN) 0.1 % nasal spray Spray 1 spray into both nostrils 2 times daily 30 mL 11    budesonide-formoterol (SYMBICORT) 80-4.5 MCG/ACT Inhaler Inhale 2 puffs into the lungs 2 times daily 10.2 g 11    cholecalciferol (VITAMIN D3) 25 mcg (1000 units) capsule Take 1 capsule by mouth daily      clotrimazole (LOTRIMIN) 1 % external cream Apply topically 2 times daily 60 g 1    famotidine (PEPCID) 20 MG tablet Take 1 tablet (20 mg) by mouth 2 times daily 180 tablet 3    fexofenadine (ALLEGRA) 180 MG tablet Take 1 tablet (180 mg) by mouth daily 90 tablet 3    fluticasone (FLONASE) 50 MCG/ACT nasal spray Spray 2 sprays into both nostrils daily 48 mL 4    loratadine (CLARITIN) 10 MG tablet Take 1 tablet (10 mg) by mouth daily 30 tablet 11    losartan (COZAAR) 50 MG tablet Take 1 tablet (50 mg) by mouth daily 90 tablet 1    MULTIVITAMINS OR TABS 1 tab qd as directed  0    rizatriptan (MAXALT) 10 MG tablet TAKE 1 TAB BY MOUTH AT ONSET OF MIGRAINE. MAY REPEAT IN 2 HR. MAX 3 TAB/24 HR - INS LIMITS 18 tablet 0     No current facility-administered medications for this visit.       ALL:     Allergies   Allergen Reactions    Macrobid [Nitrofuran Derivatives] GI Disturbance    Tape [Adhesive Tape] Rash       FMH:    Family History   Problem Relation Age of Onset    Obesity Mother     C.A.D. Father         MI, angioplasty, 50s    Diabetes Father         Diabetes    Hypertension Father     Obesity Father     Diabetes Maternal Grandmother         Multiple Sclerosis    Neurologic Disorder Paternal Grandmother     Gastrointestinal Disease Paternal Grandmother         Gluten  Intolerance     Diabetes Maternal Aunt        SocHx:    Social History     Socioeconomic History    Marital status: Single     Spouse name: Not on file    Number of children: 0    Years of education: Not on file    Highest education level: Not on file   Occupational History    Occupation: cleaning     Comment: archiver's   Tobacco Use    Smoking status: Never     Passive exposure: Never    Smokeless tobacco: Never   Vaping Use    Vaping status: Never Used   Substance and Sexual Activity    Alcohol use: No    Drug use: No    Sexual activity: Never     Partners: Male   Other Topics Concern     Service Not Asked    Blood Transfusions Not Asked    Caffeine Concern Yes     Comment: 1 pop daily    Occupational Exposure Not Asked    Hobby Hazards Not Asked    Sleep Concern Not Asked    Stress Concern Not Asked    Weight Concern Not Asked    Special Diet No     Comment: calcium daily    Back Care Not Asked    Exercise Yes     Comment: rollerblade, bike    Bike Helmet Not Asked    Seat Belt Yes    Self-Exams Yes    Parent/sibling w/ CABG, MI or angioplasty before 65F 55M? 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[employer], local [employer]     Social Determinants of Health     Financial Resource Strain: Low Risk  (5/1/2024)    Financial Resource Strain     Within the past 12 months, have you or your family members you live with been unable to get utilities (heat, electricity) when it was really needed?: No   Food Insecurity: Low Risk  (5/1/2024)    Food Insecurity     Within the past 12 months, did you worry that your food would run out before you got money to buy more?: No     Within the past 12 months, did the food you bought just not last and you didn t have money to get more?: No   Transportation Needs: Low Risk  (5/1/2024)    Transportation Needs     Within the past 12 months, has lack of transportation kept you from medical  appointments, getting your medicines, non-medical meetings or appointments, work, or from getting things that you need?: No   Physical Activity: Sufficiently Active (5/1/2024)    Exercise Vital Sign     Days of Exercise per Week: 4 days     Minutes of Exercise per Session: 60 min   Stress: No Stress Concern Present (5/1/2024)    [employer] - Occupational Stress Questionnaire     Feeling of Stress : Not at all   Social Connections: Socially Isolated (5/1/2024)    Social Connection and Isolation Panel [NHANES]     Frequency of Communication with Friends and Family: Once a week     Frequency of Social Gatherings with Friends and Family: Once a week     Attends [employer] Services: More than 4 times per year     Active Member of Clubs or Organizations: No     Attends Club or Organization Meetings: Never     Marital Status: Never    Interpersonal Safety: Low Risk  (5/1/2024)    Interpersonal Safety     Do you feel physically and emotionally safe where you currently live?: Yes     Within the past 12 months, have you been hit, slapped, kicked or otherwise physically hurt by someone?: No     Within the past 12 months, have you been humiliated or emotionally abused in other ways by your partner or ex-partner?: No   Housing Stability: Low Risk  (5/1/2024)    Housing Stability     Do you have housing? : Yes     Are you worried about losing your housing?: No           EXAMINATION:  Gen:   No apparent distress  Neuro:   A&Ox3, no deficits  Psych:    Answering questions appropriately for age and situation with normal affect  Head:    NCAT  Eye:    Visual scanning without deficit  Ear:    Response to auditory stimuli wnl  Lung:    Non-labored breathing on RA noted  Abd:    NTND per patient report  Lymph:    Neg for pitting/non-pitting edema BLE  Vasc:    Pulses palpable, CFT minimally delayed  Neuro:    Light touch sensation intact to all sensory nerve distributions without paresthesias  Derm:     Neg for nodules, lesions or ulcerations  MSK:    Right lower extremity -pain at the posterior heel at the Achilles insertion.  The Achilles tendon is otherwise unremarkable without evidence of rupture or attenuation.  Mild pain with lateral calcaneal compression  Calf:    Neg for redness, swelling or tenderness      Imaging:  MPRESSION: Moderate-sized Achilles enthesophyte. There is no evidence  of fracture. No radiographic evidence of active enthesitis. The bones  appear normal otherwise. No fracture or talar dome osteochondral  lesion. The ankle mortise is symmetric.    Assessment:  49 year old female with right lower extremity insertional Achilles tendinopathy      Medical Decision Making/Plan:  Discussed etiologies, anatomy and options  1.  Right insertional Achilles tendinopathy  -I personally reviewed and interpreted the patient's lower extremity history pertinent to today's visit, including imaging/labs, in preparation for initiating a treatment program.  -I personally interpreted and reviewed the x-rays  -Regarding the Achilles tendonitis, the Achilles handout was dispensed and discussed.  We talked about stretching, resting/activity modification, icing, NSAID/tylenol use as tolerated, inserts, supportive shoes and minimizing shoeless walking.    -discussed Achilles and hamstring stretches  -OTC insert information dispensed and discussed  -Based on severity of symptoms, a walking cast boot was dispensed with instructions    Regarding the CAM walker, the following proper-usage instructions were discussed and dispensed:  1.  Do not sleep with the CAM boot on; 2.  Do not wear during long-distance travel, ie long car rides, plane trips(they were instructed not to drive with it if the involved foot is required to operate a vehicle); 3.  The patient was encouraged to remove the boot throughout the day when off their feet;  4.  They are to have the boot on when ambulating, regardless of WB status        Follow up:  2 weeks or sooner with acute issues      Patient's medical history was reviewed today      Elliot Madden DPM [facility] FACFA  Podiatric Foot & Ankle Surgeon  [facility]  [phone]    Disclaimer: This note consists of symbols derived from keyboarding, dictation and/or voice recognition software. As a result, there may be errors in the script that have gone undetected. Please consider this when interpreting information found in this chart.

## 2024-06-17 NOTE — PATIENT INSTRUCTIONS
"Thank you for choosing [facility] / Foot & Ankle Surgery!    DR. HERNÁNDEZ'S CLINIC LOCATIONS:     [facility] (Friday) TRIAGE LINE: [phone]   [address] [facility]  APPOINTMENTS: [phone]   [place] [address] RADIOLOGY: [phone]    PHYSICAL THERAPY: [phone]    SET UP SURGERY: [phone]   [facility] (Mon-Tues AM-Thurs) BILLING QUESTIONS: [phone] [address] FAX: [phone]   [place] [address] [facility]: [phone]        Achilles Tendon Rupture  What is the Achilles Tendon?   A tendon is a band of tissue that connects a muscle to a bone. The Achilles tendon runs down the back of the lower leg and connects the calf muscle to the heel bone. Also called the \"heel cord,\" the Achilles tendon facilitates walking by helping to raise the heel off the ground.  What is an Achilles Tendon Rupture?  An Achilles tendon rupture is a complete or partial tear that occurs when the tendon is stretched beyond its capacity. Forceful jumping or pivoting, or sudden accelerations of running, can overstretch the tendon and cause a tear. An injury to the tendon can also result from falling or tripping.  Achilles tendon ruptures are most often seen in \"weekend warriors\" - typically, middle-aged people participating in sports in their spare time. Less commonly, illness or medications, such as steroids or certain antibiotics, may weaken the tendon and contribute to ruptures.  Signs and Symptoms   A person with a ruptured Achilles tendon may experience one or more of the following:  Sudden pain (which feels like a kick or a stab) in the back of the ankle or calf - often subsiding into a dull ache   A popping or snapping sensation   Swelling on the back of the leg between the heel and the calf   Difficulty walking (especially upstairs or uphill) and difficulty rising up on the toes   These symptoms require prompt medical attention to prevent further damage. Until the " "patient is able to see a doctor, the \"R.I.C.E.\" method should be used. This involves:  Rest. Stay off the injured foot and ankle, since walking can cause pain or further damage.   Ice. Apply a bag of ice covered with a thin towel to reduce swelling and pain. Do not put ice directly against the skin.   Compression. Wrap the foot and ankle in an elastic bandage to prevent further swelling.   Elevation. Keep the leg elevated to reduce the swelling. It should be even with or slightly above heart level.   Diagnosis  In diagnosing an Achilles tendon rupture, the foot and ankle surgeon will ask questions about how and when the injury occurred and whether the patient has previously injured the tendon or experienced similar symptoms. The surgeon will examine the foot and ankle, feeling for a defect in the tendon that suggests a tear. Range of motion and muscle strength will be evaluated and compared to the uninjured foot and ankle. If the Achilles tendon is ruptured, the patient will have less strength in pushing down (as on a gas pedal) and will have difficulty rising on the toes.  The diagnosis of an Achilles tendon rupture is typically straightforward and can be made through this type of examination. In some cases, however, the surgeon may order an MRI or other advanced imaging tests.  Treatment   Treatment options for an Achilles tendon rupture include surgical and non-surgical approaches. The decision of whether to proceed with surgery or non-surgical treatment is based on the severity of the rupture and the patient s health status and activity level.  Non-Surgical Treatment   Non-surgical treatment, which is generally associated with a higher rate of re-rupture, is selected for minor ruptures, less active patients, and those with medical conditions that prevent them from undergoing surgery. Non-surgical treatment involves use of a cast, walking boot, or brace to restrict motion and allow the torn tendon to " heal.  Surgery  Surgery offers important potential benefits. Besides decreasing the likelihood of re-rupturing the Achilles tendon, surgery often increases the patient s push-off strength and improves muscle function and movement of the ankle.   Various surgical techniques are available to repair the rupture. The surgeon will select the procedure best suited to the patient.   Following surgery, the foot and ankle are initially immobilized in a cast or walking boot. The surgeon will determine when the patient can begin weightbearing.   Complications such as incision-healing difficulties, re-rupture of the tendon, or nerve pain can arise after surgery.  Physical Therapy   Whether an Achilles tendon rupture is treated surgically or non-surgically, physical therapy is an important component of the healing process. Physical therapy involves exercises that strengthen the muscles and improve the range of motion of the foot and ankle.     OVER THE COUNTER INSERTS    Most of these can be found at your local [employer], [employer] sporting [id number], or online:    SuperFeet   Sofsole Fit Spenco   Power Step   Walk-Fit ([employer])  *For heel pain* Arch Cradles  *For heel pain*       ** A good high quality over the counter insert should cost around $40-$50    ** Capsulitis/Metarasalgia - try adding a metatarsal pad on your inserts or find an insert with one built in

## 2024-06-17 NOTE — LETTER
"6/17/2024      Prince Patel  [address]  [address]  [facility] [address]      Dear Colleague,    Thank you for referring your patient, Prince Patel, to the [facility]. Please see a copy of my visit note below.    Foot & Ankle Surgery  June 17, 2024    CC: \"Recheck on my ankle and foot\"    I was asked to see Prince Patel regarding the chief complaint by:  JONATHAN Fischer PA-C    HPI:  Pt is a 49 year old female who presents with above complaint.  6-week history of posterior right heel pain.  She was playing softball, running from 2nd-3rd, when she felt \"something pop in there\".  She was seen in clinic on 6 3 and had x-rays of the right ankle that demonstrated prominent posterior calcaneal spur.  She was given an ankle brace although states this is too tight and cuts off the circulation in her leg.  Pain 5 out of 10, daily, worse with \"stretching it towards the ceiling\".    ROS:   Pos for CC.  The patient denies current nausea, vomiting, chills, fevers, belly pain, calf pain, chest pain or SOB.  Complete remainder of ROS is otherwise neg.    VITALS:    Vitals:    06/17/24 1414   BP: 125/80   Weight: 104.8 kg (231 lb)       PMH:    Past Medical History:   Diagnosis Date     Allergic rhinitis, cause unspecified     Allergic rhinitis and blueberries     Benign essential hypertension 5/21/2024     Congestive heart failure (H)      Intertrigo 11/17/2014     Migraine with aura and without status migrainosus, not intractable 7/18/2016     Migraine, unspecified, without mention of intractable migraine without mention of status migrainosus     Migraine     NONSPECIFIC MEDICAL HISTORY     learning disability, mild MR, ADD?     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)     Other acne      Sprain and strain of unspecified site of knee and leg 1/24/2008       SXHX:    Past Surgical History:   Procedure Laterality Date     APPENDECTOMY       CARDIAC " SURGERY       COLONOSCOPY N/A 8/3/2018    Procedure: COLONOSCOPY;  colonoscopy;  Surgeon: Isaias Medina MD;  Location: [facility]     LAPAROSCOPIC HYSTERECTOMY SUPRACERVICAL  11/16/2011    Procedure:LAPAROSCOPIC HYSTERECTOMY SUPRACERVICAL; LAPAROSCOPIC HYSTERECTOMY SUPRACERVICAL ; Surgeon:MASOUD WHITE; Location:[facility]     SURGICAL HISTORY OF -       ingrown toenails removed     toenail removal          MEDS:    Current Outpatient Medications   Medication Sig Dispense Refill     albuterol (PROAIR HFA/PROVENTIL HFA/VENTOLIN HFA) 108 (90 Base) MCG/ACT inhaler Inhale 2 puffs into the lungs every 6 hours 18 g 11     azelastine (ASTELIN) 0.1 % nasal spray Spray 1 spray into both nostrils 2 times daily 30 mL 11     budesonide-formoterol (SYMBICORT) 80-4.5 MCG/ACT Inhaler Inhale 2 puffs into the lungs 2 times daily 10.2 g 11     cholecalciferol (VITAMIN D3) 25 mcg (1000 units) capsule Take 1 capsule by mouth daily       clotrimazole (LOTRIMIN) 1 % external cream Apply topically 2 times daily 60 g 1     famotidine (PEPCID) 20 MG tablet Take 1 tablet (20 mg) by mouth 2 times daily 180 tablet 3     fexofenadine (ALLEGRA) 180 MG tablet Take 1 tablet (180 mg) by mouth daily 90 tablet 3     fluticasone (FLONASE) 50 MCG/ACT nasal spray Spray 2 sprays into both nostrils daily 48 mL 4     loratadine (CLARITIN) 10 MG tablet Take 1 tablet (10 mg) by mouth daily 30 tablet 11     losartan (COZAAR) 50 MG tablet Take 1 tablet (50 mg) by mouth daily 90 tablet 1     MULTIVITAMINS OR TABS 1 tab qd as directed  0     rizatriptan (MAXALT) 10 MG tablet TAKE 1 TAB BY MOUTH AT ONSET OF MIGRAINE. MAY REPEAT IN 2 HR. MAX 3 TAB/24 HR - INS LIMITS 18 tablet 0     No current facility-administered medications for this visit.       ALL:     Allergies   Allergen Reactions     Macrobid [Nitrofuran Derivatives] GI Disturbance     Tape [Adhesive Tape] Rash       FMH:    Family History   Problem Relation Age of Onset     Obesity Mother      C.A.D. Father          MI, angioplasty, 50s     Diabetes Father         Diabetes     Hypertension Father      Obesity Father      Diabetes Maternal Grandmother         Multiple Sclerosis     Neurologic Disorder Paternal Grandmother      Gastrointestinal Disease Paternal Grandmother         Gluten Intolerance      Diabetes Maternal Aunt        SocHx:    Social History     Socioeconomic History     Marital status: Single     Spouse name: Not on file     Number of children: 0     Years of education: Not on file     Highest education level: Not on file   Occupational History     Occupation: cleaning     Comment: archiver's   Tobacco Use     Smoking status: Never     Passive exposure: Never     Smokeless tobacco: Never   Vaping Use     Vaping status: Never Used   Substance and Sexual Activity     Alcohol use: No     Drug use: No     Sexual activity: Never     Partners: Male   Other Topics Concern      Service Not Asked     Blood Transfusions Not Asked     Caffeine Concern Yes     Comment: 1 pop daily     Occupational Exposure Not Asked     Hobby Hazards Not Asked     Sleep Concern Not Asked     Stress Concern Not Asked     Weight Concern Not Asked     Special Diet No     Comment: calcium daily     Back Care Not Asked     Exercise Yes     Comment: rollerblade, bike     Bike Helmet Not Asked     Seat Belt Yes     Self-Exams Yes     Parent/sibling w/ CABG, MI or angioplasty before 65F 55M? 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olympics, local tournaments     Social Determinants of Health     Financial Resource Strain: Low Risk  (5/1/2024)    Financial Resource Strain      Within the past 12 months, have you or your family members you live with been unable to get utilities (heat, electricity) when it was really needed?: No   Food Insecurity: Low Risk  (5/1/2024)    Food Insecurity      Within the past 12 months, did you worry that your food  would run out before you got money to buy more?: No      Within the past 12 months, did the food you bought just not last and you didn t have money to get more?: No   Transportation Needs: Low Risk  (5/1/2024)    Transportation Needs      Within the past 12 months, has lack of transportation kept you from medical appointments, getting your medicines, non-medical meetings or appointments, work, or from getting things that you need?: No   Physical Activity: Sufficiently Active (5/1/2024)    Exercise Vital Sign      Days of Exercise per Week: 4 days      Minutes of Exercise per Session: 60 min   Stress: No Stress Concern Present (5/1/2024)    [place] [employer] - Occupational Stress Questionnaire      Feeling of Stress : Not at all   Social Connections: Socially Isolated (5/1/2024)    Social Connection and Isolation Panel [NHANES]      Frequency of Communication with Friends and Family: Once a week      Frequency of Social Gatherings with Friends and Family: Once a week      Attends Quaker Services: More than 4 times per year      Active Member of Clubs or Organizations: No      Attends Club or Organization Meetings: Never      Marital Status: Never    Interpersonal Safety: Low Risk  (5/1/2024)    Interpersonal Safety      Do you feel physically and emotionally safe where you currently live?: Yes      Within the past 12 months, have you been hit, slapped, kicked or otherwise physically hurt by someone?: No      Within the past 12 months, have you been humiliated or emotionally abused in other ways by your partner or ex-partner?: No   Housing Stability: Low Risk  (5/1/2024)    Housing Stability      Do you have housing? : Yes      Are you worried about losing your housing?: No           EXAMINATION:  Gen:   No apparent distress  Neuro:   A&Ox3, no deficits  Psych:    Answering questions appropriately for age and situation with normal affect  Head:    NCAT  Eye:    Visual scanning without  deficit  Ear:    Response to auditory stimuli wnl  Lung:    Non-labored breathing on RA noted  Abd:    NTND per patient report  Lymph:    Neg for pitting/non-pitting edema BLE  Vasc:    Pulses palpable, CFT minimally delayed  Neuro:    Light touch sensation intact to all sensory nerve distributions without paresthesias  Derm:    Neg for nodules, lesions or ulcerations  MSK:    Right lower extremity -pain at the posterior heel at the Achilles insertion.  The Achilles tendon is otherwise unremarkable without evidence of rupture or attenuation.  Mild pain with lateral calcaneal compression  Calf:    Neg for redness, swelling or tenderness      Imaging:  MPRESSION: Moderate-sized Achilles enthesophyte. There is no evidence  of fracture. No radiographic evidence of active enthesitis. The bones  appear normal otherwise. No fracture or talar dome osteochondral  lesion. The ankle mortise is symmetric.    Assessment:  49 year old female with right lower extremity insertional Achilles tendinopathy      Medical Decision Making/Plan:  Discussed etiologies, anatomy and options  1.  Right insertional Achilles tendinopathy  -I personally reviewed and interpreted the patient's lower extremity history pertinent to today's visit, including imaging/labs, in preparation for initiating a treatment program.  -I personally interpreted and reviewed the x-rays  -Regarding the Achilles tendonitis, the Achilles handout was dispensed and discussed.  We talked about stretching, resting/activity modification, icing, NSAID/tylenol use as tolerated, inserts, supportive shoes and minimizing shoeless walking.    -discussed Achilles and hamstring stretches  -OTC insert information dispensed and discussed  -Based on severity of symptoms, a walking cast boot was dispensed with instructions    Regarding the CAM walker, the following proper-usage instructions were discussed and dispensed:  1.  Do not sleep with the CAM boot on; 2.  Do not wear during  long-distance travel, ie long car rides, plane trips(they were instructed not to drive with it if the involved foot is required to operate a vehicle); 3.  The patient was encouraged to remove the boot throughout the day when off their feet;  4.  They are to have the boot on when ambulating, regardless of WB status        Follow up: 2 weeks or sooner with acute issues      Patient's medical history was reviewed today      Elliot Madden DPM [facility]  Podiatric Foot & Ankle Surgeon  [facility]  [phone]    Disclaimer: This note consists of symbols derived from keyboarding, dictation and/or voice recognition software. As a result, there may be errors in the script that have gone undetected. Please consider this when interpreting information found in this chart.          Again, thank you for allowing me to participate in the care of your patient.        Sincerely,        Elliot Madden DPM, MARCO

## 2024-06-27 ASSESSMENT — ASTHMA QUESTIONNAIRES
QUESTION_4 LAST FOUR WEEKS HOW OFTEN HAVE YOU USED YOUR RESCUE INHALER OR NEBULIZER MEDICATION (SUCH AS ALBUTEROL): NOT AT ALL
QUESTION_2 LAST FOUR WEEKS HOW OFTEN HAVE YOU HAD SHORTNESS OF BREATH: ONCE OR TWICE A WEEK
ACT_TOTALSCORE: 22
QUESTION_3 LAST FOUR WEEKS HOW OFTEN DID YOUR ASTHMA SYMPTOMS (WHEEZING, COUGHING, SHORTNESS OF BREATH, CHEST TIGHTNESS OR PAIN) WAKE YOU UP AT NIGHT OR EARLIER THAN USUAL IN THE MORNING: NOT AT ALL
QUESTION_1 LAST FOUR WEEKS HOW MUCH OF THE TIME DID YOUR ASTHMA KEEP YOU FROM GETTING AS MUCH DONE AT WORK, SCHOOL OR AT HOME: A LITTLE OF THE TIME
QUESTION_5 LAST FOUR WEEKS HOW WOULD YOU RATE YOUR ASTHMA CONTROL: WELL CONTROLLED
ACT_TOTALSCORE: 22

## 2024-07-01 ENCOUNTER — OFFICE VISIT (OUTPATIENT)
Dept: PODIATRY | Facility: CLINIC | Age: 49
End: 2024-07-01
Payer: COMMERCIAL

## 2024-07-01 VITALS — DIASTOLIC BLOOD PRESSURE: 78 MMHG | SYSTOLIC BLOOD PRESSURE: 124 MMHG

## 2024-07-01 DIAGNOSIS — M76.61 RIGHT ACHILLES TENDINITIS: Primary | ICD-10-CM

## 2024-07-01 PROCEDURE — 99213 OFFICE O/P EST LOW 20 MIN: CPT | Performed by: PODIATRIST

## 2024-07-01 NOTE — PROGRESS NOTES
Foot & Ankle Surgery   July 1, 2024    S:  Pt is seen today for evaluation of right Achilles tendinitis.  I saw her approximately 2 weeks ago we started the home treatment protocol and she was given a walking cast boot.  She states that he is doing much better.    Vitals:    07/01/24 1458   BP: 124/78   '      ROS - Pos for CC.  Patient denies current nausea, vomiting, chills, fevers, belly pain, calf pain, chest pain or SOB.  Complete remainder of ROS it otherwise neg.      PE:  Gen:   No apparent distress  Eye:    Visual scanning without deficit  Ear:    Response to auditory stimuli wnl  Lung:    Non-labored breathing on RA noted  Abd:    NTND per patient report  Lymph:    Neg for pitting/non-pitting edema BLE  Vasc:    Pulses palpable, CFT minimally delayed  Neuro:    Light touch sensation intact to all sensory nerve distributions without paresthesias  Derm:    Neg for nodules, lesions or ulcerations  MSK:    Right lower extremity -there is minimal Achilles insertion pain.  There is no evidence of bursitis in the tendon presents without pathology  Calf:    Neg for redness, swelling or tenderness    Assessment:  49 year old female with improving right Achilles insertional tendinopathy      Medical Decision Making/Plan:  Discussed etiologies, anatomy and options  1.  Improving right Achilles insertional tendinopathy  -Continue weightbearing as tolerated in the walking cast boot; she is cleared to transition to a comfortable shoe as symptoms allow  -Continue all home exercises as aggressively as symptoms dictate; I did recommend that she continue with daily stretching for 3 months minimum for better long-term management of symptoms  -She was given a letter to return to work without restrictions effective July 2, 2024  -No scheduled follow-up is needed.  If symptoms fail to progress further with the above plan, she will follow-up in clinic for reassessment and to discuss further options    Follow up: As needed or  sooner with acute issues           Elliot Madden, MARCO FACFAS FACFAOM  Podiatric Foot & Ankle Surgeon  [facility]  [phone]    Disclaimer: This note consists of symbols derived from keyboarding, dictation and/or voice recognition software. As a result, there may be errors in the script that have gone undetected. Please consider this when interpreting information found in this chart.

## 2024-07-01 NOTE — LETTER
July 1, 2024      Prince Patel  [address] [facility] [address] [address]        To Whom It May Concern:    Prince Patel was seen in our clinic for follow-up on Achilles tendinitis.  She is cleared to return to work without restrictions effective 7/2/24.      Sincerely,            Elliot Madden DPM FACFAS FACFAOM  Podiatric Foot & Ankle Surgeon  [facility]

## 2024-07-01 NOTE — PATIENT INSTRUCTIONS
Thank you for choosing [facility] Pod[facility] / Foot & Ankle Surgery!    DR. HERNÁNDEZ'S CLINIC LOCATIONS:     [facility] (Friday) TRIAGE LINE: [phone] [address] [facility]  APPOINTMENTS: [phone]   [place] [address] RADIOLOGY: [phone]    PHYSICAL THERAPY: [phone]    SET UP SURGERY: [phone]   [place] (Mon-Tues AM-Thurs) BILLING QUESTIONS: [phone]   [address] FAX: [phone]   [place] [address] [facility]: [phone]     You were seen today for follow-up on the right Achilles tendinitis.  I am glad to hear that symptoms are improving.  Recommendations:    1.  Continue weightbearing as tolerated in the walking cast boot.  You are cleared to transition from the boot to a comfortable shoe as symptoms allow;    2.  Continue the Achilles tendinitis home treatment protocol as aggressively as symptoms dictate.  I would encourage you to continue the stretching exercises daily for 3 months minimum for better long-term management.    No scheduled follow-up is needed.  If however the above plan fails to provide sufficient continued improvement, follow-up in clinic for reassessment and to discuss further options.

## 2024-07-01 NOTE — LETTER
7/1/2024      Prince Patel  [address]  [address]  [facility] [address]      Dear Colleague,    Thank you for referring your patient, Prince Patel, to the [facility]OD[facility]. Please see a copy of my visit note below.    Foot & Ankle Surgery   July 1, 2024    S:  Pt is seen today for evaluation of right Achilles tendinitis.  I saw her approximately 2 weeks ago we started the home treatment protocol and she was given a walking cast boot.  She states that he is doing much better.    Vitals:    07/01/24 1458   BP: 124/78   '      ROS - Pos for CC.  Patient denies current nausea, vomiting, chills, fevers, belly pain, calf pain, chest pain or SOB.  Complete remainder of ROS it otherwise neg.      PE:  Gen:   No apparent distress  Eye:    Visual scanning without deficit  Ear:    Response to auditory stimuli wnl  Lung:    Non-labored breathing on RA noted  Abd:    NTND per patient report  Lymph:    Neg for pitting/non-pitting edema BLE  Vasc:    Pulses palpable, CFT minimally delayed  Neuro:    Light touch sensation intact to all sensory nerve distributions without paresthesias  Derm:    Neg for nodules, lesions or ulcerations  MSK:    Right lower extremity -there is minimal Achilles insertion pain.  There is no evidence of bursitis in the tendon presents without pathology  Calf:    Neg for redness, swelling or tenderness    Assessment:  49 year old female with improving right Achilles insertional tendinopathy      Medical Decision Making/Plan:  Discussed etiologies, anatomy and options  1.  Improving right Achilles insertional tendinopathy  -Continue weightbearing as tolerated in the walking cast boot; she is cleared to transition to a comfortable shoe as symptoms allow  -Continue all home exercises as aggressively as symptoms dictate; I did recommend that she continue with daily stretching for 3 months minimum for better long-term management of symptoms  -She was given a letter to  return to work without restrictions effective July 2, 2024  -No scheduled follow-up is needed.  If symptoms fail to progress further with the above plan, she will follow-up in clinic for reassessment and to discuss further options    Follow up: As needed or sooner with acute issues           Elliot Madden DPM [facility]  Podiatric Foot & Ankle Surgeon  [facility]  [phone]    Disclaimer: This note consists of symbols derived from keyboarding, dictation and/or voice recognition software. As a result, there may be errors in the script that have gone undetected. Please consider this when interpreting information found in this chart.        Again, thank you for allowing me to participate in the care of your patient.        Sincerely,        Elliot Madden DPM, MARCO

## 2024-07-02 ENCOUNTER — OFFICE VISIT (OUTPATIENT)
Dept: PULMONOLOGY | Facility: CLINIC | Age: 49
End: 2024-07-02
Attending: NURSE PRACTITIONER
Payer: COMMERCIAL

## 2024-07-02 VITALS
BODY MASS INDEX: 40.57 KG/M2 | HEART RATE: 90 BPM | DIASTOLIC BLOOD PRESSURE: 82 MMHG | SYSTOLIC BLOOD PRESSURE: 128 MMHG | WEIGHT: 229 LBS | OXYGEN SATURATION: 100 %

## 2024-07-02 DIAGNOSIS — J45.40 MODERATE PERSISTENT ASTHMA WITHOUT COMPLICATION: Primary | ICD-10-CM

## 2024-07-02 DIAGNOSIS — Z91.09 ENVIRONMENTAL ALLERGIES: ICD-10-CM

## 2024-07-02 PROCEDURE — 99213 OFFICE O/P EST LOW 20 MIN: CPT | Performed by: NURSE PRACTITIONER

## 2024-07-02 NOTE — PATIENT INSTRUCTIONS
It was a pleasure to see you in clinic today.   Here is what we discussed:    Continue Symbicort two puffs twice daily as needed, rinse/gargle after use.  Max 12 puffs per day.  Please call us if you required this much use in one day.  Call my nurse, Von ([phone]) with any change or worsening of your breathing.  Follow-up in one year.     Merly Kelly, CNP  Pulmonary Medicine  [facility]  [phone]

## 2024-07-02 NOTE — PROGRESS NOTES
Pulmonary Clinic Follow-up          Assessment/Plan:     49 year old female with a history of asthma, allergic rhinitis, GERD, presenting for 6 month follow-up.     Moderate persistent asthma  Environmental allergies  Lifelong non-smoker presented last visit for evaluation of wheezing.  She notices this occasionally, when she wakes and when she is exercising.  She has known hx of allergies to cats, ragweed, and dust mites.  She also has hx of GERD which is currently controlled with famotidine.  Last visit she was initiated on Symbicort BID and encouraged to continue allergy regimen.  She reports improvement in wheezing since starting Symbicort.  She is able to exercise more, without issue.    She reports stable breathing over past 6 months, no URIs or exacerbations.  ACT score 22.     Plan:  - continue Symbicort (budesonide/formoterol) 80/4.5, two puffs BID, rinse/gargle after use.  This can also be used as needed, max 12 puffs per day.  - continue azelastine and fluticasone nasal sprays  - continue fexofenadine 180mg daily  - continue famotidine 20mg BID  - she is UTD with COVID vaccine and booster, and annual influenza vaccine.  - Action plan: prednisone 40mg x5 days    Follow-up  - one year    Merly Kelly CNP  Pulmonary Medicine  [facility]  [phone]       CC:     Asthma follow-up     HPI:     49 year old female with a history of asthma, allergic rhinitis, GERD, presenting for 6 month follow-up.     Since last visit (12/2023), breathing has been stable.  Using symbicort only once/week.   Hears wheezing, feels it in chest, then uses symbicort.  Helps wheezing.   No URIs or exacerbations over the past 6 months.          12/27/2023     2:07 PM 3/13/2024     1:39 PM 6/27/2024     9:13 AM   ACT Total Scores   ACT TOTAL SCORE (Goal Greater than or Equal to 20) 23 20 22   In the past 12 months, how many times did you visit the emergency room for your asthma without being  admitted to the hospital? 0 0 0   In the past 12 months, how many times were you hospitalized overnight because of your asthma? 0 0 0          ROS:     A 6-system review was obtained and was negative with the exception of the symptoms endorsed in the HPI.       Medical history:       PMH:  Past Medical History:   Diagnosis Date    Allergic rhinitis, cause unspecified     Allergic rhinitis and blueberries    Benign essential hypertension 5/21/2024    Congestive heart failure (H)     Intertrigo 11/17/2014    Migraine with aura and without status migrainosus, not intractable 7/18/2016    Migraine, unspecified, without mention of intractable migraine without mention of status migrainosus     Migraine    NONSPECIFIC MEDICAL HISTORY     learning disability, mild MR, ADD?    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)    Other acne     Sprain and strain of unspecified site of knee and leg 1/24/2008       PSH:  Past Surgical History:   Procedure Laterality Date    APPENDECTOMY      CARDIAC SURGERY      COLONOSCOPY N/A 8/3/2018    Procedure: COLONOSCOPY;  colonoscopy;  Surgeon: Isaias Medina MD;  Location:  GI    LAPAROSCOPIC HYSTERECTOMY SUPRACERVICAL  11/16/2011    Procedure:LAPAROSCOPIC HYSTERECTOMY SUPRACERVICAL; LAPAROSCOPIC HYSTERECTOMY SUPRACERVICAL ; Surgeon:MASOUD WHITE; Location:[facility] OR    SURGICAL HISTORY OF -       ingrown toenails removed    toenail removal         Allergies:  Allergies   Allergen Reactions    Macrobid [Nitrofuran Derivatives] GI Disturbance    Tape [Adhesive Tape] Rash       Family Hx:  Family History   Problem Relation Age of Onset    Obesity Mother     C.A.D. Father         MI, angioplasty, 50s    Diabetes Father         Diabetes    Hypertension Father     Obesity Father     Diabetes Maternal Grandmother         Multiple Sclerosis    Neurologic Disorder Paternal Grandmother     Gastrointestinal Disease Paternal Grandmother         Gluten  Intolerance     Diabetes Maternal Aunt        Social Hx:  Social History     Socioeconomic History    Marital status: Single     Spouse name: Not on file    Number of children: 0    Years of education: Not on file    Highest education level: Not on file   Occupational History    Occupation: cleaning     Comment: archiver's   Tobacco Use    Smoking status: Never     Passive exposure: Never    Smokeless tobacco: Never   Vaping Use    Vaping status: Never Used   Substance and Sexual Activity    Alcohol use: No    Drug use: No    Sexual activity: Never     Partners: Male   Other Topics Concern     Service Not Asked    Blood Transfusions Not Asked    Caffeine Concern Yes     Comment: 1 pop daily    Occupational Exposure Not Asked    Hobby Hazards Not Asked    Sleep Concern Not Asked    Stress Concern Not Asked    Weight Concern Not Asked    Special Diet No     Comment: calcium daily    Back Care Not Asked    Exercise Yes     Comment: rollerblade, bike    Bike Helmet Not Asked    Seat Belt Yes    Self-Exams Yes    Parent/sibling w/ CABG, MI or angioplasty before 65F 55M? 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[employer], local [employer]     Social Determinants of Health     Financial Resource Strain: Low Risk  (5/1/2024)    Financial Resource Strain     Within the past 12 months, have you or your family members you live with been unable to get utilities (heat, electricity) when it was really needed?: No   Food Insecurity: Low Risk  (5/1/2024)    Food Insecurity     Within the past 12 months, did you worry that your food would run out before you got money to buy more?: No     Within the past 12 months, did the food you bought just not last and you didn t have money to get more?: No   Transportation Needs: Low Risk  (5/1/2024)    Transportation Needs     Within the past 12 months, has lack of transportation kept you from medical  appointments, getting your medicines, non-medical meetings or appointments, work, or from getting things that you need?: No   Physical Activity: Sufficiently Active (5/1/2024)    Exercise Vital Sign     Days of Exercise per Week: 4 days     Minutes of Exercise per Session: 60 min   Stress: No Stress Concern Present (5/1/2024)    [place] [employer] - Occupational Stress Questionnaire     Feeling of Stress : Not at all   Social Connections: Socially Isolated (5/1/2024)    Social Connection and Isolation Panel [NHANES]     Frequency of Communication with Friends and Family: Once a week     Frequency of Social Gatherings with Friends and Family: Once a week     Attends Sikhism Services: More than 4 times per year     Active Member of Clubs or Organizations: No     Attends Club or Organization Meetings: Never     Marital Status: Never    Interpersonal Safety: Low Risk  (5/1/2024)    Interpersonal Safety     Do you feel physically and emotionally safe where you currently live?: Yes     Within the past 12 months, have you been hit, slapped, kicked or otherwise physically hurt by someone?: No     Within the past 12 months, have you been humiliated or emotionally abused in other ways by your partner or ex-partner?: No   Housing Stability: Low Risk  (5/1/2024)    Housing Stability     Do you have housing? : Yes     Are you worried about losing your housing?: No       Current Meds:  Current Outpatient Medications   Medication Sig Dispense Refill    albuterol (PROAIR HFA/PROVENTIL HFA/VENTOLIN HFA) 108 (90 Base) MCG/ACT inhaler Inhale 2 puffs into the lungs every 6 hours 18 g 11    azelastine (ASTELIN) 0.1 % nasal spray Spray 1 spray into both nostrils 2 times daily 30 mL 11    budesonide-formoterol (SYMBICORT) 80-4.5 MCG/ACT Inhaler Inhale 2 puffs into the lungs 2 times daily 10.2 g 11    cholecalciferol (VITAMIN D3) 25 mcg (1000 units) capsule Take 1 capsule by mouth daily      famotidine  (PEPCID) 20 MG tablet Take 1 tablet (20 mg) by mouth 2 times daily 180 tablet 3    fluticasone (FLONASE) 50 MCG/ACT nasal spray Spray 2 sprays into both nostrils daily 48 mL 4    loratadine (CLARITIN) 10 MG tablet Take 1 tablet (10 mg) by mouth daily 30 tablet 11    losartan (COZAAR) 50 MG tablet Take 1 tablet (50 mg) by mouth daily 90 tablet 1    MULTIVITAMINS OR TABS 1 tab qd as directed  0    rizatriptan (MAXALT) 10 MG tablet TAKE 1 TAB BY MOUTH AT ONSET OF MIGRAINE. MAY REPEAT IN 2 HR. MAX 3 TAB/24 HR - INS LIMITS 18 tablet 0    clotrimazole (LOTRIMIN) 1 % external cream Apply topically 2 times daily (Patient not taking: Reported on 7/2/2024) 60 g 1    fexofenadine (ALLEGRA) 180 MG tablet Take 1 tablet (180 mg) by mouth daily (Patient not taking: Reported on 7/2/2024) 90 tablet 3          Physical Exam:     /82   Pulse 90   Wt 103.9 kg (229 lb)   LMP 10/31/2011   SpO2 100%   BMI 40.57 kg/m    Gen: adult female, appears in NAD  HEENT: clear conjunctivae, moist mucous membranes  CV: RRR, no M/G/R  Resp: CTAB, no focal crackles or wheezes.  Respirations even and unlabored.  On RA.  No cough.  Skin: no apparent rashes on visible skin  Ext: no cyanosis, clubbing or edema  Neuro: alert and answering questions appropriately       Data:     Labs:  reviewed    Imaging studies:  I have personally reviewed all pertinent imaging studies and PFT results unless otherwise noted.    Chest Xray 8/14/23:  Impression  Heart is normal in size. Lungs are clear.    Pulmonary Function Testing      The FVC, FEV1, and FEV1/FVC ratio are within normal limits. Lung volumes are within normal limits.  Following administration of bronchodilators, there is no significant response.  The diffusing capacity is normal.   IMPRESSION:   Normal spirometry, lung volumes and diffusing capacity.   No significant change following bronchodilators.

## 2024-07-03 ASSESSMENT — ACTIVITIES OF DAILY LIVING (ADL)
ROLLING_OVER_IN_BED: NO DIFFICULTY
WALKING_2_BLOCKS: A LITTLE BIT OF DIFFICULTY
YOUR_USUAL_HOBBIES,_RECREATIONAL_OR_SPORTING_ACTIVITIES: A LITTLE BIT OF DIFFICULTY
PERFORMING_LIGHT_ACTIVITIES_AROUND_YOUR_HOME: NO DIFFICULTY
SITTING_FOR_1_HOUR: NO DIFFICULTY
WALKING_BETWEEN_ROOMS: NO DIFFICULTY
MAKING_SHARP_TURNS_WHILE_RUNNING_FAST: MODERATE DIFFICULTY
LIFTING_AN_OBJECT,_LIKE_A_BAG_OF_GROCERIES_FROM_THE_FLOOR: NO DIFFICULTY
ANY_OF_YOUR_USUAL_WORK,_HOUSEWORK_OR_SCHOOL_ACTIVITIES: A LITTLE BIT OF DIFFICULTY
SQUATTING: NO DIFFICULTY
STANDING_FOR_1_HOUR: NO DIFFICULTY
PERFORMING_HEAVY_ACTIVITIES_AROUND_YOUR_HOME: A LITTLE BIT OF DIFFICULTY
LEFS_RAW_SCORE: 0
GOING_UP_OR_DOWN_10_STAIRS: A LITTLE BIT OF DIFFICULTY
WALKING_A_MILE: NO DIFFICULTY
PUTTING_ON_YOUR_SHOES_OR_SOCKS: NO DIFFICULTY
PLEASE_INDICATE_YOR_PRIMARY_REASON_FOR_REFERRAL_TO_THERAPY:: FOOT AND/OR ANKLE
RUNNING_ON_EVEN_GROUND: NO DIFFICULTY
RUNNING_ON_UNEVEN_GROUND: A LITTLE BIT OF DIFFICULTY
GETTING_INTO_AND_OUT_OF_A_BATH: A LITTLE BIT OF DIFFICULTY
GETTING_INTO_OR_OUT_OF_A_CAR: NO DIFFICULTY
SHOPPING: A LITTLE BIT OF DIFFICULTY
LEFS_SCORE(%): 0

## 2024-07-08 ENCOUNTER — THERAPY VISIT (OUTPATIENT)
Dept: PHYSICAL THERAPY | Facility: CLINIC | Age: 49
End: 2024-07-08
Payer: COMMERCIAL

## 2024-07-08 DIAGNOSIS — M25.571 PAIN IN JOINT, ANKLE AND FOOT, RIGHT: ICD-10-CM

## 2024-07-08 PROCEDURE — 97161 PT EVAL LOW COMPLEX 20 MIN: CPT | Mod: GP | Performed by: PHYSICAL THERAPIST

## 2024-07-08 PROCEDURE — 97110 THERAPEUTIC EXERCISES: CPT | Mod: GP | Performed by: PHYSICAL THERAPIST

## 2024-07-08 NOTE — PROGRESS NOTES
"PHYSICAL THERAPY EVALUATION  Type of Visit: Evaluation       Fall Risk Screen:  Fall screen completed by: PT  Have you fallen 2 or more times in the past year?: No  Have you fallen and had an injury in the past year?: No  Is patient a fall risk?: No    Subjective       Pt reporting heel pain at Achilles since the end of May when it \"popped\" playing softball. Pt reports she was wearing a boot for about a month and then stopped because it was feeling better. Today pt heard it pop when walking and now it's bothering her again a little bit more. Pt still plays softball without running, also on her feet 4 hours a day for work.    Xray: Moderate-sized Achilles enthesophyte. There is no evidence  of fracture. No radiographic evidence of active enthesitis. The bones  appear normal otherwise. No fracture or talar dome osteochondral  lesion. The ankle mortise is symmetric.    Presenting condition or subjective complaint: Physical therapy for foot and ankle  Date of onset: 06/03/24 (MD order)    Relevant medical history: Asthma; High blood pressure; Migraines or headaches; Overweight   Dates & types of surgery: November 2011    Prior diagnostic imaging/testing results: X-ray     Prior therapy history for the same diagnosis, illness or injury: No      Living Environment  Social support: Alone   Type of home: Apartment/condo   Stairs to enter the home: No       Ramp: Yes   Stairs inside the home: No       Help at home: Other  Equipment owned: Straight Cane; Crutches     Employment: Yes   Hobbies/Interests: Dianelys art, crotcheting, softball,bowling, ect     Patient goals for therapy: Flex foot towards body    Pain assessment: Pain present     Objective   FOOT/ANKLE EVALUATION  PAIN: Pain Level at Rest: 0/10  Pain Level with Use: 5/10  Pain is Exacerbated By: running, walking, stairs   Pain is Relieved By: cold and otc medications  INTEGUMENTARY (edema, incisions): WNL  GAIT:   Weightbearing Status: WBAT  Assistive " Device(s): None  Gait Deviations: Antalgic  BALANCE/PROPRIOCEPTION:  pain with WB through RLE, SLS time 3 seconds  WEIGHT BEARING ALIGNMENT: Foot/Ankle WB Alignment:WNL  ROM:  limited DF to neutral, pain with overpressure     FUNCTIONAL TESTS: Double Leg Squat: Anterior knee translation, Knee valgus, Hip internal rotation, and Improper use of glutes/hips  PALPATION:  TTP at Achilles tendon insertion   JOINT MOBILITY:  hypomobile midfoot    Assessment & Plan   CLINICAL IMPRESSIONS  Medical Diagnosis: R achilles tendinopathy    Treatment Diagnosis: R ankle pain   Impression/Assessment: Patient is a 49 year old female with R ankle complaints.  The following significant findings have been identified: Pain, Decreased ROM/flexibility, Decreased joint mobility, Decreased strength, Impaired balance, Impaired muscle performance, and Decreased activity tolerance. These impairments interfere with their ability to perform self care tasks, work tasks, recreational activities, and community mobility as compared to previous level of function.     Clinical Decision Making (Complexity):  Clinical Presentation: Stable/Uncomplicated  Clinical Presentation Rationale: based on medical and personal factors listed in PT evaluation  Clinical Decision Making (Complexity): Low complexity    PLAN OF CARE  Treatment Interventions:  Interventions: Gait Training, Manual Therapy, Neuromuscular Re-education, Therapeutic Activity, Therapeutic Exercise    Long Term Goals     PT Goal 1  Goal Identifier: Running  Goal Description: pt will be able to run in her softball games without increased pain  Rationale: to maximize safety and independence with performance of ADLs and functional tasks  Target Date: 09/16/24      Frequency of Treatment: once per week  Duration of Treatment: 10 weeks    Recommended Referrals to Other Professionals:  NA  Education Assessment:   Learner/Method: Patient  Education Comments: Pt educated on PT role and plan of  care    Risks and benefits of evaluation/treatment have been explained.   Patient/Family/caregiver agrees with Plan of Care.     Evaluation Time:     PT Eval, Low Complexity Minutes (70692): 20     Signing Clinician: ANN Romero [facility]                                                                                   OUTPATIENT PHYSICAL THERAPY      PLAN OF TREATMENT FOR OUTPATIENT REHABILITATION   Patient's Last Name, First Name, Prince Gary YOB: 1975   Provider's Name   [facility]   Medical Record No.  [id number]     Onset Date: 06/03/24 (MD order)  Start of Care Date: 07/08/24     Medical Diagnosis:  R achilles tendinopathy      PT Treatment Diagnosis:  R ankle pain Plan of Treatment  Frequency/Duration: once per week/ 10 weeks    Certification date from 07/08/24 to 09/16/24         See note for plan of treatment details and functional goals     Keyana Longo PT                         I CERTIFY THE NEED FOR THESE SERVICES FURNISHED UNDER        THIS PLAN OF TREATMENT AND WHILE UNDER MY CARE     (Physician attestation of this document indicates review and certification of the therapy plan).              Referring Provider:  Ivelisse Fischer    Initial Assessment  See Epic Evaluation- Start of Care Date: 07/08/24

## 2024-07-24 ENCOUNTER — THERAPY VISIT (OUTPATIENT)
Dept: PHYSICAL THERAPY | Facility: CLINIC | Age: 49
End: 2024-07-24
Payer: COMMERCIAL

## 2024-07-24 DIAGNOSIS — M25.571 PAIN IN JOINT, ANKLE AND FOOT, RIGHT: Primary | ICD-10-CM

## 2024-07-24 PROCEDURE — 97535 SELF CARE MNGMENT TRAINING: CPT | Mod: GP

## 2024-07-24 PROCEDURE — 97140 MANUAL THERAPY 1/> REGIONS: CPT | Mod: GP

## 2024-07-24 PROCEDURE — 97110 THERAPEUTIC EXERCISES: CPT | Mod: GP

## 2024-08-12 ENCOUNTER — THERAPY VISIT (OUTPATIENT)
Dept: PHYSICAL THERAPY | Facility: CLINIC | Age: 49
End: 2024-08-12
Payer: COMMERCIAL

## 2024-08-12 DIAGNOSIS — M25.571 PAIN IN JOINT, ANKLE AND FOOT, RIGHT: Primary | ICD-10-CM

## 2024-08-12 PROCEDURE — 97140 MANUAL THERAPY 1/> REGIONS: CPT | Mod: GP | Performed by: PHYSICAL THERAPIST

## 2024-08-12 PROCEDURE — 97110 THERAPEUTIC EXERCISES: CPT | Mod: GP | Performed by: PHYSICAL THERAPIST

## 2024-08-26 ENCOUNTER — THERAPY VISIT (OUTPATIENT)
Dept: PHYSICAL THERAPY | Facility: CLINIC | Age: 49
End: 2024-08-26
Payer: COMMERCIAL

## 2024-08-26 DIAGNOSIS — M25.571 PAIN IN JOINT, ANKLE AND FOOT, RIGHT: Primary | ICD-10-CM

## 2024-08-26 PROCEDURE — 97110 THERAPEUTIC EXERCISES: CPT | Mod: GP | Performed by: PHYSICAL THERAPIST

## 2024-08-26 PROCEDURE — 97140 MANUAL THERAPY 1/> REGIONS: CPT | Mod: GP | Performed by: PHYSICAL THERAPIST

## 2024-08-30 ENCOUNTER — MYC MEDICAL ADVICE (OUTPATIENT)
Dept: FAMILY MEDICINE | Facility: CLINIC | Age: 49
End: 2024-08-30
Payer: COMMERCIAL

## 2024-09-05 ENCOUNTER — TRANSFERRED RECORDS (OUTPATIENT)
Dept: HEALTH INFORMATION MANAGEMENT | Facility: CLINIC | Age: 49
End: 2024-09-05
Payer: COMMERCIAL

## 2024-09-09 ENCOUNTER — THERAPY VISIT (OUTPATIENT)
Dept: PHYSICAL THERAPY | Facility: CLINIC | Age: 49
End: 2024-09-09
Payer: COMMERCIAL

## 2024-09-09 DIAGNOSIS — M25.571 PAIN IN JOINT, ANKLE AND FOOT, RIGHT: Primary | ICD-10-CM

## 2024-09-09 PROCEDURE — 97140 MANUAL THERAPY 1/> REGIONS: CPT | Mod: GP | Performed by: PHYSICAL THERAPIST

## 2024-09-09 PROCEDURE — 97110 THERAPEUTIC EXERCISES: CPT | Mod: GP | Performed by: PHYSICAL THERAPIST

## 2024-09-23 ENCOUNTER — THERAPY VISIT (OUTPATIENT)
Dept: PHYSICAL THERAPY | Facility: CLINIC | Age: 49
End: 2024-09-23
Payer: COMMERCIAL

## 2024-09-23 DIAGNOSIS — M25.571 PAIN IN JOINT, ANKLE AND FOOT, RIGHT: Primary | ICD-10-CM

## 2024-09-23 PROCEDURE — 97140 MANUAL THERAPY 1/> REGIONS: CPT | Mod: GP | Performed by: PHYSICAL THERAPIST

## 2024-09-23 PROCEDURE — 97110 THERAPEUTIC EXERCISES: CPT | Mod: GP | Performed by: PHYSICAL THERAPIST

## 2024-09-23 NOTE — PROGRESS NOTES
[facility]                                                                                   OUTPATIENT PHYSICAL THERAPY    PLAN OF TREATMENT FOR OUTPATIENT REHABILITATION   Patient's Last Name, First Name, Prince Gary YOB: 1975   Provider's Name   [facility]   Medical Record No.  [id number]     Onset Date: 06/03/24 (MD order)  Start of Care Date: 07/08/24     Medical Diagnosis:  R achilles tendinopathy      PT Treatment Diagnosis:  R ankle pain Plan of Treatment  Frequency/Duration: once per week/ 8 weeks    Certification date from 09/17/24 to 11/11/24         See note for plan of treatment details and functional goals     Keyana Longo PT                         I CERTIFY THE NEED FOR THESE SERVICES FURNISHED UNDER        THIS PLAN OF TREATMENT AND WHILE UNDER MY CARE     (Physician attestation of this document indicates review and certification of the therapy plan).              Referring Provider:  Ivelisse Fischer    Initial Assessment  See Epic Evaluation- Start of Care Date: 07/08/24            PLAN  Continue therapy per current plan of care.    Beginning/End Dates of Progress Note Reporting Period:  07/08/24 to 09/23/2024    Referring Provider:  Ivelisse Fischer         09/23/24 0500   Appointment Info   Signing clinician's name / credentials Keyana Longo, PT, DPT   Total/Authorized Visits PT E&T   Visits Used 6   Medical Diagnosis R achilles tendinopathy   PT Tx Diagnosis R ankle pain   Progress Note/Certification   Start of Care Date 07/08/24   Onset of illness/injury or Date of Surgery 06/03/24  (MD order)   Therapy Frequency once per week   Predicted Duration 8 weeks   Certification date from 09/17/24   Certification date to 11/11/24   Progress Note Completed Date 07/08/24   PT Goal 1   Goal Identifier Running   Goal Description pt will be able to run in her softball games without increased pain   Rationale to  maximize safety and independence with performance of ADLs and functional tasks   Goal Progress hasn't run but thinks she could (not currently in softball season)   Target Date 11/11/24   Subjective Report   Subjective Report pt reports pain has been a lot better, able to do everything she wants to do be able to do. Exericses going well, balance has been getting easier. Agreeable to wrapping up PT today   Objective Measures   Objective Measures Objective Measure 1   Objective Measure 1   Details 1/10 pain at worst   Treatment Interventions (PT)   Interventions Therapeutic Procedure/Exercise;Manual Therapy;Self Care/Home Management   Therapeutic Procedure/Exercise   Therapeutic Procedures: strength, endurance, ROM, flexibility minutes (65139) 15   PTRx Ther Proc 1 Towel Gather   PTRx Ther Proc 1 - Details 1x15 for review   PTRx Ther Proc 2 Standing Gastroc Stretch   PTRx Ther Proc 2 - Details 2x30 sec on R - verbal and visual cues for good form and muscle stretch   PTRx Ther Proc 3 Squat   PTRx Ther Proc 3 - Details cues for hip hinge in front of chair and keeping knees from caving in   PTRx Ther Proc 4 Calf Raise - Single Leg   PTRx Ther Proc 4 - Details no pain with this today x 10 reps    PTRx Ther Proc 5 Side Stepping With Theraband   PTRx Ther Proc 5 - Details yellow TB 200ft    PTRx Ther Proc 6 Eccentric Toe Raise on Flat Surface-Gastroc   PTRx Ther Proc 6 - Details x 10 reps SL heel raise too uncomfortable   Skilled Intervention Progression of HEP with additional stretches and strengthening exercises; cues for good form   Patient Response/Progress Pt tolerated well without increase in pain   Neuromuscular Re-education   PTRx Neuro Re-ed 1 Single Leg Stance - Balance Right Foot   PTRx Neuro Re-ed 1 - Details R LE balance x 30 seconds no pain    Manual Therapy   Manual Therapy: Mobilization, MFR, MLD, friction massage minutes (22361) 15   Manual Therapy 1 STM to Achilles tendon and distal calf   Skilled  Intervention MT to decrease tension   Patient Response/Progress Pt reports ankle feeling better afterwards   Education   Learner/Method Patient   Education Comments Pt educated on PT role and plan of care   Plan   Home program see PTRx - printed   Plan for next session DC   Total Session Time   Timed Code Treatment Minutes 30   Total Treatment Time (sum of timed and untimed services) 30

## 2024-10-02 ENCOUNTER — OFFICE VISIT (OUTPATIENT)
Dept: FAMILY MEDICINE | Facility: CLINIC | Age: 49
End: 2024-10-02
Payer: COMMERCIAL

## 2024-10-02 VITALS
BODY MASS INDEX: 41.75 KG/M2 | OXYGEN SATURATION: 100 % | DIASTOLIC BLOOD PRESSURE: 86 MMHG | RESPIRATION RATE: 15 BRPM | HEIGHT: 63 IN | HEART RATE: 98 BPM | WEIGHT: 235.6 LBS | SYSTOLIC BLOOD PRESSURE: 137 MMHG

## 2024-10-02 DIAGNOSIS — M25.531 RIGHT WRIST PAIN: Primary | ICD-10-CM

## 2024-10-02 DIAGNOSIS — B07.0 PLANTAR WARTS: ICD-10-CM

## 2024-10-02 DIAGNOSIS — M25.571 PAIN IN JOINT, ANKLE AND FOOT, RIGHT: ICD-10-CM

## 2024-10-02 PROCEDURE — 17110 DESTRUCTION B9 LES UP TO 14: CPT

## 2024-10-02 PROCEDURE — 99213 OFFICE O/P EST LOW 20 MIN: CPT | Mod: 25

## 2024-10-02 NOTE — PROGRESS NOTES
"  Assessment & Plan     (M25.531) Right wrist pain  (primary encounter diagnosis)  New onset right wrist pain. Seems to be carpal tunnel based on history. Likely due to sleeping on the wrist. 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 Will fit for wrist brace to wear at night. If worsening would recommend OT referral.   Plan: Wrist/Arm/Hand Bracing Supplies Order Wrist         Brace; Right; non-thumb spica          (M25.571) Pain in joint, ankle and foot, right  Ongoing symptoms. Has not been back to see Podiatry. Plans to follow up with them given no improvement in symptoms with therapy and home stretches/conservative measures.    (B07.0) Plantar warts  Procedure explained, verbal consent given by patient, cleansed with alcohol, liquid nitrogen applied x 3 pulses with cryo gun to 1 warts. Explained that may take more than 1 treatment. if wart remains return in 3-4 weeks for an additional treatment. Recommend using pumice stone daily as well as salicylic acid.   Plan: DESTRUCT BENIGN LESION, UP TO 14            BMI  Estimated body mass index is 41.73 kg/m  as calculated from the following:    Height as of this encounter: 1.6 m (5' 3\").    Weight as of this encounter: 106.9 kg (235 lb 9.6 oz).   Weight management plan: Discussed healthy diet and exercise guidelines      Follow up as needed if symptoms worsen or fail to improve.    Subjective   Petr Tolbert is a 49 year old, presenting for the following health issues:  Musculoskeletal Problem (Right foot follow up for pain. Also has a wart on that foot too)        10/2/2024     1:16 PM   Additional Questions   Roomed by Ellen CAVANAUGH     History of Present Illness       Reason for visit:  Bone spur check to see if  Getting any better or not    She eats 2-3 servings of fruits and vegetables daily.She consumes 1 sweetened beverage(s) daily.She exercises with enough effort to increase her heart rate 20 to 29 minutes per day.  She exercises with enough effort " "to increase her heart rate 4 days per week.   She is taking medications regularly.     Pain History:  When did you first notice your pain? [email or web address]   Have you seen this provider for your pain in the past? Yes   Where in your body do you have pain? Right foot  Are you seeing anyone else for your pain? Yes - was seeing pt but that just ended        5/30/2023     2:08 PM 9/19/2023     2:48 PM 3/13/2024     1:38 PM   PHQ-9 SCORE   PHQ-9 Total Score MyChart  1 (Minimal depression)    PHQ-9 Total Score 3 1 0         12/23/2022     2:20 PM 9/19/2023     2:49 PM 3/13/2024     1:38 PM   KATHLEEN-7 SCORE   Total Score 0 (minimal anxiety) 0 (minimal anxiety)    Total Score 0 0 0       Warts  Onset/Duration: a week  Description (location/number): one on right foot  Accompanying signs and symptoms (pain, redness): No  History: prior warts: YES  Therapies tried and outcome: none      Review of Systems  Constitutional, HEENT, cardiovascular, pulmonary, gi and gu systems are negative, except as otherwise noted.        Objective    /86 (BP Location: Right arm, Patient Position: Sitting, Cuff Size: Adult Regular)   Pulse 98   Resp 15   Ht 1.6 m (5' 3\")   Wt 106.9 kg (235 lb 9.6 oz)   LMP 10/31/2011   SpO2 100%   BMI 41.73 kg/m    Body mass index is 41.73 kg/m .  Physical Exam   GENERAL: alert and no distress  RESP: lungs clear to auscultation - no rales, rhonchi or wheezes  CV: regular rate and rhythm, normal S1 S2, no S3 or S4, no murmur, click or rub  MS: no gross musculoskeletal defects noted  Skin: Verruca to the heel of the right foot      Signed Electronically by: Ivelisse Fischer PA-C    "

## 2024-10-21 ENCOUNTER — ANCILLARY PROCEDURE (OUTPATIENT)
Dept: GENERAL RADIOLOGY | Facility: CLINIC | Age: 49
End: 2024-10-21
Attending: PODIATRIST
Payer: COMMERCIAL

## 2024-10-21 ENCOUNTER — OFFICE VISIT (OUTPATIENT)
Dept: PODIATRY | Facility: CLINIC | Age: 49
End: 2024-10-21
Payer: COMMERCIAL

## 2024-10-21 VITALS — BODY MASS INDEX: 41.27 KG/M2 | DIASTOLIC BLOOD PRESSURE: 80 MMHG | SYSTOLIC BLOOD PRESSURE: 121 MMHG | WEIGHT: 233 LBS

## 2024-10-21 DIAGNOSIS — M76.61 RIGHT ACHILLES TENDINITIS: ICD-10-CM

## 2024-10-21 DIAGNOSIS — M76.61 RIGHT ACHILLES TENDINITIS: Primary | ICD-10-CM

## 2024-10-21 DIAGNOSIS — M77.31 POSTERIOR CALCANEAL EXOSTOSIS, RIGHT: ICD-10-CM

## 2024-10-21 PROCEDURE — 73630 X-RAY EXAM OF FOOT: CPT | Mod: TC | Performed by: RADIOLOGY

## 2024-10-21 PROCEDURE — 99213 OFFICE O/P EST LOW 20 MIN: CPT | Performed by: PODIATRIST

## 2024-10-21 NOTE — PATIENT INSTRUCTIONS
Thank you for choosing [facility] Podiatry / Foot & Ankle Surgery!    DR FERRARO'S CLINIC:  [facility]   [address]   [place] [address]   (Tues, Wed, Thurs AM, Fri PM)       [facility]  [address], [place] [address]  (Every other Fri AM)    [place] [facility]  [address] [place] [address]  (Every other Friday)       TRIAGE LINE: [phone]  APPOINTMENTS: [phone]  RADIOLOGY: [phone]  SET UP SURGERY: [phone]  PHYSICAL THERAPY: [phone]   BILLING QUESTIONS: [phone]  FAX: [phone]

## 2024-10-21 NOTE — PROGRESS NOTES
Prince returns to the office for reevaluation of the pain in the right heel.  She relates she has a bump that has become increasingly painful on the back of the heel.  She does have a history of insertional Achilles tendinitis in the same area.  Denies any traumatic event or episode.  Treatment thus far has included an ankle brace, walking boot, athletic shoes with an added arch support, a round of physical therapy, ice,.  She relates that physical therapy was very helpful for her plantar fasciitis but not as much for this condition in the back of the heel region.    Vitals: /80   Wt 105.7 kg (233 lb)   LMP 10/31/2011   BMI 41.27 kg/m    BMI= Body mass index is 41.27 kg/m .    Lower Extremity Physical Exam:      Neurovascular status remains unchanged.  Muscular exam is within normal limits to major muscle groups.  Integument is intact.      Prominence noted at the posterior calcaneal region at the Achilles tendon insertion.  Pain with palpation of this area.  There is limitation with dorsiflexion and tightness of the posterior muscle group and achilles tendon.  No effusions noted    Diagnostics:  Radiograph evaluation including three views of the right foot reveals no evidence of fracture or tumor formation.  There is a moderate-sized exostosis posterior superior calcaneus that has increased in size since last film.   I personally evaluated the images as well as reviewed the images with the patient pointing out the findings.      Assessment:     ICD-10-CM    1. Right Achilles tendinitis  M76.61 XR Foot Right G/E 3 Views      2. Posterior calcaneal exostosis, right  M77.31           Plan:    I have explained to Prince about the progress of the conditions.  We reviewed all of the treatments that she has had thus far.  Also discussed that she can try some of those treatments again.  At this time, a prescription for voltaren gel will be sent.  Continue with all home treatments of ice, stretch, heat, shoes, otc  arch supports, heel lift.     She is interested in surgical options as well.  I recommend that she continue with above treatments while waiting for her appointment with one of the Podiatric Surgeons at [place].  Contact information was given to patient.    Prince verbalized agreement with and understanding of the rational for the diagnosis and treatment plan.  All questions were answered to best of my ability and the patient's satisfaction. The patient was advised to contact the clinic with any questions that may arise after the clinic visit.      Disclaimer: This note consists of symbols derived from keyboarding, dictation and/or voice recognition software. As a result, there may be errors in the script that have gone undetected. Please consider this when interpreting information found in this chart.       Megha Navarro D.P.M.

## 2024-10-21 NOTE — LETTER
10/21/2024      Prince Patel  [address]  [place] [id number]      Dear Colleague,    Thank you for referring your patient, Prince Patel, to the [facility]. Please see a copy of my visit note below.    Prince returns to the office for reevaluation of the pain in the right heel.  She relates she has a bump that has become increasingly painful on the back of the heel.  She does have a history of insertional Achilles tendinitis in the same area.  Denies any traumatic event or episode.  Treatment thus far has included an ankle brace, walking boot, athletic shoes with an added arch support, a round of physical therapy, ice,.  She relates that physical therapy was very helpful for her plantar fasciitis but not as much for this condition in the back of the heel region.    Vitals: /80   Wt 105.7 kg (233 lb)   LMP 10/31/2011   BMI 41.27 kg/m    BMI= Body mass index is 41.27 kg/m .    Lower Extremity Physical Exam:      Neurovascular status remains unchanged.  Muscular exam is within normal limits to major muscle groups.  Integument is intact.      Prominence noted at the posterior calcaneal region at the Achilles tendon insertion.  Pain with palpation of this area.  There is limitation with dorsiflexion and tightness of the posterior muscle group and achilles tendon.  No effusions noted    Diagnostics:  Radiograph evaluation including three views of the right foot reveals no evidence of fracture or tumor formation.  There is a moderate-sized exostosis posterior superior calcaneus that has increased in size since last film.   I personally evaluated the images as well as reviewed the images with the patient pointing out the findings.      Assessment:     ICD-10-CM    1. Right Achilles tendinitis  M76.61 XR Foot Right G/E 3 Views      2. Posterior calcaneal exostosis, right  M77.31           Plan:    I have explained to Prince about the progress of the  conditions.  We reviewed all of the treatments that she has had thus far.  Also discussed that she can try some of those treatments again.  At this time, a prescription for voltaren gel will be sent.  Continue with all home treatments of ice, stretch, heat, shoes, otc arch supports, heel lift.     She is interested in surgical options as well.  I recommend that she continue with above treatments while waiting for her appointment with one of the Podiatric Surgeons at [place].  Contact information was given to patient.    Prince verbalized agreement with and understanding of the rational for the diagnosis and treatment plan.  All questions were answered to best of my ability and the patient's satisfaction. The patient was advised to contact the clinic with any questions that may arise after the clinic visit.      Disclaimer: This note consists of symbols derived from keyboarding, dictation and/or voice recognition software. As a result, there may be errors in the script that have gone undetected. Please consider this when interpreting information found in this chart.       Megha Navarro, D.P.M.      Again, thank you for allowing me to participate in the care of your patient.        Sincerely,        Megha Navarro DPM

## 2024-10-22 ENCOUNTER — OFFICE VISIT (OUTPATIENT)
Dept: PODIATRY | Facility: CLINIC | Age: 49
End: 2024-10-22
Payer: COMMERCIAL

## 2024-10-22 VITALS — BODY MASS INDEX: 41.63 KG/M2 | DIASTOLIC BLOOD PRESSURE: 80 MMHG | SYSTOLIC BLOOD PRESSURE: 128 MMHG | WEIGHT: 235 LBS

## 2024-10-22 DIAGNOSIS — M62.461 GASTROCNEMIUS EQUINUS, RIGHT: ICD-10-CM

## 2024-10-22 DIAGNOSIS — M76.61 TENDONITIS, ACHILLES, RIGHT: ICD-10-CM

## 2024-10-22 DIAGNOSIS — M79.671 RIGHT FOOT PAIN: Primary | ICD-10-CM

## 2024-10-22 DIAGNOSIS — M77.50 BONE SPUR OF ANKLE: ICD-10-CM

## 2024-10-22 PROCEDURE — 99214 OFFICE O/P EST MOD 30 MIN: CPT | Performed by: PODIATRIST

## 2024-10-22 NOTE — PATIENT INSTRUCTIONS
"Thank you for choosing [facility] Podiatry / Foot & Ankle Surgery!    DR FERRARO'S CLINIC:  [facility]   [address]   [place] [address]  [phone]    (Tues, Wed, Thur am, Fri pm)     [facility]  [address], [place] [address]  [phone]  (Every other Friday morning)    [facility]  [address] [place] [id number] [phone]  (Every other Friday)     TRIAGE LINE: [phone]  APPOINTMENTS: [phone]  RADIOLOGY: [phone]  SET UP SURGERY: [phone]  PHYSICAL THERAPY: [phone]   FAX NUMBER: [phone]  BILLING QUESTIONS: [phone]       Follow up: Will call or My Chart MRI results          Please call to schedule your MRI/CT/Ultrasound/Arthrogram appointment.  The number is [phone].        BONE SPUR  A bone spur (osteophyte) is a bony growth formed on normal bone. Most people think of something sharp when they think of a \"spur,\" but a bone spur is just extra bone. It s usually smooth, but it can cause wear and tear or pain if it presses or rubs on other bones or soft tissues such as ligaments, tendons, or nerves in the body. Common places for bone spurs include the spine, shoulders, hands, hips, knees, and feet.  CAUSES  A bone spur forms as the body tries to repair itself by building extra bone. It generally forms in response to pressure, rubbing, or stress that continues over a long period of time.  Some bone spurs form as part of the aging process. As we age, the slippery tissue called cartilage that covers the ends of the bones within joints breaks down and eventually wears away (osteoarthritis). Over time, this leads to pain and swelling and, in some cases, bone spurs forming along the edges of the joint. Bone spurs due to aging are especially common in the joints of the spine and feet.  Bone spurs also form in the feet in response to tight ligaments, to activities such as dancing and " "running that put stress on the feet, and to pressure from being overweight or from poorly fitting shoes. For example, the long ligament on the bottom of the foot (plantar fascia) can become stressed or tight and pull on the heel, causing the ligament to become inflamed (plantar fasciitis). As the bone tries to mend itself, a bone spur can form on the bottom of the heel (known as a \"heel spur\"). Pressure at the back of the heel from frequently wearing shoes that are too tight can cause a bone spur on the back of the heel. This is sometimes called a \"pump bump,\" because it is often seen in women who wear high heels.  SYMPTOMS  Many people have bone spurs without ever knowing it, because most bone spurs cause no symptoms. But if the bone spurs are pressing on other bones or tissues or are causing a muscle or tendon to rub, they can break that tissue down over time, causing swelling, pain, and tearing. Bone spurs in the foot can also cause corns and calluses when tissue builds up to provide added padding over the bone spur.  DIAGNOSIS  A bone spur is usually visible on an X-ray. But since most bone spurs do not cause problems, it would be unusual to take an X-ray just to see whether you have a bone spur. If you had an X-ray to evaluate one of the problems associated with bone spurs, such as arthritis, bone spurs would be visible on that X-ray.  TREATMENT  Bone spurs do not require treatment unless they are causing pain or damaging other tissues. When needed, treatment may be directed at the causes, the symptoms, or the bone spurs themselves.  Treatment directed at the cause of bone spurs may include weight loss to take some pressure off the joints (especially when osteoarthritis or plantar fasciitis is the cause) and stretching the affected area, such as the heel cord and bottom of the foot. Seeing a physical therapist for ultrasound or deep tissue massage may be helpful for plantar fasciitis or shoulder " pain.  Treatment directed at symptoms could include rest, ice, stretching, and nonsteroidal anti-inflammatory drugs (NSAIDs) such as ibuprofen. Education in how to protect your joints is helpful if you have osteoarthritis. If a bone spur is in your foot, changing footwear or adding padding or a shoe insert such as a heel cup or orthotic may help. If the bone spur is causing corns or calluses, padding the area or wearing different shoes can help. A podiatrist (foot doctor) may be consulted if corns and calluses become a bigger problem. If the bone spur continues to cause symptoms, your doctor may suggest a corticosteroid injection at the painful area to decrease pain and inflammation of the soft tissues next to the bone spur.  Sometimes the bone spurs themselves are treated. Bone spurs can be surgically removed or treated as part of a surgery to repair or replace a joint when osteoarthritis has caused considerable damage and deformity. Examples might include repair of a bunion or heel spur in the foot. At other times, fusion of a joint may be warrented to decrease pain.      TENDONITIS   Tendons are the strong fibrous portions of muscles that attach to bones and allow the muscle to move a joint when it contracts. Tendons are very strong because they have a lot of force exerted on them. Sometimes tendons can become painful because they have suffered an acute injury, in which too much force was exerted at one time, or an overuse injury, in which a normal force was exerted too frequently or over a prolonged period of time. As a result, there is damage to the tendon and its surrounding soft tissue structures and they become inflammed. Because tendons do not have a great blood supply, they do not heal rapidly and the inflammation can become chronic.   Conservative treatment for tendinitis involves rest and anti-inflammatory measures. Ice is applied 15 minutes 2-3 times daily. Anti-inflammatory medications called NSAIDs  (ibuprofen, example) can be taken provided they are used with caution, as they can lead to internal bleeding and increase the risk ofstroke and heart attack. Sometimes topical nitroglycerin is prescribed to help with pain. Often your doctor will use a special shoe or removable walking cast to immobilize the tendon, allowing it to heal without further damage from use. These devices are very useful in helping tendons heal, but they may slow you down or make you feel like your hip, knee, or back are out ofalignment. This is temporary and should go away once you are out ofthe immobilization. You should not use a walking cast when showering or driving. Another option is Platelet Rich Plasma injections. (Normally done with a Sports and Orthorapedic doctor.   If conservative measures fail, your physician may need to surgically repair the tendon by removing any chronic inflammatory tissue and sewing it back together. Sometimes it is sewn to an adjacent tendon with similar function for support and sometimes it is lengthened. . Sometimes the bones around the tendon need to be realigned or reshaped to better support the tendon or prevent further damage. Your foot and ankle surgeon will discuss the specifics of your surgery with you, should you need it.    Towel stretch: Sit on a hard surface with your injured leg stretched out in front of you. Loop a towel around your toes and the ball of your foot and pull the towel toward your body keeping your leg straight. Hold this position for 15 to 30 seconds and then relax. Repeat 3 times. Then push the towel away with the ball of your foot. Repeat 3 times.  When you don't feel much of a stretch using the towel, you can start the standing calf stretch and the following exercises.  Standing calf stretch: Stand facing a wall with your hands on the wall at about eye level. Keep your injured leg back with your heel on the floor. Keep the other leg forward with the knee bent. Turn your back  foot slightly inward (as if you were pigeon-toed). Slowly lean into the wall until you feel a stretch in the back of your calf. Hold the stretch for 15 to 30 seconds. Return to the starting position. Repeat 3 times. Do this exercise several times each day.   Standing soleus stretch: Stand facing a wall with your hands on the wall at about chest height. Keep your injured leg back with your heel on the floor. Keep the other leg forward with the knee bent. Turn your back foot slightly inward (as if you were pigeon-toed). Bend your back knee slightly and gently lean into the wall until you feel a stretch in the lower calf of your injured leg. Hold the stretch for 15 to 30 seconds. Return to the starting position. Repeat 3 times.   Achilles stretch: Stand with the ball of one foot on a stair. Reach for the step below with your heel until you feel a stretch in the arch of your foot. Hold this position for 15 to 30 seconds and then relax. Repeat 3 times.   Heel raise: Balance yourself while standing behind a chair or counter. Using the chair or counter as a support to help you, raise your body up onto your toes and hold for 5 seconds. Then slowly lower yourself down without holding onto the support. (It's OK to keep holding onto the support if you need to.) When this exercise becomes less painful, try lowering yourself down on the injured leg only. Repeat 15 times. Do 2 sets of 15. Rest 30 seconds between sets.   Step-up: Stand with the foot of your injured leg on a support 3 to 5 inches high (like a small step or block of wood). Keep your other foot flat on the floor. Shift your weight onto the injured leg on the support. Straighten your injured leg as the other leg comes off the floor. Return to the starting position by bending your injured leg and slowly lowering your uninjured leg back to the floor. Do 2 sets of 15.   Resisted ankle eversion: Sit with both legs stretched out in front of you, with your feet about a  shoulder's width apart. Tie a loop in one end of elastic tubing. Put the foot of your injured leg through the loop so that the tubing goes around the arch of that foot and wraps around the outside of the other foot. Hold onto the other end of the tubing with your hand to provide tension. Turn the foot of your injured leg up and out. Make sure you keep your other foot still so that it will allow the tubing to stretch as you move the foot of your injured leg. Return to the starting position. Do 2 sets of 15.   Balance and reach exercises: Stand next to a chair with your injured leg farther from the chair. The chair will provide support if you need it. Stand on the foot of your injured leg and bend your knee slightly. Try to raise the arch of this foot while keeping your big toe on the floor. Keep your foot in this position. With the hand that is farther away from the chair, reach forward in front of you by bending at the waist. Avoid bending your knee any more as you do this. Repeat this 10 times. To make the exercise more challenging, reach farther in front of you. Do 2 sets of 10.  the same position as above. While keeping your arch height, reach the hand that is farther away from the chair across your body toward the chair. The farther you reach, the more challenging the exercise. Do 2 sets of 10.   Resisted ankle eversion: Sit with both legs stretched out in front of you, with your feet about a shoulder's width apart. Tie a loop in one end of elastic tubing. Put the foot of your injured leg through the loop so that the tubing goes around the arch of that foot and wraps around the outside of the other foot. Hold onto the other end of the tubing with your hand to provide tension. Turn the foot of your injured leg up and out. Make sure you keep your other foot still so that it will allow the tubing to stretch as you move the foot of your injured leg. Return to the starting position. Do 2 sets of 15.        GETTING READY FOR YOUR SURGERY  ONE-THREE WEEKS BEFORE  1. See your Family Doctor or Primary Care Doctor for a History and Physical. If you do not, we may need to change the date of your surgery.  2. Please see pre-surgical medications below to which medications need to be stopped before surgery and when.    TEN OR MORE DAYS BEFORE    1. [place] with the hospital. (For [facility])      By Phone: [phone].      By Internet: [email or web address]. Choose [facility].      If you do not register by phone or online, we will call to help you register.    SAME DAY SURGERY PATIENTS  1. You will need a family member of friend to drive you home. If you do not have one the surgery will be cancelled or rescheduled.  2. You will need a responsible adult to stay with you that night after the surgery.       We will ask this person to listen to some instructions before you leave the hospital.  * If your child is having surgery, and you would like a tour of the hospital, please call: [phone].      DAY BEFORE SURGERY  1. DO NOT EAT OR DRINK ANYTHING AFTER MIDNIGHT THE NIGHT BEFORE YOUR SURGERY!   2. DO NOT DRINK ALCOHOL.  3. Do not take over the counter drugs.  4. Some people need to have blood tests at the hospital. If you need blood tests, we will tell you in advance.  5. Take medications as directed by your doctor. You may take these with a small amount of water.  6. Do not chew gum, chew tobacco, or suck on hard candy the day of surgery.  7. Bring your insurance cards, a list of your medicines and co-pays you might need. Leave jewelry and other valuables at home.  8. If you received papers at your doctor's office, bring these with you to the surgery.    If you have questions about these instructions, please call: [phone]  Ask to speak with a pre-admitting nurse.    PRESURGICAL MEDICATIONS:  Certain prescription, over-the-counter, and herbal medications interfere with healing after an  operation. The main concern relates to medications that increase bleeding at the surgical site. Excess blood under the incision results in poor wound healing, excess pain, increased scarring, and a higher risk of infection.    Some medications slow the healing process of bone. Medications can also interfere with the anesthesia drugs that keep you asleep during the operation. It is important to ensure that these medications are out of your system prior to the operation. The list below details a number of medications that are of concern. Pay special attention to how long you should avoid these medications before your operation. Please note that this list is not complete. You should ask your surgeon or pharmacist if you are uncertain about other medications. Any herbal supplement not listed should be discontinued at least one week prior to surgery.    Aspirin: Hold for one week prior to surgery and restart the day after surgery. This over the counter medication promotes bleeding.    Motrin / Ibuprofen / Aleve / Advil / NSAIDS:  Stop one week prior to surgery. These medications affect bleeding and may cause delay in bone healing. Avoid taking these medications for six weeks after bone surgeries. Other procedures may allow you to restart sooner than 6 weeks after surgery.    Coumadin / Plavix: Your primary care provider will manage Coumadin in relation to surgery. Coumadin may result in excessive bleeding and may be adjusted before and after surgery.    Enbriel: Stop two weeks prior to surgery and restart two weeks after surgery. This medication can effect soft tissue healing and increases the risk of infection.    Remicade: Stop 8-12 weeks before surgery and restart two weeks after surgery. This medication can affect soft tissue healing and increases the risk of infection.    Humira: Stop 4 weeks before surgery and restart two weeks after surgery. This medication can affect soft tissue healing and increases the risk of  infection.    Methotrexate: Stop one dose prior to surgery. This medication will be restarted when the wound appears to be healing well. Please ask your surgeon about restarting this medication when you are being seen in the office for wound checks.    Kava: Stop at least one day prior to surgery and may restart one day after surgery. Kava may increase the sedative effect of anesthetics that are given during the operation. Kava can also increase bleeding at the surgical site.    Ephedra (ma pappas): Stop at least one day prior to surgery and may restart one day after surgery.  Ephedra may increase the risk of heart attack and stroke. This medication can also increase bleeding at the surgical site.    Melida's Wort: Stop at least five days before surgery and may restart one day after surgery. Melida's wort may diminish the effects of several drugs that are given during surgery.    Ginseng: Stop at least one week prior to surgery and may restart one day after surgery.  Ginseng lowers blood sugar and may increase bleeding at the surgical site.    Ginkgo: Stop 36 hours before surgery and may restart one day after surgery. Ginkgo may increase bleeding at the surgical site.    Garlic: Stop at least one week prior to surgery and may restart one day after. Garlic may increase bleeding at the surgery site.    Valerian: Do a slow steady decrease in your daily dose over a period of 2-3 weeks before surgery to decrease the chance of withdrawal symptoms. Valerian may increase the sedative effect of anesthetics given during the operation.    Echinacea: There is no data on stopping echinacea prior to surgery. This medication though can be associated with allergic reactions and suppression of your immune system.    Vitamin E, Omega-3, Flax, Fish Oil, Glucosamine and Chondroitin: Stop 2 weeks prior to surgery and may restart one day after. These herbal medications can increase risk of bleeding at surgical site.      POST  OPERATIVE HOME CARE INSTRUCTIONS  Activities: You should rest today. Stay off your feet as much as possible and keep your foot elevated above the level of your heart (about two pillow height). Wear your surgical shoe at all times when up. Limit walking to 5 to 10 minutes per hour over the next few days if your doctor has previously told you that you can put some weight on the foot after surgery, although limit the weight to your heel. If you are supposed to be non-weight bearing, that means NO WEIGHT AT ALL ON THE FOOT. Use an ice pack on the ankle while awake 20 minutes per hour to help decrease pain and swelling.     Discomfort: If a prescription for pain was given, take as directed. If no pain medication was ordered, you may take a non-prescription, non-aspirin pain medication. If the pain is not relieved by pain medication, call the clinic.     Incision and Dressing: Your surgical dressing is a sterile dressing and should be left in place until removed by your physician. Keep the dressing dry by covering it with a plastic bag for showers, taking baths with the surgical foot out of the tub, or sponge bathing. Some bleeding on the dressing should be expected. If however, you notice active or excessive bleeding or a temperature over 100 degrees by mouth, call the clinic. Do not change dressing by yourself.  If the dressing becomes wet or dirty, please call the clinic as it may need a new sterile dressing applied. You may start getting the foot wet after the stitches are removed.     Do not wear regular shoes with a surgical bandage and/or external pins in your foot. Wear loose fitting clothing that easily will slip over the bandage and/or pins. Do not cover your surgical foot with blankets as they may damage the dressing/pins. Also, remember that dogs are not aware of your surgery. Please keep them away from the bandage/pins.   If your surgeon places external pins in your foot, you must keep the foot dry until the  pins are removed at 6-8 weeks after the surgery. Pins should be covered with a dressing for protection. You should examine the pins and your skin often. Check for any spreading redness or yellow drainage from the pin areas. Do not apply ointment around the pins. Do not push a loose pin back into your foot. Please call the clinic if the pin is spinning or moving in and out. If the pins are bumped or loosened they may need to be removed early. This may affect your surgical outcome.   Please call the clinic if you feel there is a problem with your pins and/or surgical bandage.    TIPS FOR SUCCESSFUL HEALING  How you care for the surgical site is critically important to achieve a successful result after surgery. Avoidance of injury, infection, excess swelling, scar tissue and stiffness are highly dependent on the care you provide over the next six weeks. Please do not hesitate to call if you have questions or concerns.   Your foot requires significant rest and elevation. Sitting for long hours with your foot elevated, however, will create its own problems. Expect muscle aches, back pain, cramps, etc. Optimal posture, lumbar support, back exercises, ice and heat may all help with your new aches and pains. Do not apply a heating pad to your foot or leg as this can cause increase swelling and pain. Rather use ice in those areas.   Pain medications cause drowsiness. You may frequently sleep during the day and then have trouble sleeping at night. Over the counter sleep aids might be more effective than narcotic pain medication to achieve a reasonable night's sleep.    Narcotic pain medications and inactivity lead to constipation. Limiting use of narcotics will help minimize this problem. The pain medications will not completely alleviate your pain. The purpose of pain pills is to take the edge off and help you get through the first few days. You can substitute Extra Strength Tylenol if pain is mild. Please note that narcotic  pain pills usually contain acetaminophen (the active ingredient in Tylenol) so be careful to avoid the maximum dose of acetaminophen. You should take measures to avoid constipation by drinking plenty of water, eating lots of fruit and vegetables and taking the recommended dose of Metamucil or a similar fiber supplement. These measures should be continued for as long as you require narcotic pain medications and are inactive.     Showering is a major challenge. Your incision requires about three days to become sealed from water. Your bandage should not get wet and should not be removed. Do not attempt showering for the first three days. A sponge bath is preferred. You may attempt to shower on the fourth day after the operation. Your foot should be covered with a bag, tape and rubber bands. Double bagging is preferred. Standing in the shower with a bag on your foot is quite hazardous. A portable shower stool would be ideal. The bandage will need to be changed in the office if it becomes moistened. A moist bandage will not dry on its own. A moist dressing may lead to infection.   Stiffness will develop after any operation due to scarring. The scar tissue begins to form immediately after the surgery. Inactivity can cause excess stiffness and may lead to blood clots in your legs. Frequent range of motion exercises will help decrease stiffness, blood clots, scar tissue and adhesions. Please call if you are unsure about these recommendations.   Good luck and best wishes on a prompt recovery. Healing is slow but an important step in your recovery. You are in control of the final result. Please use this time wisely. Please do not hesitate to call if you have questions, concerns or comments.    * If you have any post-operative questions or concerns regarding your procedure, call our triage team at the [facility] at [phone].     GETTING READY FOR YOUR SURGERY  ONE-THREE WEEKS BEFORE  1. See your  Family Doctor or Primary Care Doctor for a History and Physical. If you do not, we may need to change the date of your surgery.  2. Please see pre-surgical medications below to which medications need to be stopped before surgery and when.    TEN OR MORE DAYS BEFORE    1. [place] with the hospital. (For [facility])      By Phone: [phone].      By Internet: [email or web address]. Choose [facility].      If you do not register by phone or online, we will call to help you register.    SAME DAY SURGERY PATIENTS  1. You will need a family member of friend to drive you home. If you do not have one the surgery will be cancelled or rescheduled.  2. You will need a responsible adult to stay with you that night after the surgery.       We will ask this person to listen to some instructions before you leave the hospital.  * If your child is having surgery, and you would like a tour of the hospital, please call: [phone].      DAY BEFORE SURGERY  1. DO NOT EAT OR DRINK ANYTHING AFTER MIDNIGHT THE NIGHT BEFORE YOUR SURGERY!   2. DO NOT DRINK ALCOHOL.  3. Do not take over the counter drugs.  4. Some people need to have blood tests at the hospital. If you need blood tests, we will tell you in advance.  5. Take medications as directed by your doctor. You may take these with a small amount of water.  6. Do not chew gum, chew tobacco, or suck on hard candy the day of surgery.  7. Bring your insurance cards, a list of your medicines and co-pays you might need. Leave jewelry and other valuables at home.  8. If you received papers at your doctor's office, bring these with you to the surgery.    If you have questions about these instructions, please call: [phone]  Ask to speak with a pre-admitting nurse.    PRESURGICAL MEDICATIONS:  Certain prescription, over-the-counter, and herbal medications interfere with healing after an operation. The main concern relates to medications that increase bleeding at the  surgical site. Excess blood under the incision results in poor wound healing, excess pain, increased scarring, and a higher risk of infection.    Some medications slow the healing process of bone. Medications can also interfere with the anesthesia drugs that keep you asleep during the operation. It is important to ensure that these medications are out of your system prior to the operation. The list below details a number of medications that are of concern. Pay special attention to how long you should avoid these medications before your operation. Please note that this list is not complete. You should ask your surgeon or pharmacist if you are uncertain about other medications. Any herbal supplement not listed should be discontinued at least one week prior to surgery.    Aspirin: Hold for one week prior to surgery and restart the day after surgery. This over the counter medication promotes bleeding.    Motrin / Ibuprofen / Aleve / Advil / NSAIDS:  Stop one week prior to surgery. These medications affect bleeding and may cause delay in bone healing. Avoid taking these medications for six weeks after bone surgeries. Other procedures may allow you to restart sooner than 6 weeks after surgery.    Coumadin / Plavix: Your primary care provider will manage Coumadin in relation to surgery. Coumadin may result in excessive bleeding and may be adjusted before and after surgery.    Enbriel: Stop two weeks prior to surgery and restart two weeks after surgery. This medication can effect soft tissue healing and increases the risk of infection.    Remicade: Stop 8-12 weeks before surgery and restart two weeks after surgery. This medication can affect soft tissue healing and increases the risk of infection.    Humira: Stop 4 weeks before surgery and restart two weeks after surgery. This medication can affect soft tissue healing and increases the risk of infection.    Methotrexate: Stop one dose prior to surgery. This medication will  be restarted when the wound appears to be healing well. Please ask your surgeon about restarting this medication when you are being seen in the office for wound checks.    Kava: Stop at least one day prior to surgery and may restart one day after surgery. Kava may increase the sedative effect of anesthetics that are given during the operation. Kava can also increase bleeding at the surgical site.    Ephedra (ma pappas): Stop at least one day prior to surgery and may restart one day after surgery.  Ephedra may increase the risk of heart attack and stroke. This medication can also increase bleeding at the surgical site.    [place]'s Wort: Stop at least five days before surgery and may restart one day after surgery. [place]'s wort may diminish the effects of several drugs that are given during surgery.    Ginseng: Stop at least one week prior to surgery and may restart one day after surgery.  Ginseng lowers blood sugar and may increase bleeding at the surgical site.    Ginkgo: Stop 36 hours before surgery and may restart one day after surgery. Ginkgo may increase bleeding at the surgical site.    Garlic: Stop at least one week prior to surgery and may restart one day after. Garlic may increase bleeding at the surgery site.    Valerian: Do a slow steady decrease in your daily dose over a period of 2-3 weeks before surgery to decrease the chance of withdrawal symptoms. Valerian may increase the sedative effect of anesthetics given during the operation.    Echinacea: There is no data on stopping echinacea prior to surgery. This medication though can be associated with allergic reactions and suppression of your immune system.    Vitamin E, Omega-3, Flax, Fish Oil, Glucosamine and Chondroitin: Stop 2 weeks prior to surgery and may restart one day after. These herbal medications can increase risk of bleeding at surgical site.     POTENTIAL COMPLICATIONS OF FOOT & ANKLE SURGERY  Undergoing a surgical procedure involves a  certain amount of risk. Risks of complications vary depending on the complexity of the surgery and how you take care of the surgical area during the healing process. Complications can range from minor infection to death. Some complications are temporary while others will be permanent.  Your surgeon weighs the risk of complications vs the potential benefit of undergoing surgery. You need to consider your tolerance for unexpected problems as you decide whether to undergo surgery.    Foot and Ankle surgery involves cutting skin, bone, ligaments, blood vessels and joints.  These structures heal well but not without consequence. Any break to the skin can lead to infection. A deep infection involves bones or joints which can be devastating. Deep infection can lead to amputation or could spread to other parts of your body. Most infections are minor and easily treated with oral antibiotics. Infections are often times from bacteria already present on your skin. Proper care of the surgical site is an essential component of avoiding infection. Do not get the bandage wet and take proper care of external pins to avoid these problems.     Joint stiffness is inherent to any foot or ankle surgery. Joint surgery is a major component of reconstructive foot and ankle procedures. The ligaments and tissues around the joint are cut, and later repaired. Scare tissue limits joint mobility. This can be permanent but generally improves over the course of one year.    Surgery involves dissection around nerves. Visible nerves are moved out of the way while very small nerves are cut. Scar tissue develops around nerves and can lead to nerve pain, numbness, or neuromas. Nerve symptoms can be permanent. This can lead to numbness or sometimes hypersensitivity to touch and problems wearing shoes.    Bones do not always heal after surgery. Poor healing after a bone cut or joint fusion can lead to an extended period of casting or repeat surgery.  Electronic bone stimulators are sometimes used to stimulate poor healing of bone. Nonunion is when joint fusion does not take.  This can occur as often as 10% of the time. Smoking doubles your risk of poor bone healing to 20%.    Bone grafting is sometimes necessary during the original or subsequent surgery. Bone is sometimes taken from other parts of your body or freeze dried bone from a bone bank from a bone bank or synthetic bone material might be used.    A scar is always present after foot and ankle surgery. The scar will be visible and could be sensitive. Some people develop excessive scarring, which cannot be controlled by the surgeon. Scars can be unsightly and can restrict joint mobility.    Blood clots can develop in the calf after surgery. Foot and ankle surgery is a predisposing factor for blood clots. The blood clot could break and travel to your lung.  This condition can lead to death. Early warning signs could include calf swelling and pain, chest pain or shortness of breath. This is an emergency that requires immediate attention by a medical doctor!    Surgery will not necessarily create a pain-free foot. Even normal feet hurt. Crooked toes, bunions, neuromas, flat feet and arthritis should all be considered permanent conditions.  Ankle pain commonly requires multiple surgeries over a lifetime. Do not assume that having surgery will permanently fix your condition. You may need permanent alteration in shoes and activities to accommodate your foot and ankle problem.    Careful attention to post-operative recommendations will dramatically reduce your risk of complications. Proper dressing, wound care, elevation and rest will be essential to get the wound healed and minimize scarring. Strict attention to activity restrictions, such as non-weight bearing, or partial weight bearing is essential. Internal fixation devices may not resist the stress of walking. Some select surgeries allow the patient to walk,  however this should be very minimal.    Despite these concerns, foot and ankle surgery leads to a high level of patient satisfaction. Your surgeon would not recommend surgery if he/she did not expect your foot to improve. Talk to your surgeon about any of the above issues.    POST OPERATIVE HOME CARE INSTRUCTIONS  Activities: You should rest today. Stay off your feet as much as possible and keep your foot elevated above the level of your heart (about two pillow height). Wear your surgical shoe at all times when up. Limit walking to 5 to 10 minutes per hour over the next few days if your doctor has previously told you that you can put some weight on the foot after surgery, although limit the weight to your heel. If you are supposed to be non-weight bearing, that means NO WEIGHT AT ALL ON THE FOOT. Use an ice pack on the ankle while awake 20 minutes per hour to help decrease pain and swelling.     Discomfort: If a prescription for pain was given, take as directed. If no pain medication was ordered, you may take a non-prescription, non-aspirin pain medication. If the pain is not relieved by pain medication, call the clinic.     Incision and Dressing: Your surgical dressing is a sterile dressing and should be left in place until removed by your physician. Keep the dressing dry by covering it with a plastic bag for showers, taking baths with the surgical foot out of the tub, or sponge bathing. Some bleeding on the dressing should be expected. If however, you notice active or excessive bleeding or a temperature over 100 degrees by mouth, call the clinic. Do not change dressing by yourself.  If the dressing becomes wet or dirty, please call the clinic as it may need a new sterile dressing applied. You may start getting the foot wet after the stitches are removed.     Do not wear regular shoes with a surgical bandage and/or external pins in your foot. Wear loose fitting clothing that easily will slip over the bandage and/or  pins. Do not cover your surgical foot with blankets as they may damage the dressing/pins. Also, remember that dogs are not aware of your surgery. Please keep them away from the bandage/pins.   If your surgeon places external pins in your foot, you must keep the foot dry until the pins are removed at 6-8 weeks after the surgery. Pins should be covered with a dressing for protection. You should examine the pins and your skin often. Check for any spreading redness or yellow drainage from the pin areas. Do not apply ointment around the pins. Do not push a loose pin back into your foot. Please call the clinic if the pin is spinning or moving in and out. If the pins are bumped or loosened they may need to be removed early. This may affect your surgical outcome.   Please call the clinic if you feel there is a problem with your pins and/or surgical bandage.    TIPS FOR SUCCESSFUL HEALING  How you care for the surgical site is critically important to achieve a successful result after surgery. Avoidance of injury, infection, excess swelling, scar tissue and stiffness are highly dependent on the care you provide over the next six weeks. Please do not hesitate to call if you have questions or concerns.   Your foot requires significant rest and elevation. Sitting for long hours with your foot elevated, however, will create its own problems. Expect muscle aches, back pain, cramps, etc. Optimal posture, lumbar support, back exercises, ice and heat may all help with your new aches and pains. Do not apply a heating pad to your foot or leg as this can cause increase swelling and pain. Rather use ice in those areas.   Pain medications cause drowsiness. You may frequently sleep during the day and then have trouble sleeping at night. Over the counter sleep aids might be more effective than narcotic pain medication to achieve a reasonable night's sleep.    Narcotic pain medications and inactivity lead to constipation. Limiting use of  narcotics will help minimize this problem. The pain medications will not completely alleviate your pain. The purpose of pain pills is to take the edge off and help you get through the first few days. You can substitute Extra Strength Tylenol if pain is mild. Please note that narcotic pain pills usually contain acetaminophen (the active ingredient in Tylenol) so be careful to avoid the maximum dose of acetaminophen. You should take measures to avoid constipation by drinking plenty of water, eating lots of fruit and vegetables and taking the recommended dose of Metamucil or a similar fiber supplement. These measures should be continued for as long as you require narcotic pain medications and are inactive.     Showering is a major challenge. Your incision requires about three days to become sealed from water. Your bandage should not get wet and should not be removed. Do not attempt showering for the first three days. A sponge bath is preferred. You may attempt to shower on the fourth day after the operation. Your foot should be covered with a bag, tape and rubber bands. Double bagging is preferred. Standing in the shower with a bag on your foot is quite hazardous. A portable shower stool would be ideal. The bandage will need to be changed in the office if it becomes moistened. A moist bandage will not dry on its own. A moist dressing may lead to infection.   Stiffness will develop after any operation due to scarring. The scar tissue begins to form immediately after the surgery. Inactivity can cause excess stiffness and may lead to blood clots in your legs. Frequent range of motion exercises will help decrease stiffness, blood clots, scar tissue and adhesions. Please call if you are unsure about these recommendations.   Good luck and best wishes on a prompt recovery. Healing is slow but an important step in your recovery. You are in control of the final result. Please use this time wisely. Please do not hesitate to call  if you have questions, concerns or comments.    * If you have any post-operative questions or concerns regarding your procedure, call our triage team at the [facility] [place] at [phone] (option 4).

## 2024-10-22 NOTE — LETTER
10/22/2024      Prince Patel  [address]  [facility] [address]      Dear Colleague,    Thank you for referring your patient, Prince Patel, to the [facility]. Please see a copy of my visit note below.    Podiatry / Foot and Ankle Surgery Progress Note    October 22, 2024    Subject: Patient was seen for pain to the back of her right heel.  She was seen quite a few months ago for this and underwent multiple conservative treatments such as physical therapy and stretching and new shoes and oral NSAIDs.  She continues to have pain to this area  She did have x-rays which showed bone spurs to the area.  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  Pain can be 6 out of 10 at its worst.  She is wondering what can be done for it.  Here with family member.    Objective:  Vitals: /80   Wt 106.6 kg (235 lb)   LMP 10/31/2011   BMI 41.63 kg/m    BMI= Body mass index is 41.63 kg/m .    General:  Patient is alert and orientated.  NAD.    Vascular:  DP and PT pulses are palpable.  No edema or varicosities noted.  CFT's < 3secs.  Skin temp is normal.    Neuro:  Light and gross touch sensation intact to digits, dorsum, and plantar aspects of the feet.    Derm:  Skin is supple.  No rashes, lesions, or ulcerations noted.    Musculoskeletal: Pain on palpation of the right posterior heel at the Achilles tendon insertion.  Decreased dorsiflexion of the right ankle is noted.    Imaging: Right foot x-ray I personally reviewed the xrays.  No acute fracture or malalignment. There is normal joint spacing. Moderate Achilles calcaneal enthesophyte, with chronic fragmentation.    Assessment:    Right foot pain  Bone spur of ankle  Tendonitis, Achilles, right  Gastrocnemius equinus, right      Medical Decision Making/Plan: Reviewed and discussed x-rays.Reviewed and discussed causes of tendonitis.  We discussed treatments such as immobiliation, icing, stretching,  heel lifts, orthotics, physical therapy, MRI.     Discussed that based on x-rays and her failed conservative treatment I would recommend surgery to go in and remove the bone spurs to repair the and lengthen the Achilles tendon.  Discussed this would be a same-day procedure under general anesthesia.  She would be nonweightbearing for 4 weeks and then minimal weightbearing in a boot for 4 to 6 weeks.    Talked about risks including infection, numbness, continued pain, recurrence, need for further surgery, blood loss, blood clotting. You will scar.    She would like to proceed with surgery.  Will place an order to my surgery scheduler to contact her to set up surgery.  Will also order an MRI sure there is no significant tendon tearing that may require a tendon transfer during surgery.    Questions were answered to patient's satisfaction and she will call further questions or concerns.    Patient Risk Factor:  Patient is a low risk factor for infection.     Jennifer Rome DPM, Podiatry/Foot and Ankle Surgery      Again, thank you for allowing me to participate in the care of your patient.        Sincerely,        Jennifer Rome DPM, Podiatry/Foot and Ankle Surgery

## 2024-10-22 NOTE — PROGRESS NOTES
Podiatry / Foot and Ankle Surgery Progress Note    October 22, 2024    Subject: Patient was seen for pain to the back of her right heel.  She was seen quite a few months ago for this and underwent multiple conservative treatments such as physical therapy and stretching and new shoes and oral NSAIDs.  She continues to have pain to this area  She did have x-rays which showed bone spurs to the area.  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  Pain can be 6 out of 10 at its worst.  She is wondering what can be done for it.  Here with family member.    Objective:  Vitals: /80   Wt 106.6 kg (235 lb)   LMP 10/31/2011   BMI 41.63 kg/m    BMI= Body mass index is 41.63 kg/m .    General:  Patient is alert and orientated.  NAD.    Vascular:  DP and PT pulses are palpable.  No edema or varicosities noted.  CFT's < 3secs.  Skin temp is normal.    Neuro:  Light and gross touch sensation intact to digits, dorsum, and plantar aspects of the feet.    Derm:  Skin is supple.  No rashes, lesions, or ulcerations noted.    Musculoskeletal: Pain on palpation of the right posterior heel at the Achilles tendon insertion.  Decreased dorsiflexion of the right ankle is noted.    Imaging: Right foot x-ray I personally reviewed the xrays.  No acute fracture or malalignment. There is normal joint spacing. Moderate Achilles calcaneal enthesophyte, with chronic fragmentation.    Assessment:    Right foot pain  Bone spur of ankle  Tendonitis, Achilles, right  Gastrocnemius equinus, right      Medical Decision Making/Plan: Reviewed and discussed x-rays.Reviewed and discussed causes of tendonitis.  We discussed treatments such as immobiliation, icing, stretching, heel lifts, orthotics, physical therapy, MRI.     Discussed that based on x-rays and her failed conservative treatment I would recommend surgery to go in and remove the bone spurs to repair the and lengthen the Achilles tendon.  Discussed this would be a  same-day procedure under general anesthesia.  She would be nonweightbearing for 4 weeks and then minimal weightbearing in a boot for 4 to 6 weeks.    Talked about risks including infection, numbness, continued pain, recurrence, need for further surgery, blood loss, blood clotting. You will scar.    She would like to proceed with surgery.  Will place an order to my surgery scheduler to contact her to set up surgery.  Will also order an MRI sure there is no significant tendon tearing that may require a tendon transfer during surgery.    Questions were answered to patient's satisfaction and she will call further questions or concerns.    Patient Risk Factor:  Patient is a low risk factor for infection.     Jennifer Rome DPM, Podiatry/Foot and Ankle Surgery

## 2024-10-24 ENCOUNTER — MYC MEDICAL ADVICE (OUTPATIENT)
Dept: FAMILY MEDICINE | Facility: CLINIC | Age: 49
End: 2024-10-24
Payer: COMMERCIAL

## 2024-10-24 PROBLEM — M25.571 PAIN IN JOINT, ANKLE AND FOOT, RIGHT: Status: RESOLVED | Noted: 2024-07-08 | Resolved: 2024-10-24

## 2024-10-25 ENCOUNTER — MYC MEDICAL ADVICE (OUTPATIENT)
Dept: PODIATRY | Facility: CLINIC | Age: 49
End: 2024-10-25
Payer: COMMERCIAL

## 2024-10-25 NOTE — TELEPHONE ENCOUNTER
Filled out disability parking form.  She can  Tuesday after noon at the [facility] clinic as I am at the [facility] today.    Please let patient know.    Thanks,    Jennifer Rome DPM

## 2024-11-06 ENCOUNTER — OFFICE VISIT (OUTPATIENT)
Dept: FAMILY MEDICINE | Facility: CLINIC | Age: 49
End: 2024-11-06
Payer: COMMERCIAL

## 2024-11-06 VITALS
OXYGEN SATURATION: 99 % | RESPIRATION RATE: 16 BRPM | SYSTOLIC BLOOD PRESSURE: 134 MMHG | BODY MASS INDEX: 41.67 KG/M2 | TEMPERATURE: 97.8 F | DIASTOLIC BLOOD PRESSURE: 85 MMHG | HEART RATE: 92 BPM | HEIGHT: 63 IN | WEIGHT: 235.2 LBS

## 2024-11-06 DIAGNOSIS — B07.0 PLANTAR WARTS: Primary | ICD-10-CM

## 2024-11-06 PROCEDURE — 17110 DESTRUCTION B9 LES UP TO 14: CPT

## 2024-11-06 NOTE — PROGRESS NOTES
"  Assessment & Plan     (B07.0) Plantar warts  (primary encounter diagnosis)  Procedure explained, verbal consent given by patient, cleansed with alcohol, liquid nitrogen applied x 3 pulses with cryo gun to 1 wart. Explained that may take more than 1 treatment. if wart remains return in 3-4 weeks for an additional treatment. Recommend using pumice stone daily as well as salicylic acid.   Plan: DESTRUCT BENIGN LESION, UP TO 14            Follow up in 3-4 weeks for repeat therapy if needed; sooner with any acute concerns.     Subjective   Petr Tolbert is a 49 year old, presenting for the following health issues:  Wart (Wart on right foot)        11/6/2024     1:53 PM   Additional Questions   Roomed by Ann-Marie     History of Present Illness       Reason for visit:  Recheck on the wart   She is taking medications regularly.     Warts  Onset/Duration: About a month  Description (location/number): right foot  Accompanying signs and symptoms (pain, redness): No  History: prior warts: YES  Therapies tried and outcome: none        Review of Systems  Constitutional, HEENT, cardiovascular, pulmonary, gi and gu systems are negative, except as otherwise noted.        Objective    /85 (BP Location: Right arm, Patient Position: Sitting, Cuff Size: Adult Large)   Pulse 92   Temp 97.8  F (36.6  C) (Oral)   Resp 16   Ht 1.6 m (5' 3\")   Wt 106.7 kg (235 lb 3.2 oz)   LMP 10/31/2011   SpO2 99%   BMI 41.66 kg/m    Body mass index is 41.66 kg/m .  Physical Exam   GENERAL: alert and no distress  RESP: no respiratory distress, no audible wheezes  Skin: Verruca to the heel of the right foot         Signed Electronically by: Ivelisse Fischer PA-C    "

## 2024-11-10 ENCOUNTER — HOSPITAL ENCOUNTER (OUTPATIENT)
Dept: MRI IMAGING | Facility: CLINIC | Age: 49
Discharge: HOME | End: 2024-11-10
Attending: PODIATRIST | Admitting: PODIATRIST
Payer: COMMERCIAL

## 2024-11-10 DIAGNOSIS — M76.61 TENDONITIS, ACHILLES, RIGHT: ICD-10-CM

## 2024-11-10 DIAGNOSIS — M79.671 RIGHT FOOT PAIN: ICD-10-CM

## 2024-11-10 DIAGNOSIS — M77.50 BONE SPUR OF ANKLE: ICD-10-CM

## 2024-11-10 DIAGNOSIS — M62.461 GASTROCNEMIUS EQUINUS, RIGHT: ICD-10-CM

## 2024-11-10 PROCEDURE — 73721 MRI JNT OF LWR EXTRE W/O DYE: CPT | Mod: RT

## 2024-11-10 PROCEDURE — 73721 MRI JNT OF LWR EXTRE W/O DYE: CPT | Mod: 26 | Performed by: RADIOLOGY

## 2024-11-11 ENCOUNTER — MYC MEDICAL ADVICE (OUTPATIENT)
Dept: PODIATRY | Facility: CLINIC | Age: 49
End: 2024-11-11
Payer: COMMERCIAL

## 2024-11-11 DIAGNOSIS — M77.51 BONE SPUR OF RIGHT ANKLE: ICD-10-CM

## 2024-11-11 DIAGNOSIS — M76.61 RIGHT ACHILLES TENDINITIS: Primary | ICD-10-CM

## 2024-11-11 NOTE — TELEPHONE ENCOUNTER
Saundra Tolbert,    Your MRI does show some tearing of your achilles tendon.  Usually in surgery we repair this but may need to do a tendon transfer when in surgery.     I had put in an order for a case request.  Has my  called you yet?    Jennifer Rome DPM

## 2024-11-12 NOTE — TELEPHONE ENCOUNTER
I recommend continue to wear the boot.  I will reput in the surgery case request.    Please let patient know.     THanks,     Jennifer Rome DPM

## 2024-11-17 DIAGNOSIS — I10 BENIGN ESSENTIAL HYPERTENSION: ICD-10-CM

## 2024-11-18 RX ORDER — LOSARTAN POTASSIUM 50 MG/1
50 TABLET ORAL DAILY
Qty: 90 TABLET | Refills: 1 | Status: SHIPPED | OUTPATIENT
Start: 2024-11-18

## 2024-11-25 ENCOUNTER — OFFICE VISIT (OUTPATIENT)
Dept: FAMILY MEDICINE | Facility: CLINIC | Age: 49
End: 2024-11-25
Payer: COMMERCIAL

## 2024-11-25 VITALS
HEART RATE: 106 BPM | SYSTOLIC BLOOD PRESSURE: 110 MMHG | RESPIRATION RATE: 22 BRPM | WEIGHT: 239.3 LBS | BODY MASS INDEX: 42.4 KG/M2 | TEMPERATURE: 97.5 F | OXYGEN SATURATION: 99 % | HEIGHT: 63 IN | DIASTOLIC BLOOD PRESSURE: 80 MMHG

## 2024-11-25 DIAGNOSIS — M76.61 RIGHT ACHILLES TENDINITIS: ICD-10-CM

## 2024-11-25 DIAGNOSIS — Z23 NEED FOR PROPHYLACTIC VACCINATION AGAINST HEPATITIS B VIRUS: ICD-10-CM

## 2024-11-25 DIAGNOSIS — Z01.818 PREOP GENERAL PHYSICAL EXAM: Primary | ICD-10-CM

## 2024-11-25 DIAGNOSIS — M77.31 POSTERIOR CALCANEAL EXOSTOSIS, RIGHT: ICD-10-CM

## 2024-11-25 LAB — HGB BLD-MCNC: 12.9 G/DL (ref 11.7–15.7)

## 2024-11-25 PROCEDURE — 36415 COLL VENOUS BLD VENIPUNCTURE: CPT | Performed by: PHYSICIAN ASSISTANT

## 2024-11-25 PROCEDURE — 99214 OFFICE O/P EST MOD 30 MIN: CPT | Performed by: PHYSICIAN ASSISTANT

## 2024-11-25 PROCEDURE — G2211 COMPLEX E/M VISIT ADD ON: HCPCS | Performed by: PHYSICIAN ASSISTANT

## 2024-11-25 PROCEDURE — 85018 HEMOGLOBIN: CPT | Performed by: PHYSICIAN ASSISTANT

## 2024-11-25 ASSESSMENT — PAIN SCALES - GENERAL: PAINLEVEL_OUTOF10: MODERATE PAIN (4)

## 2024-11-25 NOTE — PROGRESS NOTES
Preoperative Evaluation  [facility]  [address] [facility] [address]  Phone: [phone]  Primary Provider: Ivelisse Fischer PA-C  Pre-op Performing Provider: Angel Shea PA-C  Nov 25, 2024 11/24/2024   Surgical Information   What procedure is being done? Removal of bone spur, and Achilles tendon on right foot being fixed    Facility or Hospital where procedure/surgery will be performed: [facility]    Who is doing the procedure / surgery? Jennifer Rome    Date of surgery / procedure: 12/11/24    Time of surgery / procedure: 12 PM    Where do you plan to recover after surgery? at home with family        Patient-reported     Fax number for surgical facility: Note does not need to be faxed, will be available electronically in Epic.    Assessment & Plan     The proposed surgical procedure is considered INTERMEDIATE risk.    Preop general physical exam    Cleared for surgery.    - Hemoglobin; Future  - Hemoglobin      Right Achilles tendinitis    Surgery planned.      Posterior calcaneal exostosis, right    Surgery planned.      Need for prophylactic vaccination against hepatitis B virus    - hepatitis b vaccine recombinant (RECOMBIVAX-HB) 10 MCG/ML injection; Inject 0.5 mLs (5 mcg) into the muscle once for 1 dose.          - No identified additional risk factors other than previously addressed    Antiplatelet or Anticoagulation Medication Instructions   - Patient is on no antiplatelet or anticoagulation medications.    Additional Medication Instructions   - ACE/ARB: DO NOT TAKE on day of surgery (minimum 11 hours for general anesthesia).    Recommendation  Approval given to proceed with proposed procedure, without further diagnostic evaluation.        The longitudinal plan of care for the diagnosis(es)/condition(s) as documented were addressed during this visit. Due to the added complexity in care, I will continue to support Petr Tolbert in  the subsequent management and with ongoing continuity of care.      Subjective   Petr Tolbert is a 49 year old, presenting for the following:  Pre-Op Exam          11/25/2024     1:00 PM   Additional Questions   Roomed by can buchanan   Accompanied by self     HPI related to upcoming procedure: achilles tendonitis, calcaneal bone spur        11/24/2024   Pre-Op Questionnaire   Have you ever had a heart attack or stroke? No    Have you ever had surgery on your heart or blood vessels, such as a stent placement, a coronary artery bypass, or surgery on an artery in your head, neck, heart, or legs? No    Do you have chest pain with activity? No    Do you have a history of heart failure? No    Do you currently have a cold, bronchitis or symptoms of other infection? No    Do you have a cough, shortness of breath, or wheezing? No    Do you or anyone in your family have previous history of blood clots? (!) YES - father    Do you or does anyone in your family have a serious bleeding problem such as prolonged bleeding following surgeries or cuts? No   Have you ever had problems with anemia or been told to take iron pills? No    Have you had any abnormal blood loss such as black, tarry or bloody stools, or abnormal vaginal bleeding? No    Have you ever had a blood transfusion? No    Are you willing to have a blood transfusion if it is medically needed before, during, or after your surgery? Yes    Have you or any of your relatives ever had problems with anesthesia? No    Do you have sleep apnea, excessive snoring or daytime drowsiness? (!) UNKNOWN    Do you have any artifical heart valves or other implanted medical devices like a pacemaker, defibrillator, or continuous glucose monitor? No    Do you have artificial joints? No    Are you allergic to latex? No        Patient-reported     Health Care Directive  Patient does not have a Health Care Directive: Discussed advance care planning with patient; however, patient declined at this  time.    Preoperative Review of    reviewed - no record of controlled substances prescribed.        Status of Chronic Conditions:  HYPERTENSION - Patient has longstanding history of HTN , currently denies any symptoms referable to elevated blood pressure. Specifically denies chest pain, palpitations, dyspnea, orthopnea, PND or peripheral edema. Blood pressure readings have been in normal range. Current medication regimen is as listed below. Patient denies any side effects of medication.     Patient Active Problem List    Diagnosis Date Noted    Right Achilles tendinitis 10/21/2024     Priority: Medium    Posterior calcaneal exostosis, right 10/21/2024     Priority: Medium    Benign essential hypertension 05/21/2024     Priority: Medium    Borderline intellectual functioning 10/17/2023     Priority: Medium    Gastroesophageal reflux disease with esophagitis without hemorrhage 02/18/2022     Priority: Medium    Morbid obesity (H) 08/05/2020     Priority: Medium    Chronic non-seasonal allergic rhinitis, unspecified trigger 04/05/2018     Priority: Medium    Migraine with aura and without status migrainosus, not intractable 07/18/2016     Priority: Medium    Intertrigo 11/17/2014     Priority: Medium    S/P partial hysterectomy 02/07/2013     Priority: Medium    CARDIOVASCULAR SCREENING; LDL GOAL LESS THAN 160 10/31/2010     Priority: Medium      Past Medical History:   Diagnosis Date    Allergic rhinitis, cause unspecified     Allergic rhinitis and blueberries    Benign essential hypertension 5/21/2024    Congestive heart failure (H)     Intertrigo 11/17/2014    Migraine with aura and without status migrainosus, not intractable 7/18/2016    Migraine, unspecified, without mention of intractable migraine without mention of status migrainosus     Migraine    NONSPECIFIC MEDICAL HISTORY     learning disability, mild MR, ADD?    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)     Other acne     Sprain and strain of unspecified site of knee and leg 1/24/2008     Past Surgical History:   Procedure Laterality Date    APPENDECTOMY      CARDIAC SURGERY      COLONOSCOPY N/A 8/3/2018    Procedure: COLONOSCOPY;  colonoscopy;  Surgeon: Isaias Medina MD;  Location: [facility]    LAPAROSCOPIC HYSTERECTOMY SUPRACERVICAL  11/16/2011    Procedure:LAPAROSCOPIC HYSTERECTOMY SUPRACERVICAL; LAPAROSCOPIC HYSTERECTOMY SUPRACERVICAL ; Surgeon:MASOUD WHITE; Location:[facility] OR    SURGICAL HISTORY OF -       ingrown toenails removed    toenail removal       Current Outpatient Medications   Medication Sig Dispense Refill    albuterol (PROAIR HFA/PROVENTIL HFA/VENTOLIN HFA) 108 (90 Base) MCG/ACT inhaler Inhale 2 puffs into the lungs every 6 hours 18 g 11    azelastine (ASTELIN) 0.1 % nasal spray Spray 1 spray into both nostrils 2 times daily 30 mL 11    budesonide-formoterol (SYMBICORT) 80-4.5 MCG/ACT Inhaler Inhale 2 puffs into the lungs 2 times daily 10.2 g 11    cholecalciferol (VITAMIN D3) 25 mcg (1000 units) capsule Take 1 capsule by mouth daily      clotrimazole (LOTRIMIN) 1 % external cream Apply topically 2 times daily 60 g 1    diclofenac (VOLTAREN) 1 % topical gel Apply 2 g topically 4 times daily. 50 g 1    famotidine (PEPCID) 20 MG tablet Take 1 tablet (20 mg) by mouth 2 times daily 180 tablet 3    fexofenadine (ALLEGRA) 180 MG tablet Take 1 tablet (180 mg) by mouth daily 90 tablet 3    fluticasone (FLONASE) 50 MCG/ACT nasal spray Spray 2 sprays into both nostrils daily 48 mL 4    loratadine (CLARITIN) 10 MG tablet Take 1 tablet (10 mg) by mouth daily 30 tablet 11    losartan (COZAAR) 50 MG tablet TAKE 1 TABLET BY MOUTH EVERY DAY 90 tablet 1    MULTIVITAMINS OR TABS 1 tab qd as directed  0    rizatriptan (MAXALT) 10 MG tablet TAKE 1 TAB BY MOUTH AT ONSET OF MIGRAINE. MAY REPEAT IN 2 HR. MAX 3 TAB/24 HR - INS LIMITS 18 tablet 0       Allergies   Allergen Reactions    Macrobid [Nitrofuran Derivatives] GI  "Disturbance    Tape [Adhesive Tape] Rash        Social History     Tobacco Use    Smoking status: Never     Passive exposure: Never    Smokeless tobacco: Never   Substance Use Topics    Alcohol use: No     Family History   Problem Relation Age of Onset    Obesity Mother     C.A.D. Father         MI, angioplasty, 50s    Diabetes Father         Diabetes    Hypertension Father     Obesity Father     Diabetes Maternal Grandmother         Multiple Sclerosis    Neurologic Disorder Paternal Grandmother     Gastrointestinal Disease Paternal Grandmother         Gluten Intolerance     Diabetes Maternal Aunt      History   Drug Use No             Review of Systems  CONSTITUTIONAL: NEGATIVE for fever, chills, change in weight  INTEGUMENTARY/SKIN: NEGATIVE for worrisome rashes, moles or lesions  EYES: NEGATIVE for vision changes or irritation  ENT/MOUTH: NEGATIVE for ear, mouth and throat problems  RESP: NEGATIVE for significant cough or SOB  BREAST: NEGATIVE for masses, tenderness or discharge  CV: NEGATIVE for chest pain, palpitations or peripheral edema  GI: NEGATIVE for nausea, abdominal pain, heartburn, or change in bowel habits  : NEGATIVE for frequency, dysuria, or hematuria  MUSCULOSKELETAL: NEGATIVE for significant arthralgias or myalgia  NEURO: NEGATIVE for weakness, dizziness or paresthesias  ENDOCRINE: NEGATIVE for temperature intolerance, skin/hair changes  HEME: NEGATIVE for bleeding problems  PSYCHIATRIC: NEGATIVE for changes in mood or affect      Objective    BP (!) 151/93 (BP Location: Right arm, Patient Position: Chair, Cuff Size: Adult Large)   Pulse 106   Temp 97.5  F (36.4  C) (Oral)   Resp 22   Ht 1.607 m (5' 3.25\")   Wt 108.5 kg (239 lb 4.8 oz)   LMP 10/31/2011   SpO2 99%   BMI 42.06 kg/m     Estimated body mass index is 42.06 kg/m  as calculated from the following:    Height as of this encounter: 1.607 m (5' 3.25\").    Weight as of this encounter: 108.5 kg (239 lb 4.8 oz).      Physical " Exam  GENERAL: alert and no distress  EYES: Eyes grossly normal to inspection, PERRL and conjunctivae and sclerae normal  HENT: ear canals and TM's normal, nose and mouth without ulcers or lesions  RESP: lungs clear to auscultation - no rales, rhonchi or wheezes  CV: regular rate and rhythm, normal S1 S2, no S3 or S4, no murmur, click or rub, no peripheral edema  ABDOMEN: soft, nontender, no hepatosplenomegaly, no masses and bowel sounds normal  MS: no gross musculoskeletal defects noted, no edema  SKIN: no suspicious lesions or rashes  NEURO: Normal strength and tone, mentation intact and speech normal  PSYCH: mentation appears normal, affect normal/bright    Recent Labs   Lab Test 06/03/24  0930 05/01/24  1519 03/13/24  1412     --  140   POTASSIUM 4.4  --  4.0   CR 0.84  --  0.95   A1C  --  5.8*  --         Diagnostics  Recent Results (from the past 24 hours)   Hemoglobin    Collection Time: 11/25/24  2:03 PM   Result Value Ref Range    Hemoglobin 12.9 11.7 - 15.7 g/dL      No EKG required, no history of coronary heart disease, significant arrhythmia, peripheral arterial disease or other structural heart disease.    Revised Cardiac Risk Index (RCRI)  The patient has the following serious cardiovascular risks for perioperative complications:   - No serious cardiac risks = 0 points     RCRI Interpretation: 0 points: Class I (very low risk - 0.4% complication rate)         Signed Electronically by: Angel Shea PA-C  A copy of this evaluation report is provided to the requesting physician.

## 2024-11-25 NOTE — PATIENT INSTRUCTIONS
Do not take losartan the morning of surgery.    Everything else is ok to take with a small sip of water.

## 2024-11-26 ENCOUNTER — MYC MEDICAL ADVICE (OUTPATIENT)
Dept: FAMILY MEDICINE | Facility: CLINIC | Age: 49
End: 2024-11-26
Payer: COMMERCIAL

## 2024-12-06 RX ORDER — SODIUM FLUORIDE 1.1 G/100G
CREAM ORAL
COMMUNITY
Start: 2024-10-10

## 2024-12-09 ENCOUNTER — ANESTHESIA EVENT (OUTPATIENT)
Dept: SURGERY | Facility: CLINIC | Age: 49
End: 2024-12-09
Payer: COMMERCIAL

## 2024-12-11 ENCOUNTER — ANESTHESIA (OUTPATIENT)
Dept: SURGERY | Facility: CLINIC | Age: 49
End: 2024-12-11
Payer: COMMERCIAL

## 2024-12-11 ENCOUNTER — APPOINTMENT (OUTPATIENT)
Dept: GENERAL RADIOLOGY | Facility: CLINIC | Age: 49
End: 2024-12-11
Attending: PODIATRIST
Payer: COMMERCIAL

## 2024-12-11 ENCOUNTER — HOSPITAL ENCOUNTER (OUTPATIENT)
Facility: CLINIC | Age: 49
Discharge: HOME | End: 2024-12-11
Attending: PODIATRIST | Admitting: PODIATRIST
Payer: COMMERCIAL

## 2024-12-11 VITALS
OXYGEN SATURATION: 96 % | TEMPERATURE: 98.4 F | RESPIRATION RATE: 10 BRPM | BODY MASS INDEX: 39.95 KG/M2 | SYSTOLIC BLOOD PRESSURE: 142 MMHG | HEIGHT: 64 IN | DIASTOLIC BLOOD PRESSURE: 98 MMHG | HEART RATE: 96 BPM | WEIGHT: 234 LBS

## 2024-12-11 DIAGNOSIS — Z98.890 POST-OPERATIVE STATE: Primary | ICD-10-CM

## 2024-12-11 DIAGNOSIS — M77.31 POSTERIOR CALCANEAL EXOSTOSIS, RIGHT: ICD-10-CM

## 2024-12-11 DIAGNOSIS — M76.61 RIGHT ACHILLES TENDINITIS: ICD-10-CM

## 2024-12-11 PROCEDURE — 64447 NJX AA&/STRD FEMORAL NRV IMG: CPT | Mod: XU,RT

## 2024-12-11 PROCEDURE — 250N000011 HC RX IP 250 OP 636: Performed by: ANESTHESIOLOGY

## 2024-12-11 PROCEDURE — C1713 ANCHOR/SCREW BN/BN,TIS/BN: HCPCS | Performed by: PODIATRIST

## 2024-12-11 PROCEDURE — 710N000012 HC RECOVERY PHASE 2, PER MINUTE: Performed by: PODIATRIST

## 2024-12-11 PROCEDURE — 250N000009 HC RX 250: Performed by: ANESTHESIOLOGY

## 2024-12-11 PROCEDURE — 258N000003 HC RX IP 258 OP 636: Performed by: NURSE ANESTHETIST, CERTIFIED REGISTERED

## 2024-12-11 PROCEDURE — 999N000065 XR FOOT PORT RIGHT 3 VIEWS: Mod: RT

## 2024-12-11 PROCEDURE — 258N000003 HC RX IP 258 OP 636: Performed by: ANESTHESIOLOGY

## 2024-12-11 PROCEDURE — 250N000011 HC RX IP 250 OP 636: Performed by: PODIATRIST

## 2024-12-11 PROCEDURE — 250N000025 HC SEVOFLURANE, PER MIN: Performed by: PODIATRIST

## 2024-12-11 PROCEDURE — 250N000011 HC RX IP 250 OP 636: Performed by: NURSE ANESTHETIST, CERTIFIED REGISTERED

## 2024-12-11 PROCEDURE — 710N000009 HC RECOVERY PHASE 1, LEVEL 1, PER MIN: Performed by: PODIATRIST

## 2024-12-11 PROCEDURE — 64445 NJX AA&/STRD SCIATIC NRV IMG: CPT | Mod: XU,RT

## 2024-12-11 PROCEDURE — 28119 REMOVAL OF HEEL SPUR: CPT | Mod: RT | Performed by: PODIATRIST

## 2024-12-11 PROCEDURE — 250N000011 HC RX IP 250 OP 636: Mod: JW | Performed by: PODIATRIST

## 2024-12-11 PROCEDURE — 999N000141 HC STATISTIC PRE-PROCEDURE NURSING ASSESSMENT: Performed by: PODIATRIST

## 2024-12-11 PROCEDURE — 250N000009 HC RX 250: Performed by: PODIATRIST

## 2024-12-11 PROCEDURE — 370N000017 HC ANESTHESIA TECHNICAL FEE, PER MIN: Performed by: PODIATRIST

## 2024-12-11 PROCEDURE — 360N000076 HC SURGERY LEVEL 3, PER MIN: Performed by: PODIATRIST

## 2024-12-11 PROCEDURE — 27685 REVISION OF LOWER LEG TENDON: CPT | Mod: RT | Performed by: PODIATRIST

## 2024-12-11 PROCEDURE — 250N000009 HC RX 250: Performed by: NURSE ANESTHETIST, CERTIFIED REGISTERED

## 2024-12-11 PROCEDURE — 999N000179 XR SURGERY CARM FLUORO LESS THAN 5 MIN W STILLS: Mod: TC

## 2024-12-11 PROCEDURE — 272N000001 HC OR GENERAL SUPPLY STERILE: Performed by: PODIATRIST

## 2024-12-11 DEVICE — IMPL SYS,BIO-COMP ACHILLES SPEEDBRG
Type: IMPLANTABLE DEVICE | Site: ANKLE | Status: FUNCTIONAL
Brand: ARTHREX®

## 2024-12-11 RX ORDER — CEFAZOLIN SODIUM/WATER 2 G/20 ML
2 SYRINGE (ML) INTRAVENOUS SEE ADMIN INSTRUCTIONS
Status: DISCONTINUED | OUTPATIENT
Start: 2024-12-11 | End: 2024-12-11 | Stop reason: HOSPADM

## 2024-12-11 RX ORDER — LABETALOL HYDROCHLORIDE 5 MG/ML
10 INJECTION, SOLUTION INTRAVENOUS EVERY 5 MIN PRN
Status: DISCONTINUED | OUTPATIENT
Start: 2024-12-11 | End: 2024-12-11 | Stop reason: HOSPADM

## 2024-12-11 RX ORDER — SODIUM CHLORIDE, SODIUM LACTATE, POTASSIUM CHLORIDE, CALCIUM CHLORIDE 600; 310; 30; 20 MG/100ML; MG/100ML; MG/100ML; MG/100ML
INJECTION, SOLUTION INTRAVENOUS CONTINUOUS
Status: DISCONTINUED | OUTPATIENT
Start: 2024-12-11 | End: 2024-12-11 | Stop reason: HOSPADM

## 2024-12-11 RX ORDER — FENTANYL CITRATE 50 UG/ML
25 INJECTION, SOLUTION INTRAMUSCULAR; INTRAVENOUS EVERY 5 MIN PRN
Status: DISCONTINUED | OUTPATIENT
Start: 2024-12-11 | End: 2024-12-11 | Stop reason: HOSPADM

## 2024-12-11 RX ORDER — OXYCODONE HYDROCHLORIDE 5 MG/1
10 TABLET ORAL
Status: DISCONTINUED | OUTPATIENT
Start: 2024-12-11 | End: 2024-12-11 | Stop reason: HOSPADM

## 2024-12-11 RX ORDER — MAGNESIUM HYDROXIDE 1200 MG/15ML
LIQUID ORAL PRN
Status: DISCONTINUED | OUTPATIENT
Start: 2024-12-11 | End: 2024-12-11 | Stop reason: HOSPADM

## 2024-12-11 RX ORDER — NALOXONE HYDROCHLORIDE 0.4 MG/ML
0.1 INJECTION, SOLUTION INTRAMUSCULAR; INTRAVENOUS; SUBCUTANEOUS
Status: DISCONTINUED | OUTPATIENT
Start: 2024-12-11 | End: 2024-12-11 | Stop reason: HOSPADM

## 2024-12-11 RX ORDER — LIDOCAINE 40 MG/G
CREAM TOPICAL
Status: DISCONTINUED | OUTPATIENT
Start: 2024-12-11 | End: 2024-12-11 | Stop reason: HOSPADM

## 2024-12-11 RX ORDER — KETOROLAC TROMETHAMINE 15 MG/ML
15 INJECTION, SOLUTION INTRAMUSCULAR; INTRAVENOUS
Status: COMPLETED | OUTPATIENT
Start: 2024-12-11 | End: 2024-12-11

## 2024-12-11 RX ORDER — OXYCODONE HYDROCHLORIDE 5 MG/1
5-10 TABLET ORAL EVERY 4 HOURS PRN
Qty: 30 TABLET | Refills: 0 | Status: SHIPPED | OUTPATIENT
Start: 2024-12-11

## 2024-12-11 RX ORDER — FENTANYL CITRATE 50 UG/ML
INJECTION, SOLUTION INTRAMUSCULAR; INTRAVENOUS PRN
Status: DISCONTINUED | OUTPATIENT
Start: 2024-12-11 | End: 2024-12-11

## 2024-12-11 RX ORDER — BUPIVACAINE HYDROCHLORIDE AND EPINEPHRINE 2.5; 5 MG/ML; UG/ML
INJECTION, SOLUTION INFILTRATION; PERINEURAL
Status: COMPLETED | OUTPATIENT
Start: 2024-12-11 | End: 2024-12-11

## 2024-12-11 RX ORDER — SODIUM CHLORIDE, SODIUM LACTATE, POTASSIUM CHLORIDE, CALCIUM CHLORIDE 600; 310; 30; 20 MG/100ML; MG/100ML; MG/100ML; MG/100ML
INJECTION, SOLUTION INTRAVENOUS CONTINUOUS PRN
Status: DISCONTINUED | OUTPATIENT
Start: 2024-12-11 | End: 2024-12-11

## 2024-12-11 RX ORDER — PROPOFOL 10 MG/ML
INJECTION, EMULSION INTRAVENOUS PRN
Status: DISCONTINUED | OUTPATIENT
Start: 2024-12-11 | End: 2024-12-11

## 2024-12-11 RX ORDER — ACETAMINOPHEN 325 MG/1
975 TABLET ORAL ONCE
Status: DISCONTINUED | OUTPATIENT
Start: 2024-12-11 | End: 2024-12-11 | Stop reason: HOSPADM

## 2024-12-11 RX ORDER — AMOXICILLIN 250 MG
1-2 CAPSULE ORAL 2 TIMES DAILY
Qty: 30 TABLET | Refills: 0 | Status: SHIPPED | OUTPATIENT
Start: 2024-12-11

## 2024-12-11 RX ORDER — ONDANSETRON 2 MG/ML
4 INJECTION INTRAMUSCULAR; INTRAVENOUS EVERY 30 MIN PRN
Status: DISCONTINUED | OUTPATIENT
Start: 2024-12-11 | End: 2024-12-11 | Stop reason: HOSPADM

## 2024-12-11 RX ORDER — OXYCODONE HYDROCHLORIDE 5 MG/1
5 TABLET ORAL
Status: DISCONTINUED | OUTPATIENT
Start: 2024-12-11 | End: 2024-12-11 | Stop reason: HOSPADM

## 2024-12-11 RX ORDER — FENTANYL CITRATE 50 UG/ML
50 INJECTION, SOLUTION INTRAMUSCULAR; INTRAVENOUS EVERY 5 MIN PRN
Status: DISCONTINUED | OUTPATIENT
Start: 2024-12-11 | End: 2024-12-11 | Stop reason: HOSPADM

## 2024-12-11 RX ORDER — BUPIVACAINE HYDROCHLORIDE 5 MG/ML
INJECTION, SOLUTION EPIDURAL; INTRACAUDAL PRN
Status: DISCONTINUED | OUTPATIENT
Start: 2024-12-11 | End: 2024-12-11 | Stop reason: HOSPADM

## 2024-12-11 RX ORDER — ONDANSETRON 2 MG/ML
INJECTION INTRAMUSCULAR; INTRAVENOUS PRN
Status: DISCONTINUED | OUTPATIENT
Start: 2024-12-11 | End: 2024-12-11

## 2024-12-11 RX ORDER — ONDANSETRON 4 MG/1
4 TABLET, ORALLY DISINTEGRATING ORAL EVERY 30 MIN PRN
Status: DISCONTINUED | OUTPATIENT
Start: 2024-12-11 | End: 2024-12-11 | Stop reason: HOSPADM

## 2024-12-11 RX ORDER — ASPIRIN 325 MG
325 TABLET, DELAYED RELEASE (ENTERIC COATED) ORAL DAILY
Qty: 30 TABLET | Refills: 0 | Status: SHIPPED | OUTPATIENT
Start: 2024-12-11

## 2024-12-11 RX ORDER — CEFAZOLIN SODIUM/WATER 2 G/20 ML
2 SYRINGE (ML) INTRAVENOUS
Status: COMPLETED | OUTPATIENT
Start: 2024-12-11 | End: 2024-12-11

## 2024-12-11 RX ORDER — LIDOCAINE HYDROCHLORIDE 20 MG/ML
INJECTION, SOLUTION INFILTRATION; PERINEURAL PRN
Status: DISCONTINUED | OUTPATIENT
Start: 2024-12-11 | End: 2024-12-11

## 2024-12-11 RX ORDER — DEXAMETHASONE SODIUM PHOSPHATE 4 MG/ML
4 INJECTION, SOLUTION INTRA-ARTICULAR; INTRALESIONAL; INTRAMUSCULAR; INTRAVENOUS; SOFT TISSUE
Status: DISCONTINUED | OUTPATIENT
Start: 2024-12-11 | End: 2024-12-11 | Stop reason: HOSPADM

## 2024-12-11 RX ORDER — ONDANSETRON 4 MG/1
4 TABLET, ORALLY DISINTEGRATING ORAL EVERY 8 HOURS PRN
Qty: 4 TABLET | Refills: 0 | Status: SHIPPED | OUTPATIENT
Start: 2024-12-11

## 2024-12-11 RX ORDER — DEXAMETHASONE SODIUM PHOSPHATE 4 MG/ML
INJECTION, SOLUTION INTRA-ARTICULAR; INTRALESIONAL; INTRAMUSCULAR; INTRAVENOUS; SOFT TISSUE PRN
Status: DISCONTINUED | OUTPATIENT
Start: 2024-12-11 | End: 2024-12-11

## 2024-12-11 RX ADMIN — BUPIVACAINE HYDROCHLORIDE AND EPINEPHRINE BITARTRATE 20 ML: 2.5; .005 INJECTION, SOLUTION INFILTRATION; PERINEURAL at 11:26

## 2024-12-11 RX ADMIN — LIDOCAINE HYDROCHLORIDE 40 MG: 20 INJECTION, SOLUTION INFILTRATION; PERINEURAL at 12:42

## 2024-12-11 RX ADMIN — ONDANSETRON 4 MG: 2 INJECTION INTRAMUSCULAR; INTRAVENOUS at 13:05

## 2024-12-11 RX ADMIN — DEXAMETHASONE SODIUM PHOSPHATE 8 MG: 4 INJECTION, SOLUTION INTRA-ARTICULAR; INTRALESIONAL; INTRAMUSCULAR; INTRAVENOUS; SOFT TISSUE at 12:43

## 2024-12-11 RX ADMIN — MIDAZOLAM 2 MG: 1 INJECTION INTRAMUSCULAR; INTRAVENOUS at 12:37

## 2024-12-11 RX ADMIN — FENTANYL CITRATE 100 MCG: 50 INJECTION INTRAMUSCULAR; INTRAVENOUS at 12:42

## 2024-12-11 RX ADMIN — BUPIVACAINE HYDROCHLORIDE AND EPINEPHRINE 40 ML: 2.5; 5 INJECTION, SOLUTION INFILTRATION; PERINEURAL at 11:31

## 2024-12-11 RX ADMIN — SUGAMMADEX 200 MG: 100 INJECTION, SOLUTION INTRAVENOUS at 13:52

## 2024-12-11 RX ADMIN — MIDAZOLAM 1 MG: 1 INJECTION INTRAMUSCULAR; INTRAVENOUS at 11:26

## 2024-12-11 RX ADMIN — HYDROMORPHONE HYDROCHLORIDE 0.5 MG: 1 INJECTION, SOLUTION INTRAMUSCULAR; INTRAVENOUS; SUBCUTANEOUS at 12:59

## 2024-12-11 RX ADMIN — SODIUM CHLORIDE, POTASSIUM CHLORIDE, SODIUM LACTATE AND CALCIUM CHLORIDE: 600; 310; 30; 20 INJECTION, SOLUTION INTRAVENOUS at 12:39

## 2024-12-11 RX ADMIN — KETOROLAC TROMETHAMINE 15 MG: 15 INJECTION, SOLUTION INTRAMUSCULAR; INTRAVENOUS at 15:07

## 2024-12-11 RX ADMIN — SODIUM CHLORIDE, POTASSIUM CHLORIDE, SODIUM LACTATE AND CALCIUM CHLORIDE: 600; 310; 30; 20 INJECTION, SOLUTION INTRAVENOUS at 11:20

## 2024-12-11 RX ADMIN — PROPOFOL 150 MG: 10 INJECTION, EMULSION INTRAVENOUS at 12:42

## 2024-12-11 RX ADMIN — Medication 2 G: at 12:38

## 2024-12-11 RX ADMIN — ROCURONIUM BROMIDE 50 MG: 50 INJECTION, SOLUTION INTRAVENOUS at 12:43

## 2024-12-11 RX ADMIN — LABETALOL HYDROCHLORIDE 10 MG: 5 INJECTION, SOLUTION INTRAVENOUS at 15:01

## 2024-12-11 ASSESSMENT — ACTIVITIES OF DAILY LIVING (ADL)
ADLS_ACUITY_SCORE: 32

## 2024-12-11 NOTE — ANESTHESIA POSTPROCEDURE EVALUATION
Patient: Prince Patel    Procedure: Procedure(s):  Repair Achilles tendon and bone spur removal with tendon transfer, right ankle       Anesthesia Type:  General    Note:  Disposition: Outpatient   Postop Pain Control: Uneventful            Sign Out: Well controlled pain   PONV: No   Neuro/Psych: Uneventful            Sign Out: Acceptable/Baseline neuro status   Airway/Respiratory: Uneventful            Sign Out: Acceptable/Baseline resp. status   CV/Hemodynamics: Uneventful            Sign Out: Acceptable CV status; No obvious hypovolemia; No obvious fluid overload   Other NRE: NONE   DID A NON-ROUTINE EVENT OCCUR? No           Last vitals:  Vitals Value Taken Time   /107 12/11/24 1510   Temp 97.88  F (36.6  C) 12/11/24 1513   Pulse 92 12/11/24 1513   Resp 10 12/11/24 1513   SpO2 94 % 12/11/24 1514   Vitals shown include unfiled device data.    Electronically Signed By: Laura Fregoso MD  December 11, 2024  3:21 PM

## 2024-12-11 NOTE — ANESTHESIA PREPROCEDURE EVALUATION
Anesthesia Pre-Procedure Evaluation    Patient: Prince Patel   MRN: [id number] : 1975        Procedure : Procedure(s):  Repair Achilles tendon and bone spur removal with possible tendon transfer, right ankle          Past Medical History:   Diagnosis Date    Allergic rhinitis, cause unspecified     Allergic rhinitis and blueberries    Benign essential hypertension 2024    Congestive heart failure (H)     Intertrigo 2014    Migraine with aura and without status migrainosus, not intractable 2016    Migraine, unspecified, without mention of intractable migraine without mention of status migrainosus     Migraine    NONSPECIFIC MEDICAL HISTORY     learning disability, mild MR, ADD?    NONSPECIFIC MEDICAL HISTORY     dependent personality disorder (see abstract )    Other acne     Sprain and strain of unspecified site of knee and leg 2008      Past Surgical History:   Procedure Laterality Date    APPENDECTOMY      CARDIAC SURGERY      COLONOSCOPY N/A 8/3/2018    Procedure: COLONOSCOPY;  colonoscopy;  Surgeon: Isaias Medina MD;  Location:  GI    LAPAROSCOPIC HYSTERECTOMY SUPRACERVICAL  2011    Procedure:LAPAROSCOPIC HYSTERECTOMY SUPRACERVICAL; LAPAROSCOPIC HYSTERECTOMY SUPRACERVICAL ; Surgeon:MASOUD WHITE; Location: OR    SURGICAL HISTORY OF -       ingrown toenails removed    toenail removal        Allergies   Allergen Reactions    Macrobid [Nitrofuran Derivatives] GI Disturbance    Tape [Adhesive Tape] Rash      Social History     Tobacco Use    Smoking status: Never     Passive exposure: Never    Smokeless tobacco: Never   Substance Use Topics    Alcohol use: No      Wt Readings from Last 1 Encounters:   24 108.5 kg (239 lb 4.8 oz)        Anesthesia Evaluation            ROS/MED HX  ENT/Pulmonary:  - neg pulmonary ROS     Neurologic:     (+)      migraines,                   Developmental delay,       Cardiovascular:     (+)  hypertension- -   -  - -        "                               METS/Exercise Tolerance:     Hematologic:       Musculoskeletal: Comment: Right Achilles tendinosis      GI/Hepatic:  - neg GI/hepatic ROS     Renal/Genitourinary:  - neg Renal ROS     Endo:     (+)               Obesity,       Psychiatric/Substance Use:       Infectious Disease:  - neg infectious disease ROS     Malignancy:  - neg malignancy ROS     Other:  - neg other ROS          Physical Exam    Airway        Mallampati: II   TM distance: > 3 FB   Neck ROM: full   Mouth opening: > 3 cm    Respiratory Devices and Support         Dental       (+) Minor Abnormalities - some fillings, tiny chips      Cardiovascular   cardiovascular exam normal       Rhythm and rate: regular and normal     Pulmonary   pulmonary exam normal        breath sounds clear to auscultation           OUTSIDE LABS:  CBC:   Lab Results   Component Value Date    WBC 6.3 02/18/2022    WBC 8.1 08/05/2020    HGB 12.9 11/25/2024    HGB 12.5 10/19/2023    HCT 40.8 02/18/2022    HCT 40.4 08/05/2020     02/18/2022     08/05/2020     BMP:   Lab Results   Component Value Date     06/03/2024     03/13/2024    POTASSIUM 4.4 06/03/2024    POTASSIUM 4.0 03/13/2024    CHLORIDE 104 06/03/2024    CHLORIDE 105 03/13/2024    CO2 24 06/03/2024    CO2 23 03/13/2024    BUN 8.9 06/03/2024    BUN 10.3 03/13/2024    CR 0.84 06/03/2024    CR 0.95 03/13/2024     (H) 06/03/2024     (H) 03/13/2024     COAGS: No results found for: \"PTT\", \"INR\", \"FIBR\"  POC:   Lab Results   Component Value Date    HCG Negative 11/16/2011     HEPATIC:   Lab Results   Component Value Date    ALBUMIN 3.4 02/18/2022    PROTTOTAL 7.1 02/18/2022    ALT 22 02/18/2022    AST 14 02/18/2022    ALKPHOS 39 (L) 02/18/2022    BILITOTAL 0.7 02/18/2022     OTHER:   Lab Results   Component Value Date    A1C 5.8 (H) 05/01/2024    FIDE 9.0 06/03/2024    TSH 3.27 01/27/2023       Anesthesia Plan    ASA Status:  3    NPO Status:  NPO " "Appropriate    Anesthesia Type: General.     - Airway: ETT   Induction: Intravenous.   Maintenance: Balanced.   Techniques and Equipment:     - Airway: Video-Laryngoscope       Consents    Anesthesia Plan(s) and associated risks, benefits, and realistic alternatives discussed. Questions answered and patient/representative(s) expressed understanding.     - Discussed: Risks, Benefits and Alternatives for the PROCEDURE were discussed     - Discussed with:  Patient       Use of blood products discussed: Yes.     - Discussed with: Patient.     - Consented: consented to blood products            Reason for refusal: other.     Postoperative Care    Pain management: IV analgesics, Oral pain medications, Peripheral nerve block (Single Shot).   PONV prophylaxis: Ondansetron (or other 5HT-3), Dexamethasone or Solumedrol, Background Propofol Infusion     Comments:    Other Comments: GETA, peripheral nerve block for post-op pain, dilaudid PRN, ketorolac 15 mg IV.           Laura Fregoso MD    I have reviewed the pertinent notes and labs in the chart from the past 30 days and (re)examined the patient.  Any updates or changes from those notes are reflected in this note.               # Hypertension: Noted on problem list           # Severe Obesity: Estimated body mass index is 42.06 kg/m  as calculated from the following:    Height as of 11/25/24: 1.607 m (5' 3.25\").    Weight as of 11/25/24: 108.5 kg (239 lb 4.8 oz).             "

## 2024-12-11 NOTE — ANESTHESIA PROCEDURE NOTES
Adductor canal Procedure Note    Pre-Procedure   Staff -        Anesthesiologist:  Laura Fregoso MD       Performed By: anesthesiologist       Referred By: DR FERRARO       Location: pre-op       Procedure Start/Stop Times: 12/11/2024 11:26 AM and 12/11/2024 11:31 AM       Pre-Anesthestic Checklist: patient identified, IV checked, site marked, risks and benefits discussed, informed consent, monitors and equipment checked, pre-op evaluation, at physician/surgeon's request and post-op pain management  Timeout:       Correct Patient: Yes        Correct Procedure: Yes        Correct Site: Yes        Correct Position: Yes        Correct Laterality: Yes        Site Marked: Yes  Procedure Documentation  Procedure: Adductor canal       Laterality: right       Patient Position: supine       Skin prep: Chloraprep       Local skin infiltrated with mL of 2% lidocaine.        Needle Gauge: 20.        Needle Length (Inches): 4        Ultrasound guided       1. Ultrasound was used to identify targeted nerve, plexus, vascular marker, or fascial plane and place a needle adjacent to it in real-time.       2. Ultrasound was used to visualize the spread of anesthetic in close proximity to the above referenced structure.    Assessment/Narrative         The placement was negative for: blood aspirated, painful injection and site bleeding       Paresthesias: No.       Bolus given via needle..        Secured via.        Insertion/Infusion Method: Single Shot    Medication(s) Administered   Bupivacaine 0.25% w/ 1:200K Epi (Injection) - Injection   20 mL - 12/11/2024 11:26:00 AM  Medication Administration Time: 12/11/2024 11:26 AM     Comments:  The surgeon has given a verbal order transferring care of this patient to me for the performance of a regional analgesia block for post-op pain control. It is requested of me because I am uniquely trained and qualified to perform this block and the surgeon is neither trained nor qualified to perform  "this procedure.    Risks/benefits/alternatives of ultrasound guided peripheral nerve block(s) discussed. The patient was informed that undergoing the block is not required for surgery to proceed and is optional, but they can be beneficial in reducing post operative pain  medication requirements for a period of time (several hours to a few days). Block rationale, procedure, expected results, and block specific side effects reviewed with the patient. Risks, including but not limited to bleeding, infection, nerve damage/damage to surrounding structures, medication adverse/allergic reactions, and block failure discussed.  Questions encouraged and answered.  The patient wishes to proceed.        Peripheral nerve block was performed under direct US guidance. Incremental injection after negative aspiration. No paresthesias, no complications. Patient tolerated the procedure well.      FOR [facility] ([facility]) ONLY:   Pain Team Contact information: please page the Pain Team [address]. Search \"Pain\". During daytime hours, please page the attending first. At night please page the resident first.      "

## 2024-12-11 NOTE — PROGRESS NOTES
Pt discharged home with cam walker, crutches and pt has knee scooter in the car. Pt ambulated with crutches , tolerated it well . RN suggested to use the knee scooter to get into the house considering the weather . Discharge questions answered, discontinue home with Mother

## 2024-12-11 NOTE — OP NOTE
[facility] Podiatry Operative Note    Patient Name:                           Prince Patel  Patient YOB: 1975  Date of Surgery:                       12/11/2024  CSN:                                         [id number]    Pre-operative diagnosis: Right Achilles tendinitis [M76.61]  Bone spur of right ankle [M77.51]   Post-operative diagnosis: Same   Procedure: 1.  Right heel bone spur excision  2.  Right Achilles tendon repair and lengthening with reattachment.   Surgeon: Jennifer Rome DPM, Podiatry/Foot and Ankle Surgery   Assistant(s): None   Anesthesia: General With popliteal block     Hemostasis:                             Thigh tourniquet with electrocautery    Estimated Blood Loss:              5 mL    Speceimens:                            None    Materials:                                  Arthrex speed bridge with FiberWire      Indications:  is a 49-year-old female that presented to clinic with pain to the back of her right heel.  This was right at the Achilles tendon insertion.  X-rays show significant bone spurring to this area and MRI revealed partial tearing at the Achilles tendon insertion of the Achilles tendon.  She had decreased dorsiflexion on exam.  It was discussed with patient to go in and remove the bone spurring but also lengthen the tendon and repair it back down.  Risk benefits complications were discussed with patient no guarantees were made.  She wished to proceed with surgery.      Procecure: Patient brought to the operating room and anesthesia was administered.  She was placed on the operating table in a prone position.  Thigh tourniquet was placed.  The foot and ankle was prepped and draped using sterile technique.  It was inflated to 275 mmHg and attention was directed the posterior aspect of her right ankle.  A linear incision was made approximately 8 cm midline of her right ankle and heel through skin with a #15 blade.  Blunt  dissection was used down to the level of the peritenon.  Once noted this was incised and sharply dissected away from the Achilles tendon.  Using a clean 15 blade the tendon was incised down the middle to bone and the tendon was sharply dissected off of the posterior calcaneus.  The bone spurring was noted and this was removed using sagittal saw and rongeur.  This was rasped smooth and the wounds flushed copious amounts normal saline.  FluoroScan picture was taken which showed no further bone spurring.    At this time a Z lengthening was performed on the Achilles tendon to get the foot to 90 degrees to the ankle.  This was repaired using 3-0 FiberWire.  An Arthrex speed bridge was then used to reattach the Achilles tendon back down to the posterior calcaneus.  The wound was flushed copious amounts normal saline and the peritenon was reapproximated using 3-0 Vicryl the subcutaneous was reapproximate using 4-0 Vicryl the skin was reapproximated using 4-0 Prolene.  Patient's foot was placed in Cesar dressing.  Patient tolerated procedure and anesthesia well was transferred recovery with vital signs stable vascular status intact.    Jennifer Rome DPM

## 2024-12-11 NOTE — ANESTHESIA PROCEDURE NOTES
Sciatic Procedure Note    Pre-Procedure   Staff -        Anesthesiologist:  Laura Fregoso MD       Performed By: anesthesiologist       Referred By: DR FERRARO       Location: pre-op       Procedure Start/Stop Times: 12/11/2024 11:31 AM and 12/11/2024 11:39 AM       Pre-Anesthestic Checklist: patient identified, IV checked, site marked, risks and benefits discussed, informed consent, monitors and equipment checked, pre-op evaluation, at physician/surgeon's request and post-op pain management  Timeout:       Correct Patient: Yes        Correct Procedure: Yes        Correct Site: Yes        Correct Position: Yes        Correct Laterality: Yes        Site Marked: Yes  Procedure Documentation  Procedure: Sciatic       Laterality: right       Patient Position: supine       Skin prep: Chloraprep       Local skin infiltrated with mL of 2% lidocaine.  (popliteal approach).       Needle Gauge: 20.        Needle Length (Inches): 4        Ultrasound guided       1. Ultrasound was used to identify targeted nerve, plexus, vascular marker, or fascial plane and place a needle adjacent to it in real-time.       2. Ultrasound was used to visualize the spread of anesthetic in close proximity to the above referenced structure.    Assessment/Narrative         The placement was negative for: blood aspirated, painful injection and site bleeding       Paresthesias: No.       Bolus given via needle..        Secured via.        Insertion/Infusion Method: Single Shot    Medication(s) Administered   Bupivacaine 0.25% w/ 1:200K Epi (Injection) - Injection   40 mL - 12/11/2024 11:31:00 AM  Medication Administration Time: 12/11/2024 11:31 AM     Comments:  Popliteal peripheral nerve block completed in pre-op. No complications.    The surgeon has given a verbal order transferring care of this patient to me for the performance of a regional analgesia block for post-op pain control. It is requested of me because I am uniquely trained and qualified  "to perform this block and the surgeon is neither trained nor qualified to perform this procedure.    Risks/benefits/alternatives of ultrasound guided peripheral nerve block(s) discussed. The patient was informed that undergoing the block is not required for surgery to proceed and is optional, but they can be beneficial in reducing post operative pain  medication requirements for a period of time (several hours to a few days). Block rationale, procedure, expected results, and block specific side effects reviewed with the patient. Risks, including but not limited to bleeding, infection, nerve damage/damage to surrounding structures, medication adverse/allergic reactions, and block failure discussed.  Questions encouraged and answered.  The patient wishes to proceed.        Peripheral nerve block was performed under direct US guidance. Incremental injection after negative aspiration. No paresthesias, no complications. Patient tolerated the procedure well.      FOR [facility] ([facility]) ONLY:   Pain Team Contact information: please page the Pain Team Via [employer]. Search \"Pain\". During daytime hours, please page the attending first. At night please page the resident first.      "

## 2024-12-11 NOTE — DISCHARGE INSTRUCTIONS
Today you received Toradol, an antiinflammatory medication similar to Ibuprofen.  You should not take other antiinflammatory medication, such as Ibuprofen, Motrin, Advil, Aleve, Naprosyn, etc until 0900pm.

## 2024-12-11 NOTE — BRIEF OP NOTE
[facility]    Brief Operative Note    Pre-operative diagnosis: Right Achilles tendinitis [M76.61]  Bone spur of right ankle [M77.51]  Post-operative diagnosis Same as pre-operative diagnosis    Procedure: Repair Achilles tendon and bone spur removal with tendon transfer, right ankle, Right - Ankle    Surgeon: Surgeons and Role:     * Jennifer Rome, MARCO, Podiatry/Foot and Ankle Surgery - Primary  Anesthesia: General with Block   Estimated Blood Loss: Minimal    Drains: None  Specimens: * No specimens in log *  Findings:   None.  Complications: None.  Implants:   Implant Name Type Inv. Item Serial No.  Lot No. LRB No. Used Action   IMP ANCHOR [employer]THRE[employer] ACHILLIES SPEEDBRDG BIOCOMP [id number] - [id number] Metallic Hardware/[employer] IMP ANCHOR [employer] ACHILL[employer] SPEEDBRDG BIOCOMP [id number]  ARTHREX [id number] Right 1 Implanted

## 2024-12-11 NOTE — ANESTHESIA CARE TRANSFER NOTE
Patient: Prince Patel    Procedure: Procedure(s):  Repair Achilles tendon and bone spur removal with tendon transfer, right ankle       Diagnosis: Right Achilles tendinitis [M76.61]  Bone spur of right ankle [M77.51]  Diagnosis Additional Information: No value filed.    Anesthesia Type:   General     Note:    Oropharynx: oropharynx clear of all foreign objects  Level of Consciousness: drowsy    Level of Supplemental Oxygen (L/min / FiO2): 6  Independent Airway: airway patency satisfactory and stable  Dentition: dentition unchanged  Vital Signs Stable: post-procedure vital signs reviewed and stable  Report to RN Given: handoff report given  Patient transferred to: PACU    Handoff Report: Identifed the Patient, Identified the Reponsible Provider, Reviewed the pertinent medical history, Discussed the surgical course, Reviewed Intra-OP anesthesia mangement and issues during anesthesia, Set expectations for post-procedure period and Allowed opportunity for questions and acknowledgement of understanding      Vitals:  Vitals Value Taken Time   /83 12/11/24 1414   Temp 94.28  F (34.6  C) 12/11/24 1417   Pulse 97 12/11/24 1417   Resp 10 12/11/24 1417   SpO2 100 % 12/11/24 1417   Vitals shown include unfiled device data.    Electronically Signed By: ELY Saavedra CRNA  December 11, 2024  2:18 PM

## 2024-12-11 NOTE — ANESTHESIA PROCEDURE NOTES
Airway         Procedure Start/Stop Times: 12/11/2024 12:46 PM and 12/11/2024 12:47 PM  Staff -        Anesthesiologist:  Laura Fregoso MD       Performed By: anesthesiologistIndications and Patient Condition       Indications for airway management: catherine-procedural       Induction type:intravenous       Mask difficulty assessment: 1 - vent by mask    Final Airway Details       Final airway type: endotracheal airway       Successful airway: ETT - single  Endotracheal Airway Details        ETT size (mm): 7.0       Cuffed: yes       Successful intubation technique: video laryngoscopy       Grade View of Cords: 1       Adjucts: stylet       Position: Left       Measured from: gums/teeth       Secured at (cm): 23    Post intubation assessment        Placement verified by: capnometry, equal breath sounds and chest rise        Number of attempts at approach: 1       Number of other approaches attempted: 0       Ease of procedure: easy       Dentition: Intact and Unchanged    Medication(s) Administered   Medication Administration Time: 12/11/2024 12:46 PM

## 2024-12-12 ENCOUNTER — TELEPHONE (OUTPATIENT)
Dept: PODIATRY | Facility: CLINIC | Age: 49
End: 2024-12-12
Payer: COMMERCIAL

## 2024-12-12 NOTE — TELEPHONE ENCOUNTER
Patient can sleep without the boot if she feels comfortable.  The boot only needs to be on when up and moving about.     Please let patient know.     THanks,     Jennifer Rome DPM

## 2024-12-12 NOTE — TELEPHONE ENCOUNTER
Patient called back / Per Patient : I am still waiting for a call back, I did not get one and need someone to call me asap.

## 2024-12-12 NOTE — TELEPHONE ENCOUNTER
Patient is s/p right heel bone spur excision, Achilles tendon repair and lengthening with reattachment, 12/11/24.     Please advise if boot should be worn while sleeping.     Rola Ma, ATC

## 2024-12-12 NOTE — TELEPHONE ENCOUNTER
Relayed boot instructions to patient.     She inquired what to do about the pinching she is experiencing in the boot. She said she notices a pinching feeling by the heel. She confirmed she has taken the boot off to inspect and there is nothing sticking out that could be poking her. She confirmed it is more of a pinching due to pressure and tightness. From the description provided, she is likely in a tall black boot. Please advise on recommendations.     Rola Ma, ATC

## 2024-12-12 NOTE — TELEPHONE ENCOUNTER
There is a black velcro strap around the back of the heel on the black tall cam walkers.  She can loosen that. That would be my suggestion. Otherwise it is hard to know without seeing it.    Have her try that.    Thanks,     Jennifer Rome, SAMPSONM

## 2024-12-12 NOTE — TELEPHONE ENCOUNTER
Other: Pt wants to know if its ok to remove her boot when sleeping     Could we send this information to you in [place] or would you prefer to receive a phone call?:   Patient would prefer a phone call   Okay to leave a detailed message?: Yes at Cell number on file:    Telephone Information:   Mobile [phone]

## 2024-12-12 NOTE — TELEPHONE ENCOUNTER
Left detailed voicemail with Dr. Rome's recommendations and advised patient to call back if that adjustment does not help.     Rola Ma, ATC

## 2024-12-16 ENCOUNTER — MYC MEDICAL ADVICE (OUTPATIENT)
Dept: PODIATRY | Facility: CLINIC | Age: 49
End: 2024-12-16
Payer: COMMERCIAL

## 2024-12-16 DIAGNOSIS — Z98.890 POST-OPERATIVE STATE: Primary | ICD-10-CM

## 2024-12-16 NOTE — TELEPHONE ENCOUNTER
Patient is status post right heel bone spur excision and Achilles tendon repair and lengthening with reattachment, 12/11/24.     See patient MyChart message. Order pended for provider review/ signature. Please route to pool once complete so staff can instruct patient on obtaining.     Rola Ma, ATC

## 2024-12-16 NOTE — TELEPHONE ENCOUNTER
Please see Mychart request.   There is a DME order placed on day of surgery, 12/11/24. However, the status says Sign/Held.   Please advise if this needs to be re-ordered or how to release the order.     AMADEO Isidro RN

## 2024-12-17 NOTE — TELEPHONE ENCOUNTER
Phone call to patient to inquire about her recent fall this am. The knee scooter she borrowed has handle bars that are loose. She thinks that is what made her fall today. She landed on her surgical foot but had the boot on. She took the boot off and there is no bleeding. She had some increase in pain but not significantly. Recommended she elevate her foot as much as possible and can also ice for discomfort.   Provided the phone number for [facility] to call to make sure they have a knee scooter in stock for her to  at her appointment tomorrow. She will follow up at her appointment.     AMADEO Isidro RN

## 2024-12-17 NOTE — TELEPHONE ENCOUNTER
I reput in the order for knee roller. PLease let patient know she can pick it up at [facility] in [place].     Thanks,     SAMPSON ZunigaM

## 2024-12-18 ENCOUNTER — OFFICE VISIT (OUTPATIENT)
Dept: PODIATRY | Facility: CLINIC | Age: 49
End: 2024-12-18
Payer: COMMERCIAL

## 2024-12-18 VITALS — SYSTOLIC BLOOD PRESSURE: 124 MMHG | WEIGHT: 234 LBS | DIASTOLIC BLOOD PRESSURE: 78 MMHG | BODY MASS INDEX: 40.8 KG/M2

## 2024-12-18 DIAGNOSIS — Z98.890 POST-OPERATIVE STATE: Primary | ICD-10-CM

## 2024-12-18 NOTE — PATIENT INSTRUCTIONS
Thank you for choosing [facility] Podiatry / Foot & Ankle Surgery!    DR FERRARO'S CLINIC:  [facility]   [address]   [place] [address]  [phone]    (Tues, Wed, Thur am, Fri pm)     [facility]  [address], [place] [address]  [phone]  (Every other Friday morning)    [facility]  [address] [place] [id number] [phone]  (Every other Friday)     TRIAGE LINE: [phone]  APPOINTMENTS: [phone]  RADIOLOGY: [phone]  SET UP SURGERY: [phone]  PHYSICAL THERAPY: [phone]   FAX NUMBER: [phone]  BILLING QUESTIONS: [phone]       Follow up: 1 week and in 5 weeks from today    Recommend scheduling physical therapy in 3 weeks.  In 3 weeks can start putting some weight on the foot in the boot.      SCAR CARE PROTOCOL  Scarring is an unfortunate but unavoidable part of surgery.  Every person scars differently and there is no way to predict how an individual's final scar will look.  Now that the sutures have been removed one can begin taking some steps to help minimize the appearance of scarring.    WOUND HEALING  As soon as the skin is incised during surgery, the body is taking steps to prepare for healing. After about 3 days, the body has sent cells to the incision to begin the healing process. These cells, called fibroblasts, make collagen, a protein in the skin that helps provide strength. Once the skin has been sufficiently strengthened, the sutures are removed. Over the next year, the body synthesizes new collagen and breaks down old collagen to help achieve a strong scar that allows the foot/ankle to function appropriately. This is where patients can help the appearance of the scar, as it will change over the next year.    STEPS  1. Do not expose the scar to the sun for 1 year.  2. Any sun exposure may permanently darken the appearance of the scar.  3. Wear shoes/socks or cover your  scar with zinc oxide.  4. Massage the scar 2-3 times per day.  -Massage the entire length of the scar with gentle to moderate pressure.  -Pressure can help flatten the scar.  5. Lotion/Vitamin E helps keep the tissue soft.  6. Try over the counter scar products such as Mederma or Scar Zone.  -These are available at any pharmacy without a prescription.  -Patients must use these for extended periods of time (6-12 months) to see a difference.

## 2024-12-18 NOTE — PROGRESS NOTES
Podiatry / Foot and Ankle Surgery Progress Note    December 18, 2024    Subject: Patient was seen for 1 week status post Achilles tendon repair and bone spur removal right foot.  Notes that she fell yesterday.  Overall she is doing okay.  Pain is well-controlled with ibuprofen.  Denies fever, nausea, vomiting.  No other concerns today.    Objective:  Vitals: /78   Wt 106.1 kg (234 lb)   LMP 10/31/2011   BMI 40.80 kg/m    BMI= Body mass index is 40.8 kg/m .    General:  Patient is alert and orientated.  NAD.    Vascular:  DP and PT pulses are palpable.  No edema or varicosities noted.  CFT's < 3secs.  Skin temp is normal.    Neuro:  Light and gross touch sensation intact to digits, dorsum, and plantar aspects of the feet.    Derm:  dressing is c/d/I.  There is intact.  No redness, dehiscence or signs of acute infection noted.    Musculoskeletal:  No foot deformity noted.      Assessment: Post-operative state    Medical Decision Making/Plan: Testing was changed today.  She will continue to keep the foot and dressing dry.  She will continue nonweightbearing.  She will follow-up with Dr. Dee in 1 week for possible suture removal.  If the sutures are removed she can shower and get the foot wet.  She will remain nonweightbearing until week for.  At week 4 from surgery she can start putting some weight on her foot in the boot.  Will also start physical therapy at that time.  She was given an order for this.  We will have her follow back up with me in 5 weeks for reassessment.    All questions were answered to patient satisfaction and she will call further questions or concerns.      Patient Risk Factor:  Patient is a low risk factor for infection.     Jennifer Rome DPM, Podiatry/Foot and Ankle Surgery

## 2024-12-18 NOTE — LETTER
12/18/2024      Prince Patel  [address]  [place] [address]      Dear Colleague,    Thank you for referring your patient, Prince Patel, to the [facility] POD[facility]RY. Please see a copy of my visit note below.    Podiatry / Foot and Ankle Surgery Progress Note    December 18, 2024    Subject: Patient was seen for 1 week status post Achilles tendon repair and bone spur removal right foot.  Notes that she fell yesterday.  Overall she is doing okay.  Pain is well-controlled with ibuprofen.  Denies fever, nausea, vomiting.  No other concerns today.    Objective:  Vitals: /78   Wt 106.1 kg (234 lb)   LMP 10/31/2011   BMI 40.80 kg/m    BMI= Body mass index is 40.8 kg/m .    General:  Patient is alert and orientated.  NAD.    Vascular:  DP and PT pulses are palpable.  No edema or varicosities noted.  CFT's < 3secs.  Skin temp is normal.    Neuro:  Light and gross touch sensation intact to digits, dorsum, and plantar aspects of the feet.    Derm:  dressing is c/d/I.  There is intact.  No redness, dehiscence or signs of acute infection noted.    Musculoskeletal:  No foot deformity noted.      Assessment: Post-operative state    Medical Decision Making/Plan: Testing was changed today.  She will continue to keep the foot and dressing dry.  She will continue nonweightbearing.  She will follow-up with Dr. Dee in 1 week for possible suture removal.  If the sutures are removed she can shower and get the foot wet.  She will remain nonweightbearing until week for.  At week 4 from surgery she can start putting some weight on her foot in the boot.  Will also start physical therapy at that time.  She was given an order for this.  We will have her follow back up with me in 5 weeks for reassessment.    All questions were answered to patient satisfaction and she will call further questions or concerns.      Patient Risk Factor:  Patient is a low risk factor for infection.      Jennifer Rome DPM, Podiatry/Foot and Ankle Surgery      Again, thank you for allowing me to participate in the care of your patient.        Sincerely,        Jennifer Rome DPM, Podiatry/Foot and Ankle Surgery

## 2024-12-26 ENCOUNTER — OFFICE VISIT (OUTPATIENT)
Dept: PODIATRY | Facility: CLINIC | Age: 49
End: 2024-12-26
Payer: COMMERCIAL

## 2024-12-26 VITALS — SYSTOLIC BLOOD PRESSURE: 123 MMHG | BODY MASS INDEX: 40.8 KG/M2 | DIASTOLIC BLOOD PRESSURE: 80 MMHG | WEIGHT: 234 LBS

## 2024-12-26 DIAGNOSIS — Z09 SURGERY FOLLOW-UP EXAMINATION: Primary | ICD-10-CM

## 2024-12-26 NOTE — Clinical Note
Looking really good. Sutures removed. Thanks for outlined the plan in your last note. This was reviewed with the patient. She has her first 3 physical therapy sessions scheduled starting at 4 weeks postop.

## 2024-12-26 NOTE — PATIENT INSTRUCTIONS
Thank you for choosing [facility] Podiatry / Foot & Ankle Surgery!    DR. BAUER'S CLINIC LOCATIONS:     [facility] TRIAGE LINE: [phone]   [address] [place] [address] APPOINTMENTS: [phone]   [place] [address] RADIOLOGY: [phone]   (Every other Tues - Wed - Fri PM) SET UP SURGERY: [phone]    PHYSICAL THERAPY: [phone]   [place] [facility] BILLING QUESTIONS: [phone] [address] FAX: [phone]   [place] [address]    (Thurs & Fri AM)      Keep the incision dry for another 24 hours.  You can then carefully bathe.  Leave the Steri-Strips on until they fall off.  Continue removing the boot for gentle ankle range of motion exercises when you are seated.  Continue nonweightbearing for 2 more weeks.  At 4 weeks after surgery, you can start putting weight on the foot with the boot on.  Proceed with physical therapy as scheduled.  Follow-up with Dr. Coronado as scheduled on 1/22/2025.

## 2024-12-26 NOTE — LETTER
12/26/2024      Prince Patel  [address]  [facility] [address]      Dear Colleague,    Thank you for referring your patient, Prince Patel, to the [facility] PODIATRY. Please see a copy of my visit note below.    ASSESSMENT:  Encounter Diagnosis   Name Primary?     Surgery follow-up examination Yes     MEDICAL DECISION MAKING:  The surgical site is stable incision appears ready for suture removal.  The incision was prepped with alcohol and sutures removed without difficulty.  Steri-Strips and a light dressing applied.    The plan moving forward was outlined in Dr. Rome's last clinic note.    Recommendations:  Continue nonweightbearing until 4 weeks postop  At 4 weeks postop, start gradual weightbearing in the cam walker.  Begin physical therapy at 4 weeks postop.  Petr Tolbert has already scheduled the first 3 sessions.  Continue removing the cam walker when seated for gentle ankle range of motion exercises.    Of asked her to wait 24 hours before getting the incisional area wet with bath water.    Follow-up with Dr. Rome as scheduled on 1/22/2025.    Disclaimer: This note consists of symbols derived from keyboarding, dictation and/or voice recognition software. As a result, there may be errors in the script that have gone undetected. Please consider this when interpreting information found in this chart.    Drew Hale DPM, FACFAS, [facility] Department of Podiatry/Foot & Ankle Surgery      ____________________________________________________________________    HPI:       Prince Patel is status post right heel bone spur excision and right Achilles tendon repair and lengthening by Dr. Coronado on 12/11/2024.  She is currently nonweightbearing  Projection via a cam walker  No complaints  She has remove the cam walker for seated ankle range of motion exercises.    Past Medical History:   Diagnosis Date     Allergic rhinitis, cause unspecified     Allergic rhinitis and  blueberries     Benign essential hypertension 05/21/2024     Congestive heart failure (H)      Intertrigo 11/17/2014     Migraine with aura and without status migrainosus, not intractable 07/18/2016     Migraine, unspecified, without mention of intractable migraine without mention of status migrainosus     Migraine     NONSPECIFIC MEDICAL HISTORY     learning disability, mild MR, ADD?     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)     Other acne      Sprain and strain of unspecified site of knee and leg 01/24/2008   *  *  Past Surgical History:   Procedure Laterality Date     APPENDECTOMY       CARDIAC SURGERY       COLONOSCOPY N/A 8/3/2018    Procedure: COLONOSCOPY;  colonoscopy;  Surgeon: Isaias Medina MD;  Location: [facility]     LAPAROSCOPIC HYSTERECTOMY SUPRACERVICAL  11/16/2011    Procedure:LAPAROSCOPIC HYSTERECTOMY SUPRACERVICAL; LAPAROSCOPIC HYSTERECTOMY SUPRACERVICAL ; Surgeon:MASOUD WHITE; Location:[facility] OR     REPAIR TENDON ACHILLES Right 12/11/2024    Procedure: Right heel bone spur excision, right Achilles tendon repair and lengthening with reattachment;  Surgeon: Jennifer Rome DPM, Podiatry/Foot and Ankle Surgery;  Location: [facility] OR     SURGICAL HISTORY OF -       ingrown toenails removed     toenail removal     *  *  Current Outpatient Medications   Medication Sig Dispense Refill     albuterol (PROAIR HFA/PROVENTIL HFA/VENTOLIN HFA) 108 (90 Base) MCG/ACT inhaler Inhale 2 puffs into the lungs every 6 hours 18 g 11     aspirin (ASA) 325 MG EC tablet Take 1 tablet (325 mg) by mouth daily. 30 tablet 0     azelastine (ASTELIN) 0.1 % nasal spray Spray 1 spray into both nostrils 2 times daily 30 mL 11     budesonide-formoterol (SYMBICORT) 80-4.5 MCG/ACT Inhaler Inhale 2 puffs into the lungs 2 times daily 10.2 g 11     cholecalciferol (VITAMIN D3) 25 mcg (1000 units) capsule Take 1 capsule by mouth daily       clotrimazole (LOTRIMIN) 1 % external cream Apply  topically 2 times daily 60 g 1     DENTA 5000 PLUS 1.1 % CREA BRUSH WITH SMALL AMT 2/XDAY. DONT SWALLOW/EAT/DRINK/RINSE FOR 30 MIN AFTER       diclofenac (VOLTAREN) 1 % topical gel Apply 2 g topically 4 times daily. 50 g 1     famotidine (PEPCID) 20 MG tablet Take 1 tablet (20 mg) by mouth 2 times daily 180 tablet 3     fexofenadine (ALLEGRA) 180 MG tablet Take 1 tablet (180 mg) by mouth daily 90 tablet 3     fluticasone (FLONASE) 50 MCG/ACT nasal spray Spray 2 sprays into both nostrils daily 48 mL 4     loratadine (CLARITIN) 10 MG tablet Take 1 tablet (10 mg) by mouth daily 30 tablet 11     losartan (COZAAR) 50 MG tablet TAKE 1 TABLET BY MOUTH EVERY DAY 90 tablet 1     MULTIVITAMINS OR TABS 1 tab qd as directed  0     ondansetron (ZOFRAN ODT) 4 MG ODT tab Take 1 tablet (4 mg) by mouth every 8 hours as needed for nausea. 4 tablet 0     oxyCODONE (ROXICODONE) 5 MG tablet Take 1-2 tablets (5-10 mg) by mouth every 4 hours as needed for moderate to severe pain. 30 tablet 0     rizatriptan (MAXALT) 10 MG tablet TAKE 1 TAB BY MOUTH AT ONSET OF MIGRAINE. MAY REPEAT IN 2 HR. MAX 3 TAB/24 HR - INS LIMITS 18 tablet 0     senna-docusate (SENOKOT-S/PERICOLACE) 8.6-50 MG tablet Take 1-2 tablets by mouth 2 times daily. 30 tablet 0         EXAM:    Vitals: /80   Wt 106.1 kg (234 lb)   LMP 10/31/2011   BMI 40.80 kg/m    BMI: Body mass index is 40.8 kg/m .    Vasc:    Pulses palpable.  No concerning edema.    Neuro:    Light touch sensation intact to all sensory nerve distributions, bilateral lower extremity.     Derm:    Incision is well coapted.  Sutures intact.  Superficial ischemic change at the most distal aspect of the incision, yet fully intact.  No erythema.  Minimal edema.    MSK:    Although limited, smooth and nonpainful right ankle range of motion.        Again, thank you for allowing me to participate in the care of your patient.        Sincerely,        Drew Hale DPM    Electronically signed

## 2024-12-26 NOTE — PROGRESS NOTES
ASSESSMENT:  Encounter Diagnosis   Name Primary?    Surgery follow-up examination Yes     MEDICAL DECISION MAKING:  The surgical site is stable incision appears ready for suture removal.  The incision was prepped with alcohol and sutures removed without difficulty.  Steri-Strips and a light dressing applied.    The plan moving forward was outlined in Dr. Rome's last clinic note.    Recommendations:  Continue nonweightbearing until 4 weeks postop  At 4 weeks postop, start gradual weightbearing in the cam walker.  Begin physical therapy at 4 weeks postop.  Petr Tolbert has already scheduled the first 3 sessions.  Continue removing the cam walker when seated for gentle ankle range of motion exercises.    Of asked her to wait 24 hours before getting the incisional area wet with bath water.    Follow-up with Dr. Rome as scheduled on 1/22/2025.    Disclaimer: This note consists of symbols derived from keyboarding, dictation and/or voice recognition software. As a result, there may be errors in the script that have gone undetected. Please consider this when interpreting information found in this chart.    Drew Hale, MARCO, FACFAS, MS    [place] Department of Podiatry/Foot & Ankle Surgery      ____________________________________________________________________    HPI:       Prince Patel is status post right heel bone spur excision and right Achilles tendon repair and lengthening by Dr. Coronado on 12/11/2024.  She is currently nonweightbearing  Projection via a cam walker  No complaints  She has remove the cam walker for seated ankle range of motion exercises.    Past Medical History:   Diagnosis Date    Allergic rhinitis, cause unspecified     Allergic rhinitis and blueberries    Benign essential hypertension 05/21/2024    Congestive heart failure (H)     Intertrigo 11/17/2014    Migraine with aura and without status migrainosus, not intractable 07/18/2016    Migraine, unspecified, without mention of intractable migraine  without mention of status migrainosus     Migraine    NONSPECIFIC MEDICAL HISTORY     learning disability, mild MR, ADD?    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)    Other acne     Sprain and strain of unspecified site of knee and leg 01/24/2008   *  *  Past Surgical History:   Procedure Laterality Date    APPENDECTOMY      CARDIAC SURGERY      COLONOSCOPY N/A 8/3/2018    Procedure: COLONOSCOPY;  colonoscopy;  Surgeon: Isaias Medina MD;  Location: [facility]    LAPAROSCOPIC HYSTERECTOMY SUPRACERVICAL  11/16/2011    Procedure:LAPAROSCOPIC HYSTERECTOMY SUPRACERVICAL; LAPAROSCOPIC HYSTERECTOMY SUPRACERVICAL ; Surgeon:MASOUD WHITE; Location:[facility] OR    REPAIR TENDON ACHILLES Right 12/11/2024    Procedure: Right heel bone spur excision, right Achilles tendon repair and lengthening with reattachment;  Surgeon: Jennifer Rome DPM, Podiatry/Foot and Ankle Surgery;  Location: [facility] OR    SURGICAL HISTORY OF -       ingrown toenails removed    toenail removal     *  *  Current Outpatient Medications   Medication Sig Dispense Refill    albuterol (PROAIR HFA/PROVENTIL HFA/VENTOLIN HFA) 108 (90 Base) MCG/ACT inhaler Inhale 2 puffs into the lungs every 6 hours 18 g 11    aspirin (ASA) 325 MG EC tablet Take 1 tablet (325 mg) by mouth daily. 30 tablet 0    azelastine (ASTELIN) 0.1 % nasal spray Spray 1 spray into both nostrils 2 times daily 30 mL 11    budesonide-formoterol (SYMBICORT) 80-4.5 MCG/ACT Inhaler Inhale 2 puffs into the lungs 2 times daily 10.2 g 11    cholecalciferol (VITAMIN D3) 25 mcg (1000 units) capsule Take 1 capsule by mouth daily      clotrimazole (LOTRIMIN) 1 % external cream Apply topically 2 times daily 60 g 1    DENTA 5000 PLUS 1.1 % CREA BRUSH WITH SMALL AMT 2/XDAY. DONT SWALLOW/EAT/DRINK/RINSE FOR 30 MIN AFTER      diclofenac (VOLTAREN) 1 % topical gel Apply 2 g topically 4 times daily. 50 g 1    famotidine (PEPCID) 20 MG tablet Take 1 tablet (20 mg) by mouth 2  times daily 180 tablet 3    fexofenadine (ALLEGRA) 180 MG tablet Take 1 tablet (180 mg) by mouth daily 90 tablet 3    fluticasone (FLONASE) 50 MCG/ACT nasal spray Spray 2 sprays into both nostrils daily 48 mL 4    loratadine (CLARITIN) 10 MG tablet Take 1 tablet (10 mg) by mouth daily 30 tablet 11    losartan (COZAAR) 50 MG tablet TAKE 1 TABLET BY MOUTH EVERY DAY 90 tablet 1    MULTIVITAMINS OR TABS 1 tab qd as directed  0    ondansetron (ZOFRAN ODT) 4 MG ODT tab Take 1 tablet (4 mg) by mouth every 8 hours as needed for nausea. 4 tablet 0    oxyCODONE (ROXICODONE) 5 MG tablet Take 1-2 tablets (5-10 mg) by mouth every 4 hours as needed for moderate to severe pain. 30 tablet 0    rizatriptan (MAXALT) 10 MG tablet TAKE 1 TAB BY MOUTH AT ONSET OF MIGRAINE. MAY REPEAT IN 2 HR. MAX 3 TAB/24 HR - INS LIMITS 18 tablet 0    senna-docusate (SENOKOT-S/PERICOLACE) 8.6-50 MG tablet Take 1-2 tablets by mouth 2 times daily. 30 tablet 0         EXAM:    Vitals: /80   Wt 106.1 kg (234 lb)   LMP 10/31/2011   BMI 40.80 kg/m    BMI: Body mass index is 40.8 kg/m .    Vasc:    Pulses palpable.  No concerning edema.    Neuro:    Light touch sensation intact to all sensory nerve distributions, bilateral lower extremity.     Derm:    Incision is well coapted.  Sutures intact.  Superficial ischemic change at the most distal aspect of the incision, yet fully intact.  No erythema.  Minimal edema.    MSK:    Although limited, smooth and nonpainful right ankle range of motion.

## 2024-12-31 ENCOUNTER — MYC MEDICAL ADVICE (OUTPATIENT)
Dept: PODIATRY | Facility: CLINIC | Age: 49
End: 2024-12-31
Payer: COMMERCIAL

## 2024-12-31 ENCOUNTER — TELEPHONE (OUTPATIENT)
Dept: PODIATRY | Facility: CLINIC | Age: 49
End: 2024-12-31
Payer: COMMERCIAL

## 2024-12-31 NOTE — TELEPHONE ENCOUNTER
There is no consent to communicate on file.   Phone call to patient. She states the steri strips fell off in the shower. Yesterday and today she noticed some small pin point dots of blood on the gauze dressing. She denies any injury or new activity or redness. Discussed that some bleeding can happen and will take some time to heal. Recommended she keep it covered. She asks if we are able to provide more gauze as she was told we would send her home with some and she did not receive any. Apologized for that. Recommended she purchase OTC at a pharmacy or she can use band aids instead. If she continues to have bleeding by 1/6/25, asked that she call back. She is is also to call if it worsens or for any signs of infection. She verbalized understanding.     AMADEO Isidro RN

## 2024-12-31 NOTE — TELEPHONE ENCOUNTER
[place] form placed in Dr. Rome's basket. The envelope attached to mail back, no fax number listed. Milena TORRES M.A.

## 2025-01-07 ASSESSMENT — ACTIVITIES OF DAILY LIVING (ADL)
YOUR_USUAL_HOBBIES,_RECREATIONAL_OR_SPORTING_ACTIVITIES: NO DIFFICULTY
PERFORMING_HEAVY_ACTIVITIES_AROUND_YOUR_HOME: EXTREME DIFFICULTY OR UNABLE TO PERFORM ACTIVITY
PERFORMING_LIGHT_ACTIVITIES_AROUND_YOUR_HOME: A LITTLE BIT OF DIFFICULTY
STANDING_FOR_1_HOUR: EXTREME DIFFICULTY OR UNABLE TO PERFORM ACTIVITY
SITTING_FOR_1_HOUR: NO DIFFICULTY
GETTING_INTO_OR_OUT_OF_A_CAR: NO DIFFICULTY
LEFS_SCORE(%): INCOMPLETE
PLEASE_INDICATE_YOR_PRIMARY_REASON_FOR_REFERRAL_TO_THERAPY:: FOOT AND/OR ANKLE
RUNNING_ON_UNEVEN_GROUND: EXTREME DIFFICULTY OR UNABLE TO PERFORM ACTIVITY
ROLLING_OVER_IN_BED: NO DIFFICULTY
GETTING_INTO_AND_OUT_OF_A_BATH: A LITTLE BIT OF DIFFICULTY
WALKING_A_MILE: MODERATE DIFFICULTY
GOING_UP_OR_DOWN_10_STAIRS: EXTREME DIFFICULTY OR UNABLE TO PERFORM ACTIVITY
PUTTING_ON_YOUR_SHOES_OR_SOCKS: NO DIFFICULTY
SHOPPING: QUITE A BIT OF DIFFICULTY
WALKING_2_BLOCKS: EXTREME DIFFICULTY OR UNABLE TO PERFORM ACTIVITY
LEFS_RAW_SCORE: INCOMPLETE
MAKING_SHARP_TURNS_WHILE_RUNNING_FAST: EXTREME DIFFICULTY OR UNABLE TO PERFORM ACTIVITY
ANY_OF_YOUR_USUAL_WORK,_HOUSEWORK_OR_SCHOOL_ACTIVITIES: A LITTLE BIT OF DIFFICULTY
RUNNING_ON_EVEN_GROUND: EXTREME DIFFICULTY OR UNABLE TO PERFORM ACTIVITY
LIFTING_AN_OBJECT,_LIKE_A_BAG_OF_GROCERIES_FROM_THE_FLOOR: MODERATE DIFFICULTY
WALKING_BETWEEN_ROOMS: NO DIFFICULTY

## 2025-01-09 ENCOUNTER — THERAPY VISIT (OUTPATIENT)
Dept: PHYSICAL THERAPY | Facility: CLINIC | Age: 50
End: 2025-01-09
Attending: PODIATRIST
Payer: COMMERCIAL

## 2025-01-09 DIAGNOSIS — Z98.890 POST-OPERATIVE STATE: ICD-10-CM

## 2025-01-09 NOTE — PROGRESS NOTES
PHYSICAL THERAPY EVALUATION  Type of Visit: Evaluation       Fall Risk Screen:  Fall screen completed by: PT  Have you fallen 2 or more times in the past year?: Yes  Have you fallen and had an injury in the past year?: No  Is patient a fall risk?: No    Subjective   Pt s/p bone spur removal, R achilles repair, and R achilles tendon lengthening on 12/11/24. Wearing boot when up and moving. Using scooter out of the house and crutches in the home. No stairs at home. Doing a few exercises when out of the boot. Has been NWB until 4 weeks post op (yesterday).       Condition type:  Initial onset (within last 3 months)  Cause of current episode:  Post Surgical  Type of surgery: 3-Tendon Repair  Nature of treatment:  Rehabilitative  Functional ability:  Severe functional limitations  Documented POC (choose all that apply):  Measurable short and long term/discharge treatment goals related to physical and functional deficits.;Frequency of treatment visits and treatment activities to address deficit areas.;Patient agrees to program participation including home program  Briefly describe symptoms:  right foot and ankle pain after surgery  How did the symptoms start:  Chronic R foot pain, now s/p surgery  Average pain/intensity last 24 hours:  6/10  Average pain/intensity past week:  6/10  Frequency of Symptoms:  Constantly (% of the time)  Symptom impact on ADLs:  Extremely  Condition change since eval:  N/A (first visit)  General health reported by patient:  Very good        Presenting condition or subjective complaint: Physical therapy after surgery  Date of onset: 12/11/24    Relevant medical history: Migraines or headaches; Overweight   Dates & types of surgery: Achilles tendon/bone spur surgery on December 11 partial  hysterectomy on November 11, 2011    Prior diagnostic imaging/testing results: MRI; X-ray Had a bone spur removed and my Achilles tendon stretched and replaced   Prior therapy history for the same  diagnosis, illness or injury: No      Prior Level of Function  Transfers: Independent  Ambulation: Independent  ADL: Independent    Living Environment  Social support: Alone   Type of home: Apartment/condo   Stairs to enter the home: No       Ramp: Yes   Stairs inside the home: No       Help at home: Self Cares (home health aide/personal care attendant, family, etc)  Equipment owned: Crutches; Power wheelchair or scooter     Employment: Yes  at [employer] in [place]  Hobbies/Interests: Softball, basketball, crafts, such as [place], art, etc.    Patient goals for therapy: Before the 3 weeks are up or as many sessions as I need for therapy    Pain assessment: Pain present     Objective   FOOT/ANKLE EVALUATION  PAIN: Pain Level with Use: 6/10  INTEGUMENTARY (edema, incisions):  incision clean, dry, scabbing   GAIT:   Weightbearing Status: PWB  Assistive Device(s): Crutches (bilateral), Scooter  ROM:  active DF to about 3 degrees, no overpressure  STRENGTH:  not overtly tested due to postop state    Assessment & Plan   CLINICAL IMPRESSIONS  Medical Diagnosis: s/p R achilles repair and lengthening    Treatment Diagnosis: R foot/ankle pain   Impression/Assessment: Patient is a 49 year old female with R foot and ankle complaints.  The following significant findings have been identified: Pain, Decreased ROM/flexibility, Decreased strength, Impaired muscle performance, and Decreased activity tolerance. These impairments interfere with their ability to perform self care tasks, recreational activities, household chores, household mobility, and community mobility as compared to previous level of function.     Clinical Decision Making (Complexity):  Clinical Presentation: Stable/Uncomplicated  Clinical Presentation Rationale: based on medical and personal factors listed in PT evaluation  Clinical Decision Making (Complexity): Low complexity    PLAN OF CARE  Treatment Interventions:  Interventions: Gait Training, Manual  Therapy, Neuromuscular Re-education, Therapeutic Activity, Therapeutic Exercise    Long Term Goals     PT Goal 1  Goal Identifier: ambulation  Goal Description: pt will be able to ambulate 20 mins without assistive device, gait deviation, or pain  Rationale: to maximize safety and independence with performance of ADLs and functional tasks  Target Date: 04/03/25      Frequency of Treatment: once per week  Duration of Treatment: 12 weeks    Recommended Referrals to Other Professionals:  NA  Education Assessment:   Learner/Method: Patient  Education Comments: Pt educated on PT role and plan of care    Risks and benefits of evaluation/treatment have been explained.   Patient/Family/caregiver agrees with Plan of Care.     Evaluation Time:       Signing Clinician: Keyana Longo PT        [facility]                                                                                   OUTPATIENT PHYSICAL THERAPY      PLAN OF TREATMENT FOR OUTPATIENT REHABILITATION   Patient's Last Name, First Name, Prince Gary YOB: 1975   Provider's Name   [facility]   Medical Record No.  [id number]     Onset Date: 12/11/24  Start of Care Date: 01/09/25     Medical Diagnosis:  s/p R achilles repair and lengthening      PT Treatment Diagnosis:  R foot/ankle pain Plan of Treatment  Frequency/Duration: once per week/ 12 weeks    Certification date from 01/09/25 to 04/03/25         See note for plan of treatment details and functional goals     Keyana Longo PT                         I CERTIFY THE NEED FOR THESE SERVICES FURNISHED UNDER        THIS PLAN OF TREATMENT AND WHILE UNDER MY CARE     (Physician attestation of this document indicates review and certification of the therapy plan).              Referring Provider:  Jennifer Rome    Initial Assessment  See Epic Evaluation- Start of Care Date: 01/09/25

## 2025-01-13 ENCOUNTER — ANCILLARY PROCEDURE (OUTPATIENT)
Dept: MAMMOGRAPHY | Facility: CLINIC | Age: 50
End: 2025-01-13
Payer: COMMERCIAL

## 2025-01-13 DIAGNOSIS — Z12.31 VISIT FOR SCREENING MAMMOGRAM: ICD-10-CM

## 2025-01-13 PROCEDURE — 77063 BREAST TOMOSYNTHESIS BI: CPT | Mod: TC | Performed by: RADIOLOGY

## 2025-01-13 PROCEDURE — 77067 SCR MAMMO BI INCL CAD: CPT | Mod: TC | Performed by: RADIOLOGY

## 2025-01-14 ENCOUNTER — THERAPY VISIT (OUTPATIENT)
Dept: PHYSICAL THERAPY | Facility: CLINIC | Age: 50
End: 2025-01-14
Attending: PODIATRIST
Payer: COMMERCIAL

## 2025-01-14 DIAGNOSIS — Z98.890 POST-OPERATIVE STATE: Primary | ICD-10-CM

## 2025-01-14 PROCEDURE — 97110 THERAPEUTIC EXERCISES: CPT | Mod: GP | Performed by: PHYSICAL THERAPIST

## 2025-01-21 ENCOUNTER — THERAPY VISIT (OUTPATIENT)
Dept: PHYSICAL THERAPY | Facility: CLINIC | Age: 50
End: 2025-01-21
Attending: PODIATRIST
Payer: COMMERCIAL

## 2025-01-21 DIAGNOSIS — Z98.890 POST-OPERATIVE STATE: Primary | ICD-10-CM

## 2025-01-21 PROCEDURE — 97110 THERAPEUTIC EXERCISES: CPT | Mod: GP | Performed by: PHYSICAL THERAPIST

## 2025-01-22 ENCOUNTER — OFFICE VISIT (OUTPATIENT)
Dept: PODIATRY | Facility: CLINIC | Age: 50
End: 2025-01-22
Payer: COMMERCIAL

## 2025-01-22 VITALS — HEIGHT: 63 IN | WEIGHT: 234 LBS | BODY MASS INDEX: 41.46 KG/M2

## 2025-01-22 DIAGNOSIS — Z98.890 POST-OPERATIVE STATE: Primary | ICD-10-CM

## 2025-01-22 NOTE — PROGRESS NOTES
Podiatry / Foot and Ankle Surgery Progress Note    January 22, 2025    Subject: Patient was seen for status post right heel spur removal and Achilles tendon lengthening.  Overall she is doing well.  Has been walking in the boot with a cane.  Minimal tenderness to the incision area but denies fever, nausea, vomiting.    Objective:  Vitals: Wt 106.1 kg (234 lb)   LMP 10/31/2011   BMI 40.80 kg/m    BMI= Body mass index is 40.8 kg/m .    General:  Patient is alert and orientated.  NAD.    Vascular:  DP and PT pulses are palpable.  No edema or varicosities noted.  CFT's < 3secs.  Skin temp is normal.     Neuro:  Light and gross touch sensation intact to digits, dorsum, and plantar aspects of the feet.     Derm:  Incision is mostly healed.  Small scab to the distal aspect of the incision.  No redness purulent drainage or signs of an infection noted.     Musculoskeletal:  No foot deformity noted.       Assessment: Post-operative state     Medical Decision Making/Plan: At this time she will continue in the boot.  In 2 weeks we will have her transition from walking in the boot to walking in an ankle brace and shoes around the house.  She will continue with physical therapy.  She was given a letter to return to seated work only starting February 14.  We will have her follow-up in 1 month for reassessment and at that time we will likely let her increase her activity as tolerated.     All questions were answered to patient satisfaction and she will call further questions or concerns.           Jennifer Rome DPM, Podiatry/Foot and Ankle Surgery

## 2025-01-22 NOTE — LETTER
January 22, 2025      Prince Patel  [address] [address]        To Whom It May Concern:    Prince Patel was seen in our clinic. She may return to work on February 15, 2025 with the following: limited to light duty - lifting no greater than 10 pounds and mostly seated work only.  These restrictions will be in place for 3 weeks until we reassess her at that time.      Sincerely,            Jennifer Rome      Electronically signed

## 2025-01-22 NOTE — PATIENT INSTRUCTIONS
Thank you for choosing [facility] Podiatry / Foot & Ankle Surgery!    DR FERRARO'S CLINIC:  [facility]   [address]   [place] [address]  [phone]    (Tues, Wed, Thur am, Fri pm)     [facility]  [address], [place] [address]  [phone]  (Every other Friday morning)    [facility]  [address] [place] [id number] [phone]  (Every other Friday)     TRIAGE LINE: [phone]  APPOINTMENTS: [phone]  RADIOLOGY: [phone]  SET UP SURGERY: [phone]  PHYSICAL THERAPY: [phone]   FAX NUMBER: [phone]  BILLING QUESTIONS: [phone]       Follow up: 1 month      Apply over-the-counter bacitracin or Aquaphor to the incision area and cover with a Band-Aid.

## 2025-01-22 NOTE — LETTER
1/22/2025      Prince Patel  [address]  [facility] [address]      Dear Colleague,    Thank you for referring your patient, Prince Patel, to the [facility] PODIATRY. Please see a copy of my visit note below.    Podiatry / Foot and Ankle Surgery Progress Note    January 22, 2025    Subject: Patient was seen for status post right heel spur removal and Achilles tendon lengthening.  Overall she is doing well.  Has been walking in the boot with a cane.  Minimal tenderness to the incision area but denies fever, nausea, vomiting.    Objective:  Vitals: Wt 106.1 kg (234 lb)   LMP 10/31/2011   BMI 40.80 kg/m    BMI= Body mass index is 40.8 kg/m .    General:  Patient is alert and orientated.  NAD.    Vascular:  DP and PT pulses are palpable.  No edema or varicosities noted.  CFT's < 3secs.  Skin temp is normal.     Neuro:  Light and gross touch sensation intact to digits, dorsum, and plantar aspects of the feet.     Derm:  Incision is mostly healed.  Small scab to the distal aspect of the incision.  No redness purulent drainage or signs of an infection noted.     Musculoskeletal:  No foot deformity noted.       Assessment: Post-operative state     Medical Decision Making/Plan: At this time she will continue in the boot.  In 2 weeks we will have her transition from walking in the boot to walking in an ankle brace and shoes around the house.  She will continue with physical therapy.  She was given a letter to return to seated work only starting February 14.  We will have her follow-up in 1 month for reassessment and at that time we will likely let her increase her activity as tolerated.     All questions were answered to patient satisfaction and she will call further questions or concerns.           Jennifer Rome DPM, Podiatry/Foot and Ankle Surgery      Again, thank you for allowing me to participate in the care of your patient.        Sincerely,        Jennifer Rome DPM,  Podiatry/Foot and Ankle Surgery    Electronically signed

## 2025-01-28 ENCOUNTER — THERAPY VISIT (OUTPATIENT)
Dept: PHYSICAL THERAPY | Facility: CLINIC | Age: 50
End: 2025-01-28
Payer: COMMERCIAL

## 2025-01-28 ENCOUNTER — MYC MEDICAL ADVICE (OUTPATIENT)
Dept: PODIATRY | Facility: CLINIC | Age: 50
End: 2025-01-28

## 2025-01-28 DIAGNOSIS — Z98.890 POST-OPERATIVE STATE: Primary | ICD-10-CM

## 2025-01-28 PROCEDURE — 97110 THERAPEUTIC EXERCISES: CPT | Mod: GP | Performed by: PHYSICAL THERAPIST

## 2025-01-28 NOTE — TELEPHONE ENCOUNTER
Phone call to patient regarding her Mychart message sent. She states the black boot that she received after surgery is not staying on because the two velcro straps at the top of the boot don't stick anymore. After further discussion, the velcro is filled with fuzz/hair and that is why it is not sticking. Suggested she try to use a comb to get the fuzz out. She states she doesn't have one. Suggested she try a fork. She will try that.     However, she is meeting a friend after 2:30 pm today and would like to wear the gray Aircast boot she received prior to surgery if that is ok. Will discuss and get back with her.     Ok to leave message : YES    Nalini Isidro RN

## 2025-02-05 ENCOUNTER — MYC MEDICAL ADVICE (OUTPATIENT)
Dept: PODIATRY | Facility: CLINIC | Age: 50
End: 2025-02-05
Payer: COMMERCIAL

## 2025-02-06 ENCOUNTER — THERAPY VISIT (OUTPATIENT)
Dept: PHYSICAL THERAPY | Facility: CLINIC | Age: 50
End: 2025-02-06
Payer: COMMERCIAL

## 2025-02-06 DIAGNOSIS — Z98.890 POST-OPERATIVE STATE: Primary | ICD-10-CM

## 2025-02-06 NOTE — TELEPHONE ENCOUNTER
Other: Pt calling requesting a call back regarding boot is tight and would like to size up     Could we send this information to you in FRUCT or would you prefer to receive a phone call?:   Patient would prefer a phone call   Okay to leave a detailed message?: Yes at Cell number on file:    Telephone Information:   Mobile [phone]

## 2025-02-13 ENCOUNTER — THERAPY VISIT (OUTPATIENT)
Dept: PHYSICAL THERAPY | Facility: CLINIC | Age: 50
End: 2025-02-13
Payer: COMMERCIAL

## 2025-02-13 ENCOUNTER — ALLIED HEALTH/NURSE VISIT (OUTPATIENT)
Dept: ORTHOPEDICS | Facility: CLINIC | Age: 50
End: 2025-02-13
Payer: COMMERCIAL

## 2025-02-13 DIAGNOSIS — M76.61 RIGHT ACHILLES TENDINITIS: Primary | ICD-10-CM

## 2025-02-13 DIAGNOSIS — Z98.890 POST-OPERATIVE STATE: Primary | ICD-10-CM

## 2025-02-13 NOTE — PROGRESS NOTES
/Patient seen in clinic today to review if her brace is fitting properly. She states that it is too tight around her ankle and and foot and cutting of circulation to her foot. Upon review of her trying on her current size of a medium tri lock brace the brace fits well around the foot but is not big enough to fit around her upper ankle. A large was tried on she states that this fits better for her and is no longer cutting of circulation. The upper ankle fits well, however the ankle support is little big and does not completely snug around her ankle. The pros' and cons were discussed with the patient. She would like to go forward with large after both were tried one. Braces were exchanged patient will follow up Dr. Rome on 2/19/25. She expressed thanks for switching size and feels much better in the large. It was a pleasure to see the patient today.      Zack Acosta ATC

## 2025-02-16 ENCOUNTER — MYC REFILL (OUTPATIENT)
Dept: FAMILY MEDICINE | Facility: CLINIC | Age: 50
End: 2025-02-16
Payer: COMMERCIAL

## 2025-02-16 DIAGNOSIS — J30.2 SEASONAL ALLERGIC RHINITIS, UNSPECIFIED TRIGGER: ICD-10-CM

## 2025-02-16 DIAGNOSIS — I10 BENIGN ESSENTIAL HYPERTENSION: ICD-10-CM

## 2025-02-17 ENCOUNTER — THERAPY VISIT (OUTPATIENT)
Dept: PHYSICAL THERAPY | Facility: CLINIC | Age: 50
End: 2025-02-17
Payer: COMMERCIAL

## 2025-02-17 DIAGNOSIS — Z98.890 POST-OPERATIVE STATE: Primary | ICD-10-CM

## 2025-02-17 PROCEDURE — 97112 NEUROMUSCULAR REEDUCATION: CPT | Mod: GP | Performed by: PHYSICAL THERAPIST

## 2025-02-17 PROCEDURE — 97110 THERAPEUTIC EXERCISES: CPT | Mod: GP | Performed by: PHYSICAL THERAPIST

## 2025-02-17 RX ORDER — FLUTICASONE PROPIONATE 50 MCG
2 SPRAY, SUSPENSION (ML) NASAL DAILY
Qty: 48 ML | Refills: 0 | Status: SHIPPED | OUTPATIENT
Start: 2025-02-17

## 2025-02-17 RX ORDER — LOSARTAN POTASSIUM 50 MG/1
50 TABLET ORAL DAILY
Qty: 90 TABLET | Refills: 1 | OUTPATIENT
Start: 2025-02-17

## 2025-02-19 ENCOUNTER — OFFICE VISIT (OUTPATIENT)
Dept: PODIATRY | Facility: CLINIC | Age: 50
End: 2025-02-19
Payer: COMMERCIAL

## 2025-02-19 VITALS — BODY MASS INDEX: 41.45 KG/M2 | DIASTOLIC BLOOD PRESSURE: 78 MMHG | SYSTOLIC BLOOD PRESSURE: 118 MMHG | WEIGHT: 234 LBS

## 2025-02-19 DIAGNOSIS — Z98.890 POST-OPERATIVE STATE: Primary | ICD-10-CM

## 2025-02-19 PROCEDURE — 99024 POSTOP FOLLOW-UP VISIT: CPT | Performed by: PODIATRIST

## 2025-02-19 NOTE — LETTER
February 19, 2025      Prince Patel  [address] [facility] [address] [address]        To Whom It May Concern:    Prince Patel was seen in our clinic. She may return to work without restrictions.      Sincerely,            Jennifer Rome DPM, Podiatry/Foot and Ankle Surgery    Electronically signed

## 2025-02-19 NOTE — LETTER
2/19/2025      Prince Patel  [address]  [place] [address]      Dear Colleague,    Thank you for referring your patient, Prince Patel, to the [facility] PODIATRY. Please see a copy of my visit note below.    Podiatry / Foot and Ankle Surgery Progress Note    February 19, 2025    Subject: Patient was seen for 10-week status post right heel spur removal and Achilles tendon repair.  Notes that she is doing well.  No pain.  Denies fever, nausea, vomiting.  Has been walking without the ankle brace.    Objective:  Vitals: /78   Wt 106.1 kg (234 lb)   LMP 10/31/2011   BMI 41.45 kg/m    BMI= Body mass index is 41.45 kg/m .    General:  Patient is alert and orientated.  NAD.    Vascular:  DP and PT pulses are palpable.  No edema or varicosities noted.  CFT's < 3secs.  Skin temp is normal.     Neuro:  Light and gross touch sensation intact to digits, dorsum, and plantar aspects of the feet.     Derm:  Incision is mostly healed.  Small scab to the distal aspect of the incision.  No redness purulent drainage or signs of an infection noted.     Musculoskeletal:  No foot deformity noted.       Assessment: Post-operative state     Medical Decision Making/Plan: At this time she can continue increasing activity without the brace.  She can return to work without restriction.  We will have her follow-up as needed.    Patient Risk Factor:  Patient is a low risk factor for infection.     All questions were answered to patients satisfaction and they will call with further questions or concerns.    Jennifer Rome DPM, Podiatry/Foot and Ankle Surgery      Again, thank you for allowing me to participate in the care of your patient.        Sincerely,        Jennifer Rome DPM, Podiatry/Foot and Ankle Surgery    Electronically signed

## 2025-02-19 NOTE — PATIENT INSTRUCTIONS
Thank you for choosing [facility] Podiatry / Foot & Ankle Surgery!    DR FERRARO'S CLINIC:  [facility]   [address]   [place] [address]  [phone]    (Tues, Wed, Thur am, Fri pm)     [facility]  [address], [place] [address]  [phone]  (Every other Friday morning)    [facility]  [address] [place] [id number] [phone]  (Every other Friday)     TRIAGE LINE: [phone]  APPOINTMENTS: [phone]  RADIOLOGY: [phone]  SET UP SURGERY: [phone]  PHYSICAL THERAPY: [phone]   FAX NUMBER: [phone]  BILLING QUESTIONS: [phone]       Follow up: As needed

## 2025-02-19 NOTE — PROGRESS NOTES
Podiatry / Foot and Ankle Surgery Progress Note    February 19, 2025    Subject: Patient was seen for 10-week status post right heel spur removal and Achilles tendon repair.  Notes that she is doing well.  No pain.  Denies fever, nausea, vomiting.  Has been walking without the ankle brace.    Objective:  Vitals: /78   Wt 106.1 kg (234 lb)   LMP 10/31/2011   BMI 41.45 kg/m    BMI= Body mass index is 41.45 kg/m .    General:  Patient is alert and orientated.  NAD.    Vascular:  DP and PT pulses are palpable.  No edema or varicosities noted.  CFT's < 3secs.  Skin temp is normal.     Neuro:  Light and gross touch sensation intact to digits, dorsum, and plantar aspects of the feet.     Derm:  Incision is mostly healed.  Small scab to the distal aspect of the incision.  No redness purulent drainage or signs of an infection noted.     Musculoskeletal:  No foot deformity noted.       Assessment: Post-operative state     Medical Decision Making/Plan: At this time she can continue increasing activity without the brace.  She can return to work without restriction.  We will have her follow-up as needed.    Patient Risk Factor:  Patient is a low risk factor for infection.     All questions were answered to patients satisfaction and they will call with further questions or concerns.    Jennifer Rome DPM, Podiatry/Foot and Ankle Surgery

## 2025-02-27 ENCOUNTER — THERAPY VISIT (OUTPATIENT)
Dept: PHYSICAL THERAPY | Facility: CLINIC | Age: 50
End: 2025-02-27
Payer: COMMERCIAL

## 2025-02-27 DIAGNOSIS — Z98.890 POST-OPERATIVE STATE: Primary | ICD-10-CM

## 2025-03-04 ENCOUNTER — OFFICE VISIT (OUTPATIENT)
Dept: URGENT CARE | Facility: URGENT CARE | Age: 50
End: 2025-03-04
Payer: COMMERCIAL

## 2025-03-04 VITALS
HEART RATE: 99 BPM | WEIGHT: 231 LBS | SYSTOLIC BLOOD PRESSURE: 134 MMHG | TEMPERATURE: 97.8 F | BODY MASS INDEX: 40.92 KG/M2 | RESPIRATION RATE: 18 BRPM | DIASTOLIC BLOOD PRESSURE: 83 MMHG | OXYGEN SATURATION: 98 %

## 2025-03-04 DIAGNOSIS — M62.89 MUSCLE TIGHTNESS: ICD-10-CM

## 2025-03-04 DIAGNOSIS — R11.0 NAUSEA: ICD-10-CM

## 2025-03-04 DIAGNOSIS — G44.219 EPISODIC TENSION-TYPE HEADACHE, NOT INTRACTABLE: Primary | ICD-10-CM

## 2025-03-04 RX ORDER — ONDANSETRON 4 MG/1
4 TABLET, ORALLY DISINTEGRATING ORAL ONCE
Status: COMPLETED | OUTPATIENT
Start: 2025-03-04 | End: 2025-03-04

## 2025-03-04 RX ORDER — KETOROLAC TROMETHAMINE 30 MG/ML
45 INJECTION, SOLUTION INTRAMUSCULAR; INTRAVENOUS ONCE
Status: COMPLETED | OUTPATIENT
Start: 2025-03-04 | End: 2025-03-04

## 2025-03-04 RX ORDER — DIPHENHYDRAMINE HYDROCHLORIDE 50 MG/ML
50 INJECTION, SOLUTION INTRAMUSCULAR; INTRAVENOUS ONCE
Status: COMPLETED | OUTPATIENT
Start: 2025-03-04 | End: 2025-03-04

## 2025-03-04 RX ADMIN — KETOROLAC TROMETHAMINE 45 MG: 30 INJECTION, SOLUTION INTRAMUSCULAR; INTRAVENOUS at 12:37

## 2025-03-04 RX ADMIN — ONDANSETRON 4 MG: 4 TABLET, ORALLY DISINTEGRATING ORAL at 12:36

## 2025-03-04 RX ADMIN — DIPHENHYDRAMINE HYDROCHLORIDE 50 MG: 50 INJECTION, SOLUTION INTRAMUSCULAR; INTRAVENOUS at 12:38

## 2025-03-04 NOTE — PROGRESS NOTES
Assessment & Plan     Episodic tension-type headache, not intractable    Most headaches are tension headaches. Some people get them often, especially if they have a lot of stress in their lives.  This kind of headache may cause pain or a feeling of pressure all over your head. Sometimes it's hard to know where the center of the pain is.  If you get a lot of these kind of headaches, it can help to talk to your doctor. You can work together to find the treatment that works best for you.  Follow-up care is a key part of your treatment and safety    Patient was given toradol and benadryl shot in office  She has a ride home  - ketorolac (TORADOL) injection 45 mg  - diphenhydrAMINE (BENADRYL) injection 50 mg    Nausea    Nausea is secondary to headache  This was relieved with zofran in office  - ondansetron (ZOFRAN ODT) ODT tab 4 mg    Muscle tightness    Benadryl shot will help relax muscles  She has a ride home  - diphenhydrAMINE (BENADRYL) injection 50 mg       At today's visit with Nadera L Manuellawrence , we discussed results, diagnosis, medications and formulated a plan.  We also discussed red flags for immediate return to clinic/ER, as well as indications for follow up with PCP if not improved in 3 days. Patient understood and agreed to plan. Anabellrenita Patel was discharged with stable vitals and has no further questions.       No follow-ups on file.    Scott Bowers, [facility], PA-[place]  [facility] Tolbert is a 49 year old female who presents to clinic today for the following health issues:  Chief Complaint   Patient presents with    Urgent Care     Headaches for 7 day now. Been taken OTC   Her dad has a sinus infection   Hx of migraine          3/4/2025    12:10 PM   Additional Questions   Roomed by Hattie Wilson   Accompanied by self     HPI    Review of Systems  Constitutional, HEENT, cardiovascular, pulmonary, GI, , musculoskeletal, neuro, skin, endocrine and psych systems are  negative, except as otherwise noted.      Objective    /83 (BP Location: Left arm, Patient Position: Sitting, Cuff Size: Adult Large)   Pulse 99   Temp 97.8  F (36.6  C) (Tympanic)   Resp 18   Wt 104.8 kg (231 lb)   LMP 10/31/2011   SpO2 98%   BMI 40.92 kg/m    Physical Exam   GENERAL: alert and no distress. Pos for band like tension around head  EYES: Eyes grossly normal to inspection, PERRL and conjunctivae and sclerae normal  HENT: ear canals and TM's normal, nose and mouth without ulcers or lesions  NECK: no adenopathy, no asymmetry, masses, or scars, and thyroid normal to palpation. Pos for muscle tightness back of neck.  RESP: lungs clear to auscultation - no rales, rhonchi or wheezes  CV: regular rate and rhythm, normal S1 S2, no S3 or S4, no murmur, click or rub, no peripheral edema  ABDOMEN: soft, nontender, no hepatosplenomegaly, no masses and bowel sounds normal  MS: no gross musculoskeletal defects noted, no edema  SKIN: no suspicious lesions or rashes  NEURO: Normal strength and tone, mentation intact and speech normal  PSYCH: mentation appears normal, affect normal/bright      No results found for any visits on 03/04/25.

## 2025-03-06 ENCOUNTER — THERAPY VISIT (OUTPATIENT)
Dept: PHYSICAL THERAPY | Facility: CLINIC | Age: 50
End: 2025-03-06
Payer: COMMERCIAL

## 2025-03-06 DIAGNOSIS — Z98.890 POST-OPERATIVE STATE: Primary | ICD-10-CM

## 2025-03-12 NOTE — TELEPHONE ENCOUNTER
Danuta- see [employer] message and picture and also [employer] message and picture.  Please advise.  Sujata Raphael RN     Referral to colorectal surgeon for evaluation   Statement Selected

## 2025-03-13 ENCOUNTER — THERAPY VISIT (OUTPATIENT)
Dept: PHYSICAL THERAPY | Facility: CLINIC | Age: 50
End: 2025-03-13
Payer: COMMERCIAL

## 2025-03-13 DIAGNOSIS — Z98.890 POST-OPERATIVE STATE: Primary | ICD-10-CM

## 2025-03-27 ENCOUNTER — THERAPY VISIT (OUTPATIENT)
Dept: PHYSICAL THERAPY | Facility: CLINIC | Age: 50
End: 2025-03-27
Payer: COMMERCIAL

## 2025-03-27 DIAGNOSIS — Z98.890 POST-OPERATIVE STATE: Primary | ICD-10-CM

## 2025-04-03 DIAGNOSIS — G43.109 MIGRAINE WITH AURA AND WITHOUT STATUS MIGRAINOSUS, NOT INTRACTABLE: ICD-10-CM

## 2025-04-03 RX ORDER — RIZATRIPTAN BENZOATE 10 MG/1
10 TABLET ORAL
Qty: 18 TABLET | Refills: 0 | Status: SHIPPED | OUTPATIENT
Start: 2025-04-03

## 2025-04-18 ENCOUNTER — ANCILLARY PROCEDURE (OUTPATIENT)
Dept: GENERAL RADIOLOGY | Facility: CLINIC | Age: 50
End: 2025-04-18
Payer: COMMERCIAL

## 2025-04-18 ENCOUNTER — TELEPHONE (OUTPATIENT)
Dept: FAMILY MEDICINE | Facility: CLINIC | Age: 50
End: 2025-04-18

## 2025-04-18 PROCEDURE — 73610 X-RAY EXAM OF ANKLE: CPT | Mod: TC | Performed by: RADIOLOGY

## 2025-04-18 PROCEDURE — 73630 X-RAY EXAM OF FOOT: CPT | Mod: TC | Performed by: RADIOLOGY

## 2025-04-18 NOTE — TELEPHONE ENCOUNTER
Tommy    Pt seen for office visit today for recent fall and left ankle/foot pain. Order for left ankle/foot brace placed at visit. Pt reports insurance doesn't cover associated diagnosis for pain. No alternative diagnosis recommendations given. She is requesting update go to the provider and is awaiting x-ray results at this time. Please review and advise, thanks!  Ronen Houser RN on 4/18/2025 at 2:58 PM

## 2025-04-18 NOTE — TELEPHONE ENCOUNTER
Changed order to be placed under diagnosis of ankle strain.  Would insurance cover with this diagnosis?    ELY Stubbs CNP

## 2025-04-21 ENCOUNTER — MYC MEDICAL ADVICE (OUTPATIENT)
Dept: FAMILY MEDICINE | Facility: CLINIC | Age: 50
End: 2025-04-21
Payer: COMMERCIAL

## 2025-04-21 NOTE — TELEPHONE ENCOUNTER
Called and spoke with pt,     Relayed provider message below.     Pt verbalizes understanding and is agreeable with plan. Pt denies any further questions or concerns at this time.     Mirta CAMERON, RN   Clinic RN  [facility]

## 2025-04-22 ENCOUNTER — TELEPHONE (OUTPATIENT)
Dept: FAMILY MEDICINE | Facility: CLINIC | Age: 50
End: 2025-04-22
Payer: COMMERCIAL

## 2025-04-22 NOTE — TELEPHONE ENCOUNTER
Care team- please print 4/18/25 order for ankle brace and put at  per patient request.  Sujata Raphael RN

## 2025-04-22 NOTE — TELEPHONE ENCOUNTER
"Patient sent CRM request.  See below.   Sent [employer] message regarding same concern.  See mychart message.  Sujata Raphael, BILLIE Patel \"Petr Tolbert\"  P [facility] Pool15 hours ago (6:47 PM)       Topic: Non-Medical Question.     I saw Tommy last week and was wondering if he rewrote the prescription for the ankle brace for my insurance or what I m supposed to do  "

## 2025-04-23 ENCOUNTER — OFFICE VISIT (OUTPATIENT)
Dept: URGENT CARE | Facility: URGENT CARE | Age: 50
End: 2025-04-23

## 2025-04-23 VITALS
TEMPERATURE: 98 F | SYSTOLIC BLOOD PRESSURE: 137 MMHG | DIASTOLIC BLOOD PRESSURE: 85 MMHG | OXYGEN SATURATION: 99 % | HEART RATE: 89 BPM | WEIGHT: 236 LBS | RESPIRATION RATE: 16 BRPM | BODY MASS INDEX: 41.81 KG/M2

## 2025-04-23 DIAGNOSIS — S61.211A LACERATION OF LEFT INDEX FINGER WITHOUT FOREIGN BODY WITHOUT DAMAGE TO NAIL, INITIAL ENCOUNTER: Primary | ICD-10-CM

## 2025-04-23 PROCEDURE — 99213 OFFICE O/P EST LOW 20 MIN: CPT | Performed by: FAMILY MEDICINE

## 2025-04-23 PROCEDURE — 3079F DIAST BP 80-89 MM HG: CPT | Performed by: FAMILY MEDICINE

## 2025-04-23 PROCEDURE — 3075F SYST BP GE 130 - 139MM HG: CPT | Performed by: FAMILY MEDICINE

## 2025-04-23 NOTE — PROGRESS NOTES
SUBJECTIVE:     Chief Complaint   Patient presents with    Urgent Care     Left finger laceration- sliced it on glass- w/c doi 04/23/25     Prince Patel is a 49 year old female who presents to the clinic with a laceration on the left index finger sustained 1 hour(s) ago.  This is a work related and accidental injury.  Was washing glass item, slipped out of her hand and ended up getting wound on finger  Mechanism of injury: glass.    Works as a  at [employer]    Associated symptoms: Denies numbness, weakness, or loss of function  Last tetanus booster within 10 years: yes, 2022    Patient is right handed    EXAM:   The patient appears today in alert,no apparent distress distress  VITALS: /85 (BP Location: Right arm, Patient Position: Sitting, Cuff Size: Adult Large)   Pulse 89   Temp 98  F (36.7  C) (Tympanic)   Resp 16   Wt 107 kg (236 lb)   LMP 10/31/2011   SpO2 99%   BMI 41.81 kg/m      Size of laceration: 1 centimeters, irregular oval  Characteristics of the laceration: bleeding- mild, clean, and missing tissue  Tendon function intact: yes  Sensation to light touch intact: yes  Pulses intact: yes  Picture included in patient's chart: no    Assessment:  Laceration of left index finger without foreign body without damage to nail, initial encounter    PLAN:  PROCEDURE NOTE::  Wound cleaned with Shur-Clens  Gel foam applied Z=X 1     After care instructions:  Keep wound clean and dry for the next 24-48 hours  Signs of infection discussed today  May return to work as long as wound is kept clean and dry  Discussed the probability of scarring  Allow gelfoam to fall off on its own    Germain Hogue MD  April 23, 2025 12:47 PM

## 2025-04-23 NOTE — PROGRESS NOTES
Urgent Care Clinic Visit    Chief Complaint   Patient presents with    Urgent Care     Left finger laceration- sliced it on glass- w/c doi 04/23/25 4/23/2025    12:03 PM   Additional Questions   Roomed by Evon gray

## 2025-05-01 DIAGNOSIS — G43.109 MIGRAINE WITH AURA AND WITHOUT STATUS MIGRAINOSUS, NOT INTRACTABLE: ICD-10-CM

## 2025-05-01 RX ORDER — RIZATRIPTAN BENZOATE 10 MG/1
10 TABLET ORAL
Qty: 12 TABLET | Refills: 0 | Status: SHIPPED | OUTPATIENT
Start: 2025-05-01

## 2025-05-09 PROBLEM — E66.813 CLASS 3 SEVERE OBESITY DUE TO EXCESS CALORIES WITH SERIOUS COMORBIDITY AND BODY MASS INDEX (BMI) OF 40.0 TO 44.9 IN ADULT (H): Status: ACTIVE | Noted: 2020-08-05

## 2025-05-11 ENCOUNTER — MYC MEDICAL ADVICE (OUTPATIENT)
Dept: FAMILY MEDICINE | Facility: CLINIC | Age: 50
End: 2025-05-11
Payer: COMMERCIAL

## 2025-05-12 ENCOUNTER — RESULTS FOLLOW-UP (OUTPATIENT)
Dept: FAMILY MEDICINE | Facility: CLINIC | Age: 50
End: 2025-05-12

## 2025-05-12 ENCOUNTER — PATIENT OUTREACH (OUTPATIENT)
Dept: CARE COORDINATION | Facility: CLINIC | Age: 50
End: 2025-05-12
Payer: COMMERCIAL

## 2025-05-12 PROBLEM — Z98.890 POST-OPERATIVE STATE: Status: RESOLVED | Noted: 2025-01-09 | Resolved: 2025-05-12

## 2025-05-27 ENCOUNTER — OFFICE VISIT (OUTPATIENT)
Dept: PODIATRY | Facility: CLINIC | Age: 50
End: 2025-05-27
Payer: COMMERCIAL

## 2025-05-27 ENCOUNTER — ANCILLARY PROCEDURE (OUTPATIENT)
Dept: GENERAL RADIOLOGY | Facility: CLINIC | Age: 50
End: 2025-05-27
Attending: PODIATRIST
Payer: COMMERCIAL

## 2025-05-27 DIAGNOSIS — M79.672 LEFT FOOT PAIN: Primary | ICD-10-CM

## 2025-05-27 DIAGNOSIS — B37.2 CANDIDAL SKIN INFECTION: ICD-10-CM

## 2025-05-27 DIAGNOSIS — M72.2 PLANTAR FASCIITIS OF LEFT FOOT: ICD-10-CM

## 2025-05-27 DIAGNOSIS — M79.672 LEFT FOOT PAIN: ICD-10-CM

## 2025-05-27 DIAGNOSIS — Q66.72 CONGENITAL BILATERAL PES CAVUS: ICD-10-CM

## 2025-05-27 DIAGNOSIS — Q66.71 CONGENITAL BILATERAL PES CAVUS: ICD-10-CM

## 2025-05-27 PROCEDURE — 20550 NJX 1 TENDON SHEATH/LIGAMENT: CPT | Mod: LT | Performed by: PODIATRIST

## 2025-05-27 PROCEDURE — 99213 OFFICE O/P EST LOW 20 MIN: CPT | Mod: 25 | Performed by: PODIATRIST

## 2025-05-27 PROCEDURE — 73630 X-RAY EXAM OF FOOT: CPT | Mod: TC | Performed by: RADIOLOGY

## 2025-05-27 RX ORDER — BUPIVACAINE HYDROCHLORIDE 5 MG/ML
2 INJECTION, SOLUTION EPIDURAL; INTRACAUDAL; PERINEURAL
Status: COMPLETED | OUTPATIENT
Start: 2025-05-27 | End: 2025-05-27

## 2025-05-27 RX ORDER — NYSTATIN TOPICAL POWDER 100000 U/G
POWDER TOPICAL 2 TIMES DAILY
Qty: 60 G | Refills: 1 | Status: SHIPPED | OUTPATIENT
Start: 2025-05-27

## 2025-05-27 RX ORDER — TRIAMCINOLONE ACETONIDE 40 MG/ML
40 INJECTION, SUSPENSION INTRA-ARTICULAR; INTRAMUSCULAR
Status: COMPLETED | OUTPATIENT
Start: 2025-05-27 | End: 2025-05-27

## 2025-05-27 RX ADMIN — BUPIVACAINE HYDROCHLORIDE 2 ML: 5 INJECTION, SOLUTION EPIDURAL; INTRACAUDAL; PERINEURAL at 15:12

## 2025-05-27 RX ADMIN — TRIAMCINOLONE ACETONIDE 40 MG: 40 INJECTION, SUSPENSION INTRA-ARTICULAR; INTRAMUSCULAR at 15:12

## 2025-05-27 NOTE — LETTER
5/27/2025      Prince Patel  [address]  [place] [address]      Dear Colleague,    Thank you for referring your patient, Prince Patel, to the [facility] PODIATRY. Please see a copy of my visit note below.    Podiatry / Foot and Ankle Surgery Progress Note    May 27, 2025    Subject: Patient was seen for left foot pain.Notes it has been going on for a few weeks.  States that it is near her heel.  Can be 5 out of 10 pain at its worst.  Denies specific injury.  Worse with standing for long period of time.  She has tried taking Tylenol but it has not helped much.  Wondering what is causing the pain and what can be done for it.    Objective:  Vitals: [facility] 10/31/2011   BMI= There is no height or weight on file to calculate BMI.    General:  Patient is alert and orientated.  NAD.    Vascular:  DP and PT pulses are palpable.  No edema or varicosities noted.  CFT's < 3secs.  Skin temp is normal.    Neuro:  Light and gross touch sensation intact to digits, dorsum, and plantar aspects of the feet.    Derm:  Skin is supple.  No rashes, lesions, or ulcerations noted.    Musculoskeletal: Palpation to the plantar medial aspect of the left heel.    Imaging: left foot xray - I personally reviewed the xrays.  Increased calcaneal inclination angle.  Plantar posterior heel spurs are noted.  No fractures are noted.    Assessment:     Left foot pain  Plantar fasciitis of left foot  Congenital bilateral pes cavus    Medical Decision Making/Plan:  The potential causes and nature of plantar fasciitis were discussed with the patient.  We reviewed the natural history/prognosis of the condition and risks if left untreated.  These include chronic pain, other sites of pain due to gait changes, and potential plantar fascial rupture.      We discussed possible causes of the condition as it relates to the patients specific situation.      Conservative treatment options were reviewed:  appropriate  shoes, avoidance of barefoot walking, inserts/orthoses, stretching, ice, massage, immobilization and NSAIDs.     We also reviewed the options of injection therapy and surgery.  However, it was made clear that surgery is only considered when conservative therapy fails.  The risks and benefits of injection therapy, and surgery were discussed.     After thorough discussion and answering all questions, the patient elected to try injection.  She does have ankle braces and we will have her wear those for the next week.  Continue to support the arch with arch supports and not going barefoot or in socks to have a supportive shoe or sandal on at all times.  She was given stretches to do if pain continues may try physical therapy with iontophoresis or boot..         Procedure: Medium Joint Injection/Arthrocentesis: L ankle    Date/Time: 5/27/2025 3:12 PM    Performed by: Jennifer Rome DPM, Podiatry/Foot and Ankle Surgery  Authorized by: Jennifer Rome DPM, Podiatry/Foot and Ankle Surgery    Indications:  Pain  Needle Size:  25 G  Guidance: surface landmarks    Approach:  Medial  Location:  Ankle  Location comment:  Left plantar fascial injection  Site:  L ankle  Medications:  40 mg triamcinolone 40 MG/ML; 2 mL BUPivacaine (PF) 0.5 %  Outcome:  Tolerated well, no immediate complications  Procedure discussed: discussed risks, benefits, and alternatives    Consent Given by:  Patient  Timeout: timeout called immediately prior to procedure    Prep: patient was prepped and draped in usual sterile fashion          Patient Risk Factor:  Patient is a low risk factor for infection.     All questions were answered to patients satisfaction and they will call with further questions or concerns.    Jennifer Rome DPM, Podiatry/Foot and Ankle Surgery      Again, thank you for allowing me to participate in the care of your patient.        Sincerely,        Jennifer Rome DPM, Podiatry/Foot and Ankle Surgery    Electronically signed

## 2025-05-27 NOTE — TELEPHONE ENCOUNTER
Amado Oviedo PA-C    Please review pended refill   RN spoke to patient who states she still has infection under left breast and under stomach fold     Medication last ordered by pcp Amado Oviedo PA-C  on 2/21/2025 Nystatin brand 60 g with 1 refill     Vicky Acosta, Registered Nurse  [facility]

## 2025-05-27 NOTE — PROGRESS NOTES
Podiatry / Foot and Ankle Surgery Progress Note    May 27, 2025    Subject: Patient was seen for left foot pain.Notes it has been going on for a few weeks.  States that it is near her heel.  Can be 5 out of 10 pain at its worst.  Denies specific injury.  Worse with standing for long period of time.  She has tried taking Tylenol but it has not helped much.  Wondering what is causing the pain and what can be done for it.    Objective:  Vitals: [facility] 10/31/2011   BMI= There is no height or weight on file to calculate BMI.    General:  Patient is alert and orientated.  NAD.    Vascular:  DP and PT pulses are palpable.  No edema or varicosities noted.  CFT's < 3secs.  Skin temp is normal.    Neuro:  Light and gross touch sensation intact to digits, dorsum, and plantar aspects of the feet.    Derm:  Skin is supple.  No rashes, lesions, or ulcerations noted.    Musculoskeletal: Palpation to the plantar medial aspect of the left heel.    Imaging: left foot xray - I personally reviewed the xrays.  Increased calcaneal inclination angle.  Plantar posterior heel spurs are noted.  No fractures are noted.    Assessment:     Left foot pain  Plantar fasciitis of left foot  Congenital bilateral pes cavus    Medical Decision Making/Plan:  The potential causes and nature of plantar fasciitis were discussed with the patient.  We reviewed the natural history/prognosis of the condition and risks if left untreated.  These include chronic pain, other sites of pain due to gait changes, and potential plantar fascial rupture.      We discussed possible causes of the condition as it relates to the patients specific situation.      Conservative treatment options were reviewed:  appropriate shoes, avoidance of barefoot walking, inserts/orthoses, stretching, ice, massage, immobilization and NSAIDs.     We also reviewed the options of injection therapy and surgery.  However, it was made clear that surgery is only considered when conservative therapy  fails.  The risks and benefits of injection therapy, and surgery were discussed.     After thorough discussion and answering all questions, the patient elected to try injection.  She does have ankle braces and we will have her wear those for the next week.  Continue to support the arch with arch supports and not going barefoot or in socks to have a supportive shoe or sandal on at all times.  She was given stretches to do if pain continues may try physical therapy with iontophoresis or boot..         Procedure: Medium Joint Injection/Arthrocentesis: L ankle    Date/Time: 5/27/2025 3:12 PM    Performed by: Jennifer Rome DPM, Podiatry/Foot and Ankle Surgery  Authorized by: Jennifer Rome DPM, Podiatry/Foot and Ankle Surgery    Indications:  Pain  Needle Size:  25 G  Guidance: surface landmarks    Approach:  Medial  Location:  Ankle  Location comment:  Left plantar fascial injection  Site:  L ankle  Medications:  40 mg triamcinolone 40 MG/ML; 2 mL BUPivacaine (PF) 0.5 %  Outcome:  Tolerated well, no immediate complications  Procedure discussed: discussed risks, benefits, and alternatives    Consent Given by:  Patient  Timeout: timeout called immediately prior to procedure    Prep: patient was prepped and draped in usual sterile fashion          Patient Risk Factor:  Patient is a low risk factor for infection.     All questions were answered to patients satisfaction and they will call with further questions or concerns.    Jennifer Rome DPM, Podiatry/Foot and Ankle Surgery

## 2025-05-27 NOTE — PATIENT INSTRUCTIONS
Thank you for choosing [facility] Podiatry / Foot & Ankle Surgery!    DR FERRARO'S CLINIC:  [facility]   [address]   [place] [address]  [phone]    (Tues, Wed, Thur am)     [facility]  [address] [facility] [place] [address]  [phone]  (Every Friday)     TRIAGE LINE: [phone]  APPOINTMENTS: [phone]  RADIOLOGY: [phone]  SET UP SURGERY: [phone]  PHYSICAL THERAPY: [phone]   FAX NUMBER: [phone]  BILLING QUESTIONS: [phone]       Follow up: as needed.       PLANTAR FASCIITIS  Plantar fasciitis is often referred to as heel spurs or heel pain. Plantar fasciitis is a very common problem that affects people of all foot shapes, age, weight and activity level. Pain may be in the arch or on the weight-bearing surface of the heel. The pain may come on without injury or identifiable cause. Pain is generally present when first getting out of bed in the morning or up from a seated break.     CAUSES  The plantar fascia is a dense fibrous band of tissue that stretches across the bottom surface of the foot. The fascia helps support the foot muscles and arch. Plantar fasciitis is thought to be caused by mechanical strain or overload. Frequent walking without shoes or wearing unsupportive shoes is thought to cause structural overload and ultimately inflammation of the plantar fascia. Some people have heel spurs that can be seen on x-ray. The heel spur is actually a minor component of plantar fascitis and is largely ignored.       SELF TREATMENT   The easiest solution is to stop walking around your home without shoes. Plantar fasciitis is largely a shoe problem. Shoes are either not being worn often enough or your current shoes are inadequate for your weight, foot structure or activity level. The majority of shoes on the market today are not sufficient to resist development of plantar fasciitis or to promote healing. Assume that your current  shoes are inadequate and will need to be replaced. Even high quality shoes wear out with 6 months to one year of frequent use. Weight loss is another option. Losing ten pounds in the next two months may be enough to resolve the problem. Ice applied to the area of pain two to three times per day for ten minutes each session can be very helpful. Warm foot soaks in epsom salts can also relieve pain. This should continue until the problem resolves. Achilles tendon stretching is essential. Stretch multiple times daily to promote healing and to prevent recurrence in the future. Over all stretching of the body is helpful as well such as the calves, thighs and lower back. Normally when one area of the body is tight, other areas are too. Gentle Yoga can be good for this.     Over the counter topical anti inflammatories can be helpful such as biofreeze, bengay, salon pas, ect...  Oral ibuprofen or aleve is recommended as well to try to calm down inflammation.     Night splints can be helpful to gradually stretch the foot at night as a lot of pain is when you get up in the morning. Taking a towel or thera band and stretching the foot back multiple times before you get ou of bed can be beneficial as well.     MEDICAL TREATMENT  Medical treatments often include custom arch supports, cortisone injections, physical therapy, splints to be worn in bed, prescription medications and surgery. The home treatments listed above will be necessary regardless of these advanced medical treatments. Surgery is rarely needed but is very helpful in selected cases.     PROGNOSIS  Plantar fasciitis can last from one day to a lifetime. Some people get intermittent fascitis that is very short-lived. Others suffer daily for years. Excessive body weight, frequent bare foot walking, long hours on the feet, inadequate shoes, predisposing foot structures and excessive activity such as running are all potential issues that lead to chronic and/or recurring  plantar fascitis. Having plantar fasciitis means that you are forever prone to this problem and will require modification of some of the above factors. Most people seek treatment within one to four months. Healing usually requires a similar one to four month time frame. Healing time is relative to the amount of effort spent treating the problem.   Plantar fasciitis is highly recurrent. Risk factors often continue, including return to bare foot walking, inadequate shoes, excessive body weight, excessive activities, etc. Your life style and foot structure may predispose you to recurrent plantar fasciitis. A daily prevention regimen can be very helpful. Ongoing use of shoe inserts, careful attention to appropriate shoes, daily Achilles stretching, etc. may prevent recurrence. Prompt attention at the earliest warning signs of heel pain can resolve the problem in as short as a few days.     EXERCISES  Stair Exercise: Step on the stairs with the ball of your foot and hold your position for at least 15 seconds, then slowly step down with the heels of your foot. You can do this daily and as often as you want.   Picking the Towel: Sit comfortably and then pick the towel up with your toes. You can use any object other than a towel as long as the material can be soft and you can pick it up with your toes.  Rolling the Bottle: Use a small ball or frozen water bottle and then roll it around with your foot.   Flex the Toes: Sit comfortably and then flex your toes by pointing it towards the floor or towards your body. This will relax and flex your foot and exercise your plantar fascia, the calf, and the Achilles tendon. The inability of the foot to stretch often causes the bunching up of the plantar fascia area leading to the pain.  Calf/Achilles Stretching: Lay on you back and raise one foot, then point your toes towards the floor. See photo below:               Hold each stretch for 10 seconds. Stretch 10 times per set, three  sets per day. Morning, afternoon and evening. If your heel pain is very severe in the morning, consider doing the first set of stretches before you get out of bed.      OVER THE COUNTER INSERT RECOMMENDATIONS  SuperFeet   Sofsole Fit Spenco   Power Step   Walk-Fit Arch Cradles     Most of these can be found at your local [email or web address], or online.  **A good high quality over the counter insert should cost around $40-$50      [employer] LOCATIONS  [address]  [phone]   [address]  [phone] [place]  [address] [place] [address]  [phone]   [place]  [address] [facility] [address] [address]  [phone]   [place]  [address] [facility]  [phone] [place] [address]  [phone]   [place]  [address]  [phone]   [place]  [address]  [phone] [place]  [address]  [phone]

## 2025-05-28 SDOH — HEALTH STABILITY: PHYSICAL HEALTH: ON AVERAGE, HOW MANY DAYS PER WEEK DO YOU ENGAGE IN MODERATE TO STRENUOUS EXERCISE (LIKE A BRISK WALK)?: 4 DAYS

## 2025-05-28 SDOH — HEALTH STABILITY: PHYSICAL HEALTH: ON AVERAGE, HOW MANY MINUTES DO YOU ENGAGE IN EXERCISE AT THIS LEVEL?: 20 MIN

## 2025-05-28 ASSESSMENT — SOCIAL DETERMINANTS OF HEALTH (SDOH): HOW OFTEN DO YOU GET TOGETHER WITH FRIENDS OR RELATIVES?: ONCE A WEEK

## 2025-05-28 ASSESSMENT — ASTHMA QUESTIONNAIRES
QUESTION_5 LAST FOUR WEEKS HOW WOULD YOU RATE YOUR ASTHMA CONTROL: WELL CONTROLLED
QUESTION_1 LAST FOUR WEEKS HOW MUCH OF THE TIME DID YOUR ASTHMA KEEP YOU FROM GETTING AS MUCH DONE AT WORK, SCHOOL OR AT HOME: NONE OF THE TIME
QUESTION_2 LAST FOUR WEEKS HOW OFTEN HAVE YOU HAD SHORTNESS OF BREATH: ONCE OR TWICE A WEEK
QUESTION_4 LAST FOUR WEEKS HOW OFTEN HAVE YOU USED YOUR RESCUE INHALER OR NEBULIZER MEDICATION (SUCH AS ALBUTEROL): ONCE A WEEK OR LESS
QUESTION_3 LAST FOUR WEEKS HOW OFTEN DID YOUR ASTHMA SYMPTOMS (WHEEZING, COUGHING, SHORTNESS OF BREATH, CHEST TIGHTNESS OR PAIN) WAKE YOU UP AT NIGHT OR EARLIER THAN USUAL IN THE MORNING: NOT AT ALL

## 2025-06-02 ENCOUNTER — TELEPHONE (OUTPATIENT)
Dept: FAMILY MEDICINE | Facility: CLINIC | Age: 50
End: 2025-06-02

## 2025-06-02 ENCOUNTER — OFFICE VISIT (OUTPATIENT)
Dept: FAMILY MEDICINE | Facility: CLINIC | Age: 50
End: 2025-06-02
Attending: PHYSICIAN ASSISTANT
Payer: COMMERCIAL

## 2025-06-02 ENCOUNTER — MYC MEDICAL ADVICE (OUTPATIENT)
Dept: FAMILY MEDICINE | Facility: CLINIC | Age: 50
End: 2025-06-02

## 2025-06-02 VITALS
HEIGHT: 63 IN | OXYGEN SATURATION: 98 % | WEIGHT: 236 LBS | DIASTOLIC BLOOD PRESSURE: 88 MMHG | SYSTOLIC BLOOD PRESSURE: 129 MMHG | HEART RATE: 92 BPM | RESPIRATION RATE: 20 BRPM | TEMPERATURE: 97.7 F | BODY MASS INDEX: 41.82 KG/M2

## 2025-06-02 DIAGNOSIS — R73.03 PREDIABETES: ICD-10-CM

## 2025-06-02 DIAGNOSIS — E66.813 CLASS 3 SEVERE OBESITY DUE TO EXCESS CALORIES WITH SERIOUS COMORBIDITY AND BODY MASS INDEX (BMI) OF 40.0 TO 44.9 IN ADULT (H): Primary | ICD-10-CM

## 2025-06-02 DIAGNOSIS — B07.0 PLANTAR WARTS: Primary | ICD-10-CM

## 2025-06-02 DIAGNOSIS — G43.109 MIGRAINE WITH AURA AND WITHOUT STATUS MIGRAINOSUS, NOT INTRACTABLE: Primary | ICD-10-CM

## 2025-06-02 PROCEDURE — 3074F SYST BP LT 130 MM HG: CPT

## 2025-06-02 PROCEDURE — 17110 DESTRUCTION B9 LES UP TO 14: CPT

## 2025-06-02 PROCEDURE — 3079F DIAST BP 80-89 MM HG: CPT

## 2025-06-02 ASSESSMENT — ANXIETY QUESTIONNAIRES
GAD7 TOTAL SCORE: 0
1. FEELING NERVOUS, ANXIOUS, OR ON EDGE: NOT AT ALL
6. BECOMING EASILY ANNOYED OR IRRITABLE: NOT AT ALL
3. WORRYING TOO MUCH ABOUT DIFFERENT THINGS: NOT AT ALL
GAD7 TOTAL SCORE: 0
IF YOU CHECKED OFF ANY PROBLEMS ON THIS QUESTIONNAIRE, HOW DIFFICULT HAVE THESE PROBLEMS MADE IT FOR YOU TO DO YOUR WORK, TAKE CARE OF THINGS AT HOME, OR GET ALONG WITH OTHER PEOPLE: NOT DIFFICULT AT ALL
7. FEELING AFRAID AS IF SOMETHING AWFUL MIGHT HAPPEN: NOT AT ALL
5. BEING SO RESTLESS THAT IT IS HARD TO SIT STILL: NOT AT ALL
2. NOT BEING ABLE TO STOP OR CONTROL WORRYING: NOT AT ALL

## 2025-06-02 ASSESSMENT — PATIENT HEALTH QUESTIONNAIRE - PHQ9
5. POOR APPETITE OR OVEREATING: NOT AT ALL
SUM OF ALL RESPONSES TO PHQ QUESTIONS 1-9: 0

## 2025-06-02 ASSESSMENT — ASTHMA QUESTIONNAIRES: ACT_TOTALSCORE: 22

## 2025-06-02 NOTE — TELEPHONE ENCOUNTER
Routing to PA team to process BILLIE Frazier Kayla M, PA-C      6/2/25  2:03 PM  Note  Can we send the Wegovy through prior auth. I don't prescribe the Emgality as it is injectable and typically will have them see Neurology but happy to put in a Neurology referral if she wanted to discuss with them?     Thanks!  Ivelisse Fischer PA-C          6/2/25  1:55 PM  You routed this conversation to Ivelisse Fischer PA-C  [facility]    6/2/25  1:55 PM  Note  Ivelisse- she did not discuss migraines but I just want to verify if Family Practice gives Emgality?       Wegovy sent 5/9/25.  We have not received PA request.  Can address both issues in one message when you route back.     Thanks, BILLIE Fleming to [facility] (supporting Ivelisse Fischer PA-C)        6/2/25  1:42 PM  Emgality to prevent headaches ( one of my friends is on this to prevent headaches and told me to check with my doctor to see if it would work or help me. )      Wegovy  my insurance said that the only one that they would approve but it got denied for some reason.

## 2025-06-02 NOTE — TELEPHONE ENCOUNTER
Ivelisse- she did not discuss migraines but I just want to verify if Family Practice gives Emgality?      Wegovy sent 5/9/25.  We have not received PA request.  Can address both issues in one message when you route back.    Thanks, Sujata Raphael RN

## 2025-06-02 NOTE — TELEPHONE ENCOUNTER
Can we send the Wegovy through prior auth. I don't prescribe the Emgality as it is injectable and typically will have them see Neurology but happy to put in a Neurology referral if she wanted to discuss with them?    Thanks!  Ivelisse Fischer PA-C

## 2025-06-02 NOTE — PROGRESS NOTES
"  Assessment & Plan     (B07.0) Plantar warts  (primary encounter diagnosis)  Two plantar warts. One to the lateral left heel and another on the plantar surface of the right heal. Cryotherapy procedure explained, verbal consent given by patient, liquid nitrogen applied x 3 pulses with cryo gun to two warts. Explained that may take more than 1 treatment. if wart remains return in 3-4 weeks for an additional treatment. Recommend using pumice stone daily as well as salicylic acid.       Follow up in 3-4 weeks for repeat wart treatment; sooner with any acute concerns.    Subjective   Petr Tolbert is a 50 year old, presenting for the following health issues:  Physical      6/2/2025    12:46 PM   Additional Questions   Roomed by Kary MAS     WART(S)  Onset: 1-2 weeks ago   Description:   Location: right foot, and on the side of the left foot  Number of warts: 2  Painful: no  Accompanying Signs & Symptoms:  Signs of infection: no  History:   History of trauma: no  Prior warts: YES     Therapies Tried and outcome: liquid nitrogen       Review of Systems  Constitutional, HEENT, cardiovascular, pulmonary, gi and gu systems are negative, except as otherwise noted.        Objective    /88 (BP Location: Right arm, Patient Position: Sitting, Cuff Size: Adult Large)   Pulse 92   Temp 97.7  F (36.5  C) (Oral)   Resp 20   Ht 1.6 m (5' 3\")   Wt 107 kg (236 lb)   LMP 10/31/2011   SpO2 98%   BMI 41.81 kg/m    Body mass index is 41.81 kg/m .  Physical Exam   GENERAL: alert and no distress  RESP: no respiratory distress  CV: regular rate  SKIN: Two plantar warts. One to the lateral left heel and another on the plantar surface of the right heal.      Signed Electronically by: Ivelisse Fischer PA-C    "

## 2025-06-02 NOTE — TELEPHONE ENCOUNTER
Created telephone encounter for PA.  Sent [employer] message to patient regarding PA and Emgality.  Sujata Raphael RN

## 2025-06-03 ENCOUNTER — PATIENT OUTREACH (OUTPATIENT)
Dept: CARE COORDINATION | Facility: CLINIC | Age: 50
End: 2025-06-03
Payer: COMMERCIAL

## 2025-06-03 NOTE — TELEPHONE ENCOUNTER
Central Prior Authorization Team   Phone: [phone]    PA Initiation    Medication: Wegovy  Insurance Company: [employer] Part D - Phone [phone] Fax [phone]  Pharmacy Filling the Rx: [id number] - [place], [place] - [address]  Filling Pharmacy Phone: [phone]  Filling Pharmacy Fax:    Start Date: 6/3/2025

## 2025-06-05 DIAGNOSIS — J45.40 MODERATE PERSISTENT ASTHMA WITHOUT COMPLICATION: ICD-10-CM

## 2025-06-05 RX ORDER — DILTIAZEM HYDROCHLORIDE 60 MG/1
2 TABLET, FILM COATED ORAL 2 TIMES DAILY
Qty: 30.6 G | Refills: 4 | Status: SHIPPED | OUTPATIENT
Start: 2025-06-05

## 2025-06-05 NOTE — TELEPHONE ENCOUNTER
PRIOR AUTHORIZATION DENIED    Medication: Wegovy-PA DENIED    Denial Date: 6/3/2025    Denial Rational:           Appeal Information:

## 2025-06-06 NOTE — TELEPHONE ENCOUNTER
Can we call patient to let her know Wegovy was denied. It looks like her insurance does not cover injectable weight loss medications. They had told her they covered Wegovy but my guess is they cover semaglutide for diabetes and not weight loss.     Thanks!  Ivelisse Fischer PA-C

## 2025-06-09 NOTE — TELEPHONE ENCOUNTER
RN spoke to patient     Advised that injectable weight loss medications are not covered for weight loss per insurance guidelines     Patient has no questions at this time     Vicky Acosta Registered Nurse  [facility]

## 2025-06-10 DIAGNOSIS — G43.109 MIGRAINE WITH AURA AND WITHOUT STATUS MIGRAINOSUS, NOT INTRACTABLE: ICD-10-CM

## 2025-06-11 DIAGNOSIS — G43.109 MIGRAINE WITH AURA AND WITHOUT STATUS MIGRAINOSUS, NOT INTRACTABLE: ICD-10-CM

## 2025-06-11 RX ORDER — RIZATRIPTAN BENZOATE 10 MG/1
10 TABLET ORAL
Qty: 12 TABLET | Refills: 0 | Status: SHIPPED | OUTPATIENT
Start: 2025-06-11 | End: 2025-06-11

## 2025-06-11 RX ORDER — RIZATRIPTAN BENZOATE 10 MG/1
10 TABLET ORAL
Qty: 12 TABLET | Refills: 0 | Status: SHIPPED | OUTPATIENT
Start: 2025-06-11

## 2025-06-26 ASSESSMENT — ASTHMA QUESTIONNAIRES
QUESTION_4 LAST FOUR WEEKS HOW OFTEN HAVE YOU USED YOUR RESCUE INHALER OR NEBULIZER MEDICATION (SUCH AS ALBUTEROL): NOT AT ALL
QUESTION_5 LAST FOUR WEEKS HOW WOULD YOU RATE YOUR ASTHMA CONTROL: WELL CONTROLLED
QUESTION_3 LAST FOUR WEEKS HOW OFTEN DID YOUR ASTHMA SYMPTOMS (WHEEZING, COUGHING, SHORTNESS OF BREATH, CHEST TIGHTNESS OR PAIN) WAKE YOU UP AT NIGHT OR EARLIER THAN USUAL IN THE MORNING: NOT AT ALL
ACT_TOTALSCORE: 23
QUESTION_1 LAST FOUR WEEKS HOW MUCH OF THE TIME DID YOUR ASTHMA KEEP YOU FROM GETTING AS MUCH DONE AT WORK, SCHOOL OR AT HOME: A LITTLE OF THE TIME
QUESTION_2 LAST FOUR WEEKS HOW OFTEN HAVE YOU HAD SHORTNESS OF BREATH: NOT AT ALL

## 2025-07-01 ENCOUNTER — OFFICE VISIT (OUTPATIENT)
Dept: PULMONOLOGY | Facility: CLINIC | Age: 50
End: 2025-07-01
Attending: NURSE PRACTITIONER
Payer: COMMERCIAL

## 2025-07-01 VITALS
WEIGHT: 235.8 LBS | OXYGEN SATURATION: 97 % | HEART RATE: 85 BPM | BODY MASS INDEX: 41.77 KG/M2 | DIASTOLIC BLOOD PRESSURE: 88 MMHG | SYSTOLIC BLOOD PRESSURE: 134 MMHG

## 2025-07-01 DIAGNOSIS — Z91.09 ENVIRONMENTAL ALLERGIES: ICD-10-CM

## 2025-07-01 DIAGNOSIS — J45.40 MODERATE PERSISTENT ASTHMA WITHOUT COMPLICATION: Primary | ICD-10-CM

## 2025-07-01 PROCEDURE — 99214 OFFICE O/P EST MOD 30 MIN: CPT | Performed by: NURSE PRACTITIONER

## 2025-07-01 PROCEDURE — 3079F DIAST BP 80-89 MM HG: CPT | Performed by: NURSE PRACTITIONER

## 2025-07-01 PROCEDURE — 3075F SYST BP GE 130 - 139MM HG: CPT | Performed by: NURSE PRACTITIONER

## 2025-07-01 NOTE — PATIENT INSTRUCTIONS
It was a pleasure to see you in clinic today.   Here is what we discussed:    Continue Symbicort two puffs twice daily, rinse/gargle after use.  You can use this as needed as well, max 12 puffs/day.   Continue Albuterol as needed.   Call my nurse, Von ([phone]) with any change or worsening of your breathing.  Follow-up in one year.     Merly Kelly, CNP  Pulmonary Medicine  [facility]  [phone]

## 2025-07-01 NOTE — PROGRESS NOTES
Pulmonary Clinic Follow-up          Assessment/Plan:     50 year old female with a history of asthma, allergic rhinitis, GERD, presenting for annual follow-up.     Moderate persistent asthma  Environmental allergies  Lifelong non-smoker presented 11/2023 for evaluation of wheezing.  She notices this occasionally, when she wakes and when she is exercising.  She has known hx of allergies to cats, ragweed, and dust mites.  She also has hx of GERD which is currently controlled with famotidine.  She was initiated on Symbicort BID and encouraged to continue allergy regimen.  She reports improvement in wheezing since starting Symbicort.  She is able to exercise more, without issue.    She reports stable breathing over the last year, no URIs or exacerbations.  ACT score 23.     Plan:  - continue Symbicort (budesonide/formoterol) 80/4.5, two puffs BID, rinse/gargle after use.  This can also be used as needed, max 12 puffs per day.  - continue azelastine and fluticasone nasal sprays  - continue fexofenadine 180mg daily  - continue famotidine 20mg BID  - she is UTD with COVID vaccine and booster, and annual influenza vaccine.  - Action plan: prednisone 40mg x5 days    Follow-up  - annually    Merly Kelly CNP  Pulmonary Medicine  [facility]  [phone]         CC:     Asthma follow-up     HPI:     50 year old female with a history of asthma, allergic rhinitis, GERD, presenting for annual follow-up.     Just got over cold/sinus infection.  Didn't affect breathing.   Continues on Symbicort, uses about once every other day.   Rinses mouth afterwards.   Plays softball.   Had bone spur surgery in ankle.           5/8/2025     2:01 PM 5/28/2025    10:13 AM 6/26/2025    10:41 AM   ACT Total Scores   ACT TOTAL SCORE (Goal Greater than or Equal to 20) 19  22  23    In the past 12 months, how many times did you visit the emergency room for your asthma without being admitted to the hospital?  0 0 0   In the past 12 months, how many times were you hospitalized overnight because of your asthma? 0 0 0       Patient-reported          ROS:     A 6-system review was obtained and was negative with the exception of the symptoms endorsed in the HPI.       Medical history:       PMH:  Past Medical History:   Diagnosis Date    Allergic rhinitis, cause unspecified     Allergic rhinitis and blueberries    Benign essential hypertension 05/21/2024    Congestive heart failure (H)     Intertrigo 11/17/2014    Migraine with aura and without status migrainosus, not intractable 07/18/2016    Migraine, unspecified, without mention of intractable migraine without mention of status migrainosus     Migraine    NONSPECIFIC MEDICAL HISTORY     learning disability, mild MR, ADD?    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)    Other acne     Sprain and strain of unspecified site of knee and leg 01/24/2008       PSH:  Past Surgical History:   Procedure Laterality Date    APPENDECTOMY      CARDIAC SURGERY      COLONOSCOPY N/A 08/03/2018    Procedure: COLONOSCOPY;  colonoscopy;  Surgeon: Isaias Medina MD;  Location:  GI    LAPAROSCOPIC HYSTERECTOMY SUPRACERVICAL  11/16/2011    Procedure:LAPAROSCOPIC HYSTERECTOMY SUPRACERVICAL; LAPAROSCOPIC HYSTERECTOMY SUPRACERVICAL ; Surgeon:MASOUD WHITE; Location: OR    REPAIR TENDON ACHILLES Right 12/11/2024    Procedure: Right heel bone spur excision, right Achilles tendon repair and lengthening with reattachment;  Surgeon: Jennifer Rome DPM, Podiatry/Foot and Ankle Surgery;  Location:  OR    SURGICAL HISTORY OF -       ingrown toenails removed    toenail removal         Allergies:  Allergies   Allergen Reactions    Keppra [Levetiracetam] Hives    Macrobid [Nitrofuran Derivatives] GI Disturbance    Tape [Adhesive Tape] Rash       Family Hx:  Family History   Problem Relation Age of Onset    Obesity Mother     C.A.D. Father         MI,  angioplasty, 50s    Diabetes Father         Diabetes    Hypertension Father     Obesity Father     Diabetes Maternal Grandmother         Multiple Sclerosis    Neurologic Disorder Paternal Grandmother     Gastrointestinal Disease Paternal Grandmother         Gluten Intolerance     Diabetes Maternal Aunt        Social Hx:  Social History     Socioeconomic History    Marital status: Single     Spouse name: Not on file    Number of children: 0    Years of education: Not on file    Highest education level: Not on file   Occupational History    Occupation: cleaning     Comment: archiver's   Tobacco Use    Smoking status: Never     Passive exposure: Never    Smokeless tobacco: Never   Vaping Use    Vaping status: Never Used   Substance and Sexual Activity    Alcohol use: No    Drug use: No    Sexual activity: Never     Partners: Male   Other Topics Concern     Service Not Asked    Blood Transfusions Not Asked    Caffeine Concern Yes     Comment: 1 pop daily    Occupational Exposure Not Asked    Hobby Hazards Not Asked    Sleep Concern Not Asked    Stress Concern Not Asked    Weight Concern Not Asked    Special Diet No     Comment: calcium daily    Back Care Not Asked    Exercise Yes     Comment: rollerblade, bike    Bike Helmet Not Asked    Seat Belt Yes    Self-Exams Yes    Parent/sibling w/ CABG, MI or angioplasty before 65F 55M? 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olympics, local tournaments     Social Drivers of Health     Financial Resource Strain: Low Risk  (5/28/2025)    Financial Resource Strain     Within the past 12 months, have you or your family members you live with been unable to get utilities (heat, electricity) when it was really needed?: No   Food Insecurity: Low Risk  (5/28/2025)    Food Insecurity     Within the past 12 months, did you worry that your food would run out before you got money to buy more?: No      Within the past 12 months, did the food you bought just not last and you didn t have money to get more?: No   Transportation Needs: Low Risk  (5/28/2025)    Transportation Needs     Within the past 12 months, has lack of transportation kept you from medical appointments, getting your medicines, non-medical meetings or appointments, work, or from getting things that you need?: No   Physical Activity: Insufficiently Active (5/28/2025)    Exercise Vital Sign     Days of Exercise per Week: 4 days     Minutes of Exercise per Session: 20 min   Stress: No Stress Concern Present (5/28/2025)    [place] [employer] - Occupational Stress Questionnaire     Feeling of Stress : Only a little   Social Connections: Unknown (5/28/2025)    Social Connection and Isolation Panel [NHANES]     Frequency of Communication with Friends and Family: Not on file     Frequency of Social Gatherings with Friends and Family: Once a week     Attends Synagogue Services: Not on file     Active Member of Clubs or Organizations: Not on file     Attends Club or Organization Meetings: Not on file     Marital Status: Not on file   Interpersonal Safety: Low Risk  (6/2/2025)    Interpersonal Safety     Do you feel physically and emotionally safe where you currently live?: Yes     Within the past 12 months, have you been hit, slapped, kicked or otherwise physically hurt by someone?: No     Within the past 12 months, have you been humiliated or emotionally abused in other ways by your partner or ex-partner?: No   Housing Stability: Low Risk  (5/28/2025)    Housing Stability     Do you have housing? : Yes     Are you worried about losing your housing?: No       Current Meds:  Current Outpatient Medications   Medication Sig Dispense Refill    albuterol (PROAIR HFA/PROVENTIL HFA/VENTOLIN HFA) 108 (90 Base) MCG/ACT inhaler Inhale 2 puffs into the lungs every 6 hours. 18 g 11    azelastine (ASTELIN) 0.1 % nasal spray Spray 1 spray into  both nostrils 2 times daily 30 mL 11    budesonide-formoterol (SYMBICORT) 80-4.5 MCG/ACT inhaler INHALE 2 PUFFS INTO THE LUNGS TWICE A DAY 30.6 g 4    cholecalciferol (VITAMIN D3) 25 mcg (1000 units) capsule Take 1 capsule by mouth daily      clotrimazole (LOTRIMIN) 1 % external cream Apply topically 2 times daily 60 g 1    DENTA 5000 PLUS 1.1 % CREA BRUSH WITH SMALL AMT 2/XDAY. DONT SWALLOW/EAT/DRINK/RINSE FOR 30 MIN AFTER      diclofenac (VOLTAREN) 1 % topical gel Apply 2 g topically 4 times daily. 50 g 1    famotidine (PEPCID) 20 MG tablet Take 1 tablet (20 mg) by mouth 2 times daily. 180 tablet 3    fexofenadine (ALLEGRA) 180 MG tablet Take 1 tablet (180 mg) by mouth daily 90 tablet 3    fluticasone (FLONASE) 50 MCG/ACT nasal spray Spray 2 sprays into both nostrils daily. 48 mL 1    KLAYESTA 226624 UNIT/GM external powder APPLY TO AFFECTED AREA TWICE A DAY 60 g 1    loratadine (CLARITIN) 10 MG tablet Take 1 tablet (10 mg) by mouth daily. 30 tablet 11    losartan (COZAAR) 50 MG tablet Take 1 tablet (50 mg) by mouth daily. 90 tablet 1    MULTIVITAMINS OR TABS 1 tab qd as directed  0    rizatriptan (MAXALT) 10 MG tablet Take 1 tablet (10 mg) by mouth at onset of headache for migraine. May repeat in 2 hours. Max 3 tablets/24 hours. 12 tablet 0    semaglutide-weight management (WEGOVY) 0.25 MG/0.5ML pen Inject 0.5 mLs (0.25 mg) subcutaneously once a week. 2 mL 0    rizatriptan (MAXALT) 10 MG tablet TAKE 1 TABLET (10 MG) BY MOUTH AT ONSET OF HEADACHE FOR MIGRAINE. 12 tablet 0          Physical Exam:     /88 (BP Location: Left arm, Patient Position: Sitting, Cuff Size: Adult Large)   Pulse 85   Wt 107 kg (235 lb 12.8 oz)   LMP 10/31/2011   SpO2 97%   BMI 41.77 kg/m    Gen: adult female, appears in NAD  HEENT: clear conjunctivae, moist mucous membranes  CV: RRR, no M/G/R  Resp: CTAB, no focal crackles or wheezes.  Respirations even and unlabored.  On RA.  No cough.  Skin: no apparent rashes on visible  skin  Ext: no cyanosis, clubbing or edema  Neuro: alert and answering questions appropriately       Data:     Labs:  reviewed    Imaging studies:  I have personally reviewed all pertinent imaging studies and PFT results unless otherwise noted.    Chest Xray 8/14/23:  Impression  Heart is normal in size. Lungs are clear.    Pulmonary Function Testing      The FVC, FEV1, and FEV1/FVC ratio are within normal limits. Lung volumes are within normal limits.  Following administration of bronchodilators, there is no significant response.  The diffusing capacity is normal.   IMPRESSION:   Normal spirometry, lung volumes and diffusing capacity.   No significant change following bronchodilators.

## 2025-07-09 ENCOUNTER — NURSE TRIAGE (OUTPATIENT)
Dept: FAMILY MEDICINE | Facility: CLINIC | Age: 50
End: 2025-07-09
Payer: COMMERCIAL

## 2025-07-09 NOTE — TELEPHONE ENCOUNTER
Symptoms: Patient woke up this morning with very hoarse voice. Notes sore throat as well. Patient feels hoarseness is worst symptom.   -Patient stayed home from work today due to lack of voice.   Rates sore throat 4/10  -This morning throat was very sore, had difficulty taking morning medications.   Patient took at home Covid test & it was negative. Denies known exposure to strep    Denies difficulty breathing, swallowing, earache, widespread rash.     -Patient has not taken temp, but does not feel she has fever.      Patient has been taking cough syrup and has not been helping     Patient experiences allergies year round, takes claritin daily .   -Patient has been drinking liquids and has been trying to rest to alleviate symptoms.     RN reviewed home care remedies with patient, informed patient that she could submit an e-visit due to sore throat. Patient verbalized understanding and requested appointment in clinic. RN scheduled patient in clinic tomorrow per request. RN advised patient to call back with new onset, worsening symptoms. Patient verbalized understanding and agreeable to plan.     Future Appointments 7/9/2025 - 1/5/2026        Date Visit Type Length Department Provider     7/10/2025  3:00 PM OFFICE VISIT 30 min RM Carlotta Kat MD    Location Instructions:     [facility] is located at [address] near [address] This is a half mile west of [address]. If traveling west on [address], turn south onto [address], then west onto [address] to reach the parking lot. If traveling east on [address], turn south directly onto [facility].              8/5/2025  8:00 AM NEW HEADACHE 60 min CS NEUROLOGY Swati Schuler MD              8/8/2025  2:00 PM OFFICE VISIT 30 min CR FP/IM/PEDIvelisse Dumont, PAMacieC    Location Instructions:     [facility] is located at [address]  next to [employer]. Free parking is available; access the lot by turning east from [facility] onto [address] [place] [address].              8/29/2025  3:00 PM OFFICE VISIT 30 min CR FP/IM/Ivelisse Urbina PA-C    Location Instructions:     [facility] is located at [address] next to [employer]. Free parking is available; access the lot by turning east from [facility] onto [address] [place] [address].                   OK Powell, RN  [facility]      Reason for Disposition   Hoarseness lasts < 2 weeks   Patient wants to be seen    Additional Information   Negative: SEVERE difficulty breathing (e.g., struggling for each breath, speaks in single words)   Negative: Bluish (or gray) lips or face now   Negative: Stridor with difficulty breathing   Negative: Started suddenly after sting from bee, wasp, or yellow jacket   Negative: Started suddenly after taking a medicine or allergic food (e.g., nuts, seafood)   Negative: Started suddenly along with widespread hives   Negative: Tongue or facial swelling   Negative: Can't swallow normal secretions (e.g., drooling or spitting)   Negative: Sounds like a life-threatening emergency to the triager   Negative: Nasal allergies also present and they are acting up   Negative: Cold symptoms are main concern   Negative: Sore throat is main symptom   Negative: Recovered from choking episode and hoarseness lasts > 30 minutes   Negative: Direct blow to front of neck   Negative: Hoarseness starting in past 24 hours AND taking an ACE Inhibitor medicine (e.g., benazepril/LOTENSIN, captopril/CAPOTEN, enalapril/VASOTEC, lisinopril/ZESTRIL)   Negative: Difficulty breathing   Negative: Patient sounds very sick or weak to the triager   Negative: Fever > 103 F (39.4 C)   Negative: SEVERE sore throat pain   Negative: Fever present > 3 days (72 hours)   Negative: Hoarseness starting > 24 hours ago AND taking an ACE Inhibitor medicine (e.g.,  benazepril/LOTENSIN, captopril/CAPOTEN, enalapril/VASOTEC, lisinopril/ZESTRIL)   Negative: MODERATE to SEVERE hoarseness (e.g., interferes with work) and is professional voice user (e.g., call center worker, galarza, teacher)  (Exception: Current common cold or mild URI symptoms.)   Negative: Hoarseness and risk factor (e.g., recent neck surgery or intubation, smoker, unexpected weight loss) (Exception: Current common cold or mild URI symptoms.)   Negative: Hoarseness and swollen lymph node or lump in neck (Exception: Current common cold or mild URI symptoms.)   Negative: Sore throat lasts > 5 days   Negative: Drooling or spitting out saliva (because can't swallow)   Negative: Unable to open mouth completely   Negative: Drinking very little and has signs of dehydration (e.g., no urine > 12 hours, very dry mouth, very lightheaded)   Negative: Patient sounds very sick or weak to the triager   Negative: Throat culture results, call about   Negative: Productive cough is main symptom   Negative: Runny nose is main symptom   Negative: SEVERE difficulty breathing (e.g., struggling for each breath, speaks in single words)   Negative: Sounds like a life-threatening emergency to the triager   Negative: Difficulty breathing (per caller) but not severe   Negative: Fever > 103 F (39.4 C)   Negative: Refuses to drink anything for > 12 hours   Negative: SEVERE sore throat pain   Negative: Pus on tonsils (back of throat) and swollen neck lymph nodes ('glands')   Negative: Earache also present   Negative: Widespread rash (especially chest and abdomen)   Negative: Diabetes mellitus or weak immune system (e.g., HIV positive, cancer chemo, splenectomy, organ transplant, chronic steroids)   Negative: History of rheumatic fever    Protocols used: Azcqrwyffa-S-GE, Sore Throat-A-OH

## 2025-07-10 ENCOUNTER — OFFICE VISIT (OUTPATIENT)
Dept: FAMILY MEDICINE | Facility: CLINIC | Age: 50
End: 2025-07-10
Payer: COMMERCIAL

## 2025-07-10 VITALS
BODY MASS INDEX: 41.2 KG/M2 | HEIGHT: 63 IN | DIASTOLIC BLOOD PRESSURE: 91 MMHG | HEART RATE: 100 BPM | SYSTOLIC BLOOD PRESSURE: 135 MMHG | RESPIRATION RATE: 20 BRPM | TEMPERATURE: 98.2 F | WEIGHT: 232.5 LBS | OXYGEN SATURATION: 99 %

## 2025-07-10 DIAGNOSIS — R05.1 ACUTE COUGH: Primary | ICD-10-CM

## 2025-07-10 DIAGNOSIS — R49.0 HOARSENESS: ICD-10-CM

## 2025-07-10 PROCEDURE — 3075F SYST BP GE 130 - 139MM HG: CPT | Performed by: GENERAL PRACTICE

## 2025-07-10 PROCEDURE — 1126F AMNT PAIN NOTED NONE PRSNT: CPT | Performed by: GENERAL PRACTICE

## 2025-07-10 PROCEDURE — 3080F DIAST BP >= 90 MM HG: CPT | Performed by: GENERAL PRACTICE

## 2025-07-10 PROCEDURE — 99213 OFFICE O/P EST LOW 20 MIN: CPT | Performed by: GENERAL PRACTICE

## 2025-07-10 RX ORDER — BENZONATATE 100 MG/1
100 CAPSULE ORAL 3 TIMES DAILY PRN
Qty: 30 CAPSULE | Refills: 0 | Status: SHIPPED | OUTPATIENT
Start: 2025-07-10

## 2025-07-10 ASSESSMENT — PAIN SCALES - GENERAL: PAINLEVEL_OUTOF10: NO PAIN (0)

## 2025-07-10 NOTE — PROGRESS NOTES
"  {PROVIDER CHARTING PREFERENCE:546773}    Subjective   Petr Tolbert is a 50 year old, presenting for the following health issues:  Sick        7/10/2025     2:59 PM   Additional Questions   Roomed by Racquel CAMERON MA   Accompanied by self         7/10/2025     2:59 PM   Patient Reported Additional Medications   Patient reports taking the following new medications none     History of Present Illness       Headaches:   Since the patient's last clinic visit, headaches are: no change  The patient is getting headaches:  Every day  She is not able to do normal daily activities when she has a migraine.  The patient is taking the following rescue/relief medications:  Tylenol   Patient states \"I get only a small amount of relief\" from the rescue/relief medications.   The patient is taking the following medications to prevent migraines:  Other  In the past 4 weeks, the patient has gone to an Urgent Care or Emergency Room 0 times times due to headaches.    Reason for visit:  No voice  Symptoms include:  No voice  Symptom intensity:  Severe  Symptom progression:  Staying the same  Had these symptoms before:  No  What makes it worse:  No  What makes it better:  No   She is taking medications regularly.        {MA/LPN/RN Pre-Provider Visit Orders- hCG/UA/Strep (Optional):201860}  Acute Illness  Acute illness concerns: sick   Onset/Duration: couple days now   Symptoms:  Fever: No  Chills/Sweats: super hot   Headache (location?): YES  Sinus Pressure: No  Conjunctivitis:  No  Ear Pain: no  Rhinorrhea: YES  Congestion: mc  Sore Throat: mild  Cough: YES-productive of green sputum  Wheeze: a little   Decreased Appetite: No  Nausea: No  Vomiting: No  Diarrhea: No  Dysuria/Freq.: No  Dysuria or Hematuria: No  Fatigue/Achiness: tired   Sick/Strep Exposure: YES- dad has a cold   Therapies tried and outcome: cold meds over the counter   {additonal problems for provider to add (Optional):916724}    {ROS Picklists (Optional):308745}      Objective  " "  BP (!) 135/91 (BP Location: Right arm, Patient Position: Sitting, Cuff Size: Adult Large)   Pulse 100   Temp 98.2  F (36.8  C) (Oral)   Resp 20   Ht 1.6 m (5' 3\")   Wt 105.5 kg (232 lb 8 oz)   LMP 10/31/2011   SpO2 99%   BMI 41.19 kg/m    Body mass index is 41.19 kg/m .  Physical Exam   {Exam List (Optional):382293}    {Diagnostic Test Results (Optional):260157}        Signed Electronically by: Carlotta Dominguez MD  {Email feedback regarding this note to [email or web address]   :[id number]}  "   Cardiovascular:      Rate and Rhythm: Normal rate and regular rhythm.   Pulmonary:      Effort: Pulmonary effort is normal.      Breath sounds: Normal breath sounds.   Abdominal:      Palpations: Abdomen is soft.   Musculoskeletal:         General: Normal range of motion.      Cervical back: Normal range of motion.   Skin:     General: Skin is warm.   Neurological:      General: No focal deficit present.      Mental Status: She is alert and oriented to person, place, and time. Mental status is at baseline.   Psychiatric:         Mood and Affect: Mood normal.         Behavior: Behavior normal.         Thought Content: Thought content normal.         Judgment: Judgment normal.                    Signed Electronically by: Carlotta Dominguez MD

## 2025-07-10 NOTE — LETTER
7/10/2025    Prince Patel   1975        To Whom it May Concern;    Please excuse Prince Patel from work until she recovers from her condition.    Sincerely,        Carlotta Dominguez MD

## 2025-07-29 ENCOUNTER — MYC MEDICAL ADVICE (OUTPATIENT)
Dept: FAMILY MEDICINE | Facility: CLINIC | Age: 50
End: 2025-07-29
Payer: COMMERCIAL

## 2025-08-01 ASSESSMENT — HEADACHE IMPACT TEST (HIT 6)
WHEN YOU HAVE A HEADACHE HOW OFTEN DO YOU WISH YOU COULD LIE DOWN: SOMETIMES
HOW OFTEN DO HEADACHES LIMIT YOUR DAILY ACTIVITIES: RARELY
HOW OFTEN HAVE YOU FELT TOO TIRED TO WORK BECAUSE OF YOUR HEADACHES: RARELY
HOW OFTEN DID HEADACHS LIMIT CONCENTRATION ON WORK OR DAILY ACTIVITY: RARELY
HIT6 TOTAL SCORE: 51
HOW OFTEN HAVE YOU FELT FED UP OR IRRITATED BECAUSE OF YOUR HEADACHES: NEVER
WHEN YOU HAVE HEADACHES HOW OFTEN IS THE PAIN SEVERE: VERY OFTEN

## 2025-08-04 ENCOUNTER — TELEPHONE (OUTPATIENT)
Dept: NEUROLOGY | Facility: CLINIC | Age: 50
End: 2025-08-04
Payer: COMMERCIAL

## 2025-08-04 ASSESSMENT — MIGRAINE DISABILITY ASSESSMENT (MIDAS)
HOW MANY DAYS DID YOU MISS WORK OR SCHOOL BECAUSE OF HEADACHES: 0
HOW OFTEN WERE SOCIAL ACTIVITIES MISSED DUE TO HEADACHES: 0
HOW MANY DAYS WAS HOUSEWORK PRODUCTIVITY CUT IN HALF DUE TO HEADACHES: 0
TOTAL SCORE: 1
HOW MANY DAYS DID YOU NOT DO HOUSEWORK BECAUSE OF HEADACHES: 1
ON A SCALE FROM 0-10 ON AVERAGE HOW PAINFUL WERE HEADACHES: 5
HOW MANY DAYS WAS YOUR PRODUCTIVITY CUT IN HALF BECAUSE OF HEADACHES: 0
HOW MANY DAYS IN THE PAST 3 MONTHS HAVE YOU HAD A HEADACHE: 2

## 2025-08-05 ENCOUNTER — TELEPHONE (OUTPATIENT)
Dept: NEUROLOGY | Facility: CLINIC | Age: 50
End: 2025-08-05

## 2025-08-05 ENCOUNTER — OFFICE VISIT (OUTPATIENT)
Dept: INTERNAL MEDICINE | Facility: CLINIC | Age: 50
End: 2025-08-05
Payer: COMMERCIAL

## 2025-08-05 ENCOUNTER — OFFICE VISIT (OUTPATIENT)
Dept: NEUROLOGY | Facility: CLINIC | Age: 50
End: 2025-08-05
Payer: COMMERCIAL

## 2025-08-05 VITALS
HEIGHT: 63 IN | SYSTOLIC BLOOD PRESSURE: 130 MMHG | WEIGHT: 232 LBS | HEART RATE: 91 BPM | DIASTOLIC BLOOD PRESSURE: 86 MMHG | BODY MASS INDEX: 41.11 KG/M2 | OXYGEN SATURATION: 100 %

## 2025-08-05 VITALS
OXYGEN SATURATION: 98 % | SYSTOLIC BLOOD PRESSURE: 122 MMHG | DIASTOLIC BLOOD PRESSURE: 88 MMHG | HEART RATE: 84 BPM | HEIGHT: 63 IN | TEMPERATURE: 98.3 F | WEIGHT: 234 LBS | BODY MASS INDEX: 41.46 KG/M2 | RESPIRATION RATE: 20 BRPM

## 2025-08-05 DIAGNOSIS — S06.0X0A CONCUSSION WITHOUT LOSS OF CONSCIOUSNESS, INITIAL ENCOUNTER: ICD-10-CM

## 2025-08-05 DIAGNOSIS — G43.109 MIGRAINE WITH AURA AND WITHOUT STATUS MIGRAINOSUS, NOT INTRACTABLE: ICD-10-CM
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
## 2025-08-05 DIAGNOSIS — H53.8 SEES FLASHES OF LIGHT: Primary | ICD-10-CM

## 2025-08-05 DIAGNOSIS — N95.1 MENOPAUSAL SYNDROME (HOT FLASHES): Primary | ICD-10-CM

## 2025-08-05 PROCEDURE — 3079F DIAST BP 80-89 MM HG: CPT | Performed by: PSYCHIATRY & NEUROLOGY

## 2025-08-05 PROCEDURE — 3079F DIAST BP 80-89 MM HG: CPT

## 2025-08-05 PROCEDURE — 99214 OFFICE O/P EST MOD 30 MIN: CPT

## 2025-08-05 PROCEDURE — 3074F SYST BP LT 130 MM HG: CPT

## 2025-08-05 PROCEDURE — 3075F SYST BP GE 130 - 139MM HG: CPT | Performed by: PSYCHIATRY & NEUROLOGY

## 2025-08-05 PROCEDURE — 1126F AMNT PAIN NOTED NONE PRSNT: CPT

## 2025-08-05 PROCEDURE — G2211 COMPLEX E/M VISIT ADD ON: HCPCS

## 2025-08-05 PROCEDURE — 99205 OFFICE O/P NEW HI 60 MIN: CPT | Performed by: PSYCHIATRY & NEUROLOGY

## 2025-08-05 RX ORDER — CITALOPRAM HYDROBROMIDE 10 MG/1
10 TABLET ORAL DAILY
Qty: 90 TABLET | Refills: 0 | Status: SHIPPED | OUTPATIENT
Start: 2025-08-05 | End: 2025-11-03

## 2025-08-05 RX ORDER — METHOCARBAMOL 750 MG/1
750 TABLET, FILM COATED ORAL 4 TIMES DAILY PRN
Qty: 60 TABLET | Refills: 0 | Status: SHIPPED | OUTPATIENT
Start: 2025-08-05

## 2025-08-05 ASSESSMENT — PAIN SCALES - GENERAL: PAINLEVEL_OUTOF10: NO PAIN (0)

## 2025-08-06 ENCOUNTER — PATIENT OUTREACH (OUTPATIENT)
Dept: CARE COORDINATION | Facility: CLINIC | Age: 50
End: 2025-08-06
Payer: COMMERCIAL

## 2025-08-08 ENCOUNTER — TELEPHONE (OUTPATIENT)
Dept: OPHTHALMOLOGY | Facility: CLINIC | Age: 50
End: 2025-08-08

## 2025-08-11 ENCOUNTER — VIRTUAL VISIT (OUTPATIENT)
Facility: CLINIC | Age: 50
End: 2025-08-11
Payer: COMMERCIAL

## 2025-08-11 DIAGNOSIS — F90.0 ATTENTION DEFICIT HYPERACTIVITY DISORDER, INATTENTIVE TYPE: Primary | ICD-10-CM

## 2025-08-11 DIAGNOSIS — H53.40 UNSPECIFIED VISUAL FIELD DEFECTS: ICD-10-CM

## 2025-08-11 DIAGNOSIS — G43.109 OCULAR MIGRAINE: Primary | ICD-10-CM

## 2025-08-11 PROCEDURE — 90834 PSYTX W PT 45 MINUTES: CPT | Mod: 95 | Performed by: PSYCHOLOGIST

## 2025-08-11 ASSESSMENT — PATIENT HEALTH QUESTIONNAIRE - PHQ9
SUM OF ALL RESPONSES TO PHQ QUESTIONS 1-9: 5
10. IF YOU CHECKED OFF ANY PROBLEMS, HOW DIFFICULT HAVE THESE PROBLEMS MADE IT FOR YOU TO DO YOUR WORK, TAKE CARE OF THINGS AT HOME, OR GET ALONG WITH OTHER PEOPLE: SOMEWHAT DIFFICULT
SUM OF ALL RESPONSES TO PHQ QUESTIONS 1-9: 5

## 2025-08-18 ENCOUNTER — OFFICE VISIT (OUTPATIENT)
Dept: OPHTHALMOLOGY | Facility: CLINIC | Age: 50
End: 2025-08-18
Attending: OPTOMETRIST
Payer: COMMERCIAL

## 2025-08-18 DIAGNOSIS — H25.13 NUCLEAR SENILE CATARACT OF BOTH EYES: ICD-10-CM

## 2025-08-18 DIAGNOSIS — G43.109 OCULAR MIGRAINE: Primary | ICD-10-CM

## 2025-08-18 DIAGNOSIS — G43.109 OCULAR MIGRAINE: ICD-10-CM

## 2025-08-18 DIAGNOSIS — H47.333 CROWDED OPTIC DISC, BILATERAL: ICD-10-CM

## 2025-08-18 DIAGNOSIS — H47.333 CROWDED OPTIC DISC, BILATERAL: Primary | ICD-10-CM

## 2025-08-18 PROCEDURE — G0463 HOSPITAL OUTPT CLINIC VISIT: HCPCS | Performed by: OPTOMETRIST

## 2025-08-18 PROCEDURE — 92083 EXTENDED VISUAL FIELD XM: CPT | Performed by: OPTOMETRIST

## 2025-08-18 PROCEDURE — 92133 CPTRZD OPH DX IMG PST SGM ON: CPT | Performed by: OPTOMETRIST

## 2025-08-18 PROCEDURE — 92250 FUNDUS PHOTOGRAPHY W/I&R: CPT | Performed by: OPTOMETRIST

## 2025-08-18 PROCEDURE — 99207 OCT OPTIC NERVE RNFL SPECTRALIS OU (BOTH EYES): CPT | Mod: 26 | Performed by: OPTOMETRIST

## 2025-08-18 PROCEDURE — 92250 FUNDUS PHOTOGRAPHY W/I&R: CPT | Mod: 26 | Performed by: OPTOMETRIST

## 2025-08-18 PROCEDURE — 92083 EXTENDED VISUAL FIELD XM: CPT | Mod: 26 | Performed by: OPTOMETRIST

## 2025-08-18 PROCEDURE — 99204 OFFICE O/P NEW MOD 45 MIN: CPT | Performed by: OPTOMETRIST

## 2025-08-18 ASSESSMENT — TONOMETRY
OD_IOP_MMHG: 13
IOP_METHOD: ICARE
OS_IOP_MMHG: 16

## 2025-08-18 ASSESSMENT — CONF VISUAL FIELD
OD_INFERIOR_NASAL_RESTRICTION: 0
OS_SUPERIOR_NASAL_RESTRICTION: 0
OS_SUPERIOR_TEMPORAL_RESTRICTION: 0
OD_NORMAL: 1
OD_SUPERIOR_NASAL_RESTRICTION: 0
OS_INFERIOR_TEMPORAL_RESTRICTION: 0
OD_SUPERIOR_TEMPORAL_RESTRICTION: 0
OS_INFERIOR_NASAL_RESTRICTION: 0
METHOD: COUNTING FINGERS
OD_INFERIOR_TEMPORAL_RESTRICTION: 0
OS_NORMAL: 1

## 2025-08-18 ASSESSMENT — REFRACTION_MANIFEST
OD_CYLINDER: SPHERE
OD_SPHERE: PLANO
OD_ADD: +1.25
OS_ADD: +1.25
OS_CYLINDER: SPHERE
OS_SPHERE: -0.25

## 2025-08-18 ASSESSMENT — SLIT LAMP EXAM - LIDS
COMMENTS: NORMAL
COMMENTS: NORMAL

## 2025-08-18 ASSESSMENT — VISUAL ACUITY
METHOD: SNELLEN - LINEAR
OD_SC: 20/20
OS_SC+: -1
OS_SC: 20/25

## 2025-08-18 ASSESSMENT — EXTERNAL EXAM - RIGHT EYE: OD_EXAM: NORMAL

## 2025-08-18 ASSESSMENT — EXTERNAL EXAM - LEFT EYE: OS_EXAM: NORMAL

## 2025-08-29 ENCOUNTER — MYC MEDICAL ADVICE (OUTPATIENT)
Dept: FAMILY MEDICINE | Facility: CLINIC | Age: 50
End: 2025-08-29

## 2025-08-29 DIAGNOSIS — N95.1 MENOPAUSAL SYNDROME (HOT FLASHES): Primary | ICD-10-CM

## 2025-09-02 RX ORDER — CITALOPRAM HYDROBROMIDE 20 MG/1
20 TABLET ORAL DAILY
Qty: 90 TABLET | Refills: 3 | Status: SHIPPED | OUTPATIENT
Start: 2025-09-02

## 2025-09-04 ENCOUNTER — OFFICE VISIT (OUTPATIENT)
Dept: PHYSICAL MEDICINE AND REHAB | Facility: CLINIC | Age: 50
End: 2025-09-04
Attending: PSYCHIATRY & NEUROLOGY
Payer: COMMERCIAL

## 2025-09-04 VITALS — DIASTOLIC BLOOD PRESSURE: 90 MMHG | SYSTOLIC BLOOD PRESSURE: 128 MMHG | HEART RATE: 72 BPM

## 2025-09-04 DIAGNOSIS — R20.0 BILATERAL HAND NUMBNESS: ICD-10-CM

## 2025-09-04 DIAGNOSIS — S06.0X0A CONCUSSION WITHOUT LOSS OF CONSCIOUSNESS, INITIAL ENCOUNTER: ICD-10-CM

## 2025-09-04 DIAGNOSIS — M54.2 MYOFASCIAL NECK PAIN: ICD-10-CM

## 2025-09-04 DIAGNOSIS — S06.0X0S CONCUSSION WITHOUT LOSS OF CONSCIOUSNESS, SEQUELA: Primary | ICD-10-CM

## 2025-09-04 RX ORDER — LIDOCAINE 4 G/G
1 PATCH TOPICAL EVERY 24 HOURS
Qty: 30 PATCH | Refills: 1 | Status: SHIPPED | OUTPATIENT
Start: 2025-09-04 | End: 2025-11-03

## (undated) DEVICE — DRSG GAUZE 4X4" TRAY

## (undated) DEVICE — CAST PADDING 4" UNSTERILE 9044

## (undated) DEVICE — SU PROLENE 4-0 PS-2 18" 8682G

## (undated) DEVICE — BLADE KNIFE SURG 15 371115

## (undated) DEVICE — PAD CHUX UNDERPAD 30X36" P3036C

## (undated) DEVICE — PREP POVIDONE-IODINE 7.5% SCRUB 4OZ BOTTLE MDS093945

## (undated) DEVICE — SU VICRYL 4-0 PS-2 18" UND J496H

## (undated) DEVICE — SU FIBERWIRE 3-0 18" AR-7227-01

## (undated) DEVICE — GLOVE BIOGEL PI MICRO SZ 6.5 48565

## (undated) DEVICE — DRSG KERLIX 4 1/2"X4YDS ROLL 6730

## (undated) DEVICE — GLOVE BIOGEL PI MICRO INDICATOR UNDERGLOVE SZ 6.5 48965

## (undated) DEVICE — LINEN FULL SHEET 5511

## (undated) DEVICE — NDL 27GA 1.25" 305136

## (undated) DEVICE — BNDG ELASTIC 4" DBL LENGTH UNSTERILE 6611-14

## (undated) DEVICE — LINEN ORTHO ACL PACK 5447

## (undated) DEVICE — PREP POVIDONE IODINE SOLUTION 10% 4OZ BOTTLE 29906-004

## (undated) DEVICE — BLADE SAW SAGITTAL 25.5X9.5X.4MM FINE LINVATEC 5023-138

## (undated) DEVICE — DECANTER VIAL 2006S

## (undated) DEVICE — PACK LOWER EXTREMITY RIDGES

## (undated) DEVICE — BAG CLEAR TRASH 1.3M 39X33" P4040C

## (undated) DEVICE — SOL WATER IRRIG 1000ML BOTTLE 2F7114

## (undated) DEVICE — SU VICRYL 3-0 PS-2 18" UND J497H

## (undated) DEVICE — LINEN HALF SHEET 5512

## (undated) DEVICE — TRAY PREP DRY SKIN SCRUB 067

## (undated) DEVICE — KIT ENDO TURNOVER/PROCEDURE W/CLEAN A SCOPE LINERS 103888

## (undated) DEVICE — TOURNIQUET SGL BLADDER 34"X4" PURPLE 5921-034-135

## (undated) RX ORDER — DEXAMETHASONE SODIUM PHOSPHATE 4 MG/ML
INJECTION, SOLUTION INTRA-ARTICULAR; INTRALESIONAL; INTRAMUSCULAR; INTRAVENOUS; SOFT TISSUE
Status: DISPENSED
Start: 2024-12-11

## (undated) RX ORDER — CEFAZOLIN SODIUM/WATER 2 G/20 ML
SYRINGE (ML) INTRAVENOUS
Status: DISPENSED
Start: 2024-12-11

## (undated) RX ORDER — PROPOFOL 10 MG/ML
INJECTION, EMULSION INTRAVENOUS
Status: DISPENSED
Start: 2024-12-11

## (undated) RX ORDER — FENTANYL CITRATE 50 UG/ML
INJECTION, SOLUTION INTRAMUSCULAR; INTRAVENOUS
Status: DISPENSED
Start: 2024-12-11

## (undated) RX ORDER — BUPIVACAINE HYDROCHLORIDE 5 MG/ML
INJECTION, SOLUTION EPIDURAL; INTRACAUDAL
Status: DISPENSED
Start: 2024-12-11

## (undated) RX ORDER — LABETALOL HYDROCHLORIDE 5 MG/ML
INJECTION, SOLUTION INTRAVENOUS
Status: DISPENSED
Start: 2024-12-11

## (undated) RX ORDER — LIDOCAINE HYDROCHLORIDE 10 MG/ML
INJECTION, SOLUTION EPIDURAL; INFILTRATION; INTRACAUDAL; PERINEURAL
Status: DISPENSED
Start: 2024-12-11

## (undated) RX ORDER — KETOROLAC TROMETHAMINE 15 MG/ML
INJECTION, SOLUTION INTRAMUSCULAR; INTRAVENOUS
Status: DISPENSED
Start: 2024-12-11

## (undated) RX ORDER — FENTANYL CITRATE 50 UG/ML
INJECTION, SOLUTION INTRAMUSCULAR; INTRAVENOUS
Status: DISPENSED
Start: 2018-08-03

## (undated) RX ORDER — ONDANSETRON 2 MG/ML
INJECTION INTRAMUSCULAR; INTRAVENOUS
Status: DISPENSED
Start: 2024-12-11